# Patient Record
Sex: MALE | Race: OTHER | NOT HISPANIC OR LATINO
[De-identification: names, ages, dates, MRNs, and addresses within clinical notes are randomized per-mention and may not be internally consistent; named-entity substitution may affect disease eponyms.]

---

## 2019-03-11 PROBLEM — Z00.00 ENCOUNTER FOR PREVENTIVE HEALTH EXAMINATION: Status: ACTIVE | Noted: 2019-03-11

## 2019-03-12 ENCOUNTER — APPOINTMENT (OUTPATIENT)
Dept: NEPHROLOGY | Facility: CLINIC | Age: 69
End: 2019-03-12
Payer: MEDICARE

## 2019-03-12 VITALS — SYSTOLIC BLOOD PRESSURE: 150 MMHG | RESPIRATION RATE: 14 BRPM | DIASTOLIC BLOOD PRESSURE: 88 MMHG | HEART RATE: 90 BPM

## 2019-03-12 DIAGNOSIS — Z83.3 FAMILY HISTORY OF DIABETES MELLITUS: ICD-10-CM

## 2019-03-12 DIAGNOSIS — Z78.9 OTHER SPECIFIED HEALTH STATUS: ICD-10-CM

## 2019-03-12 PROCEDURE — 99204 OFFICE O/P NEW MOD 45 MIN: CPT

## 2019-03-12 RX ORDER — MOXIFLOXACIN OPHTHALMIC 5 MG/ML
0.5 SOLUTION/ DROPS OPHTHALMIC
Qty: 3 | Refills: 0 | Status: DISCONTINUED | COMMUNITY
Start: 2018-11-01

## 2019-03-12 RX ORDER — KETOROLAC TROMETHAMINE 5 MG/ML
0.5 SOLUTION OPHTHALMIC
Qty: 5 | Refills: 0 | Status: DISCONTINUED | COMMUNITY
Start: 2018-11-01

## 2019-03-12 RX ORDER — PREDNISOLONE ACETATE 10 MG/ML
1 SUSPENSION/ DROPS OPHTHALMIC
Qty: 15 | Refills: 0 | Status: DISCONTINUED | COMMUNITY
Start: 2018-11-01

## 2019-03-14 ENCOUNTER — FORM ENCOUNTER (OUTPATIENT)
Age: 69
End: 2019-03-14

## 2019-03-15 ENCOUNTER — APPOINTMENT (OUTPATIENT)
Dept: ULTRASOUND IMAGING | Facility: HOSPITAL | Age: 69
End: 2019-03-15
Payer: MEDICARE

## 2019-03-15 ENCOUNTER — OUTPATIENT (OUTPATIENT)
Dept: OUTPATIENT SERVICES | Facility: HOSPITAL | Age: 69
LOS: 1 days | End: 2019-03-15
Payer: MEDICARE

## 2019-03-15 PROCEDURE — 76770 US EXAM ABDO BACK WALL COMP: CPT

## 2019-03-15 PROCEDURE — 93976 VASCULAR STUDY: CPT | Mod: 26

## 2019-03-15 PROCEDURE — 93976 VASCULAR STUDY: CPT

## 2019-03-15 PROCEDURE — 76770 US EXAM ABDO BACK WALL COMP: CPT | Mod: 26,59

## 2019-03-18 ENCOUNTER — LABORATORY RESULT (OUTPATIENT)
Age: 69
End: 2019-03-18

## 2019-03-26 LAB
24R-OH-CALCIDIOL SERPL-MCNC: 26.1 PG/ML
25(OH)D3 SERPL-MCNC: 24.8 NG/ML
ALBUMIN MFR SERPL ELPH: 54.6 %
ALBUMIN SERPL ELPH-MCNC: 4.3 G/DL
ALBUMIN SERPL-MCNC: 4.1 G/DL
ALBUMIN/GLOB SERPL: 1.2 RATIO
ALPHA1 GLOB MFR SERPL ELPH: 3.9 %
ALPHA1 GLOB SERPL ELPH-MCNC: 0.3 G/DL
ALPHA2 GLOB MFR SERPL ELPH: 12.7 %
ALPHA2 GLOB SERPL ELPH-MCNC: 1 G/DL
ANA SER IF-ACNC: NEGATIVE
ANION GAP SERPL CALC-SCNC: 12 MMOL/L
APPEARANCE: CLEAR
B-GLOBULIN MFR SERPL ELPH: 13.3 %
B-GLOBULIN SERPL ELPH-MCNC: 1 G/DL
BACTERIA: NEGATIVE
BILIRUBIN URINE: NEGATIVE
BLOOD URINE: NORMAL
BUN SERPL-MCNC: 26 MG/DL
C3 SERPL-MCNC: 146 MG/DL
C4 SERPL-MCNC: 33 MG/DL
CALCIUM SERPL-MCNC: 9.7 MG/DL
CALCIUM SERPL-MCNC: 9.7 MG/DL
CHLORIDE SERPL-SCNC: 101 MMOL/L
CO2 SERPL-SCNC: 24 MMOL/L
COLOR: NORMAL
CREAT SERPL-MCNC: 2.42 MG/DL
CREAT SPEC-SCNC: 56 MG/DL
CREAT SPEC-SCNC: 56 MG/DL
CREAT/PROT UR: 2.4 RATIO
DEPRECATED KAPPA LC FREE/LAMBDA SER: 1.35 RATIO
DSDNA AB SER-ACNC: <12 IU/ML
GAMMA GLOB FLD ELPH-MCNC: 1.2 G/DL
GAMMA GLOB MFR SERPL ELPH: 15.5 %
GBM AB TITR SER IF: <1 AL
GLUCOSE QUALITATIVE U: ABNORMAL
GLUCOSE SERPL-MCNC: 311 MG/DL
HBV CORE IGG+IGM SER QL: NONREACTIVE
HBV CORE IGM SER QL: NONREACTIVE
HBV SURFACE AB SER QL: NONREACTIVE
HBV SURFACE AG SER QL: NONREACTIVE
HCV AB SER QL: NONREACTIVE
HCV S/CO RATIO: 0.1 S/CO
HIV1+2 AB SPEC QL IA.RAPID: NONREACTIVE
HYALINE CASTS: 0 /LPF
INTERPRETATION SERPL IEP-IMP: NORMAL
KAPPA LC CSF-MCNC: 4.77 MG/DL
KAPPA LC SERPL-MCNC: 6.42 MG/DL
KETONES URINE: NEGATIVE
LEUKOCYTE ESTERASE URINE: NEGATIVE
M PROTEIN SPEC IFE-MCNC: NORMAL
MICROALBUMIN 24H UR DL<=1MG/L-MCNC: 80.2 MG/DL
MICROALBUMIN/CREAT 24H UR-RTO: 1436 MG/G
MICROSCOPIC-UA: NORMAL
MPO AB + PR3 PNL SER: NORMAL
NITRITE URINE: NEGATIVE
PARATHYROID HORMONE INTACT: 100 PG/ML
PH URINE: 6
PHOSPHATE SERPL-MCNC: 4.3 MG/DL
PHOSPHOLIPASE A2 RECEPTOR ELISA: <2 RU/ML
PHOSPHOLIPASE A2 RECEPTOR IFA: NEGATIVE
POTASSIUM SERPL-SCNC: 5.1 MMOL/L
PROT SERPL-MCNC: 7.5 G/DL
PROT SERPL-MCNC: 7.5 G/DL
PROT UR-MCNC: 132 MG/DL
PROTEIN URINE: ABNORMAL
RED BLOOD CELLS URINE: 3 /HPF
SODIUM SERPL-SCNC: 137 MMOL/L
SPECIFIC GRAVITY URINE: 1.01
SQUAMOUS EPITHELIAL CELLS: 0 /HPF
T PALLIDUM AB SER QL IA: NEGATIVE
URATE SERPL-MCNC: 6.5 MG/DL
UROBILINOGEN URINE: NORMAL
WHITE BLOOD CELLS URINE: 1 /HPF

## 2019-03-26 NOTE — HISTORY OF PRESENT ILLNESS
[FreeTextEntry1] : 68yo [place]an M with HTN, long standing DMII + retinopathy referred by PCP Dr. Jaime To for CKD management after found to have rising Cr, microscopic hematuria and non-nephrotic proteinuria:\par \par Told about 1 yr ago that he needs to drink more water because of "kidneys'  but other wise denies knowledge of any prior kidney disease before February tania with PCP where Cr 2.4. He does say PCP told him the value checked before that in 2018 was "1.8" although didn't bring these records. Hard to get a straight answer from him regarding knowledge of renal disease or proteinuria in the past. \par \par DMII dx >20yrs ago, +retinopathy it seems (told dm affecting eye, also w cataracts), follows w optho. \par No neuropathy. \par \par Denies urinary sx, good stream usually w occasional lighter stream. no dysuria/hesitancy. no foamy urine, no hematuria. Does think he was told of "christina in the urine" when he was 7yo told it was 2/2 eating too many tomatoes. Never had sx of nephrolithiasis nor passed a stone subsequently. \par \par Follows low carb diet for DM, stable diet. Good appetite, no nausea. No skin itching or metallic taste.\par no LE swelling or SOB. \par No new joint pain/swelling/rash. \par \par OTC: B12, MVM, no NSAIDs, no herbals.\par \par

## 2019-03-26 NOTE — CONSULT LETTER
[Dear  ___] : Dear  [unfilled], [Consult Letter:] : I had the pleasure of evaluating your patient, [unfilled]. [Please see my note below.] : Please see my note below. [Consult Closing:] : Thank you very much for allowing me to participate in the care of this patient.  If you have any questions, please do not hesitate to contact me. [Sincerely,] : Sincerely, [FreeTextEntry3] : Tammy Vasquez MD\par  of Medicine\par Division of Kidney Diseases and Hypertension\par [facility] \par [employer] [facility]\par \par \par

## 2019-03-26 NOTE — PHYSICAL EXAM
[General Appearance - Alert] : alert [General Appearance - In No Acute Distress] : in no acute distress [General Appearance - Well Nourished] : well nourished [Sclera] : the sclera and conjunctiva were normal [PERRL With Normal Accommodation] : pupils were equal in size, round, and reactive to light [Extraocular Movements] : extraocular movements were intact [Outer Ear] : the ears and nose were normal in appearance [Oropharynx] : the oropharynx was normal [Neck Appearance] : the appearance of the neck was normal [Jugular Venous Distention Increased] : there was no jugular-venous distention [Auscultation Breath Sounds / Voice Sounds] : lungs were clear to auscultation bilaterally [Heart Rate And Rhythm] : heart rate was normal and rhythm regular [Heart Sounds] : normal S1 and S2 [Heart Sounds Gallop] : no gallops [Murmurs] : no murmurs [Heart Sounds Pericardial Friction Rub] : no pericardial rub [Full Pulse] : the pedal pulses are present [Edema] : there was no peripheral edema [Bowel Sounds] : normal bowel sounds [Abdomen Soft] : soft [Abdomen Tenderness] : non-tender [No CVA Tenderness] : no ~M costovertebral angle tenderness [No Spinal Tenderness] : no spinal tenderness [Abnormal Walk] : normal gait [Involuntary Movements] : no involuntary movements were seen [Musculoskeletal - Swelling] : no joint swelling seen [Skin Color & Pigmentation] : normal skin color and pigmentation [] : no rash [No Focal Deficits] : no focal deficits [Oriented To Time, Place, And Person] : oriented to person, place, and time [Impaired Insight] : insight and judgment were intact [Affect] : the affect was normal [FreeTextEntry1] : no asterixis

## 2019-04-03 ENCOUNTER — APPOINTMENT (OUTPATIENT)
Dept: NEPHROLOGY | Facility: CLINIC | Age: 69
End: 2019-04-03
Payer: MEDICARE

## 2019-04-03 VITALS — HEART RATE: 82 BPM | DIASTOLIC BLOOD PRESSURE: 80 MMHG | SYSTOLIC BLOOD PRESSURE: 145 MMHG | RESPIRATION RATE: 14 BRPM

## 2019-04-03 PROCEDURE — 99215 OFFICE O/P EST HI 40 MIN: CPT

## 2019-04-03 RX ORDER — METFORMIN HYDROCHLORIDE 1000 MG/1
1000 TABLET, COATED ORAL
Qty: 180 | Refills: 0 | Status: DISCONTINUED | COMMUNITY
Start: 2018-12-24 | End: 2019-04-03

## 2019-04-04 NOTE — ASSESSMENT
[FreeTextEntry1] : 70yo Iranian M with HTN, BPH with mild urinary retention, long standing DMII + retinopathy and CKD IV with non-nephrotic proteinuria:\par \par # CKD IV -  likely 2/2 diabetic nephropathy\par - Cr stable compared to Feb 2.4, 2.6g proteinuria, egfr 28.5 -  Ramipril 2.5mg  on hold 2/2 hyperkalemia - improved to 5.1, started chlorthalidone for HTN, edema and hyperkalemia, recheck labs in 1 month. Counselled extensively on low K diet, given literature. If k better next time will try to start  ACEi +/- Veltassa\par u/a 2+ protein, 10-20 RBCs Feb however repeat in march just proteinuria and glucosuria. 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 Needs ergo for secondary hyperparathyroidism. \par Reviewed renal sono, 10.5/10.7cm normal looking kidneys, mildly enlarged prostate with 170cc PVR - referred to urologist, may benefit from starting tamsulosin\par \par # DMII - + retinopathy\par HbA1C 7.9% Feb, says was difficult to control until he started lantus. Referred to endocrine, stopped Metformin.\par \par # HTN - fair control, started chlorthalidone, higher dose 50mg for reduced egfr\par

## 2019-04-04 NOTE — HISTORY OF PRESENT ILLNESS
[FreeTextEntry1] : 70yo [place]ian M with HTN, BPH with mild urinary retention, long standing DMII + retinopathy and CKD IV with non-nephrotic proteinuria:\par \par Feeling well since last viist. Picked  up norvasc to start in place of acei. No LE edema.\par No urinary symptoms.\par No headaches/dizziness. \par BGs have been very well controlled lately per pt, 's in the mornings. \par \par All other ROS negative

## 2019-04-26 ENCOUNTER — INPATIENT (INPATIENT)
Facility: HOSPITAL | Age: 69
LOS: 2 days | Discharge: HOME | DRG: 683 | End: 2019-04-29
Attending: INTERNAL MEDICINE | Admitting: INTERNAL MEDICINE
Payer: MEDICARE

## 2019-04-26 VITALS
SYSTOLIC BLOOD PRESSURE: 190 MMHG | TEMPERATURE: 98 F | HEART RATE: 95 BPM | OXYGEN SATURATION: 98 % | WEIGHT: 160.06 LBS | DIASTOLIC BLOOD PRESSURE: 77 MMHG | RESPIRATION RATE: 16 BRPM | HEIGHT: 67 IN

## 2019-04-26 DIAGNOSIS — Z29.9 ENCOUNTER FOR PROPHYLACTIC MEASURES, UNSPECIFIED: ICD-10-CM

## 2019-04-26 DIAGNOSIS — E78.5 HYPERLIPIDEMIA, UNSPECIFIED: ICD-10-CM

## 2019-04-26 DIAGNOSIS — E11.9 TYPE 2 DIABETES MELLITUS WITHOUT COMPLICATIONS: ICD-10-CM

## 2019-04-26 DIAGNOSIS — N18.4 CHRONIC KIDNEY DISEASE, STAGE 4 (SEVERE): ICD-10-CM

## 2019-04-26 DIAGNOSIS — R63.8 OTHER SYMPTOMS AND SIGNS CONCERNING FOOD AND FLUID INTAKE: ICD-10-CM

## 2019-04-26 DIAGNOSIS — N40.0 BENIGN PROSTATIC HYPERPLASIA WITHOUT LOWER URINARY TRACT SYMPTOMS: ICD-10-CM

## 2019-04-26 DIAGNOSIS — E87.1 HYPO-OSMOLALITY AND HYPONATREMIA: ICD-10-CM

## 2019-04-26 DIAGNOSIS — Z91.89 OTHER SPECIFIED PERSONAL RISK FACTORS, NOT ELSEWHERE CLASSIFIED: ICD-10-CM

## 2019-04-26 DIAGNOSIS — K59.00 CONSTIPATION, UNSPECIFIED: ICD-10-CM

## 2019-04-26 DIAGNOSIS — I10 ESSENTIAL (PRIMARY) HYPERTENSION: ICD-10-CM

## 2019-04-26 LAB
ANION GAP SERPL CALC-SCNC: 15 MMOL/L — SIGNIFICANT CHANGE UP (ref 5–17)
ANION GAP SERPL CALC-SCNC: 16 MMOL/L — SIGNIFICANT CHANGE UP (ref 5–17)
APPEARANCE UR: CLEAR — SIGNIFICANT CHANGE UP
BACTERIA # UR AUTO: PRESENT /HPF
BASOPHILS # BLD AUTO: 0.01 K/UL — SIGNIFICANT CHANGE UP (ref 0–0.2)
BASOPHILS NFR BLD AUTO: 0.1 % — SIGNIFICANT CHANGE UP (ref 0–2)
BILIRUB UR-MCNC: NEGATIVE — SIGNIFICANT CHANGE UP
BUN SERPL-MCNC: 29 MG/DL — HIGH (ref 7–23)
BUN SERPL-MCNC: 31 MG/DL — HIGH (ref 7–23)
CALCIUM SERPL-MCNC: 9 MG/DL — SIGNIFICANT CHANGE UP (ref 8.4–10.5)
CALCIUM SERPL-MCNC: 9.6 MG/DL — SIGNIFICANT CHANGE UP (ref 8.4–10.5)
CHLORIDE SERPL-SCNC: 71 MMOL/L — LOW (ref 96–108)
CHLORIDE SERPL-SCNC: 74 MMOL/L — LOW (ref 96–108)
CHLORIDE UR-SCNC: 28 MMOL/L — SIGNIFICANT CHANGE UP
CO2 SERPL-SCNC: 25 MMOL/L — SIGNIFICANT CHANGE UP (ref 22–31)
CO2 SERPL-SCNC: 27 MMOL/L — SIGNIFICANT CHANGE UP (ref 22–31)
COLOR SPEC: YELLOW — SIGNIFICANT CHANGE UP
COMMENT - URINE: SIGNIFICANT CHANGE UP
CREAT SERPL-MCNC: 2.61 MG/DL — HIGH (ref 0.5–1.3)
CREAT SERPL-MCNC: 2.73 MG/DL — HIGH (ref 0.5–1.3)
DIFF PNL FLD: ABNORMAL
EOSINOPHIL # BLD AUTO: 0.04 K/UL — SIGNIFICANT CHANGE UP (ref 0–0.5)
EOSINOPHIL NFR BLD AUTO: 0.4 % — SIGNIFICANT CHANGE UP (ref 0–6)
EPI CELLS # UR: SIGNIFICANT CHANGE UP /HPF (ref 0–5)
GLUCOSE BLDC GLUCOMTR-MCNC: 204 MG/DL — HIGH (ref 70–99)
GLUCOSE SERPL-MCNC: 182 MG/DL — HIGH (ref 70–99)
GLUCOSE SERPL-MCNC: 203 MG/DL — HIGH (ref 70–99)
GLUCOSE UR QL: 100
HCT VFR BLD CALC: 34.4 % — LOW (ref 39–50)
HGB BLD-MCNC: 12.4 G/DL — LOW (ref 13–17)
IMM GRANULOCYTES NFR BLD AUTO: 0.3 % — SIGNIFICANT CHANGE UP (ref 0–1.5)
KETONES UR-MCNC: NEGATIVE — SIGNIFICANT CHANGE UP
LEUKOCYTE ESTERASE UR-ACNC: NEGATIVE — SIGNIFICANT CHANGE UP
LYMPHOCYTES # BLD AUTO: 0.89 K/UL — LOW (ref 1–3.3)
LYMPHOCYTES # BLD AUTO: 9.9 % — LOW (ref 13–44)
MAGNESIUM SERPL-MCNC: 2.1 MG/DL — SIGNIFICANT CHANGE UP (ref 1.6–2.6)
MCHC RBC-ENTMCNC: 28.3 PG — SIGNIFICANT CHANGE UP (ref 27–34)
MCHC RBC-ENTMCNC: 36 GM/DL — SIGNIFICANT CHANGE UP (ref 32–36)
MCV RBC AUTO: 78.5 FL — LOW (ref 80–100)
MONOCYTES # BLD AUTO: 0.67 K/UL — SIGNIFICANT CHANGE UP (ref 0–0.9)
MONOCYTES NFR BLD AUTO: 7.5 % — SIGNIFICANT CHANGE UP (ref 2–14)
NEUTROPHILS # BLD AUTO: 7.31 K/UL — SIGNIFICANT CHANGE UP (ref 1.8–7.4)
NEUTROPHILS NFR BLD AUTO: 81.8 % — HIGH (ref 43–77)
NITRITE UR-MCNC: NEGATIVE — SIGNIFICANT CHANGE UP
NRBC # BLD: 0 /100 WBCS — SIGNIFICANT CHANGE UP (ref 0–0)
OSMOLALITY SERPL: 255 MOSM/KG — LOW (ref 280–301)
OSMOLALITY UR: 197 MOSMOL/KG — SIGNIFICANT CHANGE UP (ref 100–650)
OSMOLALITY UR: 232 MOSMOL/KG — SIGNIFICANT CHANGE UP (ref 100–650)
PH UR: 6.5 — SIGNIFICANT CHANGE UP (ref 5–8)
PLATELET # BLD AUTO: 320 K/UL — SIGNIFICANT CHANGE UP (ref 150–400)
POTASSIUM SERPL-MCNC: 2.9 MMOL/L — CRITICAL LOW (ref 3.5–5.3)
POTASSIUM SERPL-MCNC: 3.2 MMOL/L — LOW (ref 3.5–5.3)
POTASSIUM SERPL-SCNC: 2.9 MMOL/L — CRITICAL LOW (ref 3.5–5.3)
POTASSIUM SERPL-SCNC: 3.2 MMOL/L — LOW (ref 3.5–5.3)
PROT UR-MCNC: 100 MG/DL
RBC # BLD: 4.38 M/UL — SIGNIFICANT CHANGE UP (ref 4.2–5.8)
RBC # FLD: 11.1 % — SIGNIFICANT CHANGE UP (ref 10.3–14.5)
RBC CASTS # UR COMP ASSIST: < 5 /HPF — SIGNIFICANT CHANGE UP
SODIUM SERPL-SCNC: 113 MMOL/L — CRITICAL LOW (ref 135–145)
SODIUM SERPL-SCNC: 115 MMOL/L — CRITICAL LOW (ref 135–145)
SODIUM UR-SCNC: 32 MMOL/L — SIGNIFICANT CHANGE UP
SODIUM UR-SCNC: 32 MMOL/L — SIGNIFICANT CHANGE UP
SP GR SPEC: 1.01 — SIGNIFICANT CHANGE UP (ref 1–1.03)
UROBILINOGEN FLD QL: 0.2 E.U./DL — SIGNIFICANT CHANGE UP
WBC # BLD: 8.95 K/UL — SIGNIFICANT CHANGE UP (ref 3.8–10.5)
WBC # FLD AUTO: 8.95 K/UL — SIGNIFICANT CHANGE UP (ref 3.8–10.5)
WBC UR QL: < 5 /HPF — SIGNIFICANT CHANGE UP

## 2019-04-26 PROCEDURE — 93010 ELECTROCARDIOGRAM REPORT: CPT

## 2019-04-26 PROCEDURE — 99291 CRITICAL CARE FIRST HOUR: CPT

## 2019-04-26 PROCEDURE — 74019 RADEX ABDOMEN 2 VIEWS: CPT | Mod: 26

## 2019-04-26 RX ORDER — DEXTROSE 50 % IN WATER 50 %
25 SYRINGE (ML) INTRAVENOUS ONCE
Qty: 0 | Refills: 0 | Status: DISCONTINUED | OUTPATIENT
Start: 2019-04-26 | End: 2019-04-29

## 2019-04-26 RX ORDER — GLUCAGON INJECTION, SOLUTION 0.5 MG/.1ML
1 INJECTION, SOLUTION SUBCUTANEOUS ONCE
Qty: 0 | Refills: 0 | Status: DISCONTINUED | OUTPATIENT
Start: 2019-04-26 | End: 2019-04-29

## 2019-04-26 RX ORDER — INSULIN GLARGINE 100 [IU]/ML
12 INJECTION, SOLUTION SUBCUTANEOUS AT BEDTIME
Qty: 0 | Refills: 0 | Status: DISCONTINUED | OUTPATIENT
Start: 2019-04-26 | End: 2019-04-29

## 2019-04-26 RX ORDER — SENNA PLUS 8.6 MG/1
2 TABLET ORAL AT BEDTIME
Qty: 0 | Refills: 0 | Status: DISCONTINUED | OUTPATIENT
Start: 2019-04-26 | End: 2019-04-29

## 2019-04-26 RX ORDER — HEPARIN SODIUM 5000 [USP'U]/ML
5000 INJECTION INTRAVENOUS; SUBCUTANEOUS EVERY 8 HOURS
Qty: 0 | Refills: 0 | Status: DISCONTINUED | OUTPATIENT
Start: 2019-04-26 | End: 2019-04-29

## 2019-04-26 RX ORDER — POTASSIUM CHLORIDE 20 MEQ
40 PACKET (EA) ORAL ONCE
Qty: 0 | Refills: 0 | Status: COMPLETED | OUTPATIENT
Start: 2019-04-26 | End: 2019-04-26

## 2019-04-26 RX ORDER — DOCUSATE SODIUM 100 MG
100 CAPSULE ORAL THREE TIMES A DAY
Qty: 0 | Refills: 0 | Status: DISCONTINUED | OUTPATIENT
Start: 2019-04-26 | End: 2019-04-29

## 2019-04-26 RX ORDER — AMLODIPINE BESYLATE 2.5 MG/1
5 TABLET ORAL DAILY
Qty: 0 | Refills: 0 | Status: DISCONTINUED | OUTPATIENT
Start: 2019-04-27 | End: 2019-04-29

## 2019-04-26 RX ORDER — POLYETHYLENE GLYCOL 3350 17 G/17G
17 POWDER, FOR SOLUTION ORAL DAILY
Qty: 0 | Refills: 0 | Status: DISCONTINUED | OUTPATIENT
Start: 2019-04-26 | End: 2019-04-29

## 2019-04-26 RX ORDER — ATORVASTATIN CALCIUM 80 MG/1
20 TABLET, FILM COATED ORAL AT BEDTIME
Qty: 0 | Refills: 0 | Status: DISCONTINUED | OUTPATIENT
Start: 2019-04-26 | End: 2019-04-29

## 2019-04-26 RX ORDER — POTASSIUM CHLORIDE 20 MEQ
10 PACKET (EA) ORAL
Qty: 0 | Refills: 0 | Status: COMPLETED | OUTPATIENT
Start: 2019-04-26 | End: 2019-04-26

## 2019-04-26 RX ORDER — SODIUM CHLORIDE 9 MG/ML
1000 INJECTION INTRAMUSCULAR; INTRAVENOUS; SUBCUTANEOUS ONCE
Qty: 0 | Refills: 0 | Status: COMPLETED | OUTPATIENT
Start: 2019-04-26 | End: 2019-04-26

## 2019-04-26 RX ORDER — DEXTROSE 50 % IN WATER 50 %
15 SYRINGE (ML) INTRAVENOUS ONCE
Qty: 0 | Refills: 0 | Status: DISCONTINUED | OUTPATIENT
Start: 2019-04-26 | End: 2019-04-29

## 2019-04-26 RX ORDER — INSULIN LISPRO 100/ML
VIAL (ML) SUBCUTANEOUS
Qty: 0 | Refills: 0 | Status: DISCONTINUED | OUTPATIENT
Start: 2019-04-26 | End: 2019-04-29

## 2019-04-26 RX ORDER — SODIUM CHLORIDE 9 MG/ML
1000 INJECTION, SOLUTION INTRAVENOUS
Qty: 0 | Refills: 0 | Status: DISCONTINUED | OUTPATIENT
Start: 2019-04-26 | End: 2019-04-29

## 2019-04-26 RX ORDER — DEXTROSE 50 % IN WATER 50 %
12.5 SYRINGE (ML) INTRAVENOUS ONCE
Qty: 0 | Refills: 0 | Status: DISCONTINUED | OUTPATIENT
Start: 2019-04-26 | End: 2019-04-29

## 2019-04-26 RX ADMIN — Medication 40 MILLIEQUIVALENT(S): at 16:59

## 2019-04-26 RX ADMIN — HEPARIN SODIUM 5000 UNIT(S): 5000 INJECTION INTRAVENOUS; SUBCUTANEOUS at 23:38

## 2019-04-26 RX ADMIN — Medication 10 MILLIEQUIVALENT(S): at 19:06

## 2019-04-26 RX ADMIN — Medication 100 MILLIEQUIVALENT(S): at 20:30

## 2019-04-26 RX ADMIN — ATORVASTATIN CALCIUM 20 MILLIGRAM(S): 80 TABLET, FILM COATED ORAL at 23:38

## 2019-04-26 RX ADMIN — SENNA PLUS 2 TABLET(S): 8.6 TABLET ORAL at 23:38

## 2019-04-26 RX ADMIN — Medication 100 MILLIEQUIVALENT(S): at 17:59

## 2019-04-26 RX ADMIN — Medication 100 MILLIGRAM(S): at 23:38

## 2019-04-26 RX ADMIN — Medication 4: at 23:38

## 2019-04-26 RX ADMIN — POLYETHYLENE GLYCOL 3350 17 GRAM(S): 17 POWDER, FOR SOLUTION ORAL at 20:40

## 2019-04-26 RX ADMIN — SODIUM CHLORIDE 1000 MILLILITER(S): 9 INJECTION INTRAMUSCULAR; INTRAVENOUS; SUBCUTANEOUS at 13:30

## 2019-04-26 RX ADMIN — SODIUM CHLORIDE 1000 MILLILITER(S): 9 INJECTION INTRAMUSCULAR; INTRAVENOUS; SUBCUTANEOUS at 15:10

## 2019-04-26 RX ADMIN — INSULIN GLARGINE 12 UNIT(S): 100 INJECTION, SOLUTION SUBCUTANEOUS at 23:38

## 2019-04-26 RX ADMIN — SODIUM CHLORIDE 500 MILLILITER(S): 9 INJECTION INTRAMUSCULAR; INTRAVENOUS; SUBCUTANEOUS at 18:03

## 2019-04-26 RX ADMIN — Medication 1 ENEMA: at 13:14

## 2019-04-26 RX ADMIN — Medication 100 MILLIEQUIVALENT(S): at 19:39

## 2019-04-26 NOTE — H&P ADULT - PROBLEM SELECTOR PLAN 3
Creatinine elevated from baseline of 2.4. Component of PRICILA on CKD. S/p 2LNS in the ED  - f/u urine lytes  - BMP q4 Creatinine elevated at 2.73 from baseline of 2.4. Component of PRICILA on CKD. S/p 2LNS in the ED. Downtrending to 2.61.   - f/u urine lytes  - BMP q4 Creatinine elevated at 2.73 from baseline of 2.4. Component of PRICILA on CKD in the setting of vomiting and poor PO intake. S/p 2LNS in the ED. Downtrending to 2.61.   - f/u urine lytes  - BMP q4

## 2019-04-26 NOTE — ED ADULT NURSE REASSESSMENT NOTE - NS ED NURSE REASSESS COMMENT FT1
Patient states he passed some stool following enema. IV fluid infusing. Pending urine sample. Will repeat BMP after fluid. Safety precautions observed.

## 2019-04-26 NOTE — CONSULT NOTE ADULT - ASSESSMENT
Patient is a 69 year old male w pmhx of CKD IV (follows w Dr. Vasquez, baseline Creatinine 2.4), HTN, DM2, BPH with hx of urinary retention, chronic constipation who presents complaining of constipation x 4 days, found to have multiple lab derangements including Na to 113. Also with PRICILA, hypochloremia and hypokalemia.     Impression:  Hyponatremia likely secondary to new thiazide use and exacerbated by poor po intake over past several days. Dehydration from diuretic likely exacerbating chronic constipation.      Recommendations:  -stop chlorthalidone and consider calcium channel blocker for blood pressure control  -euvolemic on exam without signs of heart failure; continue with NS for repletion of Na   -monitor BMP q4 hours for sodium trends; goal increase by 6 over 24 hours.  -obtain urine Na and Urine osms with each BMP   -replete K and follow up BMP   -s/p 1 fleet enema; has not yet stooled. Start bowel regimen and continue to monitor K closely  -currently asymptomatic; mental status checks while Na <120  [facility]     Discussed with Dr. Torres Patient is a 69 year old male w pmhx of CKD IV (follows w Dr. Vasquez, baseline Creatinine 2.4), HTN, DM2, BPH with hx of urinary retention, chronic constipation who presents complaining of constipation x 4 days, found to have multiple lab derangements including Na to 113. Also with PRICILA, hypochloremia and hypokalemia.     Impression:  Hyponatremia likely secondary to new thiazide use and exacerbated by poor po intake over past several days. Dehydration from diuretic likely exacerbating chronic constipation.      Recommendations:  -stop chlorthalidone and consider nifedipine/calcium channel blocker for blood pressure control; avoid ACE given CKD4  -euvolemic on exam without signs of heart failure; continue with NS for repletion of Na   -monitor BMP q4 hours for sodium trends; goal increase by 6 over 24 hours.  -obtain urine Na and Urine osms with each BMP   -replete K and follow up BMP   -s/p 1 fleet enema; has not yet stooled. Start bowel regimen and continue to monitor K closely  -currently asymptomatic; mental status checks while Na <120  -contact Dr. Vasquez, follow up recommendations  -admission for care on [facility]     Discussed with Dr. Torres Patient is a 69 year old male w pmhx of CKD IV (follows w Dr. Vasquez, baseline Creatinine 2.4), HTN, DM2, BPH with hx of urinary retention, chronic constipation who presents complaining of constipation x 4 days, found to have multiple lab derangements including Na to 113. Also with PRICILA, hypochloremia and hypokalemia.     Impression:  Hyponatremia likely secondary to new thiazide use and exacerbated by poor po intake over past several days. Dehydration from diuretic likely exacerbating chronic constipation.      Recommendations:  -stop chlorthalidone and consider nifedipine/calcium channel blocker for blood pressure control; avoid ACE given CKD4  -euvolemic on exam without signs of heart failure; continue with NS for repletion of Na   -monitor BMP q4 hours for sodium trends; goal increase by 6 over 24 hours.  -obtain urine Na and Urine osms with each BMP   -replete K and follow up BMP   -s/p 1 fleet enema; has not yet stooled. Start bowel regimen and continue to monitor K closely  -currently asymptomatic; mental status checks while Na <120  -contact Dr. Vasquez, follow up recommendations  -abd/pelv US with prostatomegaly and high PVR; monitor UOP/PVRs to ensure not retaining  -admission for care on [facility]     Discussed with Dr. Torres Patient is a 69 year old male w pmhx of CKD IV (follows w Dr. Vasquez, baseline Creatinine 2.4), HTN, DM2, BPH with hx of urinary retention, chronic constipation who presents complaining of constipation x 4 days, found to have multiple lab derangements including Na to 113. Also with PRICILA, hypochloremia and hypokalemia.     Impression:  Hyponatremia likely secondary to new thiazide use and exacerbated by poor po intake over past several days. Dehydration from diuretic likely exacerbating chronic constipation.      Recommendations:  -stop chlorthalidone and consider nifedipine/calcium channel blocker for blood pressure control; avoid ACE given CKD4  -euvolemic on exam without signs of heart failure; continue with NS for repletion of Na   -monitor BMP q4 hours for sodium trends; goal increase by 6 over 24 hours.  -obtain urine Na and Urine osms with each BMP   -replete K and follow up BMP   -s/p 1 fleet enema; has not yet stooled. Start bowel regimen and continue to monitor K closely  -currently asymptomatic; mental status checks while Na <120  -contact Dr. Vasquez, follow up recommendations  -abd/pelv US with prostatomegaly and high PVR; monitor UOP; consider tamsulosin  -admission for care on [facility]     Discussed with Dr. Torres

## 2019-04-26 NOTE — H&P ADULT - ASSESSMENT
69 year old male w pmhx of CKD IV (follows w Dr. Vasquez, baseline Creatinine 2.4), HTN, DM2, BPH with hx of urinary retention, chronic constipation who presents complaining of constipation x 4 days found with multiple lab abnormalities including hyponatremia, hypokalemia and hypochloremia in the setting of thiazide use. 69M with PMH CKD stage IV (follows w Dr. Vasquez, baseline Creatinine 2.4), HTN, DM2, BPH with hx of urinary retention, chronic constipation who presents complaining of constipation x 4 days found with multiple lab abnormalities including hyponatremia, hypokalemia and hypochloremia in the setting of thiazide use.

## 2019-04-26 NOTE — H&P ADULT - PROBLEM SELECTOR PLAN 8
F: No IVF   E: Cautious repletion of lytes as patient with CKD  N: DASH/TLC, Consistent Carb, Renal diet

## 2019-04-26 NOTE — H&P ADULT - NSHPLABSRESULTS_GEN_ALL_CORE
LABS:                        12.4   8.95  )-----------( 320      ( 2019 12:38 )             34.4     04-    115<LL>  |  74<L>  |  29<H>  ----------------------------<  182<H>  2.9<LL>   |  25  |  2.61<H>    Ca    9.0      2019 15:22  Mg     2.1             Urinalysis Basic - ( 2019 14:48 )    Color: Yellow / Appearance: Clear / S.010 / pH: x  Gluc: x / Ketone: NEGATIVE  / Bili: Negative / Urobili: 0.2 E.U./dL   Blood: x / Protein: 100 mg/dL / Nitrite: NEGATIVE   Leuk Esterase: NEGATIVE / RBC: < 5 /HPF / WBC < 5 /HPF   Sq Epi: x / Non Sq Epi: 0-5 /HPF / Bacteria: Present /HPF

## 2019-04-26 NOTE — ED ADULT NURSE NOTE - OBJECTIVE STATEMENT
Pt presents to ED with c/o constipation and abdominal bloating x4-5 days. States he is still passing gas. Denies nausea, reports one episode of vomiting 2 days ago. Has been taking laxatives and suppositories with no relief. Abdomen is soft, nontender, appears bloated. Also reports decreased appetite. Denies hx abd surgeries or abd pain. States he began taking a new medication for DM 3 weeks ago (Tradjenta) and has "had problems ever since."

## 2019-04-26 NOTE — H&P ADULT - PROBLEM SELECTOR PLAN 1
Sodium 113 this admission without any evidence of altered mental status and currently asymptomatic. Believed to be chronic hyponatremia as he was started on thiazide about 3 weeks ago. He reports that he does not drink any excess fluids per day.  - c/w BMP q4  - f/u urine lytes Sodium 113 this admission without any evidence of altered mental status and currently asymptomatic. Believed to be chronic hyponatremia as he was started on thiazide about 3 weeks ago. He reports that he does not drink any excess fluids per day.  - c/w BMP q4  - f/u urine lytes  - holding all thiazides Sodium 113 this admission without any evidence of altered mental status and currently asymptomatic. Believed to be chronic hyponatremia as he was started on thiazide about 3 weeks ago. He reports that he does not drink any excess fluids per day.  - c/w BMP q4  - f/u urine lytes q4  - holding all thiazides

## 2019-04-26 NOTE — CONSULT NOTE ADULT - SUBJECTIVE AND OBJECTIVE BOX
Patient is a 69 year old male w pmhx of CKD IV (follows w Dr. Vasquez), HTN, chronic constipation who presents with complaining of constipation x 4 days, found to have multiple lab derangements including Na to 113. Patient saw Dr. Vasquez on 4/3, at which time his ACE inhibitor transitioned to chlorthalidone 50mg qday due to hyperkalemia. Patient was educated on low K diet and has been compliant with medication since.     Patient's last bowel movement was five days ago, with one episode of emesis two days ago and since that time, patient has had decreased po intake due to decreased appetite. In ED patient presented stable; hypertensive Patient is a 69 year old male w pmhx of CKD IV (follows w Dr. Vasquez, baseline Creatinine 2.4), HTN, DM2, BPH with hx of urinary retention, chronic constipation who presents complaining of constipation x 4 days, found to have multiple lab derangements including Na to 113. Patient saw Dr. Vasquez on 4/3, at which time his ACE inhibitor transitioned to thiazide, chlorthalidone 50mg qday, due to hyperkalemia on outpatient labwork. Patient was educated on low K diet and has been compliant with medication since. In ED, patient was fully alert, oriented, appropriate without signs of seizure, euvolemic on exam. Abdominal xray without signs of SBO, air present in rectum with patient stating he had recently passed flatus. Vitals with some hypertension that self resolved, HR to 90's, with patient comfortable on room air. Patient was given 50mEQ potassium for hypokalemia to 2.9, fleet enema for constipation and started on NS for correction of hypo-osmolar hyponatremia.  ICU consulted for higher level while monitoring correction of severe hyponatremia.     PMHx: as above  PSurgical: n/a  FHx: constipation   Meds: Lantus, amlodipine, lipitor, (stop chlorthalidone) - had been suggested for tamsulosin per outpatient notes  Allergies: NKA  Social: never smoker  ROS: no complaints    VITAL SIGNS:  Vital Signs Last 24 Hrs  T(C): 37.1 (2019 15:52), Max: 37.1 (2019 12:23)  T(F): 98.7 (2019 15:52), Max: 98.7 (2019 12:23)  HR: 92 (2019 15:52) (92 - 96)  BP: 170/88 (2019 15:52) (170/88 - 190/77)  RR: 18 (2019 15:52) (16 - 18)  SpO2: 99% (2019 15:52) (98% - 99%)    PHYSICAL EXAM:  General: WDWN in NAD  HEENT: NC/AT; PERRL, anicteric sclera  Neck: supple, no JVD  Cardiovascular: +S1/S2; RRR  Respiratory: CTA B/L; no W/R/R  Gastrointestinal: soft, NT to palpation, hypoactive bowel sounds  Extremities: WWP; no edema, clubbing or cyanosis  Vascular: 2+ radial, DP/PT pulses B/L  Neurological: AAOx3; no focal deficits    MEDICATIONS  (STANDING):  potassium chloride  10 mEq/100 mL IVPB 10 milliEquivalent(s) IV Intermittent every 1 hour    MEDICATIONS  (PRN):    ALLERGIES:  No Known Allergies or Intolerances    LABS:                        12.4   8.95  )-----------( 320      ( 2019 12:38 )             34.4     -    115<LL>  |  74<L>  |  29<H>  ----------------------------<  182<H>  2.9<LL>   |  25  |  2.61<H>    Ca    9.0      2019 15:22  Mg     2.1         Urinalysis Basic - ( 2019 14:48 )    Color: Yellow / Appearance: Clear / S.010 / pH: x  Gluc: x / Ketone: NEGATIVE  / Bili: Negative / Urobili: 0.2 E.U./dL   Blood: x / Protein: 100 mg/dL / Nitrite: NEGATIVE   Leuk Esterase: NEGATIVE / RBC: < 5 /HPF / WBC < 5 /HPF   Sq Epi: x / Non Sq Epi: 0-5 /HPF / Bacteria: Present /HPF    CAPILLARY BLOOD GLUCOSE    RADIOLOGY & ADDITIONAL TESTS: Reviewed.  ABD Xray: no signs of SBO, air in the rectum Patient is a 69 year old male w pmhx of CKD IV (follows w Dr. Vasquez, baseline Creatinine 2.4), HTN, DM2, BPH with hx of urinary retention, chronic constipation who presents complaining of constipation x 4 days, found to have multiple lab derangements including Na to 113. Patient saw Dr. Vasquez on 4/3, at which time his ACE inhibitor transitioned to thiazide, chlorthalidone 50mg qday, due to hyperkalemia on outpatient labwork. Patient was educated on low K diet and has been compliant with medication since. In ED, patient was fully alert, oriented, appropriate without signs of seizure, euvolemic on exam. Abdominal xray without signs of SBO, air present in rectum with patient stating he had recently passed flatus. Vitals with some hypertension that self resolved, HR to 90's, with patient comfortable on room air. Patient was given 50mEQ potassium for hypokalemia to 2.9, fleet enema for constipation and started on NS for correction of hypo-osmolar hyponatremia.  ICU consulted for higher level while monitoring correction of severe hyponatremia.     PMHx: as above  PSurgical: n/a  FHx: constipation   Meds: Lantus, amlodipine, lipitor, (stop chlorthalidone) - had been suggested for tamsulosin per outpatient notes  Allergies: NKA  Social: never smoker  ROS: no complaints    VITAL SIGNS:  Vital Signs Last 24 Hrs  T(C): 37.1 (2019 15:52), Max: 37.1 (2019 12:23)  T(F): 98.7 (2019 15:52), Max: 98.7 (2019 12:23)  HR: 92 (2019 15:52) (92 - 96)  BP: 170/88 (2019 15:52) (170/88 - 190/77)  RR: 18 (2019 15:52) (16 - 18)  SpO2: 99% (2019 15:52) (98% - 99%)    PHYSICAL EXAM:  General: WDWN in NAD  HEENT: NC/AT; PERRL, anicteric sclera  Neck: supple, no JVD  Cardiovascular: +S1/S2; RRR  Respiratory: CTA B/L; no W/R/R  Gastrointestinal: soft, NT to palpation, hypoactive bowel sounds  Extremities: WWP; no edema, clubbing or cyanosis  Vascular: 2+ radial, DP/PT pulses B/L  Neurological: AAOx3; no focal deficits    MEDICATIONS  (STANDING):  potassium chloride  10 mEq/100 mL IVPB 10 milliEquivalent(s) IV Intermittent every 1 hour    MEDICATIONS  (PRN):    ALLERGIES:  No Known Allergies or Intolerances    LABS:                        12.4   8.95  )-----------( 320      ( 2019 12:38 )             34.4     -    115<LL>  |  74<L>  |  29<H>  ----------------------------<  182<H>  2.9<LL>   |  25  |  2.61<H>    Ca    9.0      2019 15:22  Mg     2.1         Urinalysis Basic - ( 2019 14:48 )    Color: Yellow / Appearance: Clear / S.010 / pH: x  Gluc: x / Ketone: NEGATIVE  / Bili: Negative / Urobili: 0.2 E.U./dL   Blood: x / Protein: 100 mg/dL / Nitrite: NEGATIVE   Leuk Esterase: NEGATIVE / RBC: < 5 /HPF / WBC < 5 /HPF   Sq Epi: x / Non Sq Epi: 0-5 /HPF / Bacteria: Present /HPF    CAPILLARY BLOOD GLUCOSE    RADIOLOGY & ADDITIONAL TESTS: Reviewed.  ABD Xray: no signs of SBO, air in the rectum  EKG: NSR,  without U waves Patient is a 69 year old male w pmhx of CKD IV (follows w Dr. Vasquez, baseline Creatinine 2.4), HTN, DM2, BPH with hx of urinary retention, chronic constipation who presents complaining of constipation x 4 days, found to have multiple lab derangements including Na to 113. Patient saw Dr. Vasquez on 4/3, at which time his ACE inhibitor transitioned to thiazide, chlorthalidone 50mg qday, due to hyperkalemia on outpatient labwork. Patient was educated on low K diet and has been compliant with medication since. In ED, patient was fully alert, oriented, appropriate without signs of seizure, euvolemic on exam. Abdominal xray without signs of SBO, air present in rectum with patient stating he had recently passed flatus. Vitals with some hypertension that self resolved, HR to 90's, with patient comfortable on room air. Patient was given 50mEQ potassium for hypokalemia to 2.9, fleet enema for constipation and started on NS for correction of hypo-osmolar hyponatremia.  ICU consulted to assess for higher level of care while monitoring correction of severe hyponatremia.     PMHx: as above  PSurgical: n/a  FHx: constipation   Meds: Lantus, amlodipine, lipitor, (stop chlorthalidone) - had been suggested for tamsulosin per outpatient notes  Allergies: NKA  Social: never smoker  ROS: no complaints    VITAL SIGNS:  Vital Signs Last 24 Hrs  T(C): 37.1 (2019 15:52), Max: 37.1 (2019 12:23)  T(F): 98.7 (2019 15:52), Max: 98.7 (2019 12:23)  HR: 92 (2019 15:52) (92 - 96)  BP: 170/88 (2019 15:52) (170/88 - 190/77)  RR: 18 (2019 15:52) (16 - 18)  SpO2: 99% (2019 15:52) (98% - 99%)    PHYSICAL EXAM:  General: WDWN in NAD  HEENT: NC/AT; PERRL, anicteric sclera  Neck: supple, no JVD  Cardiovascular: +S1/S2; RRR  Respiratory: CTA B/L; no W/R/R  Gastrointestinal: soft, NT to palpation, hypoactive bowel sounds  Extremities: WWP; no edema, clubbing or cyanosis  Vascular: 2+ radial, DP/PT pulses B/L  Neurological: AAOx3; no focal deficits    MEDICATIONS  (STANDING):  potassium chloride  10 mEq/100 mL IVPB 10 milliEquivalent(s) IV Intermittent every 1 hour    MEDICATIONS  (PRN):    ALLERGIES:  No Known Allergies or Intolerances    LABS:                        12.4   8.95  )-----------( 320      ( 2019 12:38 )             34.4     04-    115<LL>  |  74<L>  |  29<H>  ----------------------------<  182<H>  2.9<LL>   |  25  |  2.61<H>    Ca    9.0      2019 15:22  Mg     2.1         Urinalysis Basic - ( 2019 14:48 )    Color: Yellow / Appearance: Clear / S.010 / pH: x  Gluc: x / Ketone: NEGATIVE  / Bili: Negative / Urobili: 0.2 E.U./dL   Blood: x / Protein: 100 mg/dL / Nitrite: NEGATIVE   Leuk Esterase: NEGATIVE / RBC: < 5 /HPF / WBC < 5 /HPF   Sq Epi: x / Non Sq Epi: 0-5 /HPF / Bacteria: Present /HPF    CAPILLARY BLOOD GLUCOSE    RADIOLOGY & ADDITIONAL TESTS: Reviewed.  ABD Xray: no signs of SBO, air in the rectum  EKG: NSR,  without U waves Patient is a 69 year old male w pmhx of CKD IV (follows w Dr. Vasquez, baseline Creatinine 2.4), HTN, DM2, BPH with hx of urinary retention, chronic constipation who presents complaining of constipation x 4 days, found to have multiple lab derangements including Na to 113. Patient saw Dr. Vasquez on 4/3, at which time his ACE inhibitor transitioned to thiazide, chlorthalidone 50mg qday, due to hyperkalemia on outpatient labwork. Patient was educated on low K diet and has been compliant with medication since. In ED, patient was fully alert, oriented, appropriate without signs of seizure, euvolemic on exam. Abdominal xray without signs of SBO, air present in rectum with patient stating he had recently passed flatus. Vitals with some hypertension that self resolved, HR to 90's, with patient comfortable on room air. Patient was given 70mEQ potassium for hypokalemia to 2.9, fleet enema for constipation and started on NS for correction of hypo-osmolar hyponatremia.  ICU consulted to assess for higher level of care while monitoring correction of severe hyponatremia.     PMHx: as above  PSurgical: n/a  FHx: constipation   Meds: Lantus, amlodipine, lipitor, (stop chlorthalidone) - had been suggested for tamsulosin per outpatient notes  Allergies: NKA  Social: never smoker  ROS: no complaints    VITAL SIGNS:  Vital Signs Last 24 Hrs  T(C): 37.1 (2019 15:52), Max: 37.1 (2019 12:23)  T(F): 98.7 (2019 15:52), Max: 98.7 (2019 12:23)  HR: 92 (2019 15:52) (92 - 96)  BP: 170/88 (2019 15:52) (170/88 - 190/77)  RR: 18 (2019 15:52) (16 - 18)  SpO2: 99% (2019 15:52) (98% - 99%)    PHYSICAL EXAM:  General: WDWN in NAD  HEENT: NC/AT; PERRL, anicteric sclera  Neck: supple, no JVD  Cardiovascular: +S1/S2; RRR  Respiratory: CTA B/L; no W/R/R  Gastrointestinal: soft, NT to palpation, hypoactive bowel sounds  Extremities: WWP; no edema, clubbing or cyanosis  Vascular: 2+ radial, DP/PT pulses B/L  Neurological: AAOx3; no focal deficits    MEDICATIONS  (STANDING):  potassium chloride  10 mEq/100 mL IVPB 10 milliEquivalent(s) IV Intermittent every 1 hour    MEDICATIONS  (PRN):    ALLERGIES:  No Known Allergies or Intolerances    LABS:                        12.4   8.95  )-----------( 320      ( 2019 12:38 )             34.4     04-    115<LL>  |  74<L>  |  29<H>  ----------------------------<  182<H>  2.9<LL>   |  25  |  2.61<H>    Ca    9.0      2019 15:22  Mg     2.1         Urinalysis Basic - ( 2019 14:48 )    Color: Yellow / Appearance: Clear / S.010 / pH: x  Gluc: x / Ketone: NEGATIVE  / Bili: Negative / Urobili: 0.2 E.U./dL   Blood: x / Protein: 100 mg/dL / Nitrite: NEGATIVE   Leuk Esterase: NEGATIVE / RBC: < 5 /HPF / WBC < 5 /HPF   Sq Epi: x / Non Sq Epi: 0-5 /HPF / Bacteria: Present /HPF    CAPILLARY BLOOD GLUCOSE    RADIOLOGY & ADDITIONAL TESTS: Reviewed.  ABD Xray: no signs of SBO, air in the rectum  EKG: NSR,  without U waves  Retroperitoneal US: prostatomegaly with high PVR    US Duplex Abdomen/Pelvis Limited (03.15.19 @ 18:53) >  IMPRESSION:   No doppler evidence of right renal artery stenosis.  Nonvisualization of the proximal and mid left renal artery due to   overlying bowel gas. Decreased velocity of the left distal main renal   artery which may be a technical artifact since it was not well   visualized, however stenosis cannot be excluded. Consider CTA or MRA.    Mild prostatomegaly. Large urinary bladder postvoid residual volume.

## 2019-04-26 NOTE — ED ADULT TRIAGE NOTE - OTHER COMPLAINTS
pt c.o no bm x 4 days. c.o decreased po intake with abd bloating. admits to taking laxatives with no relief.

## 2019-04-26 NOTE — ED PROVIDER NOTE - CRITICAL CARE INDICATION, MLM
patient was critically ill... Patient was critically ill with a high probability of imminent or life threatening deterioration.  significant hyponatremia / hypokalemia requiring correction with iv treatment/ close monitoring for complications

## 2019-04-26 NOTE — ED ADULT NURSE REASSESSMENT NOTE - NS ED NURSE REASSESS COMMENT FT1
Patient placed on continuous cardiac and pulse oximetry monitoring. VSS. Aox4. All safety maintained. Will continue to monitor.

## 2019-04-26 NOTE — ED PROVIDER NOTE - OBJECTIVE STATEMENT
here with constipation for past 4-5 days.  History of chronic constipation since childhood but says this is lasting longer/ stronger than normal.  Had one episode of nausea/vomiting yesterday.  Denies abdominal pain, fever/chills, distention.  Noted decreased appetite.  Tried stool softener and suppository without relief.  No prior abdominal surgery

## 2019-04-26 NOTE — H&P ADULT - NSHPPHYSICALEXAM_GEN_ALL_CORE
Vital Signs Last 12 Hrs  T(F): 98.8 (04-26-19 @ 19:10), Max: 98.8 (04-26-19 @ 19:10)  HR: 92 (04-26-19 @ 19:10) (92 - 96)  BP: 166/82 (04-26-19 @ 19:10) (166/82 - 190/77)  RR: 17 (04-26-19 @ 19:10) (16 - 18)  SpO2: 98% (04-26-19 @ 19:10) (98% - 99%)      PHYSICAL EXAM:  Constitutional: NAD, comfortable in bed.  HEENT: NC/AT, PERRLA, EOMI, no conjunctival pallor or scleral icterus, MMM  Neck: Supple, no JVD  Respiratory: Normal rate, rhythm, depth, effort. CTAB. No w/r/r.   Cardiovascular: RRR, normal S1 and S2, no m/r/g.   Gastrointestinal: +BS, soft NTND, no guarding or rebound tenderness, no palpable masses   Extremities: wwp; no cyanosis, clubbing or edema.   Vascular: Pulses equal and strong throughout.   Neurological: AAOx3, no CN deficits, strength and sensation intact throughout.   Skin: No gross skin abnormalities or rashes Vital Signs Last 12 Hrs  T(F): 98.8 (04-26-19 @ 19:10), Max: 98.8 (04-26-19 @ 19:10)  HR: 92 (04-26-19 @ 19:10) (92 - 96)  BP: 166/82 (04-26-19 @ 19:10) (166/82 - 190/77)  RR: 17 (04-26-19 @ 19:10) (16 - 18)  SpO2: 98% (04-26-19 @ 19:10) (98% - 99%)      PHYSICAL EXAM:  Constitutional: NAD, comfortable in bed.  HEENT: NC/AT, PERRLA, EOMI, no conjunctival pallor or scleral icterus, MMM  Neck: Supple, no JVD  Respiratory: Normal rate, rhythm, depth, effort. CTAB. No w/r/r.   Cardiovascular: RRR, normal S1 and S2, no m/r/g.   Gastrointestinal: +BS, soft and distended without guarding or rebound tenderness and no palpable masses  Extremities: wwp; no cyanosis, clubbing or edema.   Vascular: Pulses equal and strong throughout.   Neurological: AAOx3, no CN deficits, strength and sensation intact throughout.   Skin: No gross skin abnormalities or rashes

## 2019-04-26 NOTE — H&P ADULT - PROBLEM SELECTOR PLAN 10
Dr. Vasquez (nephrology)  Address: [address] [place] [address]  Phone: [phone]

## 2019-04-26 NOTE — PATIENT PROFILE ADULT - CAREGIVER PHONE NUMBER
GOAL: Pt will increase static/ dynamic standing balance to Fair+ with RW to improve safety with functional activities in 4 weeks. [phone]

## 2019-04-26 NOTE — H&P ADULT - PROBLEM SELECTOR PLAN 2
No bowel movement after given the fleet enema.   - c/w Miralax, colace and senna   - monitor bowel movements Only small bowel movement after given the fleet enema. 1 episode of "black" emesis a few days prior but Hgb on admission is stable at 12.4  - c/w Miralax, colace and senna   - monitor bowel movements  - monitor H&H

## 2019-04-26 NOTE — ED PROVIDER NOTE - CLINICAL SUMMARY MEDICAL DECISION MAKING FREE TEXT BOX
presents with constipation for past few days and decreased po intake.  abd soft, non tender.  xray done and non obstructive gas pattern.  patient noted fatigue/ decreased po intake so screening abdominal labs done. presents with constipation for past few days and decreased po intake.  abd soft, non tender.  xray done and non obstructive gas pattern.  patient noted fatigue/ decreased po intake so screening basic labwork done and notable for significant hyponatremia.  patient appears dry clinically and also on chlorthalidone which both may contribute.  baseline 137 a month ago in system.  urine lytes/ osm sent.  given 1 L ns with slight increase in sodium.  patient without neurologic symptoms.  icu consulted as may need close monitoring/ frequent blood draws.

## 2019-04-26 NOTE — H&P ADULT - HISTORY OF PRESENT ILLNESS
69M PMH CKD stage IV, HTN, DM2, BPH with hx of urinary retention, chronic constipation who presents complaining of constipation x 4 days. Patient is passing flatus without any n/v. Patient saw Dr. Vasquez on 4/3, at which time his ACE inhibitor transitioned to thiazide, chlorthalidone 50mg qday, due to hyperkalemia on outpatient lab work. He reports that he does not drink any water in excess. In ED, patient was fully alert, oriented, appropriate w/o signs of seizure, euvolemic on exam. MICU consulted to assess for higher level of care while monitoring correction of severe hyponatremia and decision made to admit to [facility] for q4 BMP.     Vitals in ED: Temp: 98.2, /77, HR: 95, RR: 16, SpO2: 98% RA  Labs s/f: Hyponatremia with sodium of 115, hypokalemia 3.2, hypochloremia 72 with creatinine 2.73 (baseline 2.4). Abdominal xray without signs of obstruction. Patient was given 40 PO potassium and 3 runs of IV K. He was also given 2L NS bolus and a fleet enema for the constipation. 69M PMH CKD stage IV, HTN, DM2, BPH with hx of urinary retention, chronic constipation who presents complaining of constipation x 4 days. He has also had poor PO intake for the past 4 days and associates the constipation with this. He had 1 episode of vomiting 2 days prior he reports as black in color. He has no excess thirst, no dizziness or lightheadedness, no cough, no shortness of breath, no chest pain or abdominal pain. No sick contacts. Patient saw Dr. Vasquez on 4/3 about 3 weeks ago at which time his ACE inhibitor was switched to chlorthalidone 50mg every day due to hyperkalemia on outpatient lab work. He reports that he does not drink any water in excess and has 4-5 8oz cups per day. He is passing gas and voiding regularly. In ED, patient was fully alert, oriented, appropriate w/o signs of seizure, euvolemic on exam. MICU consulted to assess for higher level of care while monitoring correction of severe hyponatremia and decision made to admit to [facility] for q4 BMP.     Vitals in ED: Temp: 98.2, /77, HR: 95, RR: 16, SpO2: 98% RA  Labs s/f: Hyponatremia with sodium of 113, hypokalemia 3.2, hypochloremia 72 with creatinine 2.73 (baseline 2.4). Abdominal xray without signs of obstruction. Patient was given 40 PO potassium and 3 runs of IV K. He was also given 2L NS bolus and a fleet enema for the constipation which only a small amount of stool came out.

## 2019-04-26 NOTE — H&P ADULT - NSHPREVIEWOFSYSTEMS_GEN_ALL_CORE
Otherwise denies fever, chills, lightheadedness, CP, palpitations, SOB, cough, N/V, abdominal pain, dysuria, hematuria, LE edema.

## 2019-04-27 LAB
ALBUMIN SERPL ELPH-MCNC: 3.5 G/DL — SIGNIFICANT CHANGE UP (ref 3.3–5)
ANION GAP SERPL CALC-SCNC: 11 MMOL/L — SIGNIFICANT CHANGE UP (ref 5–17)
ANION GAP SERPL CALC-SCNC: 12 MMOL/L — SIGNIFICANT CHANGE UP (ref 5–17)
ANION GAP SERPL CALC-SCNC: 14 MMOL/L — SIGNIFICANT CHANGE UP (ref 5–17)
BASOPHILS # BLD AUTO: 0.01 K/UL — SIGNIFICANT CHANGE UP (ref 0–0.2)
BASOPHILS NFR BLD AUTO: 0.1 % — SIGNIFICANT CHANGE UP (ref 0–2)
BUN SERPL-MCNC: 28 MG/DL — HIGH (ref 7–23)
BUN SERPL-MCNC: 29 MG/DL — HIGH (ref 7–23)
BUN SERPL-MCNC: 30 MG/DL — HIGH (ref 7–23)
CALCIUM SERPL-MCNC: 8.5 MG/DL — SIGNIFICANT CHANGE UP (ref 8.4–10.5)
CALCIUM SERPL-MCNC: 8.8 MG/DL — SIGNIFICANT CHANGE UP (ref 8.4–10.5)
CALCIUM SERPL-MCNC: 8.9 MG/DL — SIGNIFICANT CHANGE UP (ref 8.4–10.5)
CALCIUM SERPL-MCNC: 9 MG/DL — SIGNIFICANT CHANGE UP (ref 8.4–10.5)
CALCIUM SERPL-MCNC: 9.3 MG/DL — SIGNIFICANT CHANGE UP (ref 8.4–10.5)
CHLORIDE SERPL-SCNC: 82 MMOL/L — LOW (ref 96–108)
CHLORIDE SERPL-SCNC: 84 MMOL/L — LOW (ref 96–108)
CHLORIDE SERPL-SCNC: 86 MMOL/L — LOW (ref 96–108)
CHLORIDE SERPL-SCNC: 86 MMOL/L — LOW (ref 96–108)
CHLORIDE SERPL-SCNC: 87 MMOL/L — LOW (ref 96–108)
CO2 SERPL-SCNC: 24 MMOL/L — SIGNIFICANT CHANGE UP (ref 22–31)
CO2 SERPL-SCNC: 24 MMOL/L — SIGNIFICANT CHANGE UP (ref 22–31)
CO2 SERPL-SCNC: 25 MMOL/L — SIGNIFICANT CHANGE UP (ref 22–31)
CO2 SERPL-SCNC: 26 MMOL/L — SIGNIFICANT CHANGE UP (ref 22–31)
CO2 SERPL-SCNC: 26 MMOL/L — SIGNIFICANT CHANGE UP (ref 22–31)
CREAT SERPL-MCNC: 2.47 MG/DL — HIGH (ref 0.5–1.3)
CREAT SERPL-MCNC: 2.49 MG/DL — HIGH (ref 0.5–1.3)
CREAT SERPL-MCNC: 2.51 MG/DL — HIGH (ref 0.5–1.3)
CREAT SERPL-MCNC: 2.52 MG/DL — HIGH (ref 0.5–1.3)
CREAT SERPL-MCNC: 2.55 MG/DL — HIGH (ref 0.5–1.3)
EOSINOPHIL # BLD AUTO: 0.12 K/UL — SIGNIFICANT CHANGE UP (ref 0–0.5)
EOSINOPHIL NFR BLD AUTO: 1.8 % — SIGNIFICANT CHANGE UP (ref 0–6)
GLUCOSE BLDC GLUCOMTR-MCNC: 189 MG/DL — HIGH (ref 70–99)
GLUCOSE BLDC GLUCOMTR-MCNC: 197 MG/DL — HIGH (ref 70–99)
GLUCOSE BLDC GLUCOMTR-MCNC: 265 MG/DL — HIGH (ref 70–99)
GLUCOSE BLDC GLUCOMTR-MCNC: 73 MG/DL — SIGNIFICANT CHANGE UP (ref 70–99)
GLUCOSE SERPL-MCNC: 189 MG/DL — HIGH (ref 70–99)
GLUCOSE SERPL-MCNC: 192 MG/DL — HIGH (ref 70–99)
GLUCOSE SERPL-MCNC: 195 MG/DL — HIGH (ref 70–99)
GLUCOSE SERPL-MCNC: 273 MG/DL — HIGH (ref 70–99)
GLUCOSE SERPL-MCNC: 79 MG/DL — SIGNIFICANT CHANGE UP (ref 70–99)
HBA1C BLD-MCNC: 8.1 % — HIGH (ref 4–5.6)
HCT VFR BLD CALC: 32.4 % — LOW (ref 39–50)
HCV AB S/CO SERPL IA: 0.09 S/CO — SIGNIFICANT CHANGE UP
HCV AB S/CO SERPL IA: 0.1 S/CO — SIGNIFICANT CHANGE UP
HCV AB SERPL-IMP: SIGNIFICANT CHANGE UP
HCV AB SERPL-IMP: SIGNIFICANT CHANGE UP
HGB BLD-MCNC: 11.4 G/DL — LOW (ref 13–17)
IMM GRANULOCYTES NFR BLD AUTO: 0.4 % — SIGNIFICANT CHANGE UP (ref 0–1.5)
LYMPHOCYTES # BLD AUTO: 1.01 K/UL — SIGNIFICANT CHANGE UP (ref 1–3.3)
LYMPHOCYTES # BLD AUTO: 14.8 % — SIGNIFICANT CHANGE UP (ref 13–44)
MAGNESIUM SERPL-MCNC: 2.2 MG/DL — SIGNIFICANT CHANGE UP (ref 1.6–2.6)
MAGNESIUM SERPL-MCNC: 2.2 MG/DL — SIGNIFICANT CHANGE UP (ref 1.6–2.6)
MCHC RBC-ENTMCNC: 28.2 PG — SIGNIFICANT CHANGE UP (ref 27–34)
MCHC RBC-ENTMCNC: 35.2 GM/DL — SIGNIFICANT CHANGE UP (ref 32–36)
MCV RBC AUTO: 80.2 FL — SIGNIFICANT CHANGE UP (ref 80–100)
MONOCYTES # BLD AUTO: 0.7 K/UL — SIGNIFICANT CHANGE UP (ref 0–0.9)
MONOCYTES NFR BLD AUTO: 10.2 % — SIGNIFICANT CHANGE UP (ref 2–14)
NEUTROPHILS # BLD AUTO: 4.96 K/UL — SIGNIFICANT CHANGE UP (ref 1.8–7.4)
NEUTROPHILS NFR BLD AUTO: 72.7 % — SIGNIFICANT CHANGE UP (ref 43–77)
NRBC # BLD: 0 /100 WBCS — SIGNIFICANT CHANGE UP (ref 0–0)
PHOSPHATE SERPL-MCNC: 2.9 MG/DL — SIGNIFICANT CHANGE UP (ref 2.5–4.5)
PHOSPHATE SERPL-MCNC: 3.6 MG/DL — SIGNIFICANT CHANGE UP (ref 2.5–4.5)
PLATELET # BLD AUTO: 307 K/UL — SIGNIFICANT CHANGE UP (ref 150–400)
POTASSIUM SERPL-MCNC: 3.1 MMOL/L — LOW (ref 3.5–5.3)
POTASSIUM SERPL-MCNC: 3.2 MMOL/L — LOW (ref 3.5–5.3)
POTASSIUM SERPL-MCNC: 3.8 MMOL/L — SIGNIFICANT CHANGE UP (ref 3.5–5.3)
POTASSIUM SERPL-MCNC: 4 MMOL/L — SIGNIFICANT CHANGE UP (ref 3.5–5.3)
POTASSIUM SERPL-MCNC: 4.4 MMOL/L — SIGNIFICANT CHANGE UP (ref 3.5–5.3)
POTASSIUM SERPL-SCNC: 3.1 MMOL/L — LOW (ref 3.5–5.3)
POTASSIUM SERPL-SCNC: 3.2 MMOL/L — LOW (ref 3.5–5.3)
POTASSIUM SERPL-SCNC: 3.8 MMOL/L — SIGNIFICANT CHANGE UP (ref 3.5–5.3)
POTASSIUM SERPL-SCNC: 4 MMOL/L — SIGNIFICANT CHANGE UP (ref 3.5–5.3)
POTASSIUM SERPL-SCNC: 4.4 MMOL/L — SIGNIFICANT CHANGE UP (ref 3.5–5.3)
RBC # BLD: 4.04 M/UL — LOW (ref 4.2–5.8)
RBC # FLD: 11.4 % — SIGNIFICANT CHANGE UP (ref 10.3–14.5)
SODIUM SERPL-SCNC: 118 MMOL/L — CRITICAL LOW (ref 135–145)
SODIUM SERPL-SCNC: 121 MMOL/L — LOW (ref 135–145)
SODIUM SERPL-SCNC: 122 MMOL/L — LOW (ref 135–145)
SODIUM SERPL-SCNC: 123 MMOL/L — LOW (ref 135–145)
SODIUM SERPL-SCNC: 125 MMOL/L — LOW (ref 135–145)
WBC # BLD: 6.83 K/UL — SIGNIFICANT CHANGE UP (ref 3.8–10.5)
WBC # FLD AUTO: 6.83 K/UL — SIGNIFICANT CHANGE UP (ref 3.8–10.5)

## 2019-04-27 PROCEDURE — 99233 SBSQ HOSP IP/OBS HIGH 50: CPT | Mod: GC

## 2019-04-27 PROCEDURE — 99223 1ST HOSP IP/OBS HIGH 75: CPT

## 2019-04-27 RX ORDER — POTASSIUM CHLORIDE 20 MEQ
40 PACKET (EA) ORAL ONCE
Qty: 0 | Refills: 0 | Status: COMPLETED | OUTPATIENT
Start: 2019-04-27 | End: 2019-04-27

## 2019-04-27 RX ORDER — SODIUM CHLORIDE 9 MG/ML
1000 INJECTION, SOLUTION INTRAVENOUS
Qty: 0 | Refills: 0 | Status: DISCONTINUED | OUTPATIENT
Start: 2019-04-27 | End: 2019-04-28

## 2019-04-27 RX ORDER — POTASSIUM CHLORIDE 20 MEQ
40 PACKET (EA) ORAL EVERY 4 HOURS
Qty: 0 | Refills: 0 | Status: COMPLETED | OUTPATIENT
Start: 2019-04-27 | End: 2019-04-27

## 2019-04-27 RX ORDER — SODIUM CHLORIDE 9 MG/ML
1000 INJECTION, SOLUTION INTRAVENOUS
Qty: 0 | Refills: 0 | Status: DISCONTINUED | OUTPATIENT
Start: 2019-04-27 | End: 2019-04-27

## 2019-04-27 RX ADMIN — Medication 2: at 22:31

## 2019-04-27 RX ADMIN — HEPARIN SODIUM 5000 UNIT(S): 5000 INJECTION INTRAVENOUS; SUBCUTANEOUS at 05:58

## 2019-04-27 RX ADMIN — HEPARIN SODIUM 5000 UNIT(S): 5000 INJECTION INTRAVENOUS; SUBCUTANEOUS at 22:31

## 2019-04-27 RX ADMIN — SODIUM CHLORIDE 100 MILLILITER(S): 9 INJECTION, SOLUTION INTRAVENOUS at 17:28

## 2019-04-27 RX ADMIN — Medication 100 MILLIGRAM(S): at 14:13

## 2019-04-27 RX ADMIN — Medication 6: at 17:28

## 2019-04-27 RX ADMIN — ATORVASTATIN CALCIUM 20 MILLIGRAM(S): 80 TABLET, FILM COATED ORAL at 22:31

## 2019-04-27 RX ADMIN — Medication 100 MILLIGRAM(S): at 22:31

## 2019-04-27 RX ADMIN — Medication 40 MILLIEQUIVALENT(S): at 14:12

## 2019-04-27 RX ADMIN — SODIUM CHLORIDE 80 MILLILITER(S): 9 INJECTION, SOLUTION INTRAVENOUS at 09:22

## 2019-04-27 RX ADMIN — Medication 40 MILLIEQUIVALENT(S): at 09:59

## 2019-04-27 RX ADMIN — Medication 40 MILLIEQUIVALENT(S): at 00:55

## 2019-04-27 RX ADMIN — INSULIN GLARGINE 12 UNIT(S): 100 INJECTION, SOLUTION SUBCUTANEOUS at 22:31

## 2019-04-27 RX ADMIN — SENNA PLUS 2 TABLET(S): 8.6 TABLET ORAL at 22:31

## 2019-04-27 RX ADMIN — Medication 100 MILLIGRAM(S): at 05:58

## 2019-04-27 RX ADMIN — HEPARIN SODIUM 5000 UNIT(S): 5000 INJECTION INTRAVENOUS; SUBCUTANEOUS at 14:12

## 2019-04-27 RX ADMIN — AMLODIPINE BESYLATE 5 MILLIGRAM(S): 2.5 TABLET ORAL at 05:58

## 2019-04-27 RX ADMIN — POLYETHYLENE GLYCOL 3350 17 GRAM(S): 17 POWDER, FOR SOLUTION ORAL at 14:13

## 2019-04-27 NOTE — PROGRESS NOTE ADULT - PROBLEM SELECTOR PLAN 3
Creatinine elevated at 2.73 from baseline of 2.4. Component of PRICILA on CKD in the setting of vomiting and poor PO intake. S/p 2LNS in the ED. Downtrending to 2.61.   - f/u urine lytes  - BMP q4

## 2019-04-27 NOTE — PROGRESS NOTE ADULT - ATTENDING COMMENTS
Pt seen and examined at bedside. Agree with history, physical exam and plan as per Dr Grey.  Pt is slightly above correctional goal for 24 hour period, currently on D5 @ 80cc/hr, 4pm BMP to follow. Renal consulted for hyponatremia management, recs appreciated.  #Constipation: had BM today

## 2019-04-27 NOTE — CONSULT NOTE ADULT - ASSESSMENT
70yo M with longstanding DMII + retinopathy, HTN, CKD IV and mild urinary retention/BPH (170cc PVR on 2/19 sono) came to ED for constipation found to have acute-subacute asymp hyponatremia and hypokalemia likely 2/2 chlorthalidone initiation on 4/3/19:    Acute/Subacute euvolemic hypoosmolar hyponatremia likely 2/2 chlorthalidone  - s/p 2 L NS yesterday, no UO recorded, it is very important to record strict UO to follow renal response to hyponatremia so we can treat appropriately  - ur na 30's however on thiazide so likey from this, diluting capability largely intact w low 100's Ur osm- consistent w thiazide related hyponatremia, Na normal on labs from 1 month ago.   - up 12 in <24hrs however now down to 123 - 10 up from 24hrs ago, on D5 - would continue and check Na q4hrs, reminder to include hyperglycemia in Na determination   - tsh wnl  - hypokalemia improved w repletion    CKD at baseline likely 2/2 diabetic/htn nephrosclerosis. ANDRE silverio recently stopped 2/2 hyperkalemia. Non nephrotic proteinuria with negative serologic GN w/u. Renal sono 2/19 10.5cm kidneys w increased echogenicity and mild PVR    HTN fair control

## 2019-04-27 NOTE — PROGRESS NOTE ADULT - PROBLEM SELECTOR PLAN 8
F: No IVF   E: Cautious repletion of lytes as patient with CKD  N: DASH/TLC, Consistent Carb, Renal diet F: d5 @ 70 cc/hr  E: Cautious repletion of lytes as patient with CKD  N: DASH/TLC, Consistent Carb, Renal diet F: d5 @ 80 cc/hr  E: Cautious repletion of lytes as patient with CKD  N: DASH/TLC, Consistent Carb, Renal diet

## 2019-04-27 NOTE — PROGRESS NOTE ADULT - PROBLEM SELECTOR PLAN 1
Sodium 113 on admission likely 2/2 thiazide use. thiazide since discontinued. Na 123 this AM. Euvolemic on exam and otherwise asymptomatic.   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 could be 2/2 true hypovolemia vs. transient SIADH vs. adrenal insufficiency but most likely drug induced (thiazide)  - c/w BMP q4  - f/u urine lytes q4  - holding all thiazides Sodium 113 on admission likely 2/2 thiazide use. thiazide since discontinued. Na 123 this AM. Euvolemic on exam and otherwise asymptomatic.   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 could be 2/2 true hypovolemia vs. transient SIADH vs. adrenal insufficiency but most likely drug induced (thiazide)  - overcorrected to 125 today - 12 in 24 hours - so d5 was started at 70 cc/hr  - c/w BMP q4  - f/u urine lytes q4  - holding all thiazides Sodium 113 on admission likely 2/2 thiazide use. thiazide since discontinued. Na 123 this AM. Euvolemic on exam and otherwise asymptomatic.   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 could be 2/2 true hypovolemia vs. transient SIADH vs. adrenal insufficiency but most likely drug induced (thiazide)  - overcorrected to 125 today - 12 in 24 hours - so d5 was started at 70 cc/hr  - c/w BMP q4  - f/u urine lytes q4  - holding all thiazides  - will consider renal consult if sodium remains labile Sodium 113 on admission likely 2/2 thiazide use. thiazide since discontinued. Na 123 this AM. Euvolemic on exam and otherwise asymptomatic.   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 could be 2/2 true hypovolemia vs. transient SIADH vs. adrenal insufficiency but most likely drug induced (thiazide)  - overcorrected to 125 today - 12 in 24 hours - so d5 was started at 80 cc/hr  - c/w BMP q4  - f/u urine lytes q4  - holding all thiazides  - will consider renal consult if sodium remains labile Sodium 113 on admission likely 2/2 thiazide use. thiazide since discontinued. Na 123 this AM. Euvolemic on exam and otherwise asymptomatic.   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 could be 2/2 true hypovolemia vs. transient SIADH vs. adrenal insufficiency. less likely SIADH as pt's urine osms are 197. likely mixed picture w thiazide use.  - overcorrected to 125 today - 12 in 24 hours - so d5 was started at 80 cc/hr  - c/w BMP q4  - f/u urine lytes q4  - holding all thiazides  - Dr. Vasquez aware of admission

## 2019-04-27 NOTE — PROGRESS NOTE ADULT - SUBJECTIVE AND OBJECTIVE BOX
TRANSFER ACCEPTANCE NOTE: [facility] to [facility]  Hospital Course:      SUBJECTIVE / INTERVAL HPI: Patient seen and examined at bedside. pt without complaint this AM. reports 1 small BM but otherwise still feels constipation. eager to return home as he is otherwise asymptomatic. denies n/v/fevers/chills/SOB/chest pain/abdominal pain.     VITAL SIGNS:  Vital Signs Last 24 Hrs  T(C): 36.3 (2019 13:11), Max: 37.1 (2019 15:52)  T(F): 97.3 (2019 13:11), Max: 98.8 (2019 19:10)  HR: 80 (2019 12:55) (78 - 94)  BP: 145/75 (2019 12:55) (134/67 - 170/88)  BP(mean): 103 (2019 12:55) (94 - 111)  RR: 16 (2019 12:55) (16 - 18)  SpO2: 99% (2019 12:55) (98% - 100%)    PHYSICAL EXAM:    General: WDWN adult male in NAD; laying in bed w empty lunch tray beside him  HEENT: NC/AT; PERRL, anicteric sclera; MMM  Neck: supple  Cardiovascular: +S1/S2, RRR  Respiratory: CTA B/L; no W/R/R  Gastrointestinal: soft, slightly distended but nontender. BS+ x 4  Extremities: WWP; no edema, clubbing or cyanosis  Vascular: 2+ radial, DP/PT pulses B/L  Neurological: AAOx3; no focal deficits    MEDICATIONS:  MEDICATIONS  (STANDING):  amLODIPine   Tablet 5 milliGRAM(s) Oral daily  atorvastatin 20 milliGRAM(s) Oral at bedtime  dextrose 5%. 1000 milliLiter(s) (50 mL/Hr) IV Continuous <Continuous>  dextrose 5%. 1000 milliLiter(s) (80 mL/Hr) IV Continuous <Continuous>  dextrose 50% Injectable 12.5 Gram(s) IV Push once  dextrose 50% Injectable 25 Gram(s) IV Push once  dextrose 50% Injectable 25 Gram(s) IV Push once  docusate sodium 100 milliGRAM(s) Oral three times a day  heparin  Injectable 5000 Unit(s) SubCutaneous every 8 hours  insulin glargine Injectable (LANTUS) 12 Unit(s) SubCutaneous at bedtime  insulin lispro (HumaLOG) corrective regimen sliding scale   SubCutaneous Before meals and at bedtime  polyethylene glycol 3350 17 Gram(s) Oral daily  potassium chloride    Tablet ER 40 milliEquivalent(s) Oral every 4 hours  senna 2 Tablet(s) Oral at bedtime    MEDICATIONS  (PRN):  dextrose 40% Gel 15 Gram(s) Oral once PRN Blood Glucose LESS THAN 70 milliGRAM(s)/deciliter  glucagon  Injectable 1 milliGRAM(s) IntraMuscular once PRN Glucose LESS THAN 70 milligrams/deciliter      ALLERGIES:  Allergies    No Known Allergies    Intolerances        LABS:                        11.4   6.83  )-----------( 307      ( 2019 07:31 )             32.4         123<L>  |  86<L>  |  30<H>  ----------------------------<  189<H>  3.8   |  26  |  2.55<H>    Ca    9.0      2019 12:25  Phos  3.6       Mg     2.2         TPro  x   /  Alb  3.5  /  TBili  x   /  DBili  x   /  AST  x   /  ALT  x   /  AlkPhos  x         Urinalysis Basic - ( 2019 14:48 )    Color: Yellow / Appearance: Clear / S.010 / pH: x  Gluc: x / Ketone: NEGATIVE  / Bili: Negative / Urobili: 0.2 E.U./dL   Blood: x / Protein: 100 mg/dL / Nitrite: NEGATIVE   Leuk Esterase: NEGATIVE / RBC: < 5 /HPF / WBC < 5 /HPF   Sq Epi: x / Non Sq Epi: 0-5 /HPF / Bacteria: Present /HPF      CAPILLARY BLOOD GLUCOSE      POCT Blood Glucose.: 197 mg/dL (2019 11:26)      RADIOLOGY & ADDITIONAL TESTS: Reviewed. TRANSFER ACCEPTANCE NOTE: [facility] to [facility]  Hospital Course:  69M with PMH CKD stage IV (follows w Dr. Vasquez, baseline Creatinine 2.4), HTN, DM2, BPH with hx of urinary retention, chronic constipation who presents complaining of constipation x 4 days found with multiple lab abnormalities including hyponatremia, hypokalemia and hypochloremia in the setting of new thiazide use for 3 weeks. Patient was admitted in hemodynamically stable condition for frequent monitoring and remained asymptomatic. Sodium corrected with 2L NS and patient was started on D5W to avoid overcorrection. Patient was continued on Norvasc for blood pressure and thiazide was held, as was ACE in setting of PRICILA. Patient remained stable for step down to [facility] for continued monitoring.     SUBJECTIVE / INTERVAL HPI: Patient seen and examined at bedside. pt without complaint this AM. reports 1 small BM but otherwise still feels constipation. eager to return home as he is otherwise asymptomatic. denies n/v/fevers/chills/SOB/chest pain/abdominal pain.     VITAL SIGNS:  Vital Signs Last 24 Hrs  T(C): 36.3 (2019 13:11), Max: 37.1 (2019 15:52)  T(F): 97.3 (2019 13:11), Max: 98.8 (2019 19:10)  HR: 80 (2019 12:55) (78 - 94)  BP: 145/75 (2019 12:55) (134/67 - 170/88)  BP(mean): 103 (2019 12:55) (94 - 111)  RR: 16 (2019 12:55) (16 - 18)  SpO2: 99% (2019 12:55) (98% - 100%)    PHYSICAL EXAM:    General: WDWN adult male in NAD; laying in bed w empty lunch tray beside him  HEENT: NC/AT; PERRL, anicteric sclera; MMM  Neck: supple  Cardiovascular: +S1/S2, RRR  Respiratory: CTA B/L; no W/R/R  Gastrointestinal: soft, slightly distended but nontender. BS+ x 4  Extremities: WWP; no edema, clubbing or cyanosis  Vascular: 2+ radial, DP/PT pulses B/L  Neurological: AAOx3; no focal deficits    MEDICATIONS:  MEDICATIONS  (STANDING):  amLODIPine   Tablet 5 milliGRAM(s) Oral daily  atorvastatin 20 milliGRAM(s) Oral at bedtime  dextrose 5%. 1000 milliLiter(s) (50 mL/Hr) IV Continuous <Continuous>  dextrose 5%. 1000 milliLiter(s) (80 mL/Hr) IV Continuous <Continuous>  dextrose 50% Injectable 12.5 Gram(s) IV Push once  dextrose 50% Injectable 25 Gram(s) IV Push once  dextrose 50% Injectable 25 Gram(s) IV Push once  docusate sodium 100 milliGRAM(s) Oral three times a day  heparin  Injectable 5000 Unit(s) SubCutaneous every 8 hours  insulin glargine Injectable (LANTUS) 12 Unit(s) SubCutaneous at bedtime  insulin lispro (HumaLOG) corrective regimen sliding scale   SubCutaneous Before meals and at bedtime  polyethylene glycol 3350 17 Gram(s) Oral daily  potassium chloride    Tablet ER 40 milliEquivalent(s) Oral every 4 hours  senna 2 Tablet(s) Oral at bedtime    MEDICATIONS  (PRN):  dextrose 40% Gel 15 Gram(s) Oral once PRN Blood Glucose LESS THAN 70 milliGRAM(s)/deciliter  glucagon  Injectable 1 milliGRAM(s) IntraMuscular once PRN Glucose LESS THAN 70 milligrams/deciliter      ALLERGIES:  Allergies    No Known Allergies    Intolerances        LABS:                        11.4   6.83  )-----------( 307      ( 2019 07:31 )             32.4     -    123<L>  |  86<L>  |  30<H>  ----------------------------<  189<H>  3.8   |  26  |  2.55<H>    Ca    9.0      2019 12:25  Phos  3.6       Mg     2.2         TPro  x   /  Alb  3.5  /  TBili  x   /  DBili  x   /  AST  x   /  ALT  x   /  AlkPhos  x         Urinalysis Basic - ( 2019 14:48 )    Color: Yellow / Appearance: Clear / S.010 / pH: x  Gluc: x / Ketone: NEGATIVE  / Bili: Negative / Urobili: 0.2 E.U./dL   Blood: x / Protein: 100 mg/dL / Nitrite: NEGATIVE   Leuk Esterase: NEGATIVE / RBC: < 5 /HPF / WBC < 5 /HPF   Sq Epi: x / Non Sq Epi: 0-5 /HPF / Bacteria: Present /HPF      CAPILLARY BLOOD GLUCOSE      POCT Blood Glucose.: 197 mg/dL (2019 11:26)      RADIOLOGY & ADDITIONAL TESTS: Reviewed.

## 2019-04-27 NOTE — CONSULT NOTE ADULT - SUBJECTIVE AND OBJECTIVE BOX
[facility] DIVISION OF KIDNEY DISEASES AND HYPERTENSION -- INITIAL CONSULT NOTE  --------------------------------------------------------------------------------  HPI:  70yo M with longstanding DMII + retinopathy, HTN, CKD IV and mild urinary retention/BPH (170cc PVR on 2/19 sono) came to ED for constipation found to have acute-subacute asymp hyponatremia and hypokalemia likely 2/2 chlorthalidone initiation on 4/3/19:      PAST HISTORY  --------------------------------------------------------------------------------  PAST MEDICAL & SURGICAL HISTORY:  Hyperlipidemia  Hypertension  Diabetes  No significant past surgical history    FAMILY HISTORY:    PAST SOCIAL HISTORY:    ALLERGIES & MEDICATIONS  --------------------------------------------------------------------------------  Allergies    No Known Allergies    Intolerances      Standing Inpatient Medications  amLODIPine   Tablet 5 milliGRAM(s) Oral daily  atorvastatin 20 milliGRAM(s) Oral at bedtime  dextrose 5%. 1000 milliLiter(s) IV Continuous <Continuous>  dextrose 5%. 1000 milliLiter(s) IV Continuous <Continuous>  dextrose 50% Injectable 12.5 Gram(s) IV Push once  dextrose 50% Injectable 25 Gram(s) IV Push once  dextrose 50% Injectable 25 Gram(s) IV Push once  docusate sodium 100 milliGRAM(s) Oral three times a day  heparin  Injectable 5000 Unit(s) SubCutaneous every 8 hours  insulin glargine Injectable (LANTUS) 12 Unit(s) SubCutaneous at bedtime  insulin lispro (HumaLOG) corrective regimen sliding scale   SubCutaneous Before meals and at bedtime  polyethylene glycol 3350 17 Gram(s) Oral daily  senna 2 Tablet(s) Oral at bedtime    PRN Inpatient Medications  dextrose 40% Gel 15 Gram(s) Oral once PRN  glucagon  Injectable 1 milliGRAM(s) IntraMuscular once PRN      REVIEW OF SYSTEMS  --------------------------------------------------------------------------------  Gen: No weight changes, fatigue, fevers/chills, weakness  Skin: No rashes  Head/Eyes/Ears/Mouth: No headache; Normal hearing; Normal vision w/o blurriness; No sinus pain/discomfort, sore throat  Respiratory: No dyspnea, cough, wheezing, hemoptysis  CV: No chest pain, PND, orthopnea  GI: No abdominal pain, diarrhea, constipation, nausea, vomiting, melena, hematochezia  : No increased frequency, dysuria, hematuria, nocturia  MSK: No joint pain/swelling; no back pain; no edema  Neuro: No dizziness/lightheadedness, weakness, seizures, numbness, tingling  Heme: No easy bruising or bleeding  Endo: No heat/cold intolerance  Psych: No significant nervousness, anxiety, stress, depression    All other systems were reviewed and are negative, except as noted.    VITALS/PHYSICAL EXAM  --------------------------------------------------------------------------------  T(C): 36.3 (04-27-19 @ 13:11), Max: 37.1 (04-26-19 @ 15:52)  HR: 80 (04-27-19 @ 12:55) (78 - 94)  BP: 145/75 (04-27-19 @ 12:55) (134/67 - 170/88)  RR: 16 (04-27-19 @ 12:55) (16 - 18)  SpO2: 99% (04-27-19 @ 12:55) (98% - 100%)  Wt(kg): --  Height (cm): 170.18 (04-26-19 @ 22:01)  Weight (kg): 72.1 (04-26-19 @ 22:01)  BMI (kg/m2): 24.9 (04-26-19 @ 22:01)  BSA (m2): 1.83 (04-26-19 @ 22:01)      Physical Exam:  	Gen: NAD, well-appearing  	HEENT: PERRL, supple neck, clear oropharynx  	Pulm: CTA B/L  	CV: RRR, S1S2; no rub  	Back: No spinal or CVA tenderness; no sacral edema  	Abd: +BS, soft, nontender/nondistended  	: No suprapubic tenderness  	UE: Warm, FROM, no clubbing, intact strength; no edema; no asterixis  	LE: Warm, FROM, no clubbing, intact strength; no edema  	Neuro: No focal deficits, intact gait  	Psych: Normal affect and mood  	Skin: Warm, without rashes  	Vascular access:    LABS/STUDIES  --------------------------------------------------------------------------------              11.4   6.83  >-----------<  307      [04-27-19 @ 07:31]              32.4     123  |  86  |  30  ----------------------------<  189      [04-27-19 @ 12:25]  3.8   |  26  |  2.55        Ca     9.0     [04-27-19 @ 12:25]      Mg     2.2     [04-27-19 @ 07:31]      Phos  3.6     [04-27-19 @ 07:31]    TPro  x   /  Alb  3.5  /  TBili  x   /  DBili  x   /  AST  x   /  ALT  x   /  AlkPhos  x   [04-26-19 @ 23:11]        Serum Osmolality 255      [04-26-19 @ 23:11]    Creatinine Trend:  SCr 2.55 [04-27 @ 12:25]  SCr 2.47 [04-27 @ 07:31]  SCr 2.51 [04-26 @ 23:11]  SCr 2.61 [04-26 @ 15:22]  SCr 2.73 [04-26 @ 12:38]    Urinalysis - [04-26-19 @ 14:48]      Color Yellow / Appearance Clear / SG 1.010 / pH 6.5      Gluc 100 / Ketone NEGATIVE  / Bili Negative / Urobili 0.2       Blood Small / Protein 100 / Leuk Est NEGATIVE / Nitrite NEGATIVE      RBC < 5 / WBC < 5 / Hyaline  / Gran  / Sq Epi  / Non Sq Epi 0-5 / Bacteria Present    Urine Sodium 32      [04-26-19 @ 19:34]  Urine Chloride 28      [04-26-19 @ 14:48]  Urine Osmolality 197      [04-26-19 @ 19:34]    HbA1c 8.1      [04-27-19 @ 07:31]  TSH 2.158      [04-26-19 @ 15:22]    HCV 0.09, Nonreact      [04-27-19 @ 07:31] [facility] DIVISION OF KIDNEY DISEASES AND HYPERTENSION -- INITIAL CONSULT NOTE  --------------------------------------------------------------------------------  HPI:  70yo M with longstanding DMII + retinopathy, HTN, CKD IV and mild urinary retention/BPH (170cc PVR on 2/19 sono) came to ED for constipation found to have acute-subacute asymp hyponatremia and hypokalemia likely 2/2 chlorthalidone initiation on 4/3/19:  feeling well, no weakness, confusion, gait unsteadiness. Biggest complaint is constipation, worsening over the past 6 days despite his usual bowel regimen. No n/v or ab. pain.   No LE edema    PAST HISTORY  --------------------------------------------------------------------------------  PAST MEDICAL & SURGICAL HISTORY:  Hyperlipidemia  Hypertension  Diabetes  No significant past surgical history    FAMILY HISTORY: nc    PAST SOCIAL HISTORY: no etoh/drugs    ALLERGIES & MEDICATIONS  --------------------------------------------------------------------------------  Allergies    No Known Allergies    Intolerances      Standing Inpatient Medications  amLODIPine   Tablet 5 milliGRAM(s) Oral daily  atorvastatin 20 milliGRAM(s) Oral at bedtime  dextrose 5%. 1000 milliLiter(s) IV Continuous <Continuous>  dextrose 5%. 1000 milliLiter(s) IV Continuous <Continuous>  dextrose 50% Injectable 12.5 Gram(s) IV Push once  dextrose 50% Injectable 25 Gram(s) IV Push once  dextrose 50% Injectable 25 Gram(s) IV Push once  docusate sodium 100 milliGRAM(s) Oral three times a day  heparin  Injectable 5000 Unit(s) SubCutaneous every 8 hours  insulin glargine Injectable (LANTUS) 12 Unit(s) SubCutaneous at bedtime  insulin lispro (HumaLOG) corrective regimen sliding scale   SubCutaneous Before meals and at bedtime  polyethylene glycol 3350 17 Gram(s) Oral daily  senna 2 Tablet(s) Oral at bedtime    PRN Inpatient Medications  dextrose 40% Gel 15 Gram(s) Oral once PRN  glucagon  Injectable 1 milliGRAM(s) IntraMuscular once PRN      REVIEW OF SYSTEMS  --------------------------------------------------------------------------------  Gen: No weight changes, fatigue, fevers/chills, weakness  Skin: No rashes  Head/Eyes/Ears/Mouth: No headache; Normal hearing; Normal vision w/o blurriness; No sinus pain/discomfort, sore throat  Respiratory: No dyspnea, cough, wheezing, hemoptysis  CV: No chest pain, PND, orthopnea  GI: No abdominal pain, +constipation, no nausea, vomiting, +passing gas  : No increased frequency, dysuria, hematuria, nocturia  MSK: No joint pain/swelling; no back pain; no edema  Neuro: No dizziness/lightheadedness, weakness, seizures, numbness, tingling  Heme: No easy bruising or bleeding  Endo: No heat/cold intolerance  Psych: No significant nervousness, anxiety, stress, depression    All other systems were reviewed and are negative, except as noted.    VITALS/PHYSICAL EXAM  --------------------------------------------------------------------------------  T(C): 36.3 (04-27-19 @ 13:11), Max: 37.1 (04-26-19 @ 15:52)  HR: 80 (04-27-19 @ 12:55) (78 - 94)  BP: 145/75 (04-27-19 @ 12:55) (134/67 - 170/88)  RR: 16 (04-27-19 @ 12:55) (16 - 18)  SpO2: 99% (04-27-19 @ 12:55) (98% - 100%)  Wt(kg): --  Height (cm): 170.18 (04-26-19 @ 22:01)  Weight (kg): 72.1 (04-26-19 @ 22:01)  BMI (kg/m2): 24.9 (04-26-19 @ 22:01)  BSA (m2): 1.83 (04-26-19 @ 22:01)      Physical Exam:  	Gen: NAD, well-appearing  	HEENT: PERRL, supple neck, clear oropharynx  	Pulm: CTA B/L  	CV: RRR, S1S2; no rub  	Back: No spinal or CVA tenderness; no sacral edema  	Abd: +BS, soft, nontender/nondistended  	: No suprapubic tenderness  	UE: Warm, FROM, no clubbing, intact strength; no edema; no asterixis  	LE: Warm, FROM, no clubbing, intact strength; no edema  	Neuro: No focal deficits, no tremor, AAox3  	Psych: Normal affect and mood  	Skin: Warm, without rashes      LABS/STUDIES  --------------------------------------------------------------------------------              11.4   6.83  >-----------<  307      [04-27-19 @ 07:31]              32.4     123  |  86  |  30  ----------------------------<  189      [04-27-19 @ 12:25]  3.8   |  26  |  2.55        Ca     9.0     [04-27-19 @ 12:25]      Mg     2.2     [04-27-19 @ 07:31]      Phos  3.6     [04-27-19 @ 07:31]    TPro  x   /  Alb  3.5  /  TBili  x   /  DBili  x   /  AST  x   /  ALT  x   /  AlkPhos  x   [04-26-19 @ 23:11]        Serum Osmolality 255      [04-26-19 @ 23:11]    Creatinine Trend:  SCr 2.55 [04-27 @ 12:25]  SCr 2.47 [04-27 @ 07:31]  SCr 2.51 [04-26 @ 23:11]  SCr 2.61 [04-26 @ 15:22]  SCr 2.73 [04-26 @ 12:38]    Urinalysis - [04-26-19 @ 14:48]      Color Yellow / Appearance Clear / SG 1.010 / pH 6.5      Gluc 100 / Ketone NEGATIVE  / Bili Negative / Urobili 0.2       Blood Small / Protein 100 / Leuk Est NEGATIVE / Nitrite NEGATIVE      RBC < 5 / WBC < 5 / Hyaline  / Gran  / Sq Epi  / Non Sq Epi 0-5 / Bacteria Present    Urine Sodium 32      [04-26-19 @ 19:34]  Urine Chloride 28      [04-26-19 @ 14:48]  Urine Osmolality 197      [04-26-19 @ 19:34]    HbA1c 8.1      [04-27-19 @ 07:31]  TSH 2.158      [04-26-19 @ 15:22]    HCV 0.09, Nonreact      [04-27-19 @ 07:31]

## 2019-04-27 NOTE — PROGRESS NOTE ADULT - SUBJECTIVE AND OBJECTIVE BOX
OVERNIGHT EVENTS: Admitted o/n    SUBJECTIVE / INTERVAL HPI: Patient seen and examined at bedside. Patient reports feeling generally well, no significant symptoms, no cough, no SOB, no HA, no lightheadedness or dizziness. Tolerating PO.    VITAL SIGNS:  Vital Signs Last 24 Hrs  T(C): 36.8 (2019 06:00), Max: 37.1 (2019 12:23)  T(F): 98.3 (2019 06:00), Max: 98.8 (2019 19:10)  HR: 78 (2019 04:45) (78 - 96)  BP: 142/75 (2019 04:45) (134/67 - 190/77)  BP(mean): 100 (2019 04:45) (94 - 111)  RR: 16 (2019 04:45) (16 - 18)  SpO2: 100% (2019 04:45) (98% - 100%)    PHYSICAL EXAM:    General: Well developed, well nourished, no acute distress  HEENT: PERRL, anicteric sclera; MMM  Neck: supple, no JVD  Cardiovascular: +S1/S2, RRR, no murmurs, rubs, gallops  Respiratory: CTA B/L; no W/R/R  Gastrointestinal: soft, NT/ND; +BSx4  Extremities: WWP; no edema, clubbing or cyanosis  Vascular: 2+ radial and pedal pulses  Neurological: AAOx3; no focal deficits    MEDICATIONS:  MEDICATIONS  (STANDING):  amLODIPine   Tablet 5 milliGRAM(s) Oral daily  atorvastatin 20 milliGRAM(s) Oral at bedtime  dextrose 5%. 1000 milliLiter(s) (50 mL/Hr) IV Continuous <Continuous>  dextrose 50% Injectable 12.5 Gram(s) IV Push once  dextrose 50% Injectable 25 Gram(s) IV Push once  dextrose 50% Injectable 25 Gram(s) IV Push once  docusate sodium 100 milliGRAM(s) Oral three times a day  heparin  Injectable 5000 Unit(s) SubCutaneous every 8 hours  insulin glargine Injectable (LANTUS) 12 Unit(s) SubCutaneous at bedtime  insulin lispro (HumaLOG) corrective regimen sliding scale   SubCutaneous Before meals and at bedtime  polyethylene glycol 3350 17 Gram(s) Oral daily  senna 2 Tablet(s) Oral at bedtime    MEDICATIONS  (PRN):  dextrose 40% Gel 15 Gram(s) Oral once PRN Blood Glucose LESS THAN 70 milliGRAM(s)/deciliter  glucagon  Injectable 1 milliGRAM(s) IntraMuscular once PRN Glucose LESS THAN 70 milligrams/deciliter      ALLERGIES:  Allergies    No Known Allergies    Intolerances        LABS:                        12.4   8.95  )-----------( 320      ( 2019 12:38 )             34.4         118<LL>  |  82<L>  |  29<H>  ----------------------------<  192<H>  3.2<L>   |  24  |  2.51<H>    Ca    8.5      2019 23:11  Mg     2.1         TPro  x   /  Alb  3.5  /  TBili  x   /  DBili  x   /  AST  x   /  ALT  x   /  AlkPhos  x         Urinalysis Basic - ( 2019 14:48 )    Color: Yellow / Appearance: Clear / S.010 / pH: x  Gluc: x / Ketone: NEGATIVE  / Bili: Negative / Urobili: 0.2 E.U./dL   Blood: x / Protein: 100 mg/dL / Nitrite: NEGATIVE   Leuk Esterase: NEGATIVE / RBC: < 5 /HPF / WBC < 5 /HPF   Sq Epi: x / Non Sq Epi: 0-5 /HPF / Bacteria: Present /HPF      CAPILLARY BLOOD GLUCOSE      POCT Blood Glucose.: 204 mg/dL (2019 22:48)      RADIOLOGY & ADDITIONAL TESTS: Reviewed. [facility]    HOSPITAL COURSE    69M with PMH CKD stage IV (follows w Dr. Vasquez, baseline Creatinine 2.4), HTN, DM2, BPH with hx of urinary retention, chronic constipation who presents complaining of constipation x 4 days found with multiple lab abnormalities including hyponatremia, hypokalemia and hypochloremia in the setting of thiazide use. Patient was admitted in hemodynamically stable condition for frequent monitoring and remained asymptomatic. Sodium corrected and patient was started on D5W.     OVERNIGHT EVENTS: Admitted o/n    SUBJECTIVE / INTERVAL HPI: Patient seen and examined at bedside. Patient reports feeling generally well, no significant symptoms, no cough, no SOB, no HA, no lightheadedness or dizziness. Tolerating PO.    VITAL SIGNS:  Vital Signs Last 24 Hrs  T(C): 36.8 (2019 06:00), Max: 37.1 (2019 12:23)  T(F): 98.3 (2019 06:00), Max: 98.8 (2019 19:10)  HR: 78 (2019 04:45) (78 - 96)  BP: 142/75 (2019 04:45) (134/67 - 190/77)  BP(mean): 100 (2019 04:45) (94 - 111)  RR: 16 (2019 04:45) (16 - 18)  SpO2: 100% (2019 04:45) (98% - 100%)    PHYSICAL EXAM:    General: Well developed, well nourished, no acute distress  HEENT: PERRL, anicteric sclera; MMM  Neck: supple, no JVD  Cardiovascular: +S1/S2, RRR, no murmurs, rubs, gallops  Respiratory: CTA B/L; no W/R/R  Gastrointestinal: soft, NT/ND; +BSx4  Extremities: WWP; no edema, clubbing or cyanosis  Vascular: 2+ radial and pedal pulses  Neurological: AAOx3; no focal deficits    MEDICATIONS:  MEDICATIONS  (STANDING):  amLODIPine   Tablet 5 milliGRAM(s) Oral daily  atorvastatin 20 milliGRAM(s) Oral at bedtime  dextrose 5%. 1000 milliLiter(s) (50 mL/Hr) IV Continuous <Continuous>  dextrose 50% Injectable 12.5 Gram(s) IV Push once  dextrose 50% Injectable 25 Gram(s) IV Push once  dextrose 50% Injectable 25 Gram(s) IV Push once  docusate sodium 100 milliGRAM(s) Oral three times a day  heparin  Injectable 5000 Unit(s) SubCutaneous every 8 hours  insulin glargine Injectable (LANTUS) 12 Unit(s) SubCutaneous at bedtime  insulin lispro (HumaLOG) corrective regimen sliding scale   SubCutaneous Before meals and at bedtime  polyethylene glycol 3350 17 Gram(s) Oral daily  senna 2 Tablet(s) Oral at bedtime    MEDICATIONS  (PRN):  dextrose 40% Gel 15 Gram(s) Oral once PRN Blood Glucose LESS THAN 70 milliGRAM(s)/deciliter  glucagon  Injectable 1 milliGRAM(s) IntraMuscular once PRN Glucose LESS THAN 70 milligrams/deciliter      ALLERGIES:  Allergies    No Known Allergies    Intolerances        LABS:                        12.4   8.95  )-----------( 320      ( 2019 12:38 )             34.4         118<LL>  |  82<L>  |  29<H>  ----------------------------<  192<H>  3.2<L>   |  24  |  2.51<H>    Ca    8.5      2019 23:11  Mg     2.1         TPro  x   /  Alb  3.5  /  TBili  x   /  DBili  x   /  AST  x   /  ALT  x   /  AlkPhos  x         Urinalysis Basic - ( 2019 14:48 )    Color: Yellow / Appearance: Clear / S.010 / pH: x  Gluc: x / Ketone: NEGATIVE  / Bili: Negative / Urobili: 0.2 E.U./dL   Blood: x / Protein: 100 mg/dL / Nitrite: NEGATIVE   Leuk Esterase: NEGATIVE / RBC: < 5 /HPF / WBC < 5 /HPF   Sq Epi: x / Non Sq Epi: 0-5 /HPF / Bacteria: Present /HPF      CAPILLARY BLOOD GLUCOSE      POCT Blood Glucose.: 204 mg/dL (2019 22:48)      RADIOLOGY & ADDITIONAL TESTS: [facility]ed. [facility]    HOSPITAL COURSE    69M with PMH CKD stage IV (follows w Dr. Vasquez, baseline Creatinine 2.4), HTN, DM2, BPH with hx of urinary retention, chronic constipation who presents complaining of constipation x 4 days found with multiple lab abnormalities including hyponatremia, hypokalemia and hypochloremia in the setting of new thiazide use for 3 weeks. Patient was admitted in hemodynamically stable condition for frequent monitoring and remained asymptomatic. Sodium corrected with 2L NS and patient was started on D5W to avoid overcorrection. Patient was continued on Norvasc for blood pressure and thiazide was held, as was ACE in setting of PRICILA. Patient remained stable for step down to [facility] for continued monitoring.     OVERNIGHT EVENTS: Admitted o/n    SUBJECTIVE / INTERVAL HPI: Patient seen and examined at bedside. Patient reports feeling generally well, no significant symptoms, no cough, no SOB, no HA, no lightheadedness or dizziness. Tolerating PO.    VITAL SIGNS:  Vital Signs Last 24 Hrs  T(C): 36.8 (2019 06:00), Max: 37.1 (2019 12:23)  T(F): 98.3 (2019 06:00), Max: 98.8 (2019 19:10)  HR: 78 (2019 04:45) (78 - 96)  BP: 142/75 (2019 04:45) (134/67 - 190/77)  BP(mean): 100 (2019 04:45) (94 - 111)  RR: 16 (2019 04:45) (16 - 18)  SpO2: 100% (2019 04:45) (98% - 100%)    PHYSICAL EXAM:    General: Well developed, well nourished, no acute distress  HEENT: PERRL, anicteric sclera; MMM  Neck: supple, no JVD  Cardiovascular: +S1/S2, RRR, no murmurs, rubs, gallops  Respiratory: CTA B/L; no W/R/R  Gastrointestinal: soft, NT/ND; +BSx4  Extremities: WWP; no edema, clubbing or cyanosis  Vascular: 2+ radial and pedal pulses  Neurological: AAOx3; no focal deficits    MEDICATIONS:  MEDICATIONS  (STANDING):  amLODIPine   Tablet 5 milliGRAM(s) Oral daily  atorvastatin 20 milliGRAM(s) Oral at bedtime  dextrose 5%. 1000 milliLiter(s) (50 mL/Hr) IV Continuous <Continuous>  dextrose 50% Injectable 12.5 Gram(s) IV Push once  dextrose 50% Injectable 25 Gram(s) IV Push once  dextrose 50% Injectable 25 Gram(s) IV Push once  docusate sodium 100 milliGRAM(s) Oral three times a day  heparin  Injectable 5000 Unit(s) SubCutaneous every 8 hours  insulin glargine Injectable (LANTUS) 12 Unit(s) SubCutaneous at bedtime  insulin lispro (HumaLOG) corrective regimen sliding scale   SubCutaneous Before meals and at bedtime  polyethylene glycol 3350 17 Gram(s) Oral daily  senna 2 Tablet(s) Oral at bedtime    MEDICATIONS  (PRN):  dextrose 40% Gel 15 Gram(s) Oral once PRN Blood Glucose LESS THAN 70 milliGRAM(s)/deciliter  glucagon  Injectable 1 milliGRAM(s) IntraMuscular once PRN Glucose LESS THAN 70 milligrams/deciliter      ALLERGIES:  Allergies    No Known Allergies    Intolerances        LABS:                        12.4   8.95  )-----------( 320      ( 2019 12:38 )             34.4         118<LL>  |  82<L>  |  29<H>  ----------------------------<  192<H>  3.2<L>   |  24  |  2.51<H>    Ca    8.5      2019 23:11  Mg     2.1         TPro  x   /  Alb  3.5  /  TBili  x   /  DBili  x   /  AST  x   /  ALT  x   /  AlkPhos  x         Urinalysis Basic - ( 2019 14:48 )    Color: Yellow / Appearance: Clear / S.010 / pH: x  Gluc: x / Ketone: NEGATIVE  / Bili: Negative / Urobili: 0.2 E.U./dL   Blood: x / Protein: 100 mg/dL / Nitrite: NEGATIVE   Leuk Esterase: NEGATIVE / RBC: < 5 /HPF / WBC < 5 /HPF   Sq Epi: x / Non Sq Epi: 0-5 /HPF / Bacteria: Present /HPF      CAPILLARY BLOOD GLUCOSE      POCT Blood Glucose.: 204 mg/dL (2019 22:48)      RADIOLOGY & ADDITIONAL TESTS: Reviewed.

## 2019-04-27 NOTE — PROGRESS NOTE ADULT - PROBLEM SELECTOR PLAN 1
Sodium 113 this admission without any evidence of altered mental status and currently asymptomatic. Believed to be chronic hyponatremia as he was started on thiazide about 3 weeks ago. He reports that he does not drink any excess fluids per day.  - c/w BMP q4  - f/u urine lytes q4  - holding all thiazides

## 2019-04-27 NOTE — PROGRESS NOTE ADULT - PROBLEM SELECTOR PLAN 2
Only small bowel movement after given the fleet enema. 1 episode of "black" emesis a few days prior but Hgb on admission is stable at 12.4  - c/w Miralax, colace and senna  - monitor H&H Only small bowel movement after given the fleet enema. 1 episode of "black" emesis a few days prior but Hgb on admission is stable at 12.4  - c/w Miralax, colace and senna  - monitor H&H  - will give additional enema if continued constipation

## 2019-04-27 NOTE — PROGRESS NOTE ADULT - PROBLEM SELECTOR PLAN 2
Only small bowel movement after given the fleet enema. 1 episode of "black" emesis a few days prior but Hgb on admission is stable at 12.4  - c/w Miralax, colace and senna  - monitor H&H

## 2019-04-27 NOTE — PROGRESS NOTE ADULT - ASSESSMENT
69M with PMH CKD stage IV (follows w Dr. Vasquez, baseline Creatinine 2.4), HTN, DM2, BPH with hx of urinary retention, chronic constipation who presents complaining of constipation x 4 days found with multiple lab abnormalities including hyponatremia, hypokalemia and hypochloremia in the setting of thiazide use.

## 2019-04-27 NOTE — PROGRESS NOTE ADULT - PROBLEM SELECTOR PLAN 6
- On home Lantus 30U nightly  - c/w insulin sliding scale, accuchecks - On home Lantus 30U nightly  - c/w insulin sliding scale  - FSG

## 2019-04-27 NOTE — PROGRESS NOTE ADULT - PROBLEM SELECTOR PLAN 3
Creatinine elevated at 2.73 from baseline of 2.4. Component of PRICILA on CKD in the setting of vomiting and poor PO intake. S/p 2LNS in the ED. Downtrending to 2.61.   - f/u urine lytes - urine creatinine not previously ordered.  - BMP q4 Creatinine elevated at 2.73 from baseline of 2.4. Component of PRICILA on CKD in the setting of vomiting and poor PO intake. S/p 2LNS in the ED. Downtrending.  - f/u urine lytes - urine creatinine not previously ordered.  - BMP q4

## 2019-04-28 LAB
ANION GAP SERPL CALC-SCNC: 10 MMOL/L — SIGNIFICANT CHANGE UP (ref 5–17)
ANION GAP SERPL CALC-SCNC: 11 MMOL/L — SIGNIFICANT CHANGE UP (ref 5–17)
ANION GAP SERPL CALC-SCNC: 12 MMOL/L — SIGNIFICANT CHANGE UP (ref 5–17)
ANION GAP SERPL CALC-SCNC: 12 MMOL/L — SIGNIFICANT CHANGE UP (ref 5–17)
ANION GAP SERPL CALC-SCNC: 13 MMOL/L — SIGNIFICANT CHANGE UP (ref 5–17)
BUN SERPL-MCNC: 25 MG/DL — HIGH (ref 7–23)
BUN SERPL-MCNC: 26 MG/DL — HIGH (ref 7–23)
BUN SERPL-MCNC: 27 MG/DL — HIGH (ref 7–23)
BUN SERPL-MCNC: 28 MG/DL — HIGH (ref 7–23)
BUN SERPL-MCNC: 30 MG/DL — HIGH (ref 7–23)
CALCIUM SERPL-MCNC: 9.1 MG/DL — SIGNIFICANT CHANGE UP (ref 8.4–10.5)
CALCIUM SERPL-MCNC: 9.2 MG/DL — SIGNIFICANT CHANGE UP (ref 8.4–10.5)
CALCIUM SERPL-MCNC: 9.2 MG/DL — SIGNIFICANT CHANGE UP (ref 8.4–10.5)
CHLORIDE SERPL-SCNC: 86 MMOL/L — LOW (ref 96–108)
CHLORIDE SERPL-SCNC: 88 MMOL/L — LOW (ref 96–108)
CHLORIDE SERPL-SCNC: 88 MMOL/L — LOW (ref 96–108)
CHLORIDE SERPL-SCNC: 89 MMOL/L — LOW (ref 96–108)
CHLORIDE SERPL-SCNC: 89 MMOL/L — LOW (ref 96–108)
CO2 SERPL-SCNC: 26 MMOL/L — SIGNIFICANT CHANGE UP (ref 22–31)
CO2 SERPL-SCNC: 27 MMOL/L — SIGNIFICANT CHANGE UP (ref 22–31)
CO2 SERPL-SCNC: 27 MMOL/L — SIGNIFICANT CHANGE UP (ref 22–31)
CREAT ?TM UR-MCNC: 27 MG/DL — SIGNIFICANT CHANGE UP
CREAT ?TM UR-MCNC: 29 MG/DL — SIGNIFICANT CHANGE UP
CREAT SERPL-MCNC: 2.46 MG/DL — HIGH (ref 0.5–1.3)
CREAT SERPL-MCNC: 2.56 MG/DL — HIGH (ref 0.5–1.3)
CREAT SERPL-MCNC: 2.58 MG/DL — HIGH (ref 0.5–1.3)
CREAT SERPL-MCNC: 2.61 MG/DL — HIGH (ref 0.5–1.3)
CREAT SERPL-MCNC: 2.62 MG/DL — HIGH (ref 0.5–1.3)
GLUCOSE BLDC GLUCOMTR-MCNC: 133 MG/DL — HIGH (ref 70–99)
GLUCOSE BLDC GLUCOMTR-MCNC: 169 MG/DL — HIGH (ref 70–99)
GLUCOSE BLDC GLUCOMTR-MCNC: 215 MG/DL — HIGH (ref 70–99)
GLUCOSE BLDC GLUCOMTR-MCNC: 240 MG/DL — HIGH (ref 70–99)
GLUCOSE SERPL-MCNC: 123 MG/DL — HIGH (ref 70–99)
GLUCOSE SERPL-MCNC: 145 MG/DL — HIGH (ref 70–99)
GLUCOSE SERPL-MCNC: 214 MG/DL — HIGH (ref 70–99)
GLUCOSE SERPL-MCNC: 253 MG/DL — HIGH (ref 70–99)
GLUCOSE SERPL-MCNC: 97 MG/DL — SIGNIFICANT CHANGE UP (ref 70–99)
HCT VFR BLD CALC: 34.8 % — LOW (ref 39–50)
HGB BLD-MCNC: 12.2 G/DL — LOW (ref 13–17)
MAGNESIUM SERPL-MCNC: 2.2 MG/DL — SIGNIFICANT CHANGE UP (ref 1.6–2.6)
MCHC RBC-ENTMCNC: 29 PG — SIGNIFICANT CHANGE UP (ref 27–34)
MCHC RBC-ENTMCNC: 35.1 GM/DL — SIGNIFICANT CHANGE UP (ref 32–36)
MCV RBC AUTO: 82.9 FL — SIGNIFICANT CHANGE UP (ref 80–100)
NRBC # BLD: 0 /100 WBCS — SIGNIFICANT CHANGE UP (ref 0–0)
OSMOLALITY UR: 162 MOSMOL/KG — SIGNIFICANT CHANGE UP (ref 100–650)
OSMOLALITY UR: 163 MOSMOL/KG — SIGNIFICANT CHANGE UP (ref 100–650)
PLATELET # BLD AUTO: 331 K/UL — SIGNIFICANT CHANGE UP (ref 150–400)
POTASSIUM SERPL-MCNC: 3.9 MMOL/L — SIGNIFICANT CHANGE UP (ref 3.5–5.3)
POTASSIUM SERPL-MCNC: 4 MMOL/L — SIGNIFICANT CHANGE UP (ref 3.5–5.3)
POTASSIUM SERPL-MCNC: 4.2 MMOL/L — SIGNIFICANT CHANGE UP (ref 3.5–5.3)
POTASSIUM SERPL-MCNC: 4.4 MMOL/L — SIGNIFICANT CHANGE UP (ref 3.5–5.3)
POTASSIUM SERPL-MCNC: 4.4 MMOL/L — SIGNIFICANT CHANGE UP (ref 3.5–5.3)
POTASSIUM SERPL-SCNC: 3.9 MMOL/L — SIGNIFICANT CHANGE UP (ref 3.5–5.3)
POTASSIUM SERPL-SCNC: 4 MMOL/L — SIGNIFICANT CHANGE UP (ref 3.5–5.3)
POTASSIUM SERPL-SCNC: 4.2 MMOL/L — SIGNIFICANT CHANGE UP (ref 3.5–5.3)
POTASSIUM SERPL-SCNC: 4.4 MMOL/L — SIGNIFICANT CHANGE UP (ref 3.5–5.3)
POTASSIUM SERPL-SCNC: 4.4 MMOL/L — SIGNIFICANT CHANGE UP (ref 3.5–5.3)
RBC # BLD: 4.2 M/UL — SIGNIFICANT CHANGE UP (ref 4.2–5.8)
RBC # FLD: 11.9 % — SIGNIFICANT CHANGE UP (ref 10.3–14.5)
SODIUM SERPL-SCNC: 123 MMOL/L — LOW (ref 135–145)
SODIUM SERPL-SCNC: 125 MMOL/L — LOW (ref 135–145)
SODIUM SERPL-SCNC: 127 MMOL/L — LOW (ref 135–145)
SODIUM SERPL-SCNC: 127 MMOL/L — LOW (ref 135–145)
SODIUM SERPL-SCNC: 128 MMOL/L — LOW (ref 135–145)
SODIUM UR-SCNC: 25 MMOL/L — SIGNIFICANT CHANGE UP
SODIUM UR-SCNC: 29 MMOL/L — SIGNIFICANT CHANGE UP
WBC # BLD: 8.44 K/UL — SIGNIFICANT CHANGE UP (ref 3.8–10.5)
WBC # FLD AUTO: 8.44 K/UL — SIGNIFICANT CHANGE UP (ref 3.8–10.5)

## 2019-04-28 PROCEDURE — 99232 SBSQ HOSP IP/OBS MODERATE 35: CPT

## 2019-04-28 PROCEDURE — 99233 SBSQ HOSP IP/OBS HIGH 50: CPT | Mod: GC

## 2019-04-28 RX ORDER — LISINOPRIL 2.5 MG/1
10 TABLET ORAL DAILY
Qty: 0 | Refills: 0 | Status: DISCONTINUED | OUTPATIENT
Start: 2019-04-28 | End: 2019-04-29

## 2019-04-28 RX ORDER — LISINOPRIL 2.5 MG/1
10 TABLET ORAL DAILY
Qty: 0 | Refills: 0 | Status: DISCONTINUED | OUTPATIENT
Start: 2019-04-28 | End: 2019-04-28

## 2019-04-28 RX ADMIN — INSULIN GLARGINE 12 UNIT(S): 100 INJECTION, SOLUTION SUBCUTANEOUS at 22:03

## 2019-04-28 RX ADMIN — SODIUM CHLORIDE 100 MILLILITER(S): 9 INJECTION, SOLUTION INTRAVENOUS at 06:00

## 2019-04-28 RX ADMIN — Medication 100 MILLIGRAM(S): at 21:37

## 2019-04-28 RX ADMIN — Medication 100 MILLIGRAM(S): at 11:13

## 2019-04-28 RX ADMIN — Medication 100 MILLIGRAM(S): at 05:59

## 2019-04-28 RX ADMIN — LISINOPRIL 10 MILLIGRAM(S): 2.5 TABLET ORAL at 14:15

## 2019-04-28 RX ADMIN — HEPARIN SODIUM 5000 UNIT(S): 5000 INJECTION INTRAVENOUS; SUBCUTANEOUS at 06:00

## 2019-04-28 RX ADMIN — HEPARIN SODIUM 5000 UNIT(S): 5000 INJECTION INTRAVENOUS; SUBCUTANEOUS at 13:36

## 2019-04-28 RX ADMIN — Medication 4: at 22:03

## 2019-04-28 RX ADMIN — Medication 10 MILLIGRAM(S): at 14:15

## 2019-04-28 RX ADMIN — AMLODIPINE BESYLATE 5 MILLIGRAM(S): 2.5 TABLET ORAL at 05:59

## 2019-04-28 RX ADMIN — SENNA PLUS 2 TABLET(S): 8.6 TABLET ORAL at 21:37

## 2019-04-28 RX ADMIN — HEPARIN SODIUM 5000 UNIT(S): 5000 INJECTION INTRAVENOUS; SUBCUTANEOUS at 21:38

## 2019-04-28 RX ADMIN — Medication 2: at 17:58

## 2019-04-28 RX ADMIN — POLYETHYLENE GLYCOL 3350 17 GRAM(S): 17 POWDER, FOR SOLUTION ORAL at 11:13

## 2019-04-28 RX ADMIN — ATORVASTATIN CALCIUM 20 MILLIGRAM(S): 80 TABLET, FILM COATED ORAL at 21:37

## 2019-04-28 RX ADMIN — Medication 4: at 12:20

## 2019-04-28 NOTE — PROGRESS NOTE ADULT - ASSESSMENT
70yo M with longstanding DMII + retinopathy, HTN, CKD IV and mild urinary retention/BPH (170cc PVR on 2/19 sono) came to ED for constipation found to have acute-subacute asymp hyponatremia and hypokalemia likely 2/2 chlorthalidone initiation on 4/3/19:    Acute/Subacute euvolemic hypoosmolar hyponatremia likely 2/2 chlorthalidone  - Na 128 then 127 (corrected for hyperglycemia) this am. d/c D5W and change frequency of Na checks to BID. Goal 135 tmrw. Consider endocrine consult to optimize regimen, lantus at home as metformin d/c'd because of reduced egfr <30  - hypokalemia improved w repletion    CKD at baseline likely 2/2 diabetic/htn nephrosclerosis. ANDRE blockade recently stopped 2/2 hyperkalemia. Non nephrotic proteinuria with negative serologic GN w/u. Renal sono 2/19 10.5cm kidneys w increased echogenicity and mild PVR  BP not at goal <135/85. Recommend restarting ramipril 2.5mg daily along w torsemide for renal benefits as well as lower BP, hopefully adding torsemide instead of chlorthalidone will help with his hyperkalemia he had in the recent past while on Ramipril. Low K diet

## 2019-04-28 NOTE — PROGRESS NOTE ADULT - PROBLEM SELECTOR PLAN 3
At baseline, in setting of Diabetes. To begin Ramipril 2.5mg today for renal protection, as well as Torsemide.

## 2019-04-28 NOTE — PROGRESS NOTE ADULT - PROBLEM SELECTOR PLAN 1
Na correcting at appropriate levels as of this morning (Na 128), D5 now DC'ed as Na dropped to 123. Continue to check BMP q12 hrs  -Renal following, recs appreciated

## 2019-04-28 NOTE — PROGRESS NOTE ADULT - ASSESSMENT
69M with PMH CKD stage IV (follows w Dr. Vasquez, baseline Creatinine 2.4), HTN, DM2, BPH with hx of urinary retention, chronic constipation who presents complaining of constipation x 4 days found with multiple lab abnormalities including hyponatremia, hypokalemia and hypochloremia.

## 2019-04-28 NOTE — PROGRESS NOTE ADULT - SUBJECTIVE AND OBJECTIVE BOX
[facility] DIVISION OF KIDNEY DISEASES AND HYPERTENSION   --------------------------------------------------------------------------------  HPI:  70yo M with longstanding DMII + retinopathy, HTN, CKD IV and mild urinary retention/BPH (170cc PVR on 2/19 sono) came to ED for constipation found to have acute-subacute asymp hyponatremia and hypokalemia likely 2/2 chlorthalidone initiation on 4/3/19:      PAST HISTORY  --------------------------------------------------------------------------------  PAST MEDICAL & SURGICAL HISTORY:  Hyperlipidemia  Hypertension  Diabetes  No significant past surgical history    FAMILY HISTORY: nc    PAST SOCIAL HISTORY: no etoh/drugs    ALLERGIES & MEDICATIONS  --------------------------------------------------------------------------------  Allergies    No Known Allergies    Intolerances      Standing Inpatient Medications  amLODIPine   Tablet 5 milliGRAM(s) Oral daily  atorvastatin 20 milliGRAM(s) Oral at bedtime  dextrose 5%. 1000 milliLiter(s) IV Continuous <Continuous>  dextrose 5%. 1000 milliLiter(s) IV Continuous <Continuous>  dextrose 50% Injectable 12.5 Gram(s) IV Push once  dextrose 50% Injectable 25 Gram(s) IV Push once  dextrose 50% Injectable 25 Gram(s) IV Push once  docusate sodium 100 milliGRAM(s) Oral three times a day  heparin  Injectable 5000 Unit(s) SubCutaneous every 8 hours  insulin glargine Injectable (LANTUS) 12 Unit(s) SubCutaneous at bedtime  insulin lispro (HumaLOG) corrective regimen sliding scale   SubCutaneous Before meals and at bedtime  polyethylene glycol 3350 17 Gram(s) Oral daily  senna 2 Tablet(s) Oral at bedtime    PRN Inpatient Medications  dextrose 40% Gel 15 Gram(s) Oral once PRN  glucagon  Injectable 1 milliGRAM(s) IntraMuscular once PRN      REVIEW OF SYSTEMS  --------------------------------------------------------------------------------  Gen: No weight changes, fatigue, fevers/chills, weakness  Skin: No rashes  Head/Eyes/Ears/Mouth: No headache; Normal hearing; Normal vision w/o blurriness; No sinus pain/discomfort, sore throat  Respiratory: No dyspnea, cough, wheezing, hemoptysis  CV: No chest pain, PND, orthopnea  GI: No abdominal pain, +constipation, no nausea, vomiting, +passing gas  : No increased frequency, dysuria, hematuria, nocturia  MSK: No joint pain/swelling; no back pain; no edema  Neuro: No dizziness/lightheadedness, weakness, seizures, numbness, tingling  Heme: No easy bruising or bleeding  Endo: No heat/cold intolerance  Psych: No significant nervousness, anxiety, stress, depression    All other systems were reviewed and are negative, except as noted.    VITALS/PHYSICAL EXAM  --------------------------------------------------------------------------------  T(C): 36.3 (04-27-19 @ 13:11), Max: 37.1 (04-26-19 @ 15:52)  HR: 80 (04-27-19 @ 12:55) (78 - 94)  BP: 145/75 (04-27-19 @ 12:55) (134/67 - 170/88)  RR: 16 (04-27-19 @ 12:55) (16 - 18)  SpO2: 99% (04-27-19 @ 12:55) (98% - 100%)  Wt(kg): --  Height (cm): 170.18 (04-26-19 @ 22:01)  Weight (kg): 72.1 (04-26-19 @ 22:01)  BMI (kg/m2): 24.9 (04-26-19 @ 22:01)  BSA (m2): 1.83 (04-26-19 @ 22:01)      Physical Exam:  	Gen: NAD, well-appearing  	HEENT: PERRL, supple neck, clear oropharynx  	Pulm: CTA B/L  	CV: RRR, S1S2; no rub  	Back: No spinal or CVA tenderness; no sacral edema  	Abd: +BS, soft, nontender/nondistended  	: No suprapubic tenderness  	UE: Warm, FROM, no clubbing, intact strength; no edema; no asterixis  	LE: Warm, FROM, no clubbing, intact strength; no edema  	Neuro: No focal deficits, no tremor, AAox3  	Psych: Normal affect and mood  	Skin: Warm, without rashes      LABS/STUDIES  --------------------------------------------------------------------------------              11.4   6.83  >-----------<  307      [04-27-19 @ 07:31]              32.4     123  |  86  |  30  ----------------------------<  189      [04-27-19 @ 12:25]  3.8   |  26  |  2.55        Ca     9.0     [04-27-19 @ 12:25]      Mg     2.2     [04-27-19 @ 07:31]      Phos  3.6     [04-27-19 @ 07:31]    TPro  x   /  Alb  3.5  /  TBili  x   /  DBili  x   /  AST  x   /  ALT  x   /  AlkPhos  x   [04-26-19 @ 23:11]        Serum Osmolality 255      [04-26-19 @ 23:11]    Creatinine Trend:  SCr 2.55 [04-27 @ 12:25]  SCr 2.47 [04-27 @ 07:31]  SCr 2.51 [04-26 @ 23:11]  SCr 2.61 [04-26 @ 15:22]  SCr 2.73 [04-26 @ 12:38]    Urinalysis - [04-26-19 @ 14:48]      Color Yellow / Appearance Clear / SG 1.010 / pH 6.5      Gluc 100 / Ketone NEGATIVE  / Bili Negative / Urobili 0.2       Blood Small / Protein 100 / Leuk Est NEGATIVE / Nitrite NEGATIVE      RBC < 5 / WBC < 5 / Hyaline  / Gran  / Sq Epi  / Non Sq Epi 0-5 / Bacteria Present    Urine Sodium 32      [04-26-19 @ 19:34]  Urine Chloride 28      [04-26-19 @ 14:48]  Urine Osmolality 197      [04-26-19 @ 19:34]    HbA1c 8.1      [04-27-19 @ 07:31]  TSH 2.158      [04-26-19 @ 15:22]    HCV 0.09, Nonreact      [04-27-19 @ 07:31]

## 2019-04-28 NOTE — PROGRESS NOTE ADULT - PROBLEM SELECTOR PLAN 2
Miralax, Senna, Colace  -Discussed dietary changes (to help with constipation) at length with patient, including Metamucil

## 2019-04-28 NOTE — PROGRESS NOTE ADULT - SUBJECTIVE AND OBJECTIVE BOX
Patient is a 69y old  Male who presents with a chief complaint of Constipation (2019 13:17)      INTERVAL HPI/OVERNIGHT EVENTS: No acute events overnight. Still on D5 in setting of overcorrection of Na. Pt seen and examined at bedside. No acute complaints, asking when he can be discharged home. No lightheadedness, dizziness, lethargy. Had BM yesterday, no urge for BM today.      ROS  (- ) headache  ( -  )fevers/chills  ( - ) dyspnea  (  - ) cough  (  - ) chest pain  (  - ) palpatations  ( - ) dizziness/lightheadedness  (  - ) nausea/vomiting  (  - ) abd pain  (  - ) diarrhea  (  - ) melena  (  - ) hematochezia  (  - ) dysuria   ( - ) hematuria  (  - ) leg swelling    ( - ) calf tenderness  (  - ) motor weakness  ( - ) extremity numbness  ( - ) back pain  ( + ) tolerating POs  ( + ) BM  ROS: 12 point review of systems otherwise negative              MEDICATIONS  (STANDING):  amLODIPine   Tablet 5 milliGRAM(s) Oral daily  atorvastatin 20 milliGRAM(s) Oral at bedtime  dextrose 5%. 1000 milliLiter(s) (50 mL/Hr) IV Continuous <Continuous>  dextrose 50% Injectable 12.5 Gram(s) IV Push once  dextrose 50% Injectable 25 Gram(s) IV Push once  dextrose 50% Injectable 25 Gram(s) IV Push once  docusate sodium 100 milliGRAM(s) Oral three times a day  heparin  Injectable 5000 Unit(s) SubCutaneous every 8 hours  insulin glargine Injectable (LANTUS) 12 Unit(s) SubCutaneous at bedtime  insulin lispro (HumaLOG) corrective regimen sliding scale   SubCutaneous Before meals and at bedtime  lisinopril 10 milliGRAM(s) Oral daily  polyethylene glycol 3350 17 Gram(s) Oral daily  senna 2 Tablet(s) Oral at bedtime  torsemide 10 milliGRAM(s) Oral every 24 hours    MEDICATIONS  (PRN):  dextrose 40% Gel 15 Gram(s) Oral once PRN Blood Glucose LESS THAN 70 milliGRAM(s)/deciliter  glucagon  Injectable 1 milliGRAM(s) IntraMuscular once PRN Glucose LESS THAN 70 milligrams/deciliter      Allergies    No Known Allergies    Intolerances          Vital Signs Last 24 Hrs  T(C): 37 (2019 13:26), Max: 37 (2019 13:26)  T(F): 98.6 (2019 13:26), Max: 98.6 (2019 13:26)  HR: 94 (2019 13:26) (81 - 94)  BP: 155/81 (2019 13:26) (133/78 - 155/81)  BP(mean): --  RR: 18 (2019 13:26) (16 - 18)  SpO2: 99% (2019 13:) (98% - 99%)  CAPILLARY BLOOD GLUCOSE      POCT Blood Glucose.: 240 mg/dL (2019 12:09)  POCT Blood Glucose.: 133 mg/dL (2019 08:05)  POCT Blood Glucose.: 189 mg/dL (2019 21:52)  POCT Blood Glucose.: 265 mg/dL (2019 17:08)       @ 07: @ 07:00  --------------------------------------------------------  IN: 1100 mL / OUT: 1575 mL / NET: -475 mL     @ : @ 13:44  --------------------------------------------------------  IN: 0 mL / OUT: 700 mL / NET: -700 mL        Physical Exam:    General:  Well appearing male in no acute distress, resting in bed comfortably  HEENT:  Nonicteric, PERRLA, oral pharynx w/o erythema/exudate,  neck supple  CV:  V7C1arp , RRR,  no JVD  Lungs:  CTA B/L, no wheezes, rales, rhonchi  Abdomen:  positive BS, Soft, non-tender, non distended, no hepatosplenomegaly  Extremities:  2+ DP/radial pulses b/l, no cyanosis, no edema  Back: no cva or midline tenderness  Skin:  Warm and dry   :  No rothman  Neuro:  AAOx3,  CN II-XII grossly intact, 5/5 str all 4 ext, sensation intact, (-) dysmetria b/l (-) dysdiadochokinesia bl  No Restraints    LABS:                        12.2   8.44  )-----------( 331      ( 2019 07:45 )             34.8         123<L>  |  86<L>  |  25<H>  ----------------------------<  253<H>  4.2   |  27  |  2.56<H>    Ca    9.2      2019 11:07  Phos  2.9       Mg     2.2         TPro  x   /  Alb  3.5  /  TBili  x   /  DBili  x   /  AST  x   /  ALT  x   /  AlkPhos  x         Urinalysis Basic - ( 2019 14:48 )    Color: Yellow / Appearance: Clear / S.010 / pH: x  Gluc: x / Ketone: NEGATIVE  / Bili: Negative / Urobili: 0.2 E.U./dL   Blood: x / Protein: 100 mg/dL / Nitrite: NEGATIVE   Leuk Esterase: NEGATIVE / RBC: < 5 /HPF / WBC < 5 /HPF   Sq Epi: x / Non Sq Epi: 0-5 /HPF / Bacteria: Present /HPF      RADIOLOGY & ADDITIONAL TESTS: reviewed

## 2019-04-29 ENCOUNTER — TRANSCRIPTION ENCOUNTER (OUTPATIENT)
Age: 69
End: 2019-04-29

## 2019-04-29 VITALS
HEART RATE: 94 BPM | RESPIRATION RATE: 17 BRPM | SYSTOLIC BLOOD PRESSURE: 114 MMHG | TEMPERATURE: 99 F | DIASTOLIC BLOOD PRESSURE: 67 MMHG | OXYGEN SATURATION: 100 %

## 2019-04-29 LAB
ALBUMIN SERPL ELPH-MCNC: 4 G/DL — SIGNIFICANT CHANGE UP (ref 3.3–5)
ALP SERPL-CCNC: 63 U/L — SIGNIFICANT CHANGE UP (ref 40–120)
ALT FLD-CCNC: 17 U/L — SIGNIFICANT CHANGE UP (ref 10–45)
ANION GAP SERPL CALC-SCNC: 13 MMOL/L — SIGNIFICANT CHANGE UP (ref 5–17)
AST SERPL-CCNC: 21 U/L — SIGNIFICANT CHANGE UP (ref 10–40)
BASOPHILS # BLD AUTO: 0.05 K/UL — SIGNIFICANT CHANGE UP (ref 0–0.2)
BASOPHILS NFR BLD AUTO: 0.5 % — SIGNIFICANT CHANGE UP (ref 0–2)
BILIRUB SERPL-MCNC: 0.5 MG/DL — SIGNIFICANT CHANGE UP (ref 0.2–1.2)
BUN SERPL-MCNC: 30 MG/DL — HIGH (ref 7–23)
CALCIUM SERPL-MCNC: 9.5 MG/DL — SIGNIFICANT CHANGE UP (ref 8.4–10.5)
CHLORIDE SERPL-SCNC: 92 MMOL/L — LOW (ref 96–108)
CO2 SERPL-SCNC: 27 MMOL/L — SIGNIFICANT CHANGE UP (ref 22–31)
CREAT SERPL-MCNC: 2.84 MG/DL — HIGH (ref 0.5–1.3)
EOSINOPHIL # BLD AUTO: 0.66 K/UL — HIGH (ref 0–0.5)
EOSINOPHIL NFR BLD AUTO: 6.4 % — HIGH (ref 0–6)
GLUCOSE BLDC GLUCOMTR-MCNC: 104 MG/DL — HIGH (ref 70–99)
GLUCOSE SERPL-MCNC: 93 MG/DL — SIGNIFICANT CHANGE UP (ref 70–99)
HCT VFR BLD CALC: 37.9 % — LOW (ref 39–50)
HGB BLD-MCNC: 12.6 G/DL — LOW (ref 13–17)
IMM GRANULOCYTES NFR BLD AUTO: 0.4 % — SIGNIFICANT CHANGE UP (ref 0–1.5)
LYMPHOCYTES # BLD AUTO: 4.28 K/UL — HIGH (ref 1–3.3)
LYMPHOCYTES # BLD AUTO: 41.6 % — SIGNIFICANT CHANGE UP (ref 13–44)
MAGNESIUM SERPL-MCNC: 2.2 MG/DL — SIGNIFICANT CHANGE UP (ref 1.6–2.6)
MCHC RBC-ENTMCNC: 28.3 PG — SIGNIFICANT CHANGE UP (ref 27–34)
MCHC RBC-ENTMCNC: 33.2 GM/DL — SIGNIFICANT CHANGE UP (ref 32–36)
MCV RBC AUTO: 85 FL — SIGNIFICANT CHANGE UP (ref 80–100)
MONOCYTES # BLD AUTO: 1.05 K/UL — HIGH (ref 0–0.9)
MONOCYTES NFR BLD AUTO: 10.2 % — SIGNIFICANT CHANGE UP (ref 2–14)
NEUTROPHILS # BLD AUTO: 4.2 K/UL — SIGNIFICANT CHANGE UP (ref 1.8–7.4)
NEUTROPHILS NFR BLD AUTO: 40.9 % — LOW (ref 43–77)
NRBC # BLD: 0 /100 WBCS — SIGNIFICANT CHANGE UP (ref 0–0)
PHOSPHATE SERPL-MCNC: 3.8 MG/DL — SIGNIFICANT CHANGE UP (ref 2.5–4.5)
PLATELET # BLD AUTO: 352 K/UL — SIGNIFICANT CHANGE UP (ref 150–400)
POTASSIUM SERPL-MCNC: 4 MMOL/L — SIGNIFICANT CHANGE UP (ref 3.5–5.3)
POTASSIUM SERPL-SCNC: 4 MMOL/L — SIGNIFICANT CHANGE UP (ref 3.5–5.3)
PROT SERPL-MCNC: 7.5 G/DL — SIGNIFICANT CHANGE UP (ref 6–8.3)
RBC # BLD: 4.46 M/UL — SIGNIFICANT CHANGE UP (ref 4.2–5.8)
RBC # FLD: 11.9 % — SIGNIFICANT CHANGE UP (ref 10.3–14.5)
SODIUM SERPL-SCNC: 132 MMOL/L — LOW (ref 135–145)
WBC # BLD: 10.28 K/UL — SIGNIFICANT CHANGE UP (ref 3.8–10.5)
WBC # FLD AUTO: 10.28 K/UL — SIGNIFICANT CHANGE UP (ref 3.8–10.5)

## 2019-04-29 PROCEDURE — 85027 COMPLETE CBC AUTOMATED: CPT

## 2019-04-29 PROCEDURE — 93005 ELECTROCARDIOGRAM TRACING: CPT

## 2019-04-29 PROCEDURE — 82040 ASSAY OF SERUM ALBUMIN: CPT

## 2019-04-29 PROCEDURE — 82436 ASSAY OF URINE CHLORIDE: CPT

## 2019-04-29 PROCEDURE — 96361 HYDRATE IV INFUSION ADD-ON: CPT

## 2019-04-29 PROCEDURE — 83935 ASSAY OF URINE OSMOLALITY: CPT

## 2019-04-29 PROCEDURE — 86803 HEPATITIS C AB TEST: CPT

## 2019-04-29 PROCEDURE — 83880 ASSAY OF NATRIURETIC PEPTIDE: CPT

## 2019-04-29 PROCEDURE — 80048 BASIC METABOLIC PNL TOTAL CA: CPT

## 2019-04-29 PROCEDURE — 99239 HOSP IP/OBS DSCHRG MGMT >30: CPT

## 2019-04-29 PROCEDURE — 96365 THER/PROPH/DIAG IV INF INIT: CPT

## 2019-04-29 PROCEDURE — 81001 URINALYSIS AUTO W/SCOPE: CPT

## 2019-04-29 PROCEDURE — 36415 COLL VENOUS BLD VENIPUNCTURE: CPT

## 2019-04-29 PROCEDURE — 84300 ASSAY OF URINE SODIUM: CPT

## 2019-04-29 PROCEDURE — 85025 COMPLETE CBC W/AUTO DIFF WBC: CPT

## 2019-04-29 PROCEDURE — 83036 HEMOGLOBIN GLYCOSYLATED A1C: CPT

## 2019-04-29 PROCEDURE — 82570 ASSAY OF URINE CREATININE: CPT

## 2019-04-29 PROCEDURE — 83735 ASSAY OF MAGNESIUM: CPT

## 2019-04-29 PROCEDURE — 80053 COMPREHEN METABOLIC PANEL: CPT

## 2019-04-29 PROCEDURE — 99232 SBSQ HOSP IP/OBS MODERATE 35: CPT

## 2019-04-29 PROCEDURE — 83930 ASSAY OF BLOOD OSMOLALITY: CPT

## 2019-04-29 PROCEDURE — 82962 GLUCOSE BLOOD TEST: CPT

## 2019-04-29 PROCEDURE — 99285 EMERGENCY DEPT VISIT HI MDM: CPT | Mod: 25

## 2019-04-29 PROCEDURE — 84100 ASSAY OF PHOSPHORUS: CPT

## 2019-04-29 PROCEDURE — 84443 ASSAY THYROID STIM HORMONE: CPT

## 2019-04-29 PROCEDURE — 74019 RADEX ABDOMEN 2 VIEWS: CPT

## 2019-04-29 RX ORDER — CHLORTHALIDONE 50 MG
1 TABLET ORAL
Qty: 0 | Refills: 0 | COMMUNITY

## 2019-04-29 RX ORDER — POLYETHYLENE GLYCOL 3350 17 G/17G
17 POWDER, FOR SOLUTION ORAL
Qty: 0 | Refills: 0 | DISCHARGE
Start: 2019-04-29

## 2019-04-29 RX ORDER — INSULIN GLARGINE 100 [IU]/ML
30 INJECTION, SOLUTION SUBCUTANEOUS
Qty: 0 | Refills: 0 | COMMUNITY

## 2019-04-29 RX ORDER — INSULIN GLARGINE 100 [IU]/ML
15 INJECTION, SOLUTION SUBCUTANEOUS
Qty: 0 | Refills: 0 | DISCHARGE
Start: 2019-04-29

## 2019-04-29 RX ORDER — INSULIN GLARGINE 100 [IU]/ML
20 INJECTION, SOLUTION SUBCUTANEOUS
Qty: 0 | Refills: 0 | DISCHARGE
Start: 2019-04-29

## 2019-04-29 RX ORDER — RAMIPRIL 5 MG
1 CAPSULE ORAL
Qty: 30 | Refills: 0 | OUTPATIENT
Start: 2019-04-29 | End: 2019-05-28

## 2019-04-29 RX ORDER — RAMIPRIL 5 MG
1 CAPSULE ORAL
Qty: 30 | Refills: 0
Start: 2019-04-29 | End: 2019-05-28

## 2019-04-29 RX ADMIN — POLYETHYLENE GLYCOL 3350 17 GRAM(S): 17 POWDER, FOR SOLUTION ORAL at 12:26

## 2019-04-29 RX ADMIN — LISINOPRIL 10 MILLIGRAM(S): 2.5 TABLET ORAL at 06:02

## 2019-04-29 RX ADMIN — Medication 10 MILLIGRAM(S): at 12:26

## 2019-04-29 RX ADMIN — AMLODIPINE BESYLATE 5 MILLIGRAM(S): 2.5 TABLET ORAL at 06:02

## 2019-04-29 RX ADMIN — HEPARIN SODIUM 5000 UNIT(S): 5000 INJECTION INTRAVENOUS; SUBCUTANEOUS at 06:02

## 2019-04-29 RX ADMIN — Medication 100 MILLIGRAM(S): at 06:02

## 2019-04-29 RX ADMIN — HEPARIN SODIUM 5000 UNIT(S): 5000 INJECTION INTRAVENOUS; SUBCUTANEOUS at 13:12

## 2019-04-29 RX ADMIN — Medication 100 MILLIGRAM(S): at 13:12

## 2019-04-29 NOTE — DISCHARGE NOTE PROVIDER - HOSPITAL COURSE
69M with PMH CKD stage IV (follows w Dr. Vasquez, baseline Creatinine 2.4), HTN, DM2, BPH with hx of urinary retention, chronic constipation who presents complaining of constipation x 4 days found with multiple lab abnormalities including hyponatremia, hypokalemia and hypochloremia in the setting of new thiazide use for 3 weeks. Patient was admitted in hemodynamically stable condition for frequent monitoring and remained asymptomatic. Sodium corrected with 2L NS and patient was started on D5W to avoid overcorrection. Patient was continued on Norvasc for blood pressure and thiazide was held, as was ACE in setting of PRICILA. Upon step down to [facility] for continued monitoring, patient was restarted on lisinopril, and torsemide was added. Na corrected to 132 on day of discharge. Creatinine slightly elevated above baseline, likely in setting of starting torsemide and ACEi. Patient is stable to be discharged with close outpatient follow up with Dr. Vasquez.

## 2019-04-29 NOTE — PROGRESS NOTE ADULT - ASSESSMENT
70yo M with longstanding DMII + retinopathy, HTN, CKD IV and mild urinary retention/BPH (170cc PVR on 2/19 sono) came to ED for constipation found to have acute-subacute asymp hyponatremia and hypokalemia likely 2/2 chlorthalidone initiation on 4/3/19:    Acute/Subacute euvolemic hypoosmolar hyponatremia likely 2/2 chlorthalidone - resolved    CKD IV w hypokalemia improved w repletion, h/o hyperkalemia prior to admission- doing well on torsemide/acei addition, explains mild bump in Cr. To restart his ramipril 2.5 mg daily at home as well as torsemide 10mg daily to help with K and volume. Repeat blood work in 1 month will place orders.  Non nephrotic proteinuria with negative serologic GN w/u. Renal sono 2/19 10.5cm kidneys w increased echogenicity and mild PVR  BP goal <135/85.

## 2019-04-29 NOTE — DISCHARGE NOTE PROVIDER - CARE PROVIDERS DIRECT ADDRESSES
[email or web address]

## 2019-04-29 NOTE — DISCHARGE NOTE NURSING/CASE MANAGEMENT/SOCIAL WORK - NSDCDPATPORTLINK_GEN_ALL_CORE
You can access the [facility] Portal, offered by [facility], by registering with the following website: [email or web address]

## 2019-04-29 NOTE — DISCHARGE NOTE PROVIDER - NSDCCPCAREPLAN_GEN_ALL_CORE_FT
PRINCIPAL DISCHARGE DIAGNOSIS  Diagnosis: Hyponatremia  Assessment and Plan of Treatment: You came in to the emergency room for constipation. You were found to have low sodium, which was due to your recent new medication that was a thiazide diuretic. This medication was stopped and your constipation and sodium improved. You should follow up with Dr. Vasquez later this week in order to have repeat blood studies.      SECONDARY DISCHARGE DIAGNOSES  Diagnosis: Diabetes  Assessment and Plan of Treatment: Please continue your insulin.    Diagnosis: Hypertension  Assessment and Plan of Treatment: Please continue your amlodipine. you will be started on a new medication called ramipril and torsemide.    Diagnosis: CKD (chronic kidney disease), stage IV  Assessment and Plan of Treatment: Please continue to follow up with Dr. Vasquez.    Diagnosis: BPH (benign prostatic hyperplasia)  Assessment and Plan of Treatment: Please continue your home medications. PRINCIPAL DISCHARGE DIAGNOSIS  Diagnosis: Hyponatremia  Assessment and Plan of Treatment: You came in to the emergency room for constipation. You were found to have low sodium, which was due to your recent new medication that was a thiazide diuretic. This medication was stopped and your constipation and sodium improved. You should follow up with Dr. To later this week in order to obtain repeat blood studies. Also follow up with Dr. Vasquez in about a month.      SECONDARY DISCHARGE DIAGNOSES  Diagnosis: Diabetes  Assessment and Plan of Treatment: Please continue your insulin.    Diagnosis: Hypertension  Assessment and Plan of Treatment: Please continue your amlodipine. you will be started on a new medication called ramipril and torsemide.    Diagnosis: CKD (chronic kidney disease), stage IV  Assessment and Plan of Treatment: Please continue to follow up with Dr. Vasquez.    Diagnosis: BPH (benign prostatic hyperplasia)  Assessment and Plan of Treatment: Please continue your home medications. PRINCIPAL DISCHARGE DIAGNOSIS  Diagnosis: Hyponatremia  Assessment and Plan of Treatment: You came in to the emergency room for constipation. You were found to have low sodium, which was due to your recent new medication that was a thiazide diuretic. This medication was stopped and your constipation and sodium improved. You should follow up with Dr. To later this week in order to obtain repeat blood studies. Also follow up with Dr. Vasquez in about a month.      SECONDARY DISCHARGE DIAGNOSES  Diagnosis: Diabetes  Assessment and Plan of Treatment: Please take lantus 15 units at night. Be sure to measure your blood glucose each morning before eating and keep a record of these readings to bring to your PCP appointment.    Diagnosis: Hypertension  Assessment and Plan of Treatment: Please continue your amlodipine. you will be started on a new medication called ramipril and torsemide.    Diagnosis: CKD (chronic kidney disease), stage IV  Assessment and Plan of Treatment: Please continue to follow up with Dr. Vasquez.    Diagnosis: BPH (benign prostatic hyperplasia)  Assessment and Plan of Treatment: Please continue your home medications.

## 2019-04-29 NOTE — DISCHARGE NOTE PROVIDER - NSDCFUADDAPPT_GEN_ALL_CORE_FT
Please follow up with Dr. Vasquez at 2:30pm on May 24th, 2019.    Please follow up with Dr. To on Saturday, May 4th, 2019.

## 2019-04-29 NOTE — PROGRESS NOTE ADULT - SUBJECTIVE AND OBJECTIVE BOX
[facility] DIVISION OF KIDNEY DISEASES AND HYPERTENSION   --------------------------------------------------------------------------------  HPI:  68yo M with longstanding DMII + retinopathy, HTN, CKD IV and mild urinary retention/BPH (170cc PVR on 2/19 sono) came to ED for constipation found to have acute-subacute asymp hyponatremia and hypokalemia likely 2/2 chlorthalidone initiation on 4/3/19:      PAST HISTORY  --------------------------------------------------------------------------------  PAST MEDICAL & SURGICAL HISTORY:  Hyperlipidemia  Hypertension  Diabetes  No significant past surgical history    FAMILY HISTORY: nc    PAST SOCIAL HISTORY: no etoh/drugs    ALLERGIES & MEDICATIONS  --------------------------------------------------------------------------------  Allergies    No Known Allergies    Intolerances      Standing Inpatient Medications  amLODIPine   Tablet 5 milliGRAM(s) Oral daily  atorvastatin 20 milliGRAM(s) Oral at bedtime  dextrose 5%. 1000 milliLiter(s) IV Continuous <Continuous>  dextrose 5%. 1000 milliLiter(s) IV Continuous <Continuous>  dextrose 50% Injectable 12.5 Gram(s) IV Push once  dextrose 50% Injectable 25 Gram(s) IV Push once  dextrose 50% Injectable 25 Gram(s) IV Push once  docusate sodium 100 milliGRAM(s) Oral three times a day  heparin  Injectable 5000 Unit(s) SubCutaneous every 8 hours  insulin glargine Injectable (LANTUS) 12 Unit(s) SubCutaneous at bedtime  insulin lispro (HumaLOG) corrective regimen sliding scale   SubCutaneous Before meals and at bedtime  polyethylene glycol 3350 17 Gram(s) Oral daily  senna 2 Tablet(s) Oral at bedtime    PRN Inpatient Medications  dextrose 40% Gel 15 Gram(s) Oral once PRN  glucagon  Injectable 1 milliGRAM(s) IntraMuscular once PRN      REVIEW OF SYSTEMS  --------------------------------------------------------------------------------  Gen: No weight changes, fatigue, fevers/chills, weakness  Skin: No rashes  Head/Eyes/Ears/Mouth: No headache; Normal hearing; Normal vision w/o blurriness; No sinus pain/discomfort, sore throat  Respiratory: No dyspnea, cough, wheezing, hemoptysis  CV: No chest pain, PND, orthopnea  GI: No abdominal pain, +constipation, no nausea, vomiting, +passing gas  : No increased frequency, dysuria, hematuria, nocturia  MSK: No joint pain/swelling; no back pain; no edema  Neuro: No dizziness/lightheadedness, weakness, seizures, numbness, tingling  Heme: No easy bruising or bleeding  Endo: No heat/cold intolerance  Psych: No significant nervousness, anxiety, stress, depression    All other systems were reviewed and are negative, except as noted.    VITALS/PHYSICAL EXAM  --------------------------------------------------------------------------------  T(C): 36.3 (04-27-19 @ 13:11), Max: 37.1 (04-26-19 @ 15:52)  HR: 80 (04-27-19 @ 12:55) (78 - 94)  BP: 145/75 (04-27-19 @ 12:55) (134/67 - 170/88)  RR: 16 (04-27-19 @ 12:55) (16 - 18)  SpO2: 99% (04-27-19 @ 12:55) (98% - 100%)  Wt(kg): --  Height (cm): 170.18 (04-26-19 @ 22:01)  Weight (kg): 72.1 (04-26-19 @ 22:01)  BMI (kg/m2): 24.9 (04-26-19 @ 22:01)  BSA (m2): 1.83 (04-26-19 @ 22:01)      Physical Exam:  	Gen: NAD, well-appearing  	HEENT: PERRL, supple neck, clear oropharynx  	Pulm: CTA B/L  	CV: RRR, S1S2; no rub  	Back: No spinal or CVA tenderness; no sacral edema  	Abd: +BS, soft, nontender/nondistended  	: No suprapubic tenderness  	UE: Warm, FROM, no clubbing, intact strength; no edema; no asterixis  	LE: Warm, FROM, no clubbing, intact strength; no edema  	Neuro: No focal deficits, no tremor, AAox3  	Psych: Normal affect and mood  	Skin: Warm, without rashes      LABS/STUDIES  --------------------------------------------------------------------------------              11.4   6.83  >-----------<  307      [04-27-19 @ 07:31]              32.4     123  |  86  |  30  ----------------------------<  189      [04-27-19 @ 12:25]  3.8   |  26  |  2.55        Ca     9.0     [04-27-19 @ 12:25]      Mg     2.2     [04-27-19 @ 07:31]      Phos  3.6     [04-27-19 @ 07:31]    TPro  x   /  Alb  3.5  /  TBili  x   /  DBili  x   /  AST  x   /  ALT  x   /  AlkPhos  x   [04-26-19 @ 23:11]        Serum Osmolality 255      [04-26-19 @ 23:11]    Creatinine Trend:  SCr 2.55 [04-27 @ 12:25]  SCr 2.47 [04-27 @ 07:31]  SCr 2.51 [04-26 @ 23:11]  SCr 2.61 [04-26 @ 15:22]  SCr 2.73 [04-26 @ 12:38]    Urinalysis - [04-26-19 @ 14:48]      Color Yellow / Appearance Clear / SG 1.010 / pH 6.5      Gluc 100 / Ketone NEGATIVE  / Bili Negative / Urobili 0.2       Blood Small / Protein 100 / Leuk Est NEGATIVE / Nitrite NEGATIVE      RBC < 5 / WBC < 5 / Hyaline  / Gran  / Sq Epi  / Non Sq Epi 0-5 / Bacteria Present    Urine Sodium 32      [04-26-19 @ 19:34]  Urine Chloride 28      [04-26-19 @ 14:48]  Urine Osmolality 197      [04-26-19 @ 19:34]    HbA1c 8.1      [04-27-19 @ 07:31]  TSH 2.158      [04-26-19 @ 15:22]    HCV 0.09, Nonreact      [04-27-19 @ 07:31]

## 2019-04-29 NOTE — DISCHARGE NOTE PROVIDER - PROVIDER TOKENS
PROVIDER:[TOKEN:[[id number]],FOLLOWUP:[1-3 days]] PROVIDER:[TOKEN:[[id number]]],FREE:[LAST:[Yousuf],FIRST:[Jaime],PHONE:[phone]],FAX:[(   )    -],ADDRESS:[[address] [place] [address]]]

## 2019-04-29 NOTE — DISCHARGE NOTE PROVIDER - CARE PROVIDER_API CALL
Tammy Vasquez)  Nephrology  [address]  [place], [place] [address]  Phone: [phone]  Fax: [phone]  Follow Up Time: 1-3 days Tammy Vasquez)  Nephrology  [address] [place] [address]  Phone: [phone]  Fax: [phone]  Follow Up Time:     Jaime To  [address] [place] [address]  Phone: [phone]  Fax: (   )    -  Follow Up Time:

## 2019-04-30 ENCOUNTER — INBOUND DOCUMENT (OUTPATIENT)
Age: 69
End: 2019-04-30

## 2019-05-03 DIAGNOSIS — N17.9 ACUTE KIDNEY FAILURE, UNSPECIFIED: ICD-10-CM

## 2019-05-03 DIAGNOSIS — E11.22 TYPE 2 DIABETES MELLITUS WITH DIABETIC CHRONIC KIDNEY DISEASE: ICD-10-CM

## 2019-05-03 DIAGNOSIS — E86.0 DEHYDRATION: ICD-10-CM

## 2019-05-03 DIAGNOSIS — E78.5 HYPERLIPIDEMIA, UNSPECIFIED: ICD-10-CM

## 2019-05-03 DIAGNOSIS — E11.319 TYPE 2 DIABETES MELLITUS WITH UNSPECIFIED DIABETIC RETINOPATHY WITHOUT MACULAR EDEMA: ICD-10-CM

## 2019-05-03 DIAGNOSIS — K59.00 CONSTIPATION, UNSPECIFIED: ICD-10-CM

## 2019-05-03 DIAGNOSIS — E87.6 HYPOKALEMIA: ICD-10-CM

## 2019-05-03 DIAGNOSIS — T50.2X5A ADVERSE EFFECT OF CARBONIC-ANHYDRASE INHIBITORS, BENZOTHIADIAZIDES AND OTHER DIURETICS, INITIAL ENCOUNTER: ICD-10-CM

## 2019-05-03 DIAGNOSIS — E11.65 TYPE 2 DIABETES MELLITUS WITH HYPERGLYCEMIA: ICD-10-CM

## 2019-05-03 DIAGNOSIS — Z79.4 LONG TERM (CURRENT) USE OF INSULIN: ICD-10-CM
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
## 2019-05-03 DIAGNOSIS — N40.1 BENIGN PROSTATIC HYPERPLASIA WITH LOWER URINARY TRACT SYMPTOMS: ICD-10-CM

## 2019-05-03 DIAGNOSIS — E87.8 OTHER DISORDERS OF ELECTROLYTE AND FLUID BALANCE, NOT ELSEWHERE CLASSIFIED: ICD-10-CM

## 2019-05-03 DIAGNOSIS — R33.8 OTHER RETENTION OF URINE: ICD-10-CM

## 2019-05-03 DIAGNOSIS — N18.4 CHRONIC KIDNEY DISEASE, STAGE 4 (SEVERE): ICD-10-CM

## 2019-05-03 DIAGNOSIS — I12.9 HYPERTENSIVE CHRONIC KIDNEY DISEASE WITH STAGE 1 THROUGH STAGE 4 CHRONIC KIDNEY DISEASE, OR UNSPECIFIED CHRONIC KIDNEY DISEASE: ICD-10-CM

## 2019-05-03 DIAGNOSIS — E87.1 HYPO-OSMOLALITY AND HYPONATREMIA: ICD-10-CM

## 2019-05-06 ENCOUNTER — LABORATORY RESULT (OUTPATIENT)
Age: 69
End: 2019-05-06

## 2019-05-06 ENCOUNTER — APPOINTMENT (OUTPATIENT)
Dept: INTERNAL MEDICINE | Facility: CLINIC | Age: 69
End: 2019-05-06
Payer: MEDICARE

## 2019-05-06 VITALS
WEIGHT: 150 LBS | BODY MASS INDEX: 23.54 KG/M2 | OXYGEN SATURATION: 98 % | HEART RATE: 100 BPM | SYSTOLIC BLOOD PRESSURE: 125 MMHG | DIASTOLIC BLOOD PRESSURE: 78 MMHG | HEIGHT: 67 IN | TEMPERATURE: 98.1 F

## 2019-05-06 DIAGNOSIS — Z78.9 OTHER SPECIFIED HEALTH STATUS: ICD-10-CM

## 2019-05-06 DIAGNOSIS — Z12.11 ENCOUNTER FOR SCREENING FOR MALIGNANT NEOPLASM OF COLON: ICD-10-CM

## 2019-05-06 DIAGNOSIS — Z87.448 PERSONAL HISTORY OF OTHER DISEASES OF URINARY SYSTEM: ICD-10-CM

## 2019-05-06 PROCEDURE — 36415 COLL VENOUS BLD VENIPUNCTURE: CPT

## 2019-05-06 PROCEDURE — 99204 OFFICE O/P NEW MOD 45 MIN: CPT | Mod: 25

## 2019-05-06 RX ORDER — CHLORTHALIDONE 50 MG/1
50 TABLET ORAL DAILY
Qty: 30 | Refills: 6 | Status: DISCONTINUED | COMMUNITY
Start: 2019-04-03 | End: 2019-05-06

## 2019-05-07 ENCOUNTER — RESULT REVIEW (OUTPATIENT)
Age: 69
End: 2019-05-07

## 2019-05-07 ENCOUNTER — LABORATORY RESULT (OUTPATIENT)
Age: 69
End: 2019-05-07

## 2019-05-07 LAB
ALBUMIN SERPL ELPH-MCNC: 4.3 G/DL
ANION GAP SERPL CALC-SCNC: 14 MMOL/L
BASOPHILS # BLD AUTO: 0.03 K/UL
BASOPHILS NFR BLD AUTO: 0.4 %
BUN SERPL-MCNC: 36 MG/DL
CALCIUM SERPL-MCNC: 9.5 MG/DL
CHLORIDE SERPL-SCNC: 94 MMOL/L
CO2 SERPL-SCNC: 23 MMOL/L
CREAT SERPL-MCNC: 2.79 MG/DL
EOSINOPHIL # BLD AUTO: 0.32 K/UL
EOSINOPHIL NFR BLD AUTO: 4.5 %
GLUCOSE SERPL-MCNC: 256 MG/DL
HCT VFR BLD CALC: 38.7 %
HGB BLD-MCNC: 12.1 G/DL
IMM GRANULOCYTES NFR BLD AUTO: 0.3 %
LYMPHOCYTES # BLD AUTO: 1.99 K/UL
LYMPHOCYTES NFR BLD AUTO: 27.8 %
MAN DIFF?: NORMAL
MCHC RBC-ENTMCNC: 28.1 PG
MCHC RBC-ENTMCNC: 31.3 GM/DL
MCV RBC AUTO: 90 FL
MONOCYTES # BLD AUTO: 0.62 K/UL
MONOCYTES NFR BLD AUTO: 8.6 %
NEUTROPHILS # BLD AUTO: 4.19 K/UL
NEUTROPHILS NFR BLD AUTO: 58.4 %
PHOSPHATE SERPL-MCNC: 4.4 MG/DL
PLATELET # BLD AUTO: 399 K/UL
POTASSIUM SERPL-SCNC: 5.3 MMOL/L
RBC # BLD: 4.3 M/UL
RBC # FLD: 12.3 %
SODIUM SERPL-SCNC: 131 MMOL/L
WBC # FLD AUTO: 7.17 K/UL

## 2019-05-14 LAB
ALBUMIN MFR SERPL ELPH: 54.3 %
ALBUMIN SERPL-MCNC: 4 G/DL
ALBUMIN/GLOB SERPL: 1.2 RATIO
ALPHA1 GLOB MFR SERPL ELPH: 4.5 %
ALPHA1 GLOB SERPL ELPH-MCNC: 0.3 G/DL
ALPHA2 GLOB MFR SERPL ELPH: 13.5 %
ALPHA2 GLOB SERPL ELPH-MCNC: 1 G/DL
B-GLOBULIN MFR SERPL ELPH: 13.2 %
B-GLOBULIN SERPL ELPH-MCNC: 1 G/DL
DEPRECATED KAPPA LC FREE/LAMBDA SER: 1.35 RATIO
FERRITIN SERPL-MCNC: 44 NG/ML
GAMMA GLOB FLD ELPH-MCNC: 1.1 G/DL
GAMMA GLOB MFR SERPL ELPH: 14.5 %
INTERPRETATION SERPL IEP-IMP: NORMAL
IRON SATN MFR SERPL: 11 %
IRON SERPL-MCNC: 41 UG/DL
KAPPA LC CSF-MCNC: 5.13 MG/DL
KAPPA LC SERPL-MCNC: 6.94 MG/DL
PROT SERPL-MCNC: 7.3 G/DL
PROT SERPL-MCNC: 7.3 G/DL
TIBC SERPL-MCNC: 365 UG/DL
TSH SERPL-ACNC: 3.32 UIU/ML
UIBC SERPL-MCNC: 324 UG/DL
VIT B12 SERPL-MCNC: 321 PG/ML

## 2019-05-15 LAB
HOMOCYSTEINE LEVEL: 43.5 UMOL/L
METHYLMALONIC ACID LEVEL: 588 NMOL/L

## 2019-05-24 ENCOUNTER — APPOINTMENT (OUTPATIENT)
Dept: NEPHROLOGY | Facility: CLINIC | Age: 69
End: 2019-05-24
Payer: MEDICARE

## 2019-05-24 VITALS — DIASTOLIC BLOOD PRESSURE: 74 MMHG | RESPIRATION RATE: 14 BRPM | SYSTOLIC BLOOD PRESSURE: 148 MMHG | HEART RATE: 95 BPM

## 2019-05-24 PROBLEM — E11.9 TYPE 2 DIABETES MELLITUS WITHOUT COMPLICATIONS: Chronic | Status: ACTIVE | Noted: 2019-04-26

## 2019-05-24 PROBLEM — I10 ESSENTIAL (PRIMARY) HYPERTENSION: Chronic | Status: ACTIVE | Noted: 2019-04-26

## 2019-05-24 PROBLEM — E78.5 HYPERLIPIDEMIA, UNSPECIFIED: Chronic | Status: ACTIVE | Noted: 2019-04-26

## 2019-05-24 PROCEDURE — 99215 OFFICE O/P EST HI 40 MIN: CPT

## 2019-05-24 RX ORDER — PATIROMER 8.4 G/1
8.4 POWDER, FOR SUSPENSION ORAL DAILY
Qty: 1 | Refills: 4 | Status: DISCONTINUED | COMMUNITY
Start: 2019-05-07 | End: 2019-05-24

## 2019-05-24 NOTE — PHYSICAL EXAM
[General Appearance - Well Nourished] : well nourished [General Appearance - Alert] : alert [General Appearance - In No Acute Distress] : in no acute distress [Sclera] : the sclera and conjunctiva were normal [Extraocular Movements] : extraocular movements were intact [PERRL With Normal Accommodation] : pupils were equal in size, round, and reactive to light [Oropharynx] : the oropharynx was normal [Outer Ear] : the ears and nose were normal in appearance [Jugular Venous Distention Increased] : there was no jugular-venous distention [Neck Appearance] : the appearance of the neck was normal [Heart Rate And Rhythm] : heart rate was normal and rhythm regular [Auscultation Breath Sounds / Voice Sounds] : lungs were clear to auscultation bilaterally [Murmurs] : no murmurs [Heart Sounds] : normal S1 and S2 [Heart Sounds Gallop] : no gallops [Full Pulse] : the pedal pulses are present [Edema] : there was no peripheral edema [Heart Sounds Pericardial Friction Rub] : no pericardial rub [Abdomen Tenderness] : non-tender [Bowel Sounds] : normal bowel sounds [Abdomen Soft] : soft [Abnormal Walk] : normal gait [No Spinal Tenderness] : no spinal tenderness [No CVA Tenderness] : no ~M costovertebral angle tenderness [Involuntary Movements] : no involuntary movements were seen [Skin Color & Pigmentation] : normal skin color and pigmentation [Musculoskeletal - Swelling] : no joint swelling seen [No Focal Deficits] : no focal deficits [] : no rash [FreeTextEntry1] : no asterixis [Oriented To Time, Place, And Person] : oriented to person, place, and time [Impaired Insight] : insight and judgment were intact [Affect] : the affect was normal

## 2019-05-24 NOTE — HISTORY OF PRESENT ILLNESS
[FreeTextEntry1] : 68yo Fijian M with HTN, BPH with mild urinary retention, long standing DMII + retinopathy and CKD IV with non-nephrotic proteinuria and hyperkalemia here for f/u:\par \par Hospitalized for severe constipation and hyponatremia thought to be 2/2 chlorthalidone. Na normalized off chlorthalidone. Feeling very well, good energy and appetite. Weight stable.\par Tried the Veltassa last prescribed but couldn't tolerate 2/2 severe constipation again that resolved with stopping the drug\par Says his BG have been much improved, fasting am values \par  No SOB/orthopnea or LE edema\par Urinating w/o hesitancy\par No weakness/dizziness and no uremic sx.\par \par All other ROS negative

## 2019-05-24 NOTE — ASSESSMENT
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 Alb wnl. K elevated 5.3 however was hyperglycemic which contributed to high K and low measured Na, corrected Na normal 135. \par Start Kayexelate 3x weekly instead of Veltassa 2/2 intolerance, will also help w constipation. \par tolerated restarting Ramipril 2.5mg daily well. BP fair control, 2 weeks ago at PCP office was perfect, today he said he took his meds late just before walking into the office so likely this is the reason was slightly elevated today. 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 \par Reviewed renal sono, 10.5/10.7cm normal looking kidneys, mildly enlarged prostate with 170cc PVR - referred to urologist, may benefit from starting tamsulosin, no active symptoms right now. Sono couldn't rule out L ANDRE however given his well controlled BP and stable creatinine as well as lack of atrophy seen in L kidney, risks of further imaging with MRIgad or CTcontrast outweigh benefits as we would opt for medical management with ANDRE blockade which he's already one. \par On ergo weekly for secondary hyperparathyroidism \par \par # DMII - + retinopathy\par Better control on lantus and tradjenta \par \par # Anemia - Fe deficiency but can't tolerate Fe tablets. Mild, will cont to monitor. \par \par RTC in 3 months for f/u\par

## 2019-07-31 NOTE — ASSESSMENT
[FreeTextEntry1] : 70yo Uz[place] M with HTN, long standing DMII + retinopathy referred by PCP Dr. Jaime To for CKD management after found to have rising Cr, microscopic hematuria and non-nephrotic proteinuria:\par \par # CKD IV - per hx likely 2/2 diabetic nephropathy\par Reviewed Feb 2019 labs cbc wnl\par Cr 2.4 bun 26 K/bicarb/alb wnl egfr 28.5 - taken off Ramipril 2.5mg by PCP\par u/a 2+ protein, 10-20 RBCs - pt says never told of hematuria/proteinuria \par Per pt dr told him that >1yr ago was 1.6 then 1.8 Cr. \par - recheck Cr along w itph, Vit D 1,25 (25 mildly low 29), phos, uric acid\par - check serologic GN w/u given active urine sediment. Recheck u/a, if hematuria persists will need urine cytology as well as  referral for cystoscopy to r/o malignancy. \par - check renal sono w renal artery dopplers and bladder sono w PVR\par - discussed at length the nature of his kidney disease and stage, goal is to delay progression of renal disease however if 2/2 diabetes it does tend to progress. \par - if Cr can tolerate would like to restart ramipril for renal protection in near future.\par \par # DMII - + retinopathy\par HbA1C 7.9% Feb, says was difficult to control until he started lantus. \par - If gfr <30 on repeat bmp today, advise stopping metformin\par \par # HTN - ramipril changed for norvasc 5mg by Pcp but pt hasn't picked it up yet from pharmacy, denies any prior knowledge of HTN dx. Elevated today in office, he'll go  norvasc today. \par \par Addendum 3/26: reviewed lab results, Cr stable, K 5.1 - left voicemail w patient to discuss need for improved glycemic control and possibly Veltassa if insurance covers, in order to lower K, thus facilitating restarting ramipril 2.5mg as he has 2.4g proteinuria. Nephrotic GN w/u negative. Needs ergo for secondary hyperparathyroidism. \par Reviewed renal sono, 10.5/10.7cm normal looking kidneys, mildly enlarged prostate with 170cc PVR - needs to see urologist, may benefit from starting tamsulosin Orthopedic

## 2019-08-12 LAB
24R-OH-CALCIDIOL SERPL-MCNC: 65.9 PG/ML
25(OH)D3 SERPL-MCNC: 72.2 NG/ML
ALBUMIN SERPL ELPH-MCNC: 4.4 G/DL
ANION GAP SERPL CALC-SCNC: 14 MMOL/L
APPEARANCE: CLEAR
BACTERIA: NEGATIVE
BASOPHILS # BLD AUTO: 0.03 K/UL
BASOPHILS NFR BLD AUTO: 0.4 %
BILIRUBIN URINE: NEGATIVE
BLOOD URINE: NEGATIVE
BUN SERPL-MCNC: 23 MG/DL
CALCIUM SERPL-MCNC: 9.6 MG/DL
CALCIUM SERPL-MCNC: 9.6 MG/DL
CHLORIDE SERPL-SCNC: 95 MMOL/L
CO2 SERPL-SCNC: 26 MMOL/L
COLOR: COLORLESS
CREAT SERPL-MCNC: 2.67 MG/DL
CREAT SPEC-SCNC: 22 MG/DL
CREAT/PROT UR: 1.3 RATIO
EOSINOPHIL # BLD AUTO: 0.55 K/UL
EOSINOPHIL NFR BLD AUTO: 7.1 %
FERRITIN SERPL-MCNC: 40 NG/ML
GLUCOSE QUALITATIVE U: NEGATIVE
GLUCOSE SERPL-MCNC: 147 MG/DL
HCT VFR BLD CALC: 38 %
HGB BLD-MCNC: 12.4 G/DL
HYALINE CASTS: 0 /LPF
IMM GRANULOCYTES NFR BLD AUTO: 0.3 %
IRON SATN MFR SERPL: 19 %
IRON SERPL-MCNC: 65 UG/DL
KETONES URINE: NEGATIVE
LEUKOCYTE ESTERASE URINE: NEGATIVE
LYMPHOCYTES # BLD AUTO: 3.87 K/UL
LYMPHOCYTES NFR BLD AUTO: 50 %
MAN DIFF?: NORMAL
MCHC RBC-ENTMCNC: 28.4 PG
MCHC RBC-ENTMCNC: 32.6 GM/DL
MCV RBC AUTO: 87 FL
MICROSCOPIC-UA: NORMAL
MONOCYTES # BLD AUTO: 0.74 K/UL
MONOCYTES NFR BLD AUTO: 9.6 %
NEUTROPHILS # BLD AUTO: 2.53 K/UL
NEUTROPHILS NFR BLD AUTO: 32.6 %
NITRITE URINE: NEGATIVE
PARATHYROID HORMONE INTACT: 90 PG/ML
PH URINE: 6.5
PHOSPHATE SERPL-MCNC: 4.3 MG/DL
PLATELET # BLD AUTO: 269 K/UL
POTASSIUM SERPL-SCNC: 4.9 MMOL/L
PROT UR-MCNC: 28 MG/DL
PROTEIN URINE: ABNORMAL
RBC # BLD: 4.37 M/UL
RBC # FLD: 13 %
RED BLOOD CELLS URINE: 0 /HPF
SODIUM SERPL-SCNC: 135 MMOL/L
SPECIFIC GRAVITY URINE: 1.01
SQUAMOUS EPITHELIAL CELLS: 0 /HPF
TIBC SERPL-MCNC: 339 UG/DL
UIBC SERPL-MCNC: 274 UG/DL
URATE SERPL-MCNC: 7.1 MG/DL
UROBILINOGEN URINE: NORMAL
WBC # FLD AUTO: 7.74 K/UL
WHITE BLOOD CELLS URINE: 0 /HPF

## 2019-08-19 ENCOUNTER — APPOINTMENT (OUTPATIENT)
Dept: INTERNAL MEDICINE | Facility: CLINIC | Age: 69
End: 2019-08-19
Payer: MEDICARE

## 2019-08-19 VITALS
HEART RATE: 85 BPM | BODY MASS INDEX: 24.01 KG/M2 | HEIGHT: 67 IN | TEMPERATURE: 98.3 F | DIASTOLIC BLOOD PRESSURE: 77 MMHG | OXYGEN SATURATION: 98 % | WEIGHT: 153 LBS | SYSTOLIC BLOOD PRESSURE: 137 MMHG

## 2019-08-19 PROCEDURE — 36415 COLL VENOUS BLD VENIPUNCTURE: CPT

## 2019-08-19 PROCEDURE — 99214 OFFICE O/P EST MOD 30 MIN: CPT | Mod: 25

## 2019-08-20 LAB
CHOLEST SERPL-MCNC: 121 MG/DL
CHOLEST/HDLC SERPL: 4 RATIO
ESTIMATED AVERAGE GLUCOSE: 174 MG/DL
HBA1C MFR BLD HPLC: 7.7 %
HDLC SERPL-MCNC: 30 MG/DL
LDLC SERPL CALC-MCNC: 43 MG/DL
TRIGL SERPL-MCNC: 239 MG/DL
VIT B12 SERPL-MCNC: 215 PG/ML

## 2019-08-23 ENCOUNTER — APPOINTMENT (OUTPATIENT)
Dept: NEPHROLOGY | Facility: CLINIC | Age: 69
End: 2019-08-23
Payer: MEDICARE

## 2019-08-23 VITALS — SYSTOLIC BLOOD PRESSURE: 138 MMHG | DIASTOLIC BLOOD PRESSURE: 65 MMHG | HEART RATE: 88 BPM | RESPIRATION RATE: 14 BRPM

## 2019-08-23 PROCEDURE — 99215 OFFICE O/P EST HI 40 MIN: CPT

## 2019-08-23 RX ORDER — ERGOCALCIFEROL 1.25 MG/1
1.25 MG CAPSULE ORAL
Qty: 12 | Refills: 0 | Status: DISCONTINUED | COMMUNITY
Start: 2019-04-03 | End: 2019-08-23

## 2019-08-23 NOTE — PHYSICAL EXAM
[General Appearance - Alert] : alert [General Appearance - In No Acute Distress] : in no acute distress [General Appearance - Well Nourished] : well nourished [PERRL With Normal Accommodation] : pupils were equal in size, round, and reactive to light [Sclera] : the sclera and conjunctiva were normal [Extraocular Movements] : extraocular movements were intact [Outer Ear] : the ears and nose were normal in appearance [Oropharynx] : the oropharynx was normal [Neck Appearance] : the appearance of the neck was normal [Jugular Venous Distention Increased] : there was no jugular-venous distention [Auscultation Breath Sounds / Voice Sounds] : lungs were clear to auscultation bilaterally [Heart Rate And Rhythm] : heart rate was normal and rhythm regular [Heart Sounds] : normal S1 and S2 [Murmurs] : no murmurs [Heart Sounds Gallop] : no gallops [Heart Sounds Pericardial Friction Rub] : no pericardial rub [Full Pulse] : the pedal pulses are present [Edema] : there was no peripheral edema [Bowel Sounds] : normal bowel sounds [Abdomen Soft] : soft [Abdomen Tenderness] : non-tender [No CVA Tenderness] : no ~M costovertebral angle tenderness [No Spinal Tenderness] : no spinal tenderness [Abnormal Walk] : normal gait [Involuntary Movements] : no involuntary movements were seen [Musculoskeletal - Swelling] : no joint swelling seen [Skin Color & Pigmentation] : normal skin color and pigmentation [] : no rash [No Focal Deficits] : no focal deficits [Oriented To Time, Place, And Person] : oriented to person, place, and time [Affect] : the affect was normal [Impaired Insight] : insight and judgment were intact [FreeTextEntry1] : no asterixis

## 2019-08-23 NOTE — HISTORY OF PRESENT ILLNESS
[FreeTextEntry1] : 70yo [place]ore[place] M with HTN, BPH with mild urinary retention, long standing DMII + retinopathy and CKD IV with non-nephrotic proteinuria and hyperkalemia here for f/u:\par \par Feeling "great", went to [employer] for cricket championships and ate off his diet for a while. \par No LE edema or SOB. Stable appetite and weight. Urinating w/o difficulty. Tolerating torsemide\par \par OTC: vitamin B12 started by PCP \par \par All other ROS negative

## 2019-09-17 ENCOUNTER — APPOINTMENT (OUTPATIENT)
Dept: GASTROENTEROLOGY | Facility: CLINIC | Age: 69
End: 2019-09-17
Payer: MEDICARE

## 2019-09-17 VITALS
OXYGEN SATURATION: 98 % | RESPIRATION RATE: 16 BRPM | SYSTOLIC BLOOD PRESSURE: 108 MMHG | BODY MASS INDEX: 24.01 KG/M2 | HEART RATE: 101 BPM | HEIGHT: 67 IN | WEIGHT: 153 LBS | DIASTOLIC BLOOD PRESSURE: 60 MMHG

## 2019-09-17 PROCEDURE — 99203 OFFICE O/P NEW LOW 30 MIN: CPT | Mod: 25

## 2019-09-17 PROCEDURE — 36415 COLL VENOUS BLD VENIPUNCTURE: CPT

## 2019-09-18 NOTE — PHYSICAL EXAM
[General Appearance - Alert] : alert [Sclera] : the sclera and conjunctiva were normal [General Appearance - In No Acute Distress] : in no acute distress [Outer Ear] : the ears and nose were normal in appearance [Neck Appearance] : the appearance of the neck was normal [Heart Rate And Rhythm] : heart rate was normal and rhythm regular [Edema] : there was no peripheral edema [Bowel Sounds] : normal bowel sounds [Abdomen Soft] : soft [Abdomen Tenderness] : non-tender [Abdomen Mass (___ Cm)] : no abdominal mass palpated [No CVA Tenderness] : no ~M costovertebral angle tenderness [Abnormal Walk] : normal gait [] : no rash [No Focal Deficits] : no focal deficits [Oriented To Time, Place, And Person] : oriented to person, place, and time

## 2019-09-18 NOTE — HISTORY OF PRESENT ILLNESS
[de-identified] : 69M with hx controlled HTN, stable BPH, stable Diabetes, stable stage 4 CKD presents for evaluation of IRON and B12 def ref by DR US\par B12 was 321 5/2019 now 215 8/2019\par hgb 12.4 MCV 87 high EOS 7.1%\par ferritin 40, 44\par iron 11%, 41\par no symptoms except fatigue when walking a lot but no pain.\par no surgeries\par no FHX autoimmune dz or other GI diseases\par no smoking drinking\par never had cscope or egd\par no blood in stool\par normal stool\par

## 2019-09-18 NOTE — ASSESSMENT
[FreeTextEntry1] : 69M with controlled HTN, stable BPH, stable Diabetes, stable stage 4 CKD presents with b12 and iron deficiencies\par - r/o malabsorption with celiac serology, EGD with bx\par - eval pernicious anemia with Ab and EGD\par - cscope for iron def\par - if above negative consider VCE\par - supplement IM B12\par - HP breath test\par - consider SIBO if all negative\par - f/u after above result

## 2019-09-26 ENCOUNTER — MESSAGE (OUTPATIENT)
Age: 69
End: 2019-09-26

## 2019-09-26 LAB
GLIADIN IGA SER QL: 5.7 UNITS
GLIADIN IGG SER QL: <5 UNITS
GLIADIN PEPTIDE IGA SER-ACNC: NEGATIVE
GLIADIN PEPTIDE IGG SER-ACNC: NEGATIVE
IF BLOCK AB SER QL: NORMAL
IGA SER QL IEP: 386 MG/DL
PCA AB SER QL IF: NORMAL
TTG IGA SER IA-ACNC: <1.2 U/ML
TTG IGA SER-ACNC: NEGATIVE
TTG IGG SER IA-ACNC: 1.7 U/ML
TTG IGG SER IA-ACNC: NEGATIVE
UREA BREATH TEST QL: NEGATIVE

## 2019-10-21 ENCOUNTER — RX RENEWAL (OUTPATIENT)
Age: 69
End: 2019-10-21

## 2019-10-21 ENCOUNTER — APPOINTMENT (OUTPATIENT)
Dept: GASTROENTEROLOGY | Facility: HOSPITAL | Age: 69
End: 2019-10-21

## 2019-10-21 ENCOUNTER — RESULT REVIEW (OUTPATIENT)
Age: 69
End: 2019-10-21

## 2019-10-21 ENCOUNTER — OUTPATIENT (OUTPATIENT)
Dept: OUTPATIENT SERVICES | Facility: HOSPITAL | Age: 69
LOS: 1 days | Discharge: HOME | End: 2019-10-21
Payer: MEDICARE

## 2019-10-21 LAB — GLUCOSE BLDC GLUCOMTR-MCNC: 95 MG/DL — SIGNIFICANT CHANGE UP (ref 70–99)

## 2019-10-21 PROCEDURE — 45330 DIAGNOSTIC SIGMOIDOSCOPY: CPT

## 2019-10-21 PROCEDURE — 43239 EGD BIOPSY SINGLE/MULTIPLE: CPT

## 2019-10-21 PROCEDURE — 88305 TISSUE EXAM BY PATHOLOGIST: CPT

## 2019-10-21 PROCEDURE — 82962 GLUCOSE BLOOD TEST: CPT

## 2019-10-23 LAB — SURGICAL PATHOLOGY STUDY: SIGNIFICANT CHANGE UP

## 2019-10-24 ENCOUNTER — RX RENEWAL (OUTPATIENT)
Age: 69
End: 2019-10-24

## 2019-10-28 ENCOUNTER — OTHER (OUTPATIENT)
Age: 69
End: 2019-10-28

## 2019-11-07 ENCOUNTER — LABORATORY RESULT (OUTPATIENT)
Age: 69
End: 2019-11-07

## 2019-11-07 ENCOUNTER — APPOINTMENT (OUTPATIENT)
Dept: GASTROENTEROLOGY | Facility: CLINIC | Age: 69
End: 2019-11-07

## 2019-11-07 ENCOUNTER — RESULT REVIEW (OUTPATIENT)
Age: 69
End: 2019-11-07

## 2019-11-08 ENCOUNTER — INPATIENT (INPATIENT)
Facility: HOSPITAL | Age: 69
LOS: 1 days | Discharge: HOME | DRG: 918 | End: 2019-11-10
Attending: HOSPITALIST | Admitting: HOSPITALIST
Payer: MEDICARE

## 2019-11-08 VITALS
DIASTOLIC BLOOD PRESSURE: 74 MMHG | SYSTOLIC BLOOD PRESSURE: 151 MMHG | HEART RATE: 95 BPM | WEIGHT: 154.98 LBS | HEIGHT: 67 IN | OXYGEN SATURATION: 99 % | TEMPERATURE: 97 F | RESPIRATION RATE: 18 BRPM

## 2019-11-08 DIAGNOSIS — I10 ESSENTIAL (PRIMARY) HYPERTENSION: ICD-10-CM

## 2019-11-08 DIAGNOSIS — D64.9 ANEMIA, UNSPECIFIED: ICD-10-CM

## 2019-11-08 DIAGNOSIS — N17.9 ACUTE KIDNEY FAILURE, UNSPECIFIED: ICD-10-CM

## 2019-11-08 DIAGNOSIS — N40.0 BENIGN PROSTATIC HYPERPLASIA WITHOUT LOWER URINARY TRACT SYMPTOMS: ICD-10-CM

## 2019-11-08 DIAGNOSIS — N18.4 CHRONIC KIDNEY DISEASE, STAGE 4 (SEVERE): ICD-10-CM

## 2019-11-08 DIAGNOSIS — R63.8 OTHER SYMPTOMS AND SIGNS CONCERNING FOOD AND FLUID INTAKE: ICD-10-CM

## 2019-11-08 DIAGNOSIS — E87.1 HYPO-OSMOLALITY AND HYPONATREMIA: ICD-10-CM

## 2019-11-08 DIAGNOSIS — E11.9 TYPE 2 DIABETES MELLITUS WITHOUT COMPLICATIONS: ICD-10-CM

## 2019-11-08 DIAGNOSIS — Z91.89 OTHER SPECIFIED PERSONAL RISK FACTORS, NOT ELSEWHERE CLASSIFIED: ICD-10-CM

## 2019-11-08 DIAGNOSIS — E78.5 HYPERLIPIDEMIA, UNSPECIFIED: ICD-10-CM

## 2019-11-08 LAB
ALBUMIN SERPL ELPH-MCNC: 4 G/DL — SIGNIFICANT CHANGE UP (ref 3.3–5)
ALP SERPL-CCNC: 83 U/L — SIGNIFICANT CHANGE UP (ref 40–120)
ALT FLD-CCNC: 14 U/L — SIGNIFICANT CHANGE UP (ref 10–45)
ANION GAP SERPL CALC-SCNC: 13 MMOL/L — SIGNIFICANT CHANGE UP (ref 5–17)
APPEARANCE UR: CLEAR — SIGNIFICANT CHANGE UP
AST SERPL-CCNC: 23 U/L — SIGNIFICANT CHANGE UP (ref 10–40)
BASOPHILS # BLD AUTO: 0.03 K/UL — SIGNIFICANT CHANGE UP (ref 0–0.2)
BASOPHILS NFR BLD AUTO: 0.3 % — SIGNIFICANT CHANGE UP (ref 0–2)
BILIRUB SERPL-MCNC: 0.6 MG/DL — SIGNIFICANT CHANGE UP (ref 0.2–1.2)
BILIRUB UR-MCNC: NEGATIVE — SIGNIFICANT CHANGE UP
BUN SERPL-MCNC: 31 MG/DL — HIGH (ref 7–23)
BUN SERPL-MCNC: 31 MG/DL — HIGH (ref 7–23)
CALCIUM SERPL-MCNC: 8.8 MG/DL — SIGNIFICANT CHANGE UP (ref 8.4–10.5)
CALCIUM SERPL-MCNC: 9.2 MG/DL — SIGNIFICANT CHANGE UP (ref 8.4–10.5)
CHLORIDE SERPL-SCNC: 83 MMOL/L — LOW (ref 96–108)
CHLORIDE SERPL-SCNC: 89 MMOL/L — LOW (ref 96–108)
CO2 SERPL-SCNC: 24 MMOL/L — SIGNIFICANT CHANGE UP (ref 22–31)
CO2 SERPL-SCNC: 28 MMOL/L — SIGNIFICANT CHANGE UP (ref 22–31)
COLOR SPEC: YELLOW — SIGNIFICANT CHANGE UP
CREAT ?TM UR-MCNC: 43 MG/DL — SIGNIFICANT CHANGE UP
CREAT SERPL-MCNC: 3.25 MG/DL — HIGH (ref 0.5–1.3)
CREAT SERPL-MCNC: 3.36 MG/DL — HIGH (ref 0.5–1.3)
DIFF PNL FLD: ABNORMAL
EOSINOPHIL # BLD AUTO: 0.28 K/UL — SIGNIFICANT CHANGE UP (ref 0–0.5)
EOSINOPHIL NFR BLD AUTO: 2.8 % — SIGNIFICANT CHANGE UP (ref 0–6)
GLUCOSE SERPL-MCNC: 166 MG/DL — HIGH (ref 70–99)
GLUCOSE SERPL-MCNC: 230 MG/DL — HIGH (ref 70–99)
GLUCOSE UR QL: 100
HCT VFR BLD CALC: 33.4 % — LOW (ref 39–50)
HGB BLD-MCNC: 11.6 G/DL — LOW (ref 13–17)
IMM GRANULOCYTES NFR BLD AUTO: 0.3 % — SIGNIFICANT CHANGE UP (ref 0–1.5)
KETONES UR-MCNC: NEGATIVE — SIGNIFICANT CHANGE UP
LEUKOCYTE ESTERASE UR-ACNC: NEGATIVE — SIGNIFICANT CHANGE UP
LYMPHOCYTES # BLD AUTO: 3.52 K/UL — HIGH (ref 1–3.3)
LYMPHOCYTES # BLD AUTO: 35.5 % — SIGNIFICANT CHANGE UP (ref 13–44)
MAGNESIUM SERPL-MCNC: 2 MG/DL — SIGNIFICANT CHANGE UP (ref 1.6–2.6)
MCHC RBC-ENTMCNC: 27.7 PG — SIGNIFICANT CHANGE UP (ref 27–34)
MCHC RBC-ENTMCNC: 34.7 GM/DL — SIGNIFICANT CHANGE UP (ref 32–36)
MCV RBC AUTO: 79.7 FL — LOW (ref 80–100)
MONOCYTES # BLD AUTO: 0.92 K/UL — HIGH (ref 0–0.9)
MONOCYTES NFR BLD AUTO: 9.3 % — SIGNIFICANT CHANGE UP (ref 2–14)
NEUTROPHILS # BLD AUTO: 5.13 K/UL — SIGNIFICANT CHANGE UP (ref 1.8–7.4)
NEUTROPHILS NFR BLD AUTO: 51.8 % — SIGNIFICANT CHANGE UP (ref 43–77)
NITRITE UR-MCNC: NEGATIVE — SIGNIFICANT CHANGE UP
NRBC # BLD: 0 /100 WBCS — SIGNIFICANT CHANGE UP (ref 0–0)
OSMOLALITY SERPL: 267 MOSMOL/KG — LOW (ref 280–301)
OSMOLALITY UR: 187 MOSMOL/KG — SIGNIFICANT CHANGE UP (ref 100–650)
PH UR: 7 — SIGNIFICANT CHANGE UP (ref 5–8)
PHOSPHATE SERPL-MCNC: 4.1 MG/DL — SIGNIFICANT CHANGE UP (ref 2.5–4.5)
PLATELET # BLD AUTO: 284 K/UL — SIGNIFICANT CHANGE UP (ref 150–400)
POTASSIUM SERPL-MCNC: 4.2 MMOL/L — SIGNIFICANT CHANGE UP (ref 3.5–5.3)
POTASSIUM SERPL-MCNC: 4.6 MMOL/L — SIGNIFICANT CHANGE UP (ref 3.5–5.3)
POTASSIUM SERPL-SCNC: 4.2 MMOL/L — SIGNIFICANT CHANGE UP (ref 3.5–5.3)
POTASSIUM SERPL-SCNC: 4.6 MMOL/L — SIGNIFICANT CHANGE UP (ref 3.5–5.3)
PROT SERPL-MCNC: 7.4 G/DL — SIGNIFICANT CHANGE UP (ref 6–8.3)
PROT UR-MCNC: ABNORMAL MG/DL
RBC # BLD: 4.19 M/UL — LOW (ref 4.2–5.8)
RBC # FLD: 11.6 % — SIGNIFICANT CHANGE UP (ref 10.3–14.5)
SODIUM SERPL-SCNC: 120 MMOL/L — CRITICAL LOW (ref 135–145)
SODIUM UR-SCNC: 38 MMOL/L — SIGNIFICANT CHANGE UP
SP GR SPEC: 1.01 — SIGNIFICANT CHANGE UP (ref 1–1.03)
UROBILINOGEN FLD QL: 0.2 E.U./DL — SIGNIFICANT CHANGE UP
WBC # BLD: 9.91 K/UL — SIGNIFICANT CHANGE UP (ref 3.8–10.5)
WBC # FLD AUTO: 9.91 K/UL — SIGNIFICANT CHANGE UP (ref 3.8–10.5)

## 2019-11-08 PROCEDURE — 99223 1ST HOSP IP/OBS HIGH 75: CPT | Mod: GC

## 2019-11-08 PROCEDURE — 99222 1ST HOSP IP/OBS MODERATE 55: CPT | Mod: GC

## 2019-11-08 PROCEDURE — 99285 EMERGENCY DEPT VISIT HI MDM: CPT

## 2019-11-08 RX ORDER — GLUCAGON INJECTION, SOLUTION 0.5 MG/.1ML
1 INJECTION, SOLUTION SUBCUTANEOUS ONCE
Refills: 0 | Status: DISCONTINUED | OUTPATIENT
Start: 2019-11-08 | End: 2019-11-10

## 2019-11-08 RX ORDER — SODIUM CHLORIDE 9 MG/ML
1000 INJECTION, SOLUTION INTRAVENOUS
Refills: 0 | Status: DISCONTINUED | OUTPATIENT
Start: 2019-11-08 | End: 2019-11-10

## 2019-11-08 RX ORDER — ATORVASTATIN CALCIUM 80 MG/1
20 TABLET, FILM COATED ORAL AT BEDTIME
Refills: 0 | Status: DISCONTINUED | OUTPATIENT
Start: 2019-11-08 | End: 2019-11-10

## 2019-11-08 RX ORDER — PANTOPRAZOLE SODIUM 20 MG/1
40 TABLET, DELAYED RELEASE ORAL
Refills: 0 | Status: DISCONTINUED | OUTPATIENT
Start: 2019-11-08 | End: 2019-11-10

## 2019-11-08 RX ORDER — SODIUM CHLORIDE 9 MG/ML
1000 INJECTION INTRAMUSCULAR; INTRAVENOUS; SUBCUTANEOUS
Refills: 0 | Status: DISCONTINUED | OUTPATIENT
Start: 2019-11-08 | End: 2019-11-09

## 2019-11-08 RX ORDER — DEXTROSE 50 % IN WATER 50 %
25 SYRINGE (ML) INTRAVENOUS ONCE
Refills: 0 | Status: DISCONTINUED | OUTPATIENT
Start: 2019-11-08 | End: 2019-11-10

## 2019-11-08 RX ORDER — DEXTROSE 50 % IN WATER 50 %
12.5 SYRINGE (ML) INTRAVENOUS ONCE
Refills: 0 | Status: DISCONTINUED | OUTPATIENT
Start: 2019-11-08 | End: 2019-11-10

## 2019-11-08 RX ORDER — INSULIN LISPRO 100/ML
VIAL (ML) SUBCUTANEOUS
Refills: 0 | Status: DISCONTINUED | OUTPATIENT
Start: 2019-11-08 | End: 2019-11-10

## 2019-11-08 RX ORDER — DEXTROSE 50 % IN WATER 50 %
15 SYRINGE (ML) INTRAVENOUS ONCE
Refills: 0 | Status: DISCONTINUED | OUTPATIENT
Start: 2019-11-08 | End: 2019-11-10

## 2019-11-08 RX ORDER — INFLUENZA VIRUS VACCINE 15; 15; 15; 15 UG/.5ML; UG/.5ML; UG/.5ML; UG/.5ML
0.5 SUSPENSION INTRAMUSCULAR ONCE
Refills: 0 | Status: COMPLETED | OUTPATIENT
Start: 2019-11-08 | End: 2019-11-09

## 2019-11-08 RX ORDER — HEPARIN SODIUM 5000 [USP'U]/ML
5000 INJECTION INTRAVENOUS; SUBCUTANEOUS EVERY 12 HOURS
Refills: 0 | Status: DISCONTINUED | OUTPATIENT
Start: 2019-11-08 | End: 2019-11-10

## 2019-11-08 RX ADMIN — Medication 2: at 22:48

## 2019-11-08 RX ADMIN — HEPARIN SODIUM 5000 UNIT(S): 5000 INJECTION INTRAVENOUS; SUBCUTANEOUS at 18:13

## 2019-11-08 RX ADMIN — ATORVASTATIN CALCIUM 20 MILLIGRAM(S): 80 TABLET, FILM COATED ORAL at 22:47

## 2019-11-08 RX ADMIN — SODIUM CHLORIDE 70 MILLILITER(S): 9 INJECTION INTRAMUSCULAR; INTRAVENOUS; SUBCUTANEOUS at 22:47

## 2019-11-08 RX ADMIN — SODIUM CHLORIDE 70 MILLILITER(S): 9 INJECTION INTRAMUSCULAR; INTRAVENOUS; SUBCUTANEOUS at 16:50

## 2019-11-08 NOTE — CONSULT NOTE ADULT - ASSESSMENT
69M with PMH CKD stage IV (follows w Dr. Vasquez, baseline Creatinine in the 2s), HTN, DM2, BPH with hx of urinary retention, admission in April 2019 for hyponatremia and electrolyte disturbances thought to be 2/2 thiazide use, sent in today by Dr. Vasquez for Na 119 in office.

## 2019-11-08 NOTE — CONSULT NOTE ADULT - ATTENDING COMMENTS
hyponatremia -- basically asymptomatic . may be diuretic related  urine osm rather low- though on diuretic currently so this may be reason. consider tea and toast sx as well or sponatenous recovery (though not likely with current na of 120 from 119 yesterday)   gentle NS for now -- hold diuretics  no free water restriction for now   follow Na q4 --   keep Na< 125 today and goal 126 in am

## 2019-11-08 NOTE — ED ADULT NURSE NOTE - OTHER COMPLAINTS
reports he went for blood work yesterday and was directed to ER by Dr Vasquez due to low Na+ level (119). Reports feeling "more tired." Denies chest pain/sob/dizziness or other symptoms. ekg in progress.

## 2019-11-08 NOTE — ED ADULT NURSE NOTE - PMH
BPH (benign prostatic hyperplasia)    CKD (chronic kidney disease), stage IV    Diabetes    Hyperlipidemia    Hypertension

## 2019-11-08 NOTE — H&P ADULT - PROBLEM SELECTOR PLAN 9
1) PCP Contacted on Admission: (Y/N) --> Name & Phone #:  2) Date of Contact with PCP: TBD  3) PCP Contacted at Discharge: TBD  4) Summary of Handoff Given to PCP: TBD   5) Post-Discharge Appointment Date: TBD 1) PCP Contacted on Admission: (Y/N) --> Name & Phone #: PCP Dr. Mook Buchanan   3) PCP Contacted at Discharge: TBD  4) Summary of Handoff Given to PCP: TBD   5) Post-Discharge Appointment Date: TBD

## 2019-11-08 NOTE — CONSULT NOTE ADULT - PROBLEM SELECTOR RECOMMENDATION 9
unclear etiology, Hx, PE and urine lytes do not feet specific pattern, but givven dilute Urine and Gris on the low side, also pt reporting a tea and toast type of diet  - would consider a trial of gentle maintenance IVF hydration   - obtain TSH  - repeat BMP in 4 hours/ goal SNa ~ 126 in am

## 2019-11-08 NOTE — H&P ADULT - HISTORY OF PRESENT ILLNESS
69M PMH CKD stage IV (baseline Cr 2.4), HTN, DM2, BPH with hx of urinary retention, chronic constipation, recent admission in April 2019 for hyponatremia and electrolyte disturbances thought to be 2/2 thiazide use, sent in today by Dr. Vasquez for Na 119 in office. Pt states his pharmacy refilled his thiazide mistakenly, and he did not realize, took it for approximately 3-4 days, then realized he had the wrong medication.  Last dose roughly Sunday. Had routine labs done yesterday and called today by Dr. Vasquez's office that his Na was 119 and to come to the ED for evaluation. He reports that he does not drink any water in excess and has 4-5 8oz cups per day. He is passing gas and voiding regularly. In ED, patient was fully alert, oriented, appropriate w/o signs of seizure, euvolemic on exam. Nephrology was consulted for hyponatremia and decision made to admit to [facility] with q4BMP given pt asymptomatic.  Pt has no excess thirst, no dizziness or lightheadedness, no headache, no cough, no shortness of breath, no chest pain or abdominal pain.     Vitals in ED: Temp: 98.1, /74, HR: 95, RR: 18, SpO2: 99% RA  Labs s/f: Hgb 11.6, Na 120, BUN/Cr 31/3.25 (baseline 2.4). UA trace blood and protein, Uosm 187, Urine Na 38, Urine Cr 43, Serum osm 267. EKG NSR  In the ED was started on 70cc/hour NS per renal recs 69M PMH CKD stage IV (baseline Cr 2.4), HTN, DM2, BPH with hx of urinary retention, chronic constipation, recent admission in April 2019 for hyponatremia and electrolyte disturbances thought to be 2/2 thiazide use, sent in today by Dr. Vasquez for Na 119 in office. Pt states his pharmacy refilled his thiazide mistakenly, and he did not realize, took it for approximately 3-4 days, then realized he had the wrong medication.  Last dose roughly Sunday. Had routine labs done yesterday and called today by Dr. Vasquez's office that his Na was 119 and to come to the ED for evaluation. He reports that he does not drink any water in excess and has 4-5 8oz cups per day. Reports decrease voiding for the past few days however denies urinary retention or other urinary symptoms. In ED, patient was fully alert, oriented, appropriate w/o signs of seizure, euvolemic on exam. Nephrology was consulted for hyponatremia and decision made to admit to [facility] with q4BMP given pt asymptomatic.  Pt has no excess thirst, no dizziness or lightheadedness, no headache, no cough, no shortness of breath, no chest pain or abdominal pain.     Vitals in ED: Temp: 98.1, /74, HR: 95, RR: 18, SpO2: 99% RA  Labs s/f: Hgb 11.6, Na 120, BUN/Cr 31/3.25 (baseline 2.4). UA trace blood and protein, Uosm 187, Urine Na 38, Urine Cr 43, Serum osm 267. EKG NSR  In the ED was started on 70cc/hour NS per renal recs 69M PMH CKD stage IV (baseline Cr 2.4), HTN, DM2, BPH with hx of urinary retention, and recent admission in April 2019 for hyponatremia and electrolyte disturbances thought to be due to thiazide use, sent in today by Dr. Vasquez for Na 119 per outpatient labs yesterday. Pt reports pharmacist refilled his thiazide mistakenly, and he did not realize, thus he took it from 10/27 to 11/3. Afterwards patient realized he was not supposed to take the medication. He reports that he drinks ~ 2L of water per day. Reports decrease voiding for the past few days however denies urinary retention or other urinary symptoms. On presentation to the ED, patient was fully alert, oriented, appropriate w/o signs of seizure, euvolemic on exam. Nephrology was consulted for hyponatremia and decision made to admit to [facility] with q4BMP given pt asymptomatic.  Pt has no excess thirst, no dizziness or lightheadedness, no headache, no cough, no shortness of breath, no chest pain or abdominal pain.     Vitals in ED: Temp: 98.1, /74, HR: 95, RR: 18, SpO2: 99% RA  Labs s/f: Hgb 11.6, Na 120, BUN/Cr 31/3.25 (baseline 2.4). UA trace blood and protein, Uosm 187, Urine Na 38, Urine Cr 43, Serum osm 267. EKG NSR  In the ED was started on 70cc/hour NS per renal recs 69M PMH CKD stage IV (baseline Cr 2.4), HTN, DM2, BPH with hx of urinary retention, and recent admission in April 2019 for hyponatremia and electrolyte disturbances thought to be due to thiazide use, sent in today by Dr. Vasquez for Na 119 per outpatient labs yesterday. Pt reports pharmacist refilled his thiazide mistakenly, and he did not realize, thus he took it from 10/27 to 11/3. Afterwards patient realized he was not supposed to take the medication. He reports that he drinks ~ 2L of water per day. Reports decrease voiding for the past few days however denies urinary retention or other urinary symptoms. On presentation to the ED, patient was fully alert, oriented, appropriate w/o signs of seizure, euvolemic on exam. Nephrology was consulted for hyponatremia and decision made to admit to [facility] with q4BMP given pt asymptomatic.  Pt denies excess thirst, no dizziness or lightheadedness, no headache, no cough, no shortness of breath, no chest pain or abdominal pain, no F/C.     Vitals in ED: Temp: 98.1, /74, HR: 95, RR: 18, SpO2: 99% RA  Labs s/f: Hgb 11.6, Na 120, BUN/Cr 31/3.25 (baseline 2.4). UA trace blood and protein, Uosm 187, Urine Na 38, Urine Cr 43, Serum osm 267. EKG NSR  In the ED was started on 70cc/hour NS per renal recs

## 2019-11-08 NOTE — ED PROVIDER NOTE - PHYSICAL EXAMINATION
CONSTITUTIONAL: Well-appearing; well-nourished; in no apparent distress.   HEAD: Normocephalic; atraumatic.   EYES:  conjunctiva and sclera clear  ENT: normal nose; no rhinorrhea; normal pharynx with no erythema or lesions.   NECK: Supple; non-tender;   CARDIOVASCULAR: Normal S1, S2; no murmurs, rubs, or gallops. Regular rate and rhythm.   RESPIRATORY: Breathing easily; breath sounds clear and equal bilaterally; no wheezes, rhonchi, or rales.  GI: Soft; non-distended; non-tender; no palpable organomegaly.   EXT: No cyanosis or edema; N/V intact  SKIN: Normal for age and race; warm; dry; good turgor; no apparent lesions or rash.   NEURO: A & O x 3; face symmetric; grossly unremarkable.   PSYCHOLOGICAL: The patient’s mood and manner are appropriate. well-groomed/no distress/well-nourished/well-developed

## 2019-11-08 NOTE — H&P ADULT - PROBLEM SELECTOR PLAN 3
Pt with hx of HTN on home Ramipril 2.5mg qd and Torsemide 10mg qd   -holding ACEi/ARB and loop diuretics in setting of PRICILA on CKD. Pt with hx of HTN on home Ramipril 2.5mg qd and Torsemide 10mg qd  -holding ACEi/ARB and loop diuretics in setting of PRICILA on CKD. Pt with hx of HTN on home Ramipril 2.5mg qd  -holding ACEi/ARB in setting of PRICILA on CKD.

## 2019-11-08 NOTE — H&P ADULT - NSHPPHYSICALEXAM_GEN_ALL_CORE
PHYSICAL EXAM:  GENERAL: NAD, well-groomed, well-developed  HEAD:  Atraumatic, Normocephalic  EYES: EOMI, PERRLA, conjunctiva and sclera clear  ENMT: No tonsillar erythema, exudates, or enlargement; Moist mucous membranes  NECK: Supple, No JVD, Normal thyroid  HEART: Regular rate and rhythm; No murmurs, rubs, or gallops  RESPIRATORY: CTA B/L, No W/R/R  ABDOMEN: Soft, Nontender, Nondistended; Bowel sounds present  EXTREMITIES:  2+ Peripheral Pulses, No clubbing, cyanosis, or edema  SKIN: warm, dry, normal color, no rash or abnormal lesions  Neurological:   	- Mental Status: AAOx3; speech is fluent with intact naming, repetition, and comprehension  	- Cranial Nerves II -XII:  II: PERRLA; visual fields are full to confrontation  III, IV, VI: EOMI, no nystagmus appreciated  V: facial sensation intact to light touch V1-V3 b/l  VII: no ptosis, no facial droop, symmetric eyebrow raise and closure  VIII: hearing intact to finger rub b/l  IX, X: uvula is midline, soft palate rises symmetrically  XI: head turning and shoulder shrug intact b/l  XII: tongue protrusion midline  - Motor: strength is 5/5 b/l LLE & RLE, 5/5 b/l LUE & RUE. normal muscle bulk and tone throughout, no pronator drift  -Sensation: Intact to light touch, proprioception, and pinprick.  No neglect.   -Coordination: No dysmetria on finger-to-nose and heel-to-shin.  No clumsiness.  -Reflexes: 2+ in upper and lower extremities, downgoing toes bilaterally  -Gait: not assessed PHYSICAL EXAM:  GENERAL: NAD, well-groomed, well-developed. In no acute distress  HEAD:  Atraumatic, Normocephalic  EYES: EOMI, PERRLA, conjunctiva and sclera clear  ENMT: No tonsillar erythema, exudates, or enlargement; Moist mucous membranes  NECK: Supple, No JVD, Normal thyroid  HEART: Regular rate and rhythm; No murmurs, rubs, or gallops  RESPIRATORY: CTA B/L, No W/R/R  ABDOMEN: Soft, Nontender, Nondistended; Bowel sounds present  EXTREMITIES:  2+ Peripheral Pulses, No clubbing, cyanosis, or edema  SKIN: warm, dry, normal color, no rash or abnormal lesions  Neurological:   	- Mental Status: AAOx3; speech is fluent with intact naming, repetition, and comprehension  	- Cranial Nerves II -XII:  II: PERRLA; visual fields are full to confrontation  III, IV, VI: EOMI, no nystagmus appreciated  V: facial sensation intact to light touch V1-V3 b/l  VII: no ptosis, no facial droop, symmetric eyebrow raise and closure  VIII: hearing intact to finger rub b/l  IX, X: uvula is midline, soft palate rises symmetrically  XI: head turning and shoulder shrug intact b/l  XII: tongue protrusion midline  - Motor: strength is 5/5 b/l LLE & RLE, 5/5 b/l LUE & RUE. normal muscle bulk and tone throughout, no pronator drift  -Sensation: Intact to light touch, proprioception, and pinprick.  No neglect.   -Coordination: No dysmetria on finger-to-nose and heel-to-shin.  No clumsiness.  -Reflexes: 2+ in upper and lower extremities, downgoing toes bilaterally  -Gait: not assessed

## 2019-11-08 NOTE — H&P ADULT - NSHPLABSRESULTS_GEN_ALL_CORE
LABS:                        11.6   9.91  )-----------( 284      ( 2019 13:59 )             33.4         120<LL>  |  83<L>  |  31<H>  ----------------------------<  230<H>  4.2   |  24  |  3.25<H>    Ca    8.8      2019 13:59  Phos  4.1       Mg     2.0         TPro  7.4  /  Alb  4.0  /  TBili  0.6  /  DBili  x   /  AST  23  /  ALT  14  /  AlkPhos  83        Urinalysis Basic - ( 2019 15:21 )    Color: Yellow / Appearance: Clear / S.010 / pH: x  Gluc: x / Ketone: NEGATIVE  / Bili: Negative / Urobili: 0.2 E.U./dL   Blood: x / Protein: Trace mg/dL / Nitrite: NEGATIVE   Leuk Esterase: NEGATIVE / RBC: < 5 /HPF / WBC < 5 /HPF   Sq Epi: x / Non Sq Epi: x / Bacteria: x      CAPILLARY BLOOD GLUCOSE    Urinalysis Basic - ( 2019 15:21 )    Color: Yellow / Appearance: Clear / S.010 / pH: x  Gluc: x / Ketone: NEGATIVE  / Bili: Negative / Urobili: 0.2 E.U./dL   Blood: x / Protein: Trace mg/dL / Nitrite: NEGATIVE   Leuk Esterase: NEGATIVE / RBC: < 5 /HPF / WBC < 5 /HPF   Sq Epi: x / Non Sq Epi: x / Bacteria: x    Echo:    RADIOLOGY & ADDITIONAL TESTS:

## 2019-11-08 NOTE — H&P ADULT - PROBLEM SELECTOR PLAN 1
Euvolemic hypoosmolar hyponatremia   Sodium 119 today per outpt labs. This admission without any evidence of altered mental status and currently asymptomatic. Recent use of thiazide, from 10/27 to 11/3. He reports that he does not drink any excess fluids per day.  -Uosm 187 with Urine Na 38, Serum osm 267  -c/w BMP q4  -f/u urine lytes q4  -d/c thiazide  -start 70cc/hour NS   -goal Na 126 by tomorrow morning, keep <125 tonight  -nephrology following, appreciate recs Euvolemic hypo-osmolar hyponatremia   Sodium 119 yesterday per outpt labs. This admission without any evidence of altered mental status and currently asymptomatic. Recent use of thiazide, from 10/27 to 11/3. He reports that he does not drink any excess fluids per day. Most likely diuretic related with low urine osm, less likely tea and toast syndrome given pt reports eating a healthy diet including vegetables and does not appear malnourished.   gentle NS for now -- hold diuretics  no free water restriction for now   follow Na q4 --   keep Na< 125 today and goal 126 in am .   -Na 120 on presentation  -Uosm 187 with Urine Na 38, Serum osm 267  -c/w BMP q4  -f/u urine lytes q4  -d/c thiazide  -start 70cc/hour NS   -goal Na 126 by tomorrow morning, keep <125 tonight  -nephrology following, appreciate recs Euvolemic hypo-osmolar hyponatremia   Sodium 119 yesterday per outpt labs. This admission without any evidence of altered mental status and currently asymptomatic. Recent use of thiazide, from 10/27 to 11/3. He reports that he does not drink any excess fluids per day. Unclear if patient takes Torsemide daily, per pharmacy recs pt refilled Torsemide 10mg on 10/30/19. Most likely diuretic related given low urine osm, less likely tea and toast syndrome given pt reports eating a healthy diet including vegetables and does not appear malnourished. -   -Na 120 on presentation  -Uosm 187 with Urine Na 38, Serum osm 267  -c/w BMP q4  -f/u urine lytes q4  -hold diuretics  -start 70cc/hour NS   -goal Na 126 by tomorrow morning, keep <125 tonight  -nephrology following, appreciate recs Euvolemic hypo-osmolar hyponatremia   Sodium 119 yesterday per outpt labs. This admission without any evidence of altered mental status and currently asymptomatic. Recent use of thiazide, from 10/27 to 11/3. He reports that he does not drink any excess fluids. Pt on Torsemide 10mg qd. Most likely diuretic related given low urine osm, less likely tea and toast syndrome given pt reports eating a healthy diet including vegetables and does not appear malnourished. -   -Na 120 on presentation  -Uosm 187 with Urine Na 38, Serum osm 267  -c/w BMP q4  -f/u urine lytes q4  -hold diuretics  -start 70cc/hour NS   -goal Na 126 by tomorrow morning, keep <125 tonight  -nephrology following, appreciate recs  -f/u TSH Euvolemic hypo-osmolar hyponatremia   Sodium 119 yesterday per outpt labs. This admission without any evidence of altered mental status and currently asymptomatic. Recent use of thiazide, from 10/27 to 11/3. Similar presentation in past due to HCTZ use, recovered after discontinuation. He reports that he does not drink any excess fluids. Pt on Torsemide 10mg qd. Most likely diuretic related given low urine osm, less likely tea and toast syndrome given pt reports eating a healthy diet including vegetables and does not appear malnourished. -   -Na 120 on presentation  -Uosm 187 with Urine Na 38, Serum osm 267  -c/w BMP q4  -f/u urine lytes q4  -hold diuretics  -start 70cc/hour NS   -goal Na 126 by tomorrow morning, keep <125 tonight  -nephrology following, appreciate recs  -f/u TSH

## 2019-11-08 NOTE — H&P ADULT - PROBLEM SELECTOR PLAN 8
1) PCP Contacted on Admission: (Y/N) --> Name & Phone #:  2) Date of Contact with PCP: TBD  3) PCP Contacted at Discharge: TBD  4) Summary of Handoff Given to PCP: TBD   5) Post-Discharge Appointment Date: TBD F: none  E: replete electrolytes K< 4, Mg <2  N: DASH/TLC diabetic diet  DVT PPx: Hep SubQ  Code status: Full Code  Dispo: [facility]

## 2019-11-08 NOTE — H&P ADULT - NSHPSOCIALHISTORY_GEN_ALL_CORE
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

## 2019-11-08 NOTE — CONSULT NOTE ADULT - SUBJECTIVE AND OBJECTIVE BOX
HPI:  69M with PMH CKD stage IV (follows w Dr. Vasquez, baseline Creatinine in the 2s), HTN, DM2, BPH with hx of urinary retention, admission in April 2019 for hyponatremia and electrolyte disturbances thought to be 2/2 thiazide use, sent in today by Dr. Vasquez for Na 119 in office. Pt states his pharmacy refilled his thiazide mistakenly, and he did not realize, took it for approximately 3-4 days, then realized he had the wrong medication.  Last dose roughly Sunday. Had routine labs done yesterday and called today by Dr. Vasquez's office that his Na was 119 and to come to the ED for evaluation. Pt is asymptomatic at this time.  No headache, confusion, N/V, ataxia.      PAST HISTORY  --------------------------------------------------------------------------------  PAST MEDICAL & SURGICAL HISTORY:  BPH (benign prostatic hyperplasia)  CKD (chronic kidney disease), stage IV  Hyperlipidemia  Hypertension  Diabetes  No significant past surgical history    FAMILY HISTORY:    PAST SOCIAL HISTORY:    ALLERGIES & MEDICATIONS  --------------------------------------------------------------------------------  Allergies    No Known Allergies    Intolerances      Standing Inpatient Medications    PRN Inpatient Medications      REVIEW OF SYSTEMS  --------------------------------------------------------------------------------  Gen: No weight changes, fatigue, fevers/chills, weakness  Skin: No rashes  Head/Eyes/Ears/Mouth: No headache; Normal hearing; Normal vision w/o blurriness; No sinus pain/discomfort, sore throat  Respiratory: No dyspnea, cough, wheezing, hemoptysis  CV: No chest pain, PND, orthopnea  GI: No abdominal pain, diarrhea, constipation, nausea, vomiting, melena, hematochezia  : No increased frequency, dysuria, hematuria, nocturia  MSK: No joint pain/swelling; no back pain; no edema  Neuro: No dizziness/lightheadedness, weakness, seizures, numbness, tingling  Heme: No easy bruising or bleeding  Endo: No heat/cold intolerance  Psych: No significant nervousness, anxiety, stress, depression    All other systems were reviewed and are negative, except as noted.    VITALS/PHYSICAL EXAM  --------------------------------------------------------------------------------  T(C): 36.7 (11-08-19 @ 15:12), Max: 36.7 (11-08-19 @ 15:12)  HR: 91 (11-08-19 @ 15:12) (91 - 99)  BP: 153/70 (11-08-19 @ 15:12) (151/74 - 155/73)  RR: 16 (11-08-19 @ 15:12) (16 - 18)  SpO2: 100% (11-08-19 @ 15:12) (99% - 100%)  Wt(kg): --  Height (cm): 170.18 (11-08-19 @ 13:33)  Weight (kg): 70.3 (11-08-19 @ 13:33)  BMI (kg/m2): 24.3 (11-08-19 @ 13:33)  BSA (m2): 1.81 (11-08-19 @ 13:33)      Physical Exam:  	Gen: NAD, well-appearing  	HEENT: PERRL, supple neck, clear oropharynx  	Pulm: CTA B/L  	CV: RRR, S1S2; no rub  	Back: No spinal or CVA tenderness; no sacral edema  	Abd: +BS, soft, nontender/nondistended  	: No suprapubic tenderness  	UE: Warm, FROM, no clubbing, intact strength; no edema; no asterixis  	LE: Warm, FROM, no clubbing, intact strength; no edema  	Neuro: No focal deficits, intact gait  	Psych: Normal affect and mood  	Skin: Warm, without rashes  	Vascular access:    LABS/STUDIES  --------------------------------------------------------------------------------              11.6   9.91  >-----------<  284      [11-08-19 @ 13:59]              33.4     120  |  83  |  31  ----------------------------<  230      [11-08-19 @ 13:59]  4.2   |  24  |  3.25        Ca     8.8     [11-08-19 @ 13:59]      Mg     2.0     [11-08-19 @ 13:59]      Phos  4.1     [11-08-19 @ 13:59]    TPro  7.4  /  Alb  4.0  /  TBili  0.6  /  DBili  x   /  AST  23  /  ALT  14  /  AlkPhos  83  [11-08-19 @ 13:59]        Serum Osmolality 267      [11-08-19 @ 13:59]    Creatinine Trend:  SCr 3.25 [11-08 @ 13:59]    Urinalysis - [11-08-19 @ 15:21]      Color Yellow / Appearance Clear / SG 1.010 / pH 7.0      Gluc 100 / Ketone NEGATIVE  / Bili Negative / Urobili 0.2       Blood Trace / Protein Trace / Leuk Est NEGATIVE / Nitrite NEGATIVE      RBC < 5 / WBC < 5 / Hyaline  / Gran  / Sq Epi  / Non Sq Epi  / Bacteria     Urine Creatinine 43      [11-08-19 @ 15:21]  Urine Sodium 38      [11-08-19 @ 15:21]  Urine Osmolality 187      [11-08-19 @ 15:21]    HbA1c 8.1      [04-27-19 @ 07:31]  TSH 2.158      [04-26-19 @ 15:22]    HCV 0.09, Nonreact      [04-27-19 @ 07:31] HPI:  69M with PMH CKD stage IV (follows w Dr. Vasquez, baseline Creatinine in the 2s), HTN, DM2, BPH with hx of urinary retention, admission in April 2019 for hyponatremia and electrolyte disturbances thought to be 2/2 thiazide use, sent in today by Dr. Vasquez for Na 119 in office. Pt states his pharmacy refilled his thiazide mistakenly, and he did not realize, took it for approximately 3-4 days, then realized he had the wrong medication.  Last dose roughly Sunday. Had routine labs done yesterday and called today by Dr. Vasquez's office that his Na was 119 and to come to the ED for evaluation. Pt is asymptomatic at this time.  No headache, confusion, N/V, ataxia.      PAST HISTORY  --------------------------------------------------------------------------------  PAST MEDICAL & SURGICAL HISTORY:  BPH (benign prostatic hyperplasia)  CKD (chronic kidney disease), stage IV  Hyperlipidemia  Hypertension  Diabetes  No significant past surgical history    FAMILY HISTORY: NC    PAST SOCIAL HISTORY: no tobacco, etoh, drugs    ALLERGIES & MEDICATIONS  --------------------------------------------------------------------------------  Allergies    No Known Allergies    Intolerances      Standing Inpatient Medications    PRN Inpatient Medications      REVIEW OF SYSTEMS  --------------------------------------------------------------------------------  Gen: No weight changes, fatigue, fevers/chills, weakness, slt sleepiness recently   Skin: No rashes  Head/Eyes/Ears/Mouth: No headache; Normal hearing; Normal vision w/o blurriness; No sinus pain/discomfort, sore throat  Respiratory: No dyspnea, cough, wheezing, hemoptysis  CV: No chest pain, PND, orthopnea  GI: No abdominal pain, diarrhea, constipation, nausea, vomiting, melena, hematochezia  : No increased frequency, dysuria, hematuria, nocturia  MSK: No joint pain/swelling; no back pain; no edema  Neuro: No dizziness/lightheadedness, weakness, seizures, numbness, tingling  Heme: No easy bruising or bleeding  Endo: No heat/cold intolerance  Psych: No significant nervousness, anxiety, stress, depression    All other systems were reviewed and are negative, except as noted.    VITALS/PHYSICAL EXAM  --------------------------------------------------------------------------------  T(C): 36.7 (11-08-19 @ 15:12), Max: 36.7 (11-08-19 @ 15:12)  HR: 91 (11-08-19 @ 15:12) (91 - 99)  BP: 153/70 (11-08-19 @ 15:12) (151/74 - 155/73)  RR: 16 (11-08-19 @ 15:12) (16 - 18)  SpO2: 100% (11-08-19 @ 15:12) (99% - 100%)  Wt(kg): --  Height (cm): 170.18 (11-08-19 @ 13:33)  Weight (kg): 70.3 (11-08-19 @ 13:33)  BMI (kg/m2): 24.3 (11-08-19 @ 13:33)  BSA (m2): 1.81 (11-08-19 @ 13:33)      Physical Exam:  	Gen: NAD, well-appearing  	HEENT: PERRL, supple neck, clear oropharynx  	Pulm: CTA B/L  	CV: RRR, S1S2; no rub  	Back: No spinal or CVA tenderness; no sacral edema  	Abd: +BS, soft, nontender/nondistended  	: No suprapubic tenderness  	UE: Warm, FROM, no clubbing, intact strength; no edema; no asterixis  	LE: Warm, FROM, no clubbing, intact strength; no edema  	Neuro: No focal deficits, intact gait  	Psych: Normal affect and mood  	Skin: Warm, without rashes  	Vascular access:    LABS/STUDIES  --------------------------------------------------------------------------------              11.6   9.91  >-----------<  284      [11-08-19 @ 13:59]              33.4     120  |  83  |  31  ----------------------------<  230      [11-08-19 @ 13:59]  4.2   |  24  |  3.25        Ca     8.8     [11-08-19 @ 13:59]      Mg     2.0     [11-08-19 @ 13:59]      Phos  4.1     [11-08-19 @ 13:59]    TPro  7.4  /  Alb  4.0  /  TBili  0.6  /  DBili  x   /  AST  23  /  ALT  14  /  AlkPhos  83  [11-08-19 @ 13:59]        Serum Osmolality 267      [11-08-19 @ 13:59]    Creatinine Trend:  SCr 3.25 [11-08 @ 13:59]    Urinalysis - [11-08-19 @ 15:21]      Color Yellow / Appearance Clear / SG 1.010 / pH 7.0      Gluc 100 / Ketone NEGATIVE  / Bili Negative / Urobili 0.2       Blood Trace / Protein Trace / Leuk Est NEGATIVE / Nitrite NEGATIVE      RBC < 5 / WBC < 5 / Hyaline  / Gran  / Sq Epi  / Non Sq Epi  / Bacteria     Urine Creatinine 43      [11-08-19 @ 15:21]  Urine Sodium 38      [11-08-19 @ 15:21]  Urine Osmolality 187      [11-08-19 @ 15:21]    HbA1c 8.1      [04-27-19 @ 07:31]  TSH 2.158      [04-26-19 @ 15:22]    HCV 0.09, Nonreact      [04-27-19 @ 07:31] HPI:  69M with PMH CKD stage IV (follows w Dr. Vasquez, baseline Creatinine in the 2s), HTN, DM2, BPH with hx of urinary retention, admission in April 2019 for hyponatremia and electrolyte disturbances thought to be 2/2 thiazide use, sent in today by Dr. Vasquez for Na 119 in office. Pt states his pharmacy refilled his thiazide mistakenly, and he did not realize, took it for approximately 3-4 days, then realized he had the wrong medication.  Last dose roughly Sunday. Had routine labs done yesterday and called today by Dr. Vasquez's office that his Na was 119 and to come to the ED for evaluation. Pt is asymptomatic at this time.  No headache, confusion, N/V, ataxia, reports decreased UOP but no LUST, no fveres/ chills, denies any diuretic use except those few Chlorthalidone almost one week ago, denies any thyroid issues or any meds for mood disorders.    PAST HISTORY  --------------------------------------------------------------------------------  PAST MEDICAL & SURGICAL HISTORY:  BPH (benign prostatic hyperplasia)  CKD (chronic kidney disease), stage IV  Hyperlipidemia  Hypertension  Diabetes  No significant past surgical history    FAMILY HISTORY: NC    PAST SOCIAL HISTORY: no tobacco, etoh, drugs    ALLERGIES & MEDICATIONS  --------------------------------------------------------------------------------  Allergies    No Known Allergie    REVIEW OF SYSTEMS  --------------------------------------------------------------------------------  Gen: No weight changes, fatigue, fevers/chills, weakness, slt sleepiness recently   Skin: No rashes  Head/Eyes/Ears/Mouth: No headache; Normal hearing; Normal vision w/o blurriness; No sinus pain/discomfort, sore throat  Respiratory: No dyspnea, cough, wheezing, hemoptysis  CV: No chest pain, PND, orthopnea  GI: No abdominal pain, diarrhea, constipation, nausea, vomiting, melena, hematochezia  : No increased frequency, dysuria, hematuria, nocturia  MSK: No joint pain/swelling; no back pain; no edema  Neuro: No dizziness/lightheadedness, weakness, seizures, numbness, tingling  Heme: No easy bruising or bleeding  Endo: No heat/cold intolerance  Psych: No significant nervousness, anxiety, stress, depression    All other systems were reviewed and are negative, except as noted.    VITALS/PHYSICAL EXAM  --------------------------------------------------------------------------------  T(C): 36.7 (11-08-19 @ 15:12), Max: 36.7 (11-08-19 @ 15:12)  HR: 91 (11-08-19 @ 15:12) (91 - 99)  BP: 153/70 (11-08-19 @ 15:12) (151/74 - 155/73)  RR: 16 (11-08-19 @ 15:12) (16 - 18)  SpO2: 100% (11-08-19 @ 15:12) (99% - 100%)  Height (cm): 170.18 (11-08-19 @ 13:33)  Weight (kg): 70.3 (11-08-19 @ 13:33)  BMI (kg/m2): 24.3 (11-08-19 @ 13:33)  BSA (m2): 1.81 (11-08-19 @ 13:33)      Physical Exam:  	Gen: NAD, well-appearing  	Pulm: CTA B/L  	CV: RRR, S1S2; no rub  	Back: No spinal or CVA tenderness  	Abd: +BS, soft, nontender/nondistended  	: No suprapubic tenderness  	UE: Warm, FROM, no clubbing, intact strength; no edema; no asterixis  	LE: Warm, FROM, no clubbing, intact strength; no edema  	Neuro: No focal deficite  	Psych: Normal affect and mood      LABS/STUDIES  --------------------------------------------------------------------------------              11.6   9.91  >-----------<  284      [11-08-19 @ 13:59]              33.4     120  |  83  |  31  ----------------------------<  230      [11-08-19 @ 13:59]  4.2   |  24  |  3.25        Ca     8.8     [11-08-19 @ 13:59]      Mg     2.0     [11-08-19 @ 13:59]      Phos  4.1     [11-08-19 @ 13:59]    TPro  7.4  /  Alb  4.0  /  TBili  0.6  /  DBili  x   /  AST  23  /  ALT  14  /  AlkPhos  83  [11-08-19 @ 13:59]        Serum Osmolality 267      [11-08-19 @ 13:59]    Creatinine Trend:  SCr 3.25 [11-08 @ 13:59]    Urinalysis - [11-08-19 @ 15:21]      Color Yellow / Appearance Clear / SG 1.010 / pH 7.0      Gluc 100 / Ketone NEGATIVE  / Bili Negative / Urobili 0.2       Blood Trace / Protein Trace / Leuk Est NEGATIVE / Nitrite NEGATIVE      RBC < 5 / WBC < 5 / Hyaline  / Gran  / Sq Epi  / Non Sq Epi  / Bacteria     Urine Creatinine 43      [11-08-19 @ 15:21]  Urine Sodium 38      [11-08-19 @ 15:21]  Urine Osmolality 187      [11-08-19 @ 15:21]    HbA1c 8.1      [04-27-19 @ 07:31]  TSH 2.158      [04-26-19 @ 15:22]    HCV 0.09, Nonreact      [04-27-19 @ 07:31]

## 2019-11-08 NOTE — ED PROVIDER NOTE - CLINICAL SUMMARY MEDICAL DECISION MAKING FREE TEXT BOX
here w/ concern for recurrent hyponatremia, in the setting of accidental thiazide use. will repeat labs here and discuss results with nixon Loveo pending level

## 2019-11-08 NOTE — ED PROVIDER NOTE - OBJECTIVE STATEMENT
69M with PMH CKD stage IV (follows w Dr. Vasquez, baseline Creatinine in the 2s), HTN, DM2, BPH with hx of urinary retention, chronic constipation, admission in April 2019 for hyponatremia hypokalemia and hypochloremia thought to be 2/2 thiazide use, sent in today by Dr. Vasquez for concern for recurrent hyponatremia. 69M with PMH CKD stage IV (follows w Dr. Vasquez, baseline Creatinine in the 2s), HTN, DM2, BPH with hx of urinary retention, chronic constipation, admission in April 2019 for hyponatremia hypokalemia and hypochloremia thought to be 2/2 thiazide use, sent in today by Dr. Vasquez for concern for recurrent hyponatremia. Pt states his pharmacy refilled his thiazide mistakenly, and he did not realize, took it for approximately 3-4 days, then got constipated, and looked at the bottles, realized he had the wrong medication. last dose he took was possibly last Sunday. since stopping it his constipation resolved. Had routine labs done yesterday and called today by Dr. Vasquez's office that his Na was 119 and to come to the ED for evaluation. Pt is asymptomatic at this time.

## 2019-11-08 NOTE — H&P ADULT - PROBLEM SELECTOR PLAN 5
-c/w home Atorvastatin 20mg Pt with Hgb at baseline between 11-12. Most likely anemia of chronic disease in the setting of DM Type 2 and HTN.   -Hgb 11.6 with MCV 79.7  -f/u iron panel in the AM   -transfuse for Hgb <7  -continue to monitor CBC Pt with Hgb at baseline between 11-12. Most likely anemia of chronic disease in the setting of DM Type 2 and HTN.   -Hgb 11.6 with MCV 79.7  -f/u iron panel in the AM   -reportedly pt had a colonoscopy in October 2019 and it was normal. Obtain collateral information from his PCP Dr. Mook Mccann  -transfuse for Hgb <7  -continue to monitor CBC

## 2019-11-08 NOTE — CONSULT NOTE ADULT - PROBLEM SELECTOR RECOMMENDATION 2
likely prerenal, baseline in md 2s  - trial of IV hydration with Ns starting 70 cc/hr     will follow  Discussed with Dr. Dunne

## 2019-11-08 NOTE — H&P ADULT - PROBLEM SELECTOR PLAN 7
F: none  E: replete electrolytes K< 4, Mg <2  N: DASH/TLC diabetic diet  DVT PPx: Hep SubQ  Code status: Full Code  Dispo: [facility] Patient with hx of BPH. Currently voiding well.   - monitor I&O.

## 2019-11-08 NOTE — H&P ADULT - NSICDXPASTMEDICALHX_GEN_ALL_CORE_FT
PAST MEDICAL HISTORY:  BPH (benign prostatic hyperplasia)     CKD (chronic kidney disease), stage IV     Diabetes     Hyperlipidemia     Hypertension

## 2019-11-08 NOTE — H&P ADULT - PROBLEM SELECTOR PLAN 4
Diabetes TII  On home Lantus 24units per pharmacy recs  -c/w insulin sliding scale  -accu checks Diabetes type II  On home Lantus 24units and Tradjenta 5mg qd per pharmacy recs  -c/w moderate insulin sliding scale Diabetes type II  On home Lantus 24units and Tradjenta 5mg qd per pharmacy recs  -c/w moderate insulin sliding scale  -f/u HbA1C

## 2019-11-08 NOTE — ED ADULT NURSE NOTE - NSIMPLEMENTINTERV_GEN_ALL_ED
Implemented All Universal Safety Interventions:  [place] to call system. Call bell, personal items and telephone within reach. Instruct patient to call for assistance. Room bathroom lighting operational. Non-slip footwear when patient is off stretcher. Physically safe environment: no spills, clutter or unnecessary equipment. Stretcher in lowest position, wheels locked, appropriate side rails in place.

## 2019-11-08 NOTE — H&P ADULT - ASSESSMENT
69M with PMH CKD stage IV (baseline Cr 2.4), HTN, DM2, BPH with hx of urinary retention, chronic constipation, recent admission in April 2019 for hyponatremia and electrolyte disturbances thought to be 2/2 thiazide use, sent in today by Dr. Vasquez for Na 119 in office. Admitted to [facility] for further management. 69M with PMH CKD stage IV (baseline Cr 2.4), HTN, DM2, BPH with hx of urinary retention, chronic constipation, recent admission in April 2019 for hyponatremia and electrolyte disturbances thought to be 2/2 thiazide use, sent in today by Dr. Vasquez for Na 119 on 11/7 per outpatient labs . Admitted to [facility] for further management.

## 2019-11-08 NOTE — H&P ADULT - PROBLEM SELECTOR PLAN 2
Creatinine elevated at 3.25 from baseline of 2.4. Component of PRICILA on CKD   - f/u urine lytes including Urine urea, pt on Torsemide 10mg qd  - BMP q4  - start 70cc/hours NS  - hold ACE/ARBs  - avoid nephrotoxic agents  - renally dose medications Creatinine elevated at 3.25 from baseline of 2.4. Component of PRICILA on CKD   - f/u urine lytes including Urine urea, pt on Torsemide 10mg qd  - BMP q4  - start 70cc/hours NS  - hold ACE/ARBs and diuretics  - avoid nephrotoxic agents  - renally dose medications Creatinine elevated at 3.25 from baseline of 2.4. Component of PRICILA on CKD. Pt with hx of hyperkalemia per prior admission recs, was started on Torsemide since April 2019.  - f/u urine lytes including Urine urea, pt on Torsemide 10mg qd  - BMP q4  - start 70cc/hours NS  - hold ACE/ARBs and diuretics  - avoid nephrotoxic agents  - renally dose medications Hx of CKD IV. Creatinine elevated at 3.25 from baseline of 2.4. Component of PRICILA on CKD. Pt with hx of hyperkalemia per prior admission recs, was started on Torsemide since April 2019.  - f/u urine lytes including Urine urea, pt on Torsemide 10mg qd  - BMP q4  - start 70cc/hours NS  - hold ACE/ARBs and diuretics  - avoid nephrotoxic agents  - renally dose medications Hx of CKD IV. Creatinine elevated at 3.25 from baseline of 2.4. Component of PRICILA on CKD. Pt with hx of hyperkalemia per prior admission recs, was started on Torsemide since April 2019.  - f/u urine lytes including Urine urea, pt on Torsemide 10mg qd  - BMP q4  - start 70cc/hours NS, hold for Na>126 before morning   - hold ACE/ARBs and diuretics  - avoid nephrotoxic agents  - renally dose medications  - check PVR given history of BPH

## 2019-11-09 LAB
ALBUMIN SERPL ELPH-MCNC: 4 G/DL — SIGNIFICANT CHANGE UP (ref 3.3–5)
ALP SERPL-CCNC: 75 U/L — SIGNIFICANT CHANGE UP (ref 40–120)
ALT FLD-CCNC: 12 U/L — SIGNIFICANT CHANGE UP (ref 10–45)
ANION GAP SERPL CALC-SCNC: 11 MMOL/L — SIGNIFICANT CHANGE UP (ref 5–17)
ANION GAP SERPL CALC-SCNC: 12 MMOL/L — SIGNIFICANT CHANGE UP (ref 5–17)
ANION GAP SERPL CALC-SCNC: 13 MMOL/L — SIGNIFICANT CHANGE UP (ref 5–17)
ANION GAP SERPL CALC-SCNC: 7 MMOL/L — SIGNIFICANT CHANGE UP (ref 5–17)
AST SERPL-CCNC: 17 U/L — SIGNIFICANT CHANGE UP (ref 10–40)
BASOPHILS # BLD AUTO: 0.02 K/UL — SIGNIFICANT CHANGE UP (ref 0–0.2)
BASOPHILS NFR BLD AUTO: 0.3 % — SIGNIFICANT CHANGE UP (ref 0–2)
BILIRUB SERPL-MCNC: 0.7 MG/DL — SIGNIFICANT CHANGE UP (ref 0.2–1.2)
BUN SERPL-MCNC: 28 MG/DL — HIGH (ref 7–23)
BUN SERPL-MCNC: 29 MG/DL — HIGH (ref 7–23)
BUN SERPL-MCNC: 30 MG/DL — HIGH (ref 7–23)
CALCIUM SERPL-MCNC: 8.7 MG/DL — SIGNIFICANT CHANGE UP (ref 8.4–10.5)
CALCIUM SERPL-MCNC: 9.2 MG/DL — SIGNIFICANT CHANGE UP (ref 8.4–10.5)
CALCIUM SERPL-MCNC: 9.2 MG/DL — SIGNIFICANT CHANGE UP (ref 8.4–10.5)
CALCIUM SERPL-MCNC: 9.3 MG/DL — SIGNIFICANT CHANGE UP (ref 8.4–10.5)
CALCIUM SERPL-MCNC: 9.3 MG/DL — SIGNIFICANT CHANGE UP (ref 8.4–10.5)
CHLORIDE SERPL-SCNC: 89 MMOL/L — LOW (ref 96–108)
CHLORIDE SERPL-SCNC: 90 MMOL/L — LOW (ref 96–108)
CHLORIDE SERPL-SCNC: 90 MMOL/L — LOW (ref 96–108)
CHLORIDE SERPL-SCNC: 91 MMOL/L — LOW (ref 96–108)
CHLORIDE SERPL-SCNC: 92 MMOL/L — LOW (ref 96–108)
CO2 SERPL-SCNC: 24 MMOL/L — SIGNIFICANT CHANGE UP (ref 22–31)
CO2 SERPL-SCNC: 25 MMOL/L — SIGNIFICANT CHANGE UP (ref 22–31)
CO2 SERPL-SCNC: 26 MMOL/L — SIGNIFICANT CHANGE UP (ref 22–31)
CREAT SERPL-MCNC: 3.22 MG/DL — HIGH (ref 0.5–1.3)
CREAT SERPL-MCNC: 3.24 MG/DL — HIGH (ref 0.5–1.3)
CREAT SERPL-MCNC: 3.25 MG/DL — HIGH (ref 0.5–1.3)
CREAT SERPL-MCNC: 3.33 MG/DL — HIGH (ref 0.5–1.3)
CREAT SERPL-MCNC: 3.38 MG/DL — HIGH (ref 0.5–1.3)
EOSINOPHIL # BLD AUTO: 0.23 K/UL — SIGNIFICANT CHANGE UP (ref 0–0.5)
EOSINOPHIL NFR BLD AUTO: 3 % — SIGNIFICANT CHANGE UP (ref 0–6)
FERRITIN SERPL-MCNC: 50 NG/ML — SIGNIFICANT CHANGE UP (ref 30–400)
GLUCOSE SERPL-MCNC: 169 MG/DL — HIGH (ref 70–99)
GLUCOSE SERPL-MCNC: 195 MG/DL — HIGH (ref 70–99)
GLUCOSE SERPL-MCNC: 286 MG/DL — HIGH (ref 70–99)
GLUCOSE SERPL-MCNC: 331 MG/DL — HIGH (ref 70–99)
GLUCOSE SERPL-MCNC: 84 MG/DL — SIGNIFICANT CHANGE UP (ref 70–99)
HBA1C BLD-MCNC: 7.8 % — HIGH (ref 4–5.6)
HCT VFR BLD CALC: 34.2 % — LOW (ref 39–50)
HGB BLD-MCNC: 11.7 G/DL — LOW (ref 13–17)
IMM GRANULOCYTES NFR BLD AUTO: 0.3 % — SIGNIFICANT CHANGE UP (ref 0–1.5)
IRON SATN MFR SERPL: 17 % — SIGNIFICANT CHANGE UP (ref 16–55)
IRON SATN MFR SERPL: 51 UG/DL — SIGNIFICANT CHANGE UP (ref 45–165)
LYMPHOCYTES # BLD AUTO: 2.33 K/UL — SIGNIFICANT CHANGE UP (ref 1–3.3)
LYMPHOCYTES # BLD AUTO: 30.1 % — SIGNIFICANT CHANGE UP (ref 13–44)
MAGNESIUM SERPL-MCNC: 2.2 MG/DL — SIGNIFICANT CHANGE UP (ref 1.6–2.6)
MCHC RBC-ENTMCNC: 27.9 PG — SIGNIFICANT CHANGE UP (ref 27–34)
MCHC RBC-ENTMCNC: 34.2 GM/DL — SIGNIFICANT CHANGE UP (ref 32–36)
MCV RBC AUTO: 81.4 FL — SIGNIFICANT CHANGE UP (ref 80–100)
MONOCYTES # BLD AUTO: 0.83 K/UL — SIGNIFICANT CHANGE UP (ref 0–0.9)
MONOCYTES NFR BLD AUTO: 10.7 % — SIGNIFICANT CHANGE UP (ref 2–14)
NEUTROPHILS # BLD AUTO: 4.32 K/UL — SIGNIFICANT CHANGE UP (ref 1.8–7.4)
NEUTROPHILS NFR BLD AUTO: 55.6 % — SIGNIFICANT CHANGE UP (ref 43–77)
NRBC # BLD: 0 /100 WBCS — SIGNIFICANT CHANGE UP (ref 0–0)
PHOSPHATE SERPL-MCNC: 4.2 MG/DL — SIGNIFICANT CHANGE UP (ref 2.5–4.5)
PLATELET # BLD AUTO: 288 K/UL — SIGNIFICANT CHANGE UP (ref 150–400)
POTASSIUM SERPL-MCNC: 4 MMOL/L — SIGNIFICANT CHANGE UP (ref 3.5–5.3)
POTASSIUM SERPL-MCNC: 4.4 MMOL/L — SIGNIFICANT CHANGE UP (ref 3.5–5.3)
POTASSIUM SERPL-MCNC: 4.5 MMOL/L — SIGNIFICANT CHANGE UP (ref 3.5–5.3)
POTASSIUM SERPL-MCNC: 4.5 MMOL/L — SIGNIFICANT CHANGE UP (ref 3.5–5.3)
POTASSIUM SERPL-MCNC: 4.8 MMOL/L — SIGNIFICANT CHANGE UP (ref 3.5–5.3)
POTASSIUM SERPL-SCNC: 4 MMOL/L — SIGNIFICANT CHANGE UP (ref 3.5–5.3)
POTASSIUM SERPL-SCNC: 4.4 MMOL/L — SIGNIFICANT CHANGE UP (ref 3.5–5.3)
POTASSIUM SERPL-SCNC: 4.5 MMOL/L — SIGNIFICANT CHANGE UP (ref 3.5–5.3)
POTASSIUM SERPL-SCNC: 4.5 MMOL/L — SIGNIFICANT CHANGE UP (ref 3.5–5.3)
POTASSIUM SERPL-SCNC: 4.8 MMOL/L — SIGNIFICANT CHANGE UP (ref 3.5–5.3)
PROT SERPL-MCNC: 7.3 G/DL — SIGNIFICANT CHANGE UP (ref 6–8.3)
RBC # BLD: 4.2 M/UL — SIGNIFICANT CHANGE UP (ref 4.2–5.8)
RBC # FLD: 11.8 % — SIGNIFICANT CHANGE UP (ref 10.3–14.5)
SODIUM SERPL-SCNC: 124 MMOL/L — LOW (ref 135–145)
SODIUM SERPL-SCNC: 126 MMOL/L — LOW (ref 135–145)
SODIUM SERPL-SCNC: 126 MMOL/L — LOW (ref 135–145)
SODIUM SERPL-SCNC: 127 MMOL/L — LOW (ref 135–145)
SODIUM SERPL-SCNC: 129 MMOL/L — LOW (ref 135–145)
SODIUM SERPL-SCNC: 129 MMOL/L — LOW (ref 135–145)
TIBC SERPL-MCNC: 293 UG/DL — SIGNIFICANT CHANGE UP (ref 220–430)
TRANSFERRIN SERPL-MCNC: 252 MG/DL — SIGNIFICANT CHANGE UP (ref 200–360)
TSH SERPL-MCNC: 3.51 UIU/ML — SIGNIFICANT CHANGE UP (ref 0.35–4.94)
UIBC SERPL-MCNC: 242 UG/DL — SIGNIFICANT CHANGE UP (ref 110–370)
WBC # BLD: 7.75 K/UL — SIGNIFICANT CHANGE UP (ref 3.8–10.5)
WBC # FLD AUTO: 7.75 K/UL — SIGNIFICANT CHANGE UP (ref 3.8–10.5)

## 2019-11-09 PROCEDURE — 99233 SBSQ HOSP IP/OBS HIGH 50: CPT | Mod: GC

## 2019-11-09 RX ORDER — SODIUM CHLORIDE 9 MG/ML
250 INJECTION, SOLUTION INTRAVENOUS
Refills: 0 | Status: DISCONTINUED | OUTPATIENT
Start: 2019-11-09 | End: 2019-11-09

## 2019-11-09 RX ADMIN — INFLUENZA VIRUS VACCINE 0.5 MILLILITER(S): 15; 15; 15; 15 SUSPENSION INTRAMUSCULAR at 15:32

## 2019-11-09 RX ADMIN — SODIUM CHLORIDE 500 MILLILITER(S): 9 INJECTION, SOLUTION INTRAVENOUS at 09:48

## 2019-11-09 RX ADMIN — PANTOPRAZOLE SODIUM 40 MILLIGRAM(S): 20 TABLET, DELAYED RELEASE ORAL at 06:29

## 2019-11-09 RX ADMIN — SODIUM CHLORIDE 500 MILLILITER(S): 9 INJECTION, SOLUTION INTRAVENOUS at 06:34

## 2019-11-09 RX ADMIN — ATORVASTATIN CALCIUM 20 MILLIGRAM(S): 80 TABLET, FILM COATED ORAL at 23:16

## 2019-11-09 RX ADMIN — HEPARIN SODIUM 5000 UNIT(S): 5000 INJECTION INTRAVENOUS; SUBCUTANEOUS at 06:29

## 2019-11-09 RX ADMIN — Medication 4: at 12:54

## 2019-11-09 RX ADMIN — HEPARIN SODIUM 5000 UNIT(S): 5000 INJECTION INTRAVENOUS; SUBCUTANEOUS at 17:43

## 2019-11-09 NOTE — PROGRESS NOTE ADULT - PROBLEM SELECTOR PLAN 9
1) PCP Contacted on Admission: (Y/N) --> Name & Phone #: PCP Dr. Mook Buchanan   3) PCP Contacted at Discharge: TBD  4) Summary of Handoff Given to PCP: TBD   5) Post-Discharge Appointment Date: TBD

## 2019-11-09 NOTE — PROGRESS NOTE ADULT - PROBLEM SELECTOR PLAN 2
PRICILA and CKD IV POA, BUN/Cr this am 29/3.38, possible worsened overnight 2/2 to holding fluids  - need FeUrea 2/2 to pt on Torsemide 10mg qd  - BMP q4  - held fluids overnight due to increased sodium   - hold ACE/ARBs and diuretics  - avoid nephrotoxic agents  - renally dose medications  - check PVR given history of BPH

## 2019-11-09 NOTE — PROGRESS NOTE ADULT - ASSESSMENT
69M with PMH CKD stage IV (baseline Cr 2.4), HTN, DM2, BPH with hx of urinary retention, chronic constipation, recent admission in April 2019 for hyponatremia and electrolyte disturbances thought to be 2/2 thiazide use, sent in today by Dr. Vasquez for Na 119 on 11/7 per outpatient labs . Admitted to [facility] for further management.

## 2019-11-09 NOTE — PROGRESS NOTE ADULT - PROBLEM SELECTOR PLAN 4
Diabetes type II  On home Lantus 24units and Tradjenta 5mg qd per pharmacy recs  -Hgb A1c-  -c/w moderate insulin sliding scale

## 2019-11-09 NOTE — PROGRESS NOTE ADULT - PROBLEM SELECTOR PLAN 5
Pt with Hgb at baseline between 11-12. Most likely anemia of chronic disease in the setting of DM Type 2 and HTN.   -Hgb 11.6 with MCV 79.7  -f/u iron panel in the AM   -reportedly pt had a colonoscopy in October 2019 and it was normal. Obtain collateral information from his PCP Dr. Mook Mccann  -transfuse for Hgb <7  -continue to monitor CBC

## 2019-11-09 NOTE — PROGRESS NOTE ADULT - PROBLEM SELECTOR PLAN 1
Euvolemic hypo-osmolar hyponatremia, currently with no neuro deficits   - overnight Na at 127, exceeding correction of 6-8 Meq in 24 hours, fluids held, am sodium at 129 given 250cc d5w- repeat BMP at noon  -c/w BMP q4  -f/u urine lytes q4  -hold diuretics  -nephrology following, appreciate recs  -f/u TSH

## 2019-11-09 NOTE — PROGRESS NOTE ADULT - SUBJECTIVE AND OBJECTIVE BOX
Nephrology following for asymptomatic hyponatremia  Na 120 on admission, up to 129 at 7 am   NS at 70 cc/hr was stopped at 3 am  Cr stable at 3.3        Meds:  atorvastatin 20 at bedtime  dextrose 40% Gel 15 once PRN  dextrose 5%. 1000 <Continuous>  dextrose 5%. 250 <Continuous>  dextrose 50% Injectable 12.5 once  dextrose 50% Injectable 25 once  dextrose 50% Injectable 25 once  glucagon  Injectable 1 once PRN  heparin  Injectable 5000 every 12 hours  influenza   Vaccine 0.5 once  insulin lispro (HumaLOG) corrective regimen sliding scale  Before meals and at bedtime  pantoprazole    Tablet 40 before breakfast      T(C): , Max: 36.9 (19 @ 19:20)  T(F): , Max: 98.4 (19 @ 19:20)  HR: 87 (19 @ 05:48)  BP: 127/77 (19 @ 05:48)  BP(mean): --  RR: 18 (19 @ 05:48)  SpO2: 100% (19 @ 05:48)  Wt(kg): --    Height (cm): 170.18 ( 13:33)  Weight (kg): 70.3 (:33)  BMI (kg/m2): 24.3 (:33)  BSA (m2): 1.81 (:33)    Review of Systems:  CONSTITUTIONAL: No fever or chills  RESPIRATORY: No shortness of breath, cough  CARDIOVASCULAR: No chest pain or shortness of breath  GASTROINTESTINAL: No abdominal or flank pain, No nausea or vomiting, No diarrhea  GENITOURINARY: No dysuria or urinary burning, No hematuria  NEUROLOGICAL: No headaches or blurred vision  SKIN: No skin rashes   MUSCULOSKELETAL: No leg edema, No muscle pain      PHYSICAL EXAM:  GENERAL: alert, no acute distress at present  NECK: supple, No JVD  CHEST/LUNG: Clear to auscultation bilaterally  HEART: normal S1S2, RRR  ABDOMEN: Soft, Nontender, +BS, No flank tenderness bilateral  EXTREMITIES: No clubbing, cyanosis, or edema   NEUROLOGY: AAO x3, no focal neurological deficit        LABS:                        11.7   7.75  )-----------( 288      ( 2019 07:09 )             34.2     11-09    129<L>  |  91<L>  |  29<H>  ----------------------------<  169<H>  4.8   |  26  |  3.38<H>    Ca    9.3      2019 07:09  Phos  4.2       Mg     2.2         TPro  7.3  /  Alb  4.0  /  TBili  0.7  /  DBili  x   /  AST  17  /  ALT  12  /  AlkPhos  75      Hemoglobin A1C, Whole Blood: 7.8 % <H> [4.0 - 5.6] ( @ 07:09)  Osmolality, Serum: 267 mosmol/kg <L> [280 - 301] ( @ 13:59)      Urinalysis Basic - ( 2019 15:21 )    Color: Yellow / Appearance: Clear / S.010 / pH: x  Gluc: x / Ketone: NEGATIVE  / Bili: Negative / Urobili: 0.2 E.U./dL   Blood: x / Protein: Trace mg/dL / Nitrite: NEGATIVE   Leuk Esterase: NEGATIVE / RBC: < 5 /HPF / WBC < 5 /HPF   Sq Epi: x / Non Sq Epi: x / Bacteria: x      Creatinine, Random Urine: 22 mg/dL ( @ 02:56)  Sodium, Random Urine: 49 mmol/L ( @ 02:56)  Osmolality, Random Urine: 163 mosmol/kg ( @ 02:56)  Creatinine, Random Urine: 43 mg/dL ( @ 15:21)  Sodium, Random Urine: 38 mmol/L ( @ 15:21)  Osmolality, Random Urine: 187 mosmol/kg ( @ 15:21)

## 2019-11-09 NOTE — PROGRESS NOTE ADULT - SUBJECTIVE AND OBJECTIVE BOX
OVERNIGHT EVENTS: No acute events overnight-Patient with overnight sodium at 127, fluids discontinued given that sodium correcting too quickly, given 250cc bolus of d5w this am.     SUBJECTIVE / INTERVAL HPI: Patient seen and examined at bedside. No new acute complaints denies f/c/n/v, CP, sob, weakness. Tolerating PO ok, with last bowel movement yesterday.      Review of systems negative except as noted above.     VITAL SIGNS:  Vital Signs Last 24 Hrs  T(C): 36.8 (2019 05:48), Max: 36.9 (2019 19:20)  T(F): 98.3 (2019 05:48), Max: 98.4 (2019 19:20)  HR: 87 (2019 05:48) (87 - 99)  BP: 127/77 (2019 05:48) (127/77 - 157/66)  BP(mean): --  RR: 18 (2019 05:48) (16 - 18)  SpO2: 100% (2019 05:48) (98% - 100%)      PHYSICAL EXAM:    General: WDWN, resting comfortably on bed at time of exam in NAD  Neck: -JVD  Cardiovascular: +S1/S2; RRR  Respiratory: CTA B/L; no W/R/R  Gastrointestinal: NTND BS+4  Extremities: WWP; No edema  Vascular: 2+ radial, DP pulses B/L  Neurological: AAOx3; no focal deficits strength 5/5 bilaterally and symmetrically in both upper and lower extremities.     MEDICATIONS:  MEDICATIONS  (STANDING):  atorvastatin 20 milliGRAM(s) Oral at bedtime  dextrose 5%. 1000 milliLiter(s) (50 mL/Hr) IV Continuous <Continuous>  dextrose 5%. 250 milliLiter(s) (500 mL/Hr) IV Continuous <Continuous>  dextrose 50% Injectable 12.5 Gram(s) IV Push once  dextrose 50% Injectable 25 Gram(s) IV Push once  dextrose 50% Injectable 25 Gram(s) IV Push once  heparin  Injectable 5000 Unit(s) SubCutaneous every 12 hours  influenza   Vaccine 0.5 milliLiter(s) IntraMuscular once  insulin lispro (HumaLOG) corrective regimen sliding scale   SubCutaneous Before meals and at bedtime  pantoprazole    Tablet 40 milliGRAM(s) Oral before breakfast    MEDICATIONS  (PRN):  dextrose 40% Gel 15 Gram(s) Oral once PRN Blood Glucose LESS THAN 70 milliGRAM(s)/deciliter  glucagon  Injectable 1 milliGRAM(s) IntraMuscular once PRN Glucose LESS THAN 70 milligrams/deciliter      ALLERGIES:  Allergies    No Known Allergies    Intolerances        LABS:                        11.7   7.75  )-----------( 288      ( 2019 07:09 )             34.2     11    127<L>  |  89<L>  |  30<H>  ----------------------------<  84  4.0   |  25  |  3.33<H>    Ca    9.2      2019 02:50  Phos  4.1       Mg     2.0         TPro  7.4  /  Alb  4.0  /  TBili  0.6  /  DBili  x   /  AST  23  /  ALT  14  /  AlkPhos  83        Urinalysis Basic - ( 2019 15:21 )    Color: Yellow / Appearance: Clear / S.010 / pH: x  Gluc: x / Ketone: NEGATIVE  / Bili: Negative / Urobili: 0.2 E.U./dL   Blood: x / Protein: Trace mg/dL / Nitrite: NEGATIVE   Leuk Esterase: NEGATIVE / RBC: < 5 /HPF / WBC < 5 /HPF   Sq Epi: x / Non Sq Epi: x / Bacteria: x      CAPILLARY BLOOD GLUCOSE      POCT Blood Glucose.: 171 mg/dL (2019 22:21)          RADIOLOGY & ADDITIONAL TESTS: Reviewed.

## 2019-11-09 NOTE — PROGRESS NOTE ADULT - PROBLEM SELECTOR PLAN 8
F: none  E: replete electrolytes K< 4, Mg <2  N: DASH/TLC diabetic diet  DVT PPx: Hep SubQ  Code status: Full Code  Dispo: [facility]

## 2019-11-10 ENCOUNTER — TRANSCRIPTION ENCOUNTER (OUTPATIENT)
Age: 69
End: 2019-11-10

## 2019-11-10 VITALS
RESPIRATION RATE: 18 BRPM | OXYGEN SATURATION: 100 % | HEART RATE: 75 BPM | TEMPERATURE: 99 F | SYSTOLIC BLOOD PRESSURE: 127 MMHG | DIASTOLIC BLOOD PRESSURE: 69 MMHG

## 2019-11-10 LAB
ANION GAP SERPL CALC-SCNC: 11 MMOL/L — SIGNIFICANT CHANGE UP (ref 5–17)
BUN SERPL-MCNC: 27 MG/DL — HIGH (ref 7–23)
CALCIUM SERPL-MCNC: 9.6 MG/DL — SIGNIFICANT CHANGE UP (ref 8.4–10.5)
CHLORIDE SERPL-SCNC: 93 MMOL/L — LOW (ref 96–108)
CO2 SERPL-SCNC: 25 MMOL/L — SIGNIFICANT CHANGE UP (ref 22–31)
CREAT SERPL-MCNC: 3.3 MG/DL — HIGH (ref 0.5–1.3)
GLUCOSE SERPL-MCNC: 133 MG/DL — HIGH (ref 70–99)
HCT VFR BLD CALC: 33.3 % — LOW (ref 39–50)
HGB BLD-MCNC: 11.1 G/DL — LOW (ref 13–17)
MCHC RBC-ENTMCNC: 27.7 PG — SIGNIFICANT CHANGE UP (ref 27–34)
MCHC RBC-ENTMCNC: 33.3 GM/DL — SIGNIFICANT CHANGE UP (ref 32–36)
MCV RBC AUTO: 83 FL — SIGNIFICANT CHANGE UP (ref 80–100)
NRBC # BLD: 0 /100 WBCS — SIGNIFICANT CHANGE UP (ref 0–0)
PLATELET # BLD AUTO: 280 K/UL — SIGNIFICANT CHANGE UP (ref 150–400)
POTASSIUM SERPL-MCNC: 4.3 MMOL/L — SIGNIFICANT CHANGE UP (ref 3.5–5.3)
POTASSIUM SERPL-SCNC: 4.3 MMOL/L — SIGNIFICANT CHANGE UP (ref 3.5–5.3)
RBC # BLD: 4.01 M/UL — LOW (ref 4.2–5.8)
RBC # FLD: 11.9 % — SIGNIFICANT CHANGE UP (ref 10.3–14.5)
SODIUM SERPL-SCNC: 129 MMOL/L — LOW (ref 135–145)
WBC # BLD: 6.91 K/UL — SIGNIFICANT CHANGE UP (ref 3.8–10.5)
WBC # FLD AUTO: 6.91 K/UL — SIGNIFICANT CHANGE UP (ref 3.8–10.5)

## 2019-11-10 PROCEDURE — 90686 IIV4 VACC NO PRSV 0.5 ML IM: CPT

## 2019-11-10 PROCEDURE — 84100 ASSAY OF PHOSPHORUS: CPT

## 2019-11-10 PROCEDURE — 84443 ASSAY THYROID STIM HORMONE: CPT

## 2019-11-10 PROCEDURE — 82962 GLUCOSE BLOOD TEST: CPT

## 2019-11-10 PROCEDURE — 83550 IRON BINDING TEST: CPT

## 2019-11-10 PROCEDURE — 83935 ASSAY OF URINE OSMOLALITY: CPT

## 2019-11-10 PROCEDURE — 84466 ASSAY OF TRANSFERRIN: CPT

## 2019-11-10 PROCEDURE — 83930 ASSAY OF BLOOD OSMOLALITY: CPT

## 2019-11-10 PROCEDURE — 36415 COLL VENOUS BLD VENIPUNCTURE: CPT

## 2019-11-10 PROCEDURE — 85027 COMPLETE CBC AUTOMATED: CPT

## 2019-11-10 PROCEDURE — 82728 ASSAY OF FERRITIN: CPT

## 2019-11-10 PROCEDURE — 99239 HOSP IP/OBS DSCHRG MGMT >30: CPT

## 2019-11-10 PROCEDURE — 83036 HEMOGLOBIN GLYCOSYLATED A1C: CPT

## 2019-11-10 PROCEDURE — 85025 COMPLETE CBC W/AUTO DIFF WBC: CPT

## 2019-11-10 PROCEDURE — 84300 ASSAY OF URINE SODIUM: CPT

## 2019-11-10 PROCEDURE — 84540 ASSAY OF URINE/UREA-N: CPT

## 2019-11-10 PROCEDURE — 80053 COMPREHEN METABOLIC PANEL: CPT

## 2019-11-10 PROCEDURE — 83735 ASSAY OF MAGNESIUM: CPT

## 2019-11-10 PROCEDURE — 83540 ASSAY OF IRON: CPT

## 2019-11-10 PROCEDURE — 82570 ASSAY OF URINE CREATININE: CPT

## 2019-11-10 PROCEDURE — 99285 EMERGENCY DEPT VISIT HI MDM: CPT

## 2019-11-10 PROCEDURE — 80048 BASIC METABOLIC PNL TOTAL CA: CPT

## 2019-11-10 PROCEDURE — 81001 URINALYSIS AUTO W/SCOPE: CPT

## 2019-11-10 RX ORDER — SODIUM CHLORIDE 9 MG/ML
1000 INJECTION INTRAMUSCULAR; INTRAVENOUS; SUBCUTANEOUS
Refills: 0 | Status: DISCONTINUED | OUTPATIENT
Start: 2019-11-10 | End: 2019-11-10

## 2019-11-10 RX ADMIN — PANTOPRAZOLE SODIUM 40 MILLIGRAM(S): 20 TABLET, DELAYED RELEASE ORAL at 06:18

## 2019-11-10 RX ADMIN — Medication 2: at 12:49

## 2019-11-10 RX ADMIN — HEPARIN SODIUM 5000 UNIT(S): 5000 INJECTION INTRAVENOUS; SUBCUTANEOUS at 06:18

## 2019-11-10 NOTE — DISCHARGE NOTE PROVIDER - NSDCCPCAREPLAN_GEN_ALL_CORE_FT
PRINCIPAL DISCHARGE DIAGNOSIS  Diagnosis: Hyponatremia  Assessment and Plan of Treatment: You were found to have abnormally low sodium levels. This was likely caused by the medication, Hydrochlorothiazide. Please refrain from taking this medication. You were seen and evaluated by the nephrology service. Please follow-up with your nephrologist (Dr. Ferreira) tomorrow 11/11/19 for follow-up of your sodium levels. Please continue with fluid restriction as discussed.      SECONDARY DISCHARGE DIAGNOSES  Diagnosis: PRICILA (acute kidney injury)  Assessment and Plan of Treatment: Your kidney function was noted to be acutely worsening. This was noted based on your bloodwork. This was likely caused by fluid depletion. Please continue to maintain adequate hydration status by drinking an adequate amount of water. Please also avoid your home medications torsemide and lisinopril as discussed until further evaluation with your nephrologist. PRINCIPAL DISCHARGE DIAGNOSIS  Diagnosis: Hyponatremia  Assessment and Plan of Treatment: You were found to have abnormally low sodium levels. This was likely caused by the medication, Hydrochlorothiazide. Please refrain from taking this medication. You were seen and evaluated by the nephrology service. Please follow-up with your nephrologist (Dr. Ferreira) tomorrow 11/11/19 for follow-up of your sodium levels.      SECONDARY DISCHARGE DIAGNOSES  Diagnosis: PRICILA (acute kidney injury)  Assessment and Plan of Treatment: Your kidney function was noted to be acutely worsening. This was noted based on your bloodwork. This was likely caused by fluid depletion. Please continue to maintain adequate hydration status by drinking an adequate amount of water. Please also avoid your home medications torsemide and lisinopril as discussed until further evaluation with your nephrologist.

## 2019-11-10 NOTE — DISCHARGE NOTE PROVIDER - NSDCFUSCHEDAPPT_GEN_ALL_CORE_FT
JAYCOB RUANO ; 11/11/2019 ; [facility] [address] [place] [address] JAYCOB RUANO ; 11/11/2019 ; [facility] [address] [place] [address] JAYCOB RUANO ; 11/11/2019 ; [facility] [address] [place] [address]

## 2019-11-10 NOTE — DISCHARGE NOTE PROVIDER - NSDCMRMEDTOKEN_GEN_ALL_CORE_FT
amLODIPine 5 mg oral tablet: 1 tab(s) orally once a day  atorvastatin 20 mg oral tablet: 1 tab(s) orally once a day  ergocalciferol:   insulin glargine: 15 unit(s) subcutaneous once a day (at bedtime)  polyethylene glycol 3350 oral powder for reconstitution: 17 gram(s) orally once a day  ramipril 2.5 mg oral capsule: 1 cap(s) orally once a day   torsemide 10 mg oral tablet: 1 tab(s) orally every 24 hours amLODIPine 5 mg oral tablet: 1 tab(s) orally once a day  atorvastatin 20 mg oral tablet: 1 tab(s) orally once a day  ergocalciferol:   insulin glargine: 15 unit(s) subcutaneous once a day (at bedtime)

## 2019-11-10 NOTE — DISCHARGE NOTE PROVIDER - CARE PROVIDER_API CALL
Tammy Vasquez)  Nephrology  [address]  [place], [place] [address]  Phone: [phone]  Fax: [phone]  Follow Up Time:

## 2019-11-10 NOTE — PROGRESS NOTE ADULT - ASSESSMENT
68 yo M with PMH CKD stage IV (follows w Dr. Vasquez, baseline Creatinine in the 2s), HTN, DM2, BPH with hx of urinary retention with euvolemic hyponatremia

## 2019-11-10 NOTE — DISCHARGE NOTE NURSING/CASE MANAGEMENT/SOCIAL WORK - PATIENT PORTAL LINK FT
You can access the FollowMyHealth Patient Portal offered by [facility] by registering at the following website: [email or web address]. By joining [facility]’s FollowMyHealth portal, you will also be able to view your health information using other applications (apps) compatible with our system.

## 2019-11-10 NOTE — DISCHARGE NOTE PROVIDER - HOSPITAL COURSE
69M PMH CKD stage IV (baseline Cr 2.4), HTN, DM2, BPH with hx of urinary retention, and recent admission in April 2019 for hyponatremia and electrolyte disturbances thought to be due to thiazide use, sent in today by Dr. Vasquez for Na 119 per outpatient labs in setting of patient accidentally taking thiazides again.        #Hyponatremia, asymptomatic: 69M PMH CKD stage IV (baseline Cr 2.4), HTN, DM2, BPH with hx of urinary retention, and recent admission in April 2019 for hyponatremia and electrolyte disturbances thought to be due to thiazide use, sent in today by Dr. Vasquez for Na 119 per outpatient labs in setting of patient accidentally taking thiazides again.        #Hyponatremia, asymptomatic: Na 120 on admission; in setting of accidental thiazide use. Patient remained without symptoms throughout hospital course, and demonstrated interval improvement of Na levels with initial fluids followed by fluid restriction. Patient was seen and evaluated by nephrology service, with plans for immediate follow-up on 11/11/19 in outpatient setting with Dr. Vasquez.         #PRICILA, likely prerenal: labs demonstrating interval improvement with renal diet, gentle hydration, avoidance of nephrotoxic agents/meds.         Home meds torsemide and lisinopril to be resumed pending further outpatient evaluation. 69M PMH CKD stage IV (baseline Cr 2.4), HTN, DM2, BPH with hx of urinary retention, and recent admission in April 2019 for hyponatremia and electrolyte disturbances thought to be due to thiazide use, sent in today by Dr. Vasquez for Na 119 per outpatient labs in setting of patient accidentally taking thiazides again.        #Hyponatremia, asymptomatic: Na 120 on admission; in setting of accidental thiazide use. Patient remained without symptoms throughout hospital course, and demonstrated interval improvement of Na levels with initial gentle hydration. Patient was seen and evaluated by nephrology service, with plans for immediate follow-up on 11/11/19 in outpatient setting with Dr. Vasquez.         #PRICILA, likely prerenal: labs demonstrating interval improvement with renal diet, gentle hydration, avoidance of nephrotoxic agents/meds.         Home meds torsemide and lisinopril to be resumed pending further outpatient evaluation.

## 2019-11-10 NOTE — PROGRESS NOTE ADULT - PROBLEM SELECTOR PLAN 1
Hyponatremia improved to 129 following NS and 250cc D5W yesterday morning  has not received any other treatment since yesterday morning and appears very comfortable and asymptomatic  okay for discharge with follow-up tomorrow with Dr. Vasquez (already scheduled)

## 2019-11-10 NOTE — PROGRESS NOTE ADULT - SUBJECTIVE AND OBJECTIVE BOX
CC: HYPONATREMIA; PRICILA (ACUTEKIDNEY INJURY)      INTERVAL HISTORY:  feels well  wants to go home  denies any symptoms of nausea/vomiting/headache      ROS: No chest pain, no sob, no abd pain. No n/v/d    PAST MEDICAL & SURGICAL HISTORY:  BPH (benign prostatic hyperplasia)  CKD (chronic kidney disease), stage IV  Hyperlipidemia  Hypertension  Diabetes  No significant past surgical history      PHYSICAL EXAM:  T(C): 37 (11-10-19 @ 09:12), Max: 37 (11-10-19 @ 09:12)  HR: 75 (11-10-19 @ 09:12)  BP: 127/69 (11-10-19 @ 09:12) (113/68 - 146/76)  RR: 18 (11-10-19 @ 09:12)  SpO2: 100% (11-10-19 @ 09:12)  Wt(kg): --  I&O's Summary    2019 07:01  -  10 Nov 2019 07:00  --------------------------------------------------------  IN: 250 mL / OUT: 0 mL / NET: 250 mL      Weight 70.3 ( @ 13:33)  General: AAO x 3,  NAD.  HEENT: moist mucous membranes, no pallor/cyanosis.  Neck: no JVD visible.  Cardiac: S1, S2. RRR. No murmurs   Respratory: CTA b/l, no access muscle use.   Abdomen: soft. nontender. nondistended  Skin: no rashes.  Extremities: no LE edema b/l  Access:       DATA:                        11.1<L>  6.91  )-----------( 280      ( 10 Nov 2019 07:18 )             33.3<L>    Ferritin, Serum: 50 ng/mL ( 07:09)   Iron Total, Serum: 51 ug/dL ( 07:09)     129<L>  |  93<L>  |  27<H>  ----------------------------<  133<H>  Ca:9.6   (10 Nov 2019 07:19)  4.3     |  25     |  3.30<H>      eGFR if Non : 18 <L>  eGFR if : 21 <L>    TPro  7.3    /  Alb  4.0    /  TBili  0.7    /  DBili  x      /  AST  17     /  ALT  12     /  AlkPhos  75     2019 07:09    Osmolality, Serum: 267 mosmol/kg <L> ( @ 13:59)      Urinalysis Basic - ( 2019 15:21 )  Color: Yellow / Appearance: Clear / S.010 / pH: x  Gluc: x / Ketone: NEGATIVE  / Bili: Negative / Urobili: 0.2 E.U./dL   Blood: x / Protein: Trace mg/dL<!> / Nitrite: NEGATIVE   Leuk Esterase: NEGATIVE / RBC: < 5 /HPF / WBC < 5 /HPF   Sq Epi: x / Non Sq Epi: x / Bacteria: x      Creatinine, Random Urine: 22 mg/dL ( @ 02:56)  Sodium, Random Urine: 49 mmol/L ( @ 02:56)  Osmolality, Random Urine: 163 mosmol/kg ( @ 02:56)  Creatinine, Random Urine: 43 mg/dL ( @ 15:21)  Sodium, Random Urine: 38 mmol/L ( @ 15:21)  Osmolality, Random Urine: 187 mosmol/kg ( @ 15:21)            MEDICATIONS  (STANDING):  atorvastatin 20 milliGRAM(s) Oral at bedtime  dextrose 5%. 1000 milliLiter(s) (50 mL/Hr) IV Continuous <Continuous>  dextrose 50% Injectable 12.5 Gram(s) IV Push once  dextrose 50% Injectable 25 Gram(s) IV Push once  dextrose 50% Injectable 25 Gram(s) IV Push once  heparin  Injectable 5000 Unit(s) SubCutaneous every 12 hours  insulin lispro (HumaLOG) corrective regimen sliding scale   SubCutaneous Before meals and at bedtime  pantoprazole    Tablet 40 milliGRAM(s) Oral before breakfast    MEDICATIONS  (PRN):  dextrose 40% Gel 15 Gram(s) Oral once PRN Blood Glucose LESS THAN 70 milliGRAM(s)/deciliter  glucagon  Injectable 1 milliGRAM(s) IntraMuscular once PRN Glucose LESS THAN 70 milligrams/deciliter

## 2019-11-11 ENCOUNTER — INBOUND DOCUMENT (OUTPATIENT)
Age: 69
End: 2019-11-11

## 2019-11-11 ENCOUNTER — APPOINTMENT (OUTPATIENT)
Dept: NEPHROLOGY | Facility: CLINIC | Age: 69
End: 2019-11-11
Payer: MEDICARE

## 2019-11-11 VITALS — HEART RATE: 92 BPM | RESPIRATION RATE: 16 BRPM | SYSTOLIC BLOOD PRESSURE: 128 MMHG | DIASTOLIC BLOOD PRESSURE: 65 MMHG

## 2019-11-11 PROBLEM — N18.4 CHRONIC KIDNEY DISEASE, STAGE 4 (SEVERE): Chronic | Status: ACTIVE | Noted: 2019-11-08

## 2019-11-11 PROBLEM — N40.0 BENIGN PROSTATIC HYPERPLASIA WITHOUT LOWER URINARY TRACT SYMPTOMS: Chronic | Status: ACTIVE | Noted: 2019-11-08

## 2019-11-11 PROCEDURE — 99215 OFFICE O/P EST HI 40 MIN: CPT

## 2019-11-13 DIAGNOSIS — N18.4 CHRONIC KIDNEY DISEASE, STAGE 4 (SEVERE): ICD-10-CM

## 2019-11-13 DIAGNOSIS — N40.0 BENIGN PROSTATIC HYPERPLASIA WITHOUT LOWER URINARY TRACT SYMPTOMS: ICD-10-CM

## 2019-11-13 DIAGNOSIS — N17.9 ACUTE KIDNEY FAILURE, UNSPECIFIED: ICD-10-CM

## 2019-11-13 DIAGNOSIS — E87.1 HYPO-OSMOLALITY AND HYPONATREMIA: ICD-10-CM

## 2019-11-13 DIAGNOSIS — E78.5 HYPERLIPIDEMIA, UNSPECIFIED: ICD-10-CM

## 2019-11-13 DIAGNOSIS — Z79.899 OTHER LONG TERM (CURRENT) DRUG THERAPY: ICD-10-CM

## 2019-11-13 DIAGNOSIS — D64.9 ANEMIA, UNSPECIFIED: ICD-10-CM

## 2019-11-13 DIAGNOSIS — Z79.4 LONG TERM (CURRENT) USE OF INSULIN: ICD-10-CM

## 2019-11-13 DIAGNOSIS — E11.22 TYPE 2 DIABETES MELLITUS WITH DIABETIC CHRONIC KIDNEY DISEASE: ICD-10-CM

## 2019-11-13 DIAGNOSIS — T50.2X1A POISONING BY CARBONIC-ANHYDRASE INHIBITORS, BENZOTHIADIAZIDES AND OTHER DIURETICS, ACCIDENTAL (UNINTENTIONAL), INITIAL ENCOUNTER: ICD-10-CM

## 2019-11-13 DIAGNOSIS — I12.9 HYPERTENSIVE CHRONIC KIDNEY DISEASE WITH STAGE 1 THROUGH STAGE 4 CHRONIC KIDNEY DISEASE, OR UNSPECIFIED CHRONIC KIDNEY DISEASE: ICD-10-CM

## 2019-11-17 ENCOUNTER — FORM ENCOUNTER (OUTPATIENT)
Age: 69
End: 2019-11-17

## 2019-11-18 ENCOUNTER — OUTPATIENT (OUTPATIENT)
Dept: OUTPATIENT SERVICES | Facility: HOSPITAL | Age: 69
LOS: 1 days | End: 2019-11-18
Payer: MEDICARE

## 2019-11-18 ENCOUNTER — APPOINTMENT (OUTPATIENT)
Dept: ULTRASOUND IMAGING | Facility: HOSPITAL | Age: 69
End: 2019-11-18

## 2019-11-18 PROCEDURE — 76770 US EXAM ABDO BACK WALL COMP: CPT

## 2019-11-18 PROCEDURE — 76770 US EXAM ABDO BACK WALL COMP: CPT | Mod: 26

## 2019-12-02 ENCOUNTER — APPOINTMENT (OUTPATIENT)
Dept: INTERNAL MEDICINE | Facility: CLINIC | Age: 69
End: 2019-12-02
Payer: MEDICARE

## 2019-12-02 VITALS
SYSTOLIC BLOOD PRESSURE: 125 MMHG | BODY MASS INDEX: 24.28 KG/M2 | WEIGHT: 155 LBS | OXYGEN SATURATION: 97 % | DIASTOLIC BLOOD PRESSURE: 71 MMHG | HEART RATE: 93 BPM | TEMPERATURE: 99.4 F

## 2019-12-02 LAB
24R-OH-CALCIDIOL SERPL-MCNC: 37.3 PG/ML
25(OH)D3 SERPL-MCNC: 45 NG/ML
ALBUMIN SERPL ELPH-MCNC: 4.2 G/DL
ANION GAP SERPL CALC-SCNC: 15 MMOL/L
APPEARANCE: CLEAR
BACTERIA: NEGATIVE
BASOPHILS # BLD AUTO: 0.02 K/UL
BASOPHILS NFR BLD AUTO: 0.3 %
BILIRUBIN URINE: NEGATIVE
BLOOD URINE: NORMAL
BUN SERPL-MCNC: 27 MG/DL
CALCIUM SERPL-MCNC: 9.3 MG/DL
CALCIUM SERPL-MCNC: 9.3 MG/DL
CHLORIDE SERPL-SCNC: 94 MMOL/L
CO2 SERPL-SCNC: 23 MMOL/L
COLOR: COLORLESS
CREAT SERPL-MCNC: 2.85 MG/DL
CREAT SPEC-SCNC: 30 MG/DL
CREAT SPEC-SCNC: 30 MG/DL
CREAT/PROT UR: 1.1 RATIO
EOSINOPHIL # BLD AUTO: 0.64 K/UL
EOSINOPHIL NFR BLD AUTO: 8.1 %
ESTIMATED AVERAGE GLUCOSE: 183 MG/DL
FERRITIN SERPL-MCNC: 36 NG/ML
GLUCOSE QUALITATIVE U: NORMAL
GLUCOSE SERPL-MCNC: 142 MG/DL
HBA1C MFR BLD HPLC: 8 %
HCT VFR BLD CALC: 34.7 %
HGB BLD-MCNC: 11.2 G/DL
HYALINE CASTS: 0 /LPF
IMM GRANULOCYTES NFR BLD AUTO: 0.3 %
IRON SATN MFR SERPL: 21 %
IRON SERPL-MCNC: 71 UG/DL
KETONES URINE: NEGATIVE
LEUKOCYTE ESTERASE URINE: ABNORMAL
LYMPHOCYTES # BLD AUTO: 3.42 K/UL
LYMPHOCYTES NFR BLD AUTO: 43.1 %
MAGNESIUM SERPL-MCNC: 2.1 MG/DL
MAN DIFF?: NORMAL
MCHC RBC-ENTMCNC: 28.2 PG
MCHC RBC-ENTMCNC: 32.3 GM/DL
MCV RBC AUTO: 87.4 FL
MICROALBUMIN 24H UR DL<=1MG/L-MCNC: 14.8 MG/DL
MICROALBUMIN/CREAT 24H UR-RTO: 501 MG/G
MICROSCOPIC-UA: NORMAL
MONOCYTES # BLD AUTO: 0.59 K/UL
MONOCYTES NFR BLD AUTO: 7.4 %
NEUTROPHILS # BLD AUTO: 3.24 K/UL
NEUTROPHILS NFR BLD AUTO: 40.8 %
NITRITE URINE: NEGATIVE
OSMOLALITY UR: 174 MOSM/KG
PARATHYROID HORMONE INTACT: 123 PG/ML
PH URINE: 6.5
PHOSPHATE SERPL-MCNC: 3.9 MG/DL
PLATELET # BLD AUTO: 292 K/UL
POTASSIUM SERPL-SCNC: 4.5 MMOL/L
PROT UR-MCNC: 31 MG/DL
PROTEIN URINE: ABNORMAL
RBC # BLD: 3.97 M/UL
RBC # FLD: 12.5 %
RED BLOOD CELLS URINE: 2 /HPF
SODIUM ?TM SUB UR QN: 42 MMOL/L
SODIUM SERPL-SCNC: 132 MMOL/L
SPECIFIC GRAVITY URINE: 1.01
SQUAMOUS EPITHELIAL CELLS: 1 /HPF
TIBC SERPL-MCNC: 340 UG/DL
UIBC SERPL-MCNC: 269 UG/DL
URATE SERPL-MCNC: 6.7 MG/DL
URATE UR-MCNC: 9.2 MG/DL
UROBILINOGEN URINE: NORMAL
WBC # FLD AUTO: 7.93 K/UL
WHITE BLOOD CELLS URINE: 2 /HPF

## 2019-12-02 PROCEDURE — 99214 OFFICE O/P EST MOD 30 MIN: CPT

## 2019-12-02 NOTE — HISTORY OF PRESENT ILLNESS
[FreeTextEntry1] : 68yo Citizen of the [place] M with HTN, BPH with urinary retention, long standing DMII + retinopathy here for f/u of CKD IV with non-nephrotic proteinuria as well as acute hyponatremia related to chlorthalidone:\par \par Feeling "great", went to [place] for cricket championships and ate off his diet for a while. \par No LE edema or SOB. Stable appetite and weight. Urinating w/o difficulty. Tolerating torsemide\par \par OTC: vitamin B12 started by PCP \par \par All other ROS negative

## 2019-12-02 NOTE — PHYSICAL EXAM
[General Appearance - Alert] : alert [General Appearance - Well Nourished] : well nourished [General Appearance - In No Acute Distress] : in no acute distress [Sclera] : the sclera and conjunctiva were normal [PERRL With Normal Accommodation] : pupils were equal in size, round, and reactive to light [Extraocular Movements] : extraocular movements were intact [Outer Ear] : the ears and nose were normal in appearance [Oropharynx] : the oropharynx was normal [Neck Appearance] : the appearance of the neck was normal [Jugular Venous Distention Increased] : there was no jugular-venous distention [Auscultation Breath Sounds / Voice Sounds] : lungs were clear to auscultation bilaterally [Heart Rate And Rhythm] : heart rate was normal and rhythm regular [Heart Sounds] : normal S1 and S2 [Heart Sounds Gallop] : no gallops [Murmurs] : no murmurs [Heart Sounds Pericardial Friction Rub] : no pericardial rub [Full Pulse] : the pedal pulses are present [Edema] : there was no peripheral edema [Abdomen Soft] : soft [Bowel Sounds] : normal bowel sounds [Abdomen Tenderness] : non-tender [No CVA Tenderness] : no ~M costovertebral angle tenderness [No Spinal Tenderness] : no spinal tenderness [Abnormal Walk] : normal gait [Involuntary Movements] : no involuntary movements were seen [Musculoskeletal - Swelling] : no joint swelling seen [Skin Color & Pigmentation] : normal skin color and pigmentation [] : no rash [No Focal Deficits] : no focal deficits [Impaired Insight] : insight and judgment were intact [Oriented To Time, Place, And Person] : oriented to person, place, and time [Affect] : the affect was normal [FreeTextEntry1] : no asterixis

## 2019-12-02 NOTE — ASSESSMENT
[FreeTextEntry1] : 70yo [place] M with HTN, BPH with urinary retention, long standing DMII + retinopathy here for f/u of CKD IV with non-nephrotic proteinuria as well as acute hyponatremia related to chlorthalidone:\par \par # PRICILA on CKD IV w hyperkalemia and non nephrotic proteinuria -  likely 2/2 diabetic nephropathy\par -  baseline 2.4-2.8 egfr low 20's however 4x checked in [facility] and outpt over last week have been 3.2-3.4 - decrease torsemide to q2 days in case he's being volume depleted, no edema and good BPs, recheck renal sono to r/o retention as his March sono did show mild 60cc urinary retention and he complains of LUTS. Of note, also started on omeprazole for GERD 4-6 weeks ago, no rash or peripheral eosinophilia, low suspicion for AIN however if Cr doesn't improve w above changes may consider holding it to see if renal fx improves to baseline. \par - hyponatremia improved 119-> 129 with holding chlorthalidone in hospital, he will not retake. Recheck Na/Cr in 2 weeks \par - hyperkalemia normalized on Kayexelate TIW, able to cont low dose ANDRE blockade\par - fair diabetic control  7.7%, + retinopathy, on lantus and tradjenta \par - serologic proteinuric GN w/u negative. \par - Renal sono, 10.5/10.7cm normal looking kidneys, mildly enlarged prostate with 170cc PVR\par On ergo weekly for secondary hyperparathyroidism \par \par # Anemia - Fe deficiency but can't tolerate Fe tablets. Mild and stable will cont to monitor. \par \par RTC in 3 months for f/u\par \par Addendum: Cr and Na back to baseline, 1g proteinuria, renal/bladder sono w no significant urinary retention

## 2019-12-12 ENCOUNTER — MEDICATION RENEWAL (OUTPATIENT)
Age: 69
End: 2019-12-12

## 2019-12-12 ENCOUNTER — RX RENEWAL (OUTPATIENT)
Age: 69
End: 2019-12-12

## 2019-12-13 ENCOUNTER — RX RENEWAL (OUTPATIENT)
Age: 69
End: 2019-12-13

## 2019-12-17 ENCOUNTER — MEDICATION RENEWAL (OUTPATIENT)
Age: 69
End: 2019-12-17

## 2020-01-31 NOTE — ED PROVIDER NOTE - HEME LYMPH, MLM
Disp Refills Start End MATTIE   levothyroxine (SYNTHROID/LEVOTHROID) 137 MCG tablet 90 tablet 3 4/10/2019  No   Sig: TAKE 1 TABLET EVERY DAY   Sent to pharmacy as: levothyroxine (SYNTHROID/LEVOTHROID) 137 MCG tablet   Class: E-Prescribe   Notes to Pharmacy: (pt will request refill when due)   Order: [id number]   E-Prescribing Status: Receipt confirmed by pharmacy (4/10/2019  8:13 AM CDT)      Disp Refills Start End MATTIE   atorvastatin (LIPITOR) 40 MG tablet 90 tablet 3 4/10/2019  No   Sig: TAKE 1 TABLET EVERY DAY   Sent to pharmacy as: atorvastatin (LIPITOR) 40 MG tablet   Class: E-Prescribe   Notes to Pharmacy: (pt will request refill when due)   Order: [id number]   E-Prescribing Status: Receipt confirmed by pharmacy (4/10/2019  8:13 AM CDT)      Disp Refills Start End MATTIE   lisinopril (PRINIVIL/ZESTRIL) 20 MG tablet 90 tablet 3 4/10/2019  No   Sig: TAKE 1 TABLET EVERY DAY   Sent to pharmacy as: lisinopril (PRINIVIL/ZESTRIL) 20 MG tablet   Class: E-Prescribe   Notes to Pharmacy: (pt will request refill when due)   Order: [id number]   E-Prescribing Status: Receipt confirmed by pharmacy (4/10/2019  8:13 AM CDT)     Rimma Ibrahim MA on 1/31/2020 at 2:37 PM    No adenopathy

## 2020-02-05 ENCOUNTER — APPOINTMENT (OUTPATIENT)
Dept: INTERNAL MEDICINE | Facility: CLINIC | Age: 70
End: 2020-02-05
Payer: MEDICARE

## 2020-02-05 VITALS
SYSTOLIC BLOOD PRESSURE: 135 MMHG | WEIGHT: 153 LBS | HEART RATE: 96 BPM | TEMPERATURE: 98.1 F | HEIGHT: 67 IN | BODY MASS INDEX: 24.01 KG/M2 | OXYGEN SATURATION: 100 % | DIASTOLIC BLOOD PRESSURE: 63 MMHG

## 2020-02-05 DIAGNOSIS — L98.9 DISORDER OF THE SKIN AND SUBCUTANEOUS TISSUE, UNSPECIFIED: ICD-10-CM

## 2020-02-05 DIAGNOSIS — E53.8 DEFICIENCY OF OTHER SPECIFIED B GROUP VITAMINS: ICD-10-CM

## 2020-02-05 PROCEDURE — 36415 COLL VENOUS BLD VENIPUNCTURE: CPT

## 2020-02-05 PROCEDURE — 99214 OFFICE O/P EST MOD 30 MIN: CPT | Mod: 25

## 2020-02-06 LAB
24R-OH-CALCIDIOL SERPL-MCNC: 37.9 PG/ML
25(OH)D3 SERPL-MCNC: 42.1 NG/ML
ALBUMIN SERPL ELPH-MCNC: 4.4 G/DL
ALP BLD-CCNC: 100 U/L
ALT SERPL-CCNC: 11 U/L
ANION GAP SERPL CALC-SCNC: 17 MMOL/L
APPEARANCE: CLEAR
AST SERPL-CCNC: 14 U/L
BACTERIA: NEGATIVE
BASOPHILS # BLD AUTO: 0.05 K/UL
BASOPHILS NFR BLD AUTO: 0.6 %
BILIRUB SERPL-MCNC: 0.4 MG/DL
BILIRUBIN URINE: NEGATIVE
BLOOD URINE: NEGATIVE
BUN SERPL-MCNC: 29 MG/DL
CALCIUM SERPL-MCNC: 9.3 MG/DL
CALCIUM SERPL-MCNC: 9.3 MG/DL
CHLORIDE SERPL-SCNC: 97 MMOL/L
CO2 SERPL-SCNC: 21 MMOL/L
COLOR: NORMAL
CREAT SERPL-MCNC: 3.19 MG/DL
CREAT SPEC-SCNC: 40 MG/DL
CREAT SPEC-SCNC: 40 MG/DL
CREAT/PROT UR: 1.1 RATIO
EOSINOPHIL # BLD AUTO: 0.4 K/UL
EOSINOPHIL NFR BLD AUTO: 4.8 %
ESTIMATED AVERAGE GLUCOSE: 197 MG/DL
FERRITIN SERPL-MCNC: 34 NG/ML
FRUCTOSAMINE SERPL-MCNC: 387 UMOL/L
GLUCOSE QUALITATIVE U: ABNORMAL
GLUCOSE SERPL-MCNC: 251 MG/DL
HBA1C MFR BLD HPLC: 8.5 %
HCT VFR BLD CALC: 33.9 %
HGB BLD-MCNC: 11.4 G/DL
HYALINE CASTS: 1 /LPF
IMM GRANULOCYTES NFR BLD AUTO: 0.2 %
IRON SATN MFR SERPL: 11 %
IRON SERPL-MCNC: 36 UG/DL
KETONES URINE: NEGATIVE
LEUKOCYTE ESTERASE URINE: NEGATIVE
LYMPHOCYTES # BLD AUTO: 2.41 K/UL
LYMPHOCYTES NFR BLD AUTO: 28.9 %
MAGNESIUM SERPL-MCNC: 2.3 MG/DL
MAN DIFF?: NORMAL
MCHC RBC-ENTMCNC: 28.9 PG
MCHC RBC-ENTMCNC: 33.6 GM/DL
MCV RBC AUTO: 86 FL
MICROALBUMIN 24H UR DL<=1MG/L-MCNC: 19.3 MG/DL
MICROALBUMIN/CREAT 24H UR-RTO: 487 MG/G
MICROSCOPIC-UA: NORMAL
MONOCYTES # BLD AUTO: 0.77 K/UL
MONOCYTES NFR BLD AUTO: 9.2 %
NEUTROPHILS # BLD AUTO: 4.69 K/UL
NEUTROPHILS NFR BLD AUTO: 56.3 %
NITRITE URINE: NEGATIVE
OSMOLALITY UR: 259 MOSM/KG
PARATHYROID HORMONE INTACT: 105 PG/ML
PH URINE: 6.5
PHOSPHATE SERPL-MCNC: 4 MG/DL
PLATELET # BLD AUTO: 286 K/UL
POTASSIUM SERPL-SCNC: 5.5 MMOL/L
PROT SERPL-MCNC: 7.5 G/DL
PROT UR-MCNC: 44 MG/DL
PROTEIN URINE: ABNORMAL
RBC # BLD: 3.94 M/UL
RBC # FLD: 13.1 %
RED BLOOD CELLS URINE: 0 /HPF
SODIUM SERPL-SCNC: 134 MMOL/L
SPECIFIC GRAVITY URINE: 1.01
SQUAMOUS EPITHELIAL CELLS: 0 /HPF
TIBC SERPL-MCNC: 336 UG/DL
UIBC SERPL-MCNC: 299 UG/DL
UROBILINOGEN URINE: NORMAL
VIT B12 SERPL-MCNC: 538 PG/ML
WBC # FLD AUTO: 8.34 K/UL
WHITE BLOOD CELLS URINE: 0 /HPF

## 2020-02-07 ENCOUNTER — RECORD ABSTRACTING (OUTPATIENT)
Age: 70
End: 2020-02-07

## 2020-02-11 ENCOUNTER — APPOINTMENT (OUTPATIENT)
Dept: NEPHROLOGY | Facility: CLINIC | Age: 70
End: 2020-02-11
Payer: MEDICARE

## 2020-02-11 VITALS — RESPIRATION RATE: 14 BRPM | DIASTOLIC BLOOD PRESSURE: 62 MMHG | SYSTOLIC BLOOD PRESSURE: 128 MMHG | HEART RATE: 85 BPM

## 2020-02-11 PROCEDURE — 99215 OFFICE O/P EST HI 40 MIN: CPT

## 2020-02-11 NOTE — HISTORY OF PRESENT ILLNESS
[FreeTextEntry1] : 71yo [place] M with h/o acute hyponatremia related to chlorthalidone, HTN, BPH with urinary retention, long standing uncontrolled  DMII + retinopathy here for f/u of CKD IV \par \par Feeling well. Good energy and appetite. \par Has 4 grandchildren, took two on a 10d trip to [facility] during [place] break. \par \par Feeling "great", went to [place] for cricket championships and ate off his diet for a while. \par No LE edema or SOB. Stable appetite and weight. Urinating w/o difficulty. Tolerating torsemide\par \par OTC: vitamin B12 started by PCP \par \par All other ROS negative

## 2020-02-11 NOTE — ASSESSMENT
[FreeTextEntry1] : 69yo Citizen of [place] M with h/o acute on chronic hyponatremia related to chlorthalidone, HTN, BPH with urinary retention, long standing uncontrolled  DMII + retinopathy here for f/u of CKD IV\par \par # CKD IV w hyperkalemia, mild chronic hyponatremia and non nephrotic proteinuria -  likely 2/2 diabetic nephropathy\par -  baseline 2.4-2.8 egfr low 20's however 4x checked in [facility] and outpt over last week have been 3.2-3.4 - decrease torsemide to q2 days in case he's being volume depleted, no edema and good BPs, recheck renal sono to r/o retention as his March sono did show mild 60cc urinary retention and he complains of LUTS. Of note, also started on omeprazole for GERD 4-6 weeks ago, no rash or peripheral eosinophilia, low suspicion for AIN however if Cr doesn't improve w above changes may consider holding it to see if renal fx improves to baseline. \par - hyponatremia 119 on chlorthalidone req hospitalization and stopping thiazide. 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 If not, to take daily kayexelate instead TIW so we can increase ramipril to 5mg daily to get PCR <1g \par - HTN well controlled. 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 \par - Renal sono, 10.5/10.7cm normal looking kidneys, mildly enlarged prostate with 170cc PVR\par - Secondary hyperparathyroidism - on vitamin D daily well controlled\par Discussed with patient the importance of both diabetic and hypertension control on delaying the progression of renal disease. Goal HbA1C <7%, exercise advised, low Na diet rich in fruits and vegetables, to avoid pre-packaged foods, eating out, juices/sodas/sweets. Advised to check BP regularly at home, record values and bring to future appointments. To call if BP consistently above goal 130/80 so we can adjust regimen in between appointments as needed.\par \par # Anemia - Fe deficiency, started ferrous gluconate, per pt he doesn't remember fe intolerance.\par \par RTC in 3 months for f/u\par

## 2020-02-22 ENCOUNTER — EMERGENCY (EMERGENCY)
Facility: HOSPITAL | Age: 70
LOS: 1 days | Discharge: HOME | End: 2020-02-22
Attending: EMERGENCY MEDICINE | Admitting: EMERGENCY MEDICINE
Payer: MEDICARE

## 2020-02-22 VITALS
HEIGHT: 67 IN | RESPIRATION RATE: 17 BRPM | WEIGHT: 154.98 LBS | HEART RATE: 107 BPM | DIASTOLIC BLOOD PRESSURE: 74 MMHG | TEMPERATURE: 98 F | OXYGEN SATURATION: 97 % | SYSTOLIC BLOOD PRESSURE: 169 MMHG

## 2020-02-22 VITALS
SYSTOLIC BLOOD PRESSURE: 148 MMHG | RESPIRATION RATE: 18 BRPM | OXYGEN SATURATION: 98 % | HEART RATE: 100 BPM | DIASTOLIC BLOOD PRESSURE: 74 MMHG | TEMPERATURE: 98 F

## 2020-02-22 LAB
ALBUMIN SERPL ELPH-MCNC: 3.9 G/DL — SIGNIFICANT CHANGE UP (ref 3.3–5)
ALP SERPL-CCNC: 86 U/L — SIGNIFICANT CHANGE UP (ref 40–120)
ALT FLD-CCNC: 11 U/L — SIGNIFICANT CHANGE UP (ref 10–45)
ANION GAP SERPL CALC-SCNC: 13 MMOL/L — SIGNIFICANT CHANGE UP (ref 5–17)
AST SERPL-CCNC: 14 U/L — SIGNIFICANT CHANGE UP (ref 10–40)
BASOPHILS # BLD AUTO: 0.03 K/UL — SIGNIFICANT CHANGE UP (ref 0–0.2)
BASOPHILS NFR BLD AUTO: 0.3 % — SIGNIFICANT CHANGE UP (ref 0–2)
BILIRUB SERPL-MCNC: 0.9 MG/DL — SIGNIFICANT CHANGE UP (ref 0.2–1.2)
BUN SERPL-MCNC: 24 MG/DL — HIGH (ref 7–23)
CALCIUM SERPL-MCNC: 9.1 MG/DL — SIGNIFICANT CHANGE UP (ref 8.4–10.5)
CHLORIDE SERPL-SCNC: 97 MMOL/L — SIGNIFICANT CHANGE UP (ref 96–108)
CO2 SERPL-SCNC: 25 MMOL/L — SIGNIFICANT CHANGE UP (ref 22–31)
CREAT SERPL-MCNC: 2.98 MG/DL — HIGH (ref 0.5–1.3)
EOSINOPHIL # BLD AUTO: 0.1 K/UL — SIGNIFICANT CHANGE UP (ref 0–0.5)
EOSINOPHIL NFR BLD AUTO: 1 % — SIGNIFICANT CHANGE UP (ref 0–6)
GLUCOSE SERPL-MCNC: 298 MG/DL — HIGH (ref 70–99)
HCT VFR BLD CALC: 31.5 % — LOW (ref 39–50)
HGB BLD-MCNC: 10.5 G/DL — LOW (ref 13–17)
IMM GRANULOCYTES NFR BLD AUTO: 0.4 % — SIGNIFICANT CHANGE UP (ref 0–1.5)
LYMPHOCYTES # BLD AUTO: 1.95 K/UL — SIGNIFICANT CHANGE UP (ref 1–3.3)
LYMPHOCYTES # BLD AUTO: 19.3 % — SIGNIFICANT CHANGE UP (ref 13–44)
MCHC RBC-ENTMCNC: 28 PG — SIGNIFICANT CHANGE UP (ref 27–34)
MCHC RBC-ENTMCNC: 33.3 GM/DL — SIGNIFICANT CHANGE UP (ref 32–36)
MCV RBC AUTO: 84 FL — SIGNIFICANT CHANGE UP (ref 80–100)
MONOCYTES # BLD AUTO: 1.01 K/UL — HIGH (ref 0–0.9)
MONOCYTES NFR BLD AUTO: 10 % — SIGNIFICANT CHANGE UP (ref 2–14)
NEUTROPHILS # BLD AUTO: 6.99 K/UL — SIGNIFICANT CHANGE UP (ref 1.8–7.4)
NEUTROPHILS NFR BLD AUTO: 69 % — SIGNIFICANT CHANGE UP (ref 43–77)
NRBC # BLD: 0 /100 WBCS — SIGNIFICANT CHANGE UP (ref 0–0)
PLATELET # BLD AUTO: 263 K/UL — SIGNIFICANT CHANGE UP (ref 150–400)
POTASSIUM SERPL-MCNC: 4.5 MMOL/L — SIGNIFICANT CHANGE UP (ref 3.5–5.3)
POTASSIUM SERPL-SCNC: 4.5 MMOL/L — SIGNIFICANT CHANGE UP (ref 3.5–5.3)
PROT SERPL-MCNC: 7.4 G/DL — SIGNIFICANT CHANGE UP (ref 6–8.3)
RBC # BLD: 3.75 M/UL — LOW (ref 4.2–5.8)
RBC # FLD: 13.1 % — SIGNIFICANT CHANGE UP (ref 10.3–14.5)
SODIUM SERPL-SCNC: 135 MMOL/L — SIGNIFICANT CHANGE UP (ref 135–145)
WBC # BLD: 10.12 K/UL — SIGNIFICANT CHANGE UP (ref 3.8–10.5)
WBC # FLD AUTO: 10.12 K/UL — SIGNIFICANT CHANGE UP (ref 3.8–10.5)

## 2020-02-22 PROCEDURE — 10060 I&D ABSCESS SIMPLE/SINGLE: CPT

## 2020-02-22 PROCEDURE — 80053 COMPREHEN METABOLIC PANEL: CPT

## 2020-02-22 PROCEDURE — 70486 CT MAXILLOFACIAL W/O DYE: CPT | Mod: 26

## 2020-02-22 PROCEDURE — 70486 CT MAXILLOFACIAL W/O DYE: CPT

## 2020-02-22 PROCEDURE — 99284 EMERGENCY DEPT VISIT MOD MDM: CPT | Mod: 25

## 2020-02-22 PROCEDURE — 36415 COLL VENOUS BLD VENIPUNCTURE: CPT

## 2020-02-22 PROCEDURE — 85025 COMPLETE CBC W/AUTO DIFF WBC: CPT

## 2020-02-22 NOTE — ED PROVIDER NOTE - PROVIDER TOKENS
PROVIDER:[TOKEN:[[id number]],FOLLOWUP:[4-6 Days]],PROVIDER:[TOKEN:[[id number]],FOLLOWUP:[1-3 Days]]

## 2020-02-22 NOTE — ED PROVIDER NOTE - CARE PROVIDER_API CALL
Lauren Rosen)  Otolaryngology  [address] [place] [address]  Phone: [phone]  Fax: [phone]  Follow Up Time: 4-6 Days    Adonay Hernandez (TOM)  OralMaxillofacial Surgery  [address] [place] [id number]  Phone: [phone]  Fax: [phone]  Follow Up Time: 1-3 Days

## 2020-02-22 NOTE — ED PROVIDER NOTE - PATIENT PORTAL LINK FT
You can access the FollowMyHealth Patient Portal offered by [facility] by registering at the following website: [email or web address]. By joining [facility]’s FollowMyHealth portal, you will also be able to view your health information using other applications (apps) compatible with our system.

## 2020-02-22 NOTE — ED PROVIDER NOTE - ATTENDING CONTRIBUTION TO CARE
Agree w/ PA note. VSS, +2cm abscess to posterior scalp, mouth with no tongue deviation or facial droop noted. + swelling to right maxillary region extending to right upper lip. + swelling to right upper gumline. Plan for CT to assess for deeper infection, if neg, plan for po abx and f/u both dentist and ENT. DC home in [facility] with strict return precautions given.

## 2020-02-22 NOTE — ED PROVIDER NOTE - OBJECTIVE STATEMENT
71 y/o female with hx of DM, HLD, HTN, CKD c/o abscess to posterior scalp x 1 month. Pt states abscess drained but then became larger. Pt notes swelling to right face as well past few days. Pt went to urgent care center today and told tongue deviated to right and sent to ED. pt notes right upper gumline swollen as well and slight pain. Pt notes similar right facial swelling in past and resolved on its own. no toothache or recent dental work. no ha or dizziness. no fever or chills. no numbness, tingling or weakness. no cp, sob, abd pain, n/v. no further complaints.

## 2020-02-22 NOTE — ED ADULT NURSE NOTE - OBJECTIVE STATEMENT
Patient AOX4 c/o abscess to posterior head and pain/swelling to R side of face. Reports abscess x 1.5 month. Denies fevers/chills. No tongue deviation noted. Speaking in full, complete sentences.

## 2020-02-22 NOTE — ED PROVIDER NOTE - CARE PROVIDERS DIRECT ADDRESSES
[email or web address] of [email or web address]

## 2020-02-22 NOTE — ED PROVIDER NOTE - NSFOLLOWUPINSTRUCTIONS_ED_ALL_ED_FT
Follow up in the Emergency Department in 2 days for a wound check. Follow up with then ENT physician and dentist in 3-4 days. Return immediately for worsening pain, swelling, fever or any other concerning symptoms.        Dental Abscess    WHAT YOU NEED TO KNOW:    A dental abscess is a collection of pus in or around a tooth. A dental abscess is caused by bacteria. The bacteria usually enter the tooth when the enamel (outer part of the tooth) is damaged by tooth decay. Bacteria may also enter the tooth through a break or chip in the tooth, or a cut in the gum. Food particles that are stuck between the teeth for a long time may also lead to an abscess.     DISCHARGE INSTRUCTIONS:    Return to the emergency department if:     You have severe pain.      You have trouble breathing because of pain or swelling.    Contact your healthcare provider if:     Your symptoms get worse, even after treatment.      Your mouth is bleeding.      You cannot eat or drink because of pain or swelling.      Your abscess returns.      You have an injury that causes a crack in your tooth.      You have questions or concerns about your condition or care.    Medicines: You may need any of the following:     Antibiotics help treat a bacterial infection.       NSAIDs, such as ibuprofen, help decrease swelling, pain, and fever. This medicine is available with or without a doctor's order. NSAIDs can cause stomach bleeding or kidney problems in certain people. If you take blood thinner medicine, always ask your healthcare provider if NSAIDs are safe for you. Always read the medicine label and follow directions.      Acetaminophen decreases pain and fever. It is available without a doctor's order. Ask how much to take and how often to take it. Follow directions. Read the labels of all other medicines you are using to see if they also contain acetaminophen, or ask your doctor or pharmacist. Acetaminophen can cause liver damage if not taken correctly. Do not use more than 4 grams (4,000 milligrams) total of acetaminophen in one day.       Prescription pain medicine may be given. Ask your healthcare provider how to take this medicine safely. Some prescription pain medicines contain acetaminophen. Do not take other medicines that contain acetaminophen without talking to your healthcare provider. Too much acetaminophen may cause liver damage. Prescription pain medicine may cause constipation. Ask your healthcare provider how to prevent or treat constipation.       Take your medicine as directed. Contact your healthcare provider if you think your medicine is not helping or if you have side effects. Tell him of her if you are allergic to any medicine. Keep a list of the medicines, vitamins, and herbs you take. Include the amounts, and when and why you take them. Bring the list or the pill bottles to follow-up visits. Carry your medicine list with you in case of an emergency.    Self-care:     Rinse your mouth every 2 hours with salt water. This will help keep the area clean.       Gently brush your teeth twice a day with a soft tooth brush. This will help keep the area clean.       Eat soft foods as directed. Soft foods may cause less pain. Examples include applesauce, yogurt, and cooked pasta. Ask your healthcare provider how long to follow this instruction.       Apply a warm compress to your tooth or gum. Use a cotton ball or gauze soaked in warm water. Remove the compress in 10 minutes or when it becomes cool. Repeat 3 times a day.     Prevent another abscess:     Brush your teeth at least 2 times a day with fluoride toothpaste.      Use dental floss to clean between your teeth at least once a day.      Rinse your mouth with water or mouthwash after meals and snacks.       Chew sugarless gum after meals and snacks.      Limit foods that are sticky and high in sugar such as raisons. Also limit drinks high in sugar, such as soda.       See your dentist every 6 months for dental cleanings and oral exams.    Follow up with your healthcare provider in 24 hours: Your healthcare provider will need to check your teeth and gums. Write down your questions so you remember to ask them during your visits.        © Copyright [employer] 2020       back to top                      © Copyright [employer] 2020

## 2020-02-22 NOTE — ED PROVIDER NOTE - CLINICAL SUMMARY MEDICAL DECISION MAKING FREE TEXT BOX
abscess to posterior scalp and right facial swelling. pt well appearing. suspect dental infection for facial swelling. labs noted. glucose 298. d/w pt and he will take meds and monitor at home. ct scan done and + lucency to pre molars and opacification of right maxillary sinus. I&d attempted to scalp abscess with blood only. no pus. will tx with augmentin. wound check in 2 days and f/u with ENT and dental this week. return precautions d/w pt.

## 2020-02-22 NOTE — ED ADULT NURSE NOTE - CHPI ED NUR SYMPTOMS NEG
no chills/no blood in mucus/no drainage/no purulent drainage/no redness/no vomiting/no fever/no bleeding at site/no rectal pain

## 2020-02-22 NOTE — ED PROVIDER NOTE - NEUROLOGICAL, MLM
Alert and oriented, no focal deficits, no motor or sensory deficits. CN II-XI intact b/l. no tongue deviation or facial droop noted. + swelling to right maxillary region extending to right upper lip. + swelling to right upper gumline. no abscess. non tender. no erythema.

## 2020-02-22 NOTE — ED ADULT TRIAGE NOTE - CHIEF COMPLAINT QUOTE
Patient complaining of abscess to posterior neck.  Per EMS, urgent referred him to ED for "tongue deviation to the right."  Patient noted to have right sided facial swelling and droop, states "it has been like that for four days."  Patient denies any N/V, fevers, CP, SOB or any other complaints at this time.

## 2020-02-24 ENCOUNTER — EMERGENCY (EMERGENCY)
Facility: HOSPITAL | Age: 70
LOS: 1 days | Discharge: HOME | End: 2020-02-24
Admitting: EMERGENCY MEDICINE
Payer: MEDICARE

## 2020-02-24 VITALS
SYSTOLIC BLOOD PRESSURE: 132 MMHG | TEMPERATURE: 98 F | RESPIRATION RATE: 17 BRPM | WEIGHT: 154.98 LBS | HEART RATE: 81 BPM | DIASTOLIC BLOOD PRESSURE: 64 MMHG | HEIGHT: 67 IN | OXYGEN SATURATION: 98 %

## 2020-02-24 DIAGNOSIS — L02.11 CUTANEOUS ABSCESS OF NECK: ICD-10-CM

## 2020-02-24 DIAGNOSIS — Z79.899 OTHER LONG TERM (CURRENT) DRUG THERAPY: ICD-10-CM

## 2020-02-24 DIAGNOSIS — N18.4 CHRONIC KIDNEY DISEASE, STAGE 4 (SEVERE): ICD-10-CM

## 2020-02-24 DIAGNOSIS — L72.3 SEBACEOUS CYST: ICD-10-CM

## 2020-02-24 DIAGNOSIS — M54.2 CERVICALGIA: ICD-10-CM

## 2020-02-24 DIAGNOSIS — I12.9 HYPERTENSIVE CHRONIC KIDNEY DISEASE WITH STAGE 1 THROUGH STAGE 4 CHRONIC KIDNEY DISEASE, OR UNSPECIFIED CHRONIC KIDNEY DISEASE: ICD-10-CM

## 2020-02-24 PROCEDURE — 10061 I&D ABSCESS COMP/MULTIPLE: CPT | Mod: 58

## 2020-02-24 PROCEDURE — 99282 EMERGENCY DEPT VISIT SF MDM: CPT | Mod: 25

## 2020-02-24 PROCEDURE — 99283 EMERGENCY DEPT VISIT LOW MDM: CPT | Mod: 25

## 2020-02-24 PROCEDURE — 10061 I&D ABSCESS COMP/MULTIPLE: CPT

## 2020-02-24 NOTE — ED PROVIDER NOTE - NSDCPRINTRESULTS_ED_ALL_ED
Patient requests all Lab and Radiology Results on their Discharge Instructions Alert and oriented to person, place, time/situation. normal mood and affect. no apparent risk to self or others.

## 2020-02-24 NOTE — ED ADULT TRIAGE NOTE - CHIEF COMPLAINT QUOTE
told to come back to remove the wound packing placed 2/20/20 to the abscess located at the back of his neck

## 2020-02-24 NOTE — ED PROVIDER NOTE - CLINICAL SUMMARY MEDICAL DECISION MAKING FREE TEXT BOX
Patient with neck abscess was I&D no complication. Tolerated procedure well. Recommend to continue abx and follow up in two days.

## 2020-02-24 NOTE — ED PROVIDER NOTE - PATIENT PORTAL LINK FT
You can access the FollowMyHealth Patient Portal offered by [facility] by registering at the following website: [email or web address]. By joining [employer]’s FollowMyHealth portal, you will also be able to view your health information using other applications (apps) compatible with our system.

## 2020-02-24 NOTE — ED PROVIDER NOTE - OBJECTIVE STATEMENT
71 y/o M pt with PMHx of DM, HLD, and HTN, and no significant PSHx presents to ED c/o wound check s/p aspiration to abscess in back of neck today. Pt was placed on abx and reports of persistent pain and swelling. Pt denies fever, nausea, active drainage, restriction with movement of head, and any other acute complaints. 71 y/o M pt with PMHx of DM, HLD, and HTN, and no significant PSHx presents to ED c/o wound check s/p aspiration to a abscess on the back of his neck yesterday.  Pt was placed on abx. He reports of persistent pain and swelling. Pt denies fever, nausea, active drainage, restriction with movement of head, and any other acute complaints.

## 2020-02-24 NOTE — ED PROVIDER NOTE - SKIN, MLM
3cm abscess to back of neck at base of skull, fluctuant, multiple little openings with purulent secretions expelled with palpation, no surrounding cellulitis, positive induration, positive erythema, no swollen lymphadenopathy. 3cm abscess to back of neck at base of the skull, fluctuant, with multiple little openings with purulent secretions expelled with palpation, no surrounding cellulitis, positive induration, positive erythema, no swollen lymphadenopathy.

## 2020-02-24 NOTE — ED ADULT NURSE NOTE - NSIMPLEMENTINTERV_GEN_ALL_ED
Implemented All Universal Safety Interventions:  [place] to call system. Call bell, personal items and telephone within reach. Instruct patient to call for assistance. Room bathroom lighting operational. Non-slip footwear when patient is off stretcher. Physically safe environment: no spills, clutter or unnecessary equipment. Stretcher in lowest position, wheels locked, appropriate side rails in place.

## 2020-02-27 ENCOUNTER — EMERGENCY (EMERGENCY)
Facility: HOSPITAL | Age: 70
LOS: 1 days | Discharge: HOME | End: 2020-02-27
Admitting: EMERGENCY MEDICINE
Payer: MEDICARE

## 2020-02-27 VITALS
OXYGEN SATURATION: 96 % | HEIGHT: 67 IN | RESPIRATION RATE: 18 BRPM | DIASTOLIC BLOOD PRESSURE: 77 MMHG | SYSTOLIC BLOOD PRESSURE: 144 MMHG | HEART RATE: 98 BPM | WEIGHT: 154.98 LBS | TEMPERATURE: 97 F

## 2020-02-27 DIAGNOSIS — I12.9 HYPERTENSIVE CHRONIC KIDNEY DISEASE WITH STAGE 1 THROUGH STAGE 4 CHRONIC KIDNEY DISEASE, OR UNSPECIFIED CHRONIC KIDNEY DISEASE: ICD-10-CM

## 2020-02-27 DIAGNOSIS — M54.2 CERVICALGIA: ICD-10-CM

## 2020-02-27 DIAGNOSIS — Z79.899 OTHER LONG TERM (CURRENT) DRUG THERAPY: ICD-10-CM

## 2020-02-27 DIAGNOSIS — N18.4 CHRONIC KIDNEY DISEASE, STAGE 4 (SEVERE): ICD-10-CM

## 2020-02-27 DIAGNOSIS — E11.9 TYPE 2 DIABETES MELLITUS WITHOUT COMPLICATIONS: ICD-10-CM

## 2020-02-27 PROCEDURE — 99282 EMERGENCY DEPT VISIT SF MDM: CPT

## 2020-02-27 NOTE — ED PROVIDER NOTE - OBJECTIVE STATEMENT
69 y/o M with PMHx CKD, DM, HLD and HTN presents to the ED for wound check s/p multiple I&Ds to an abscess to his posterior neck. Pt had an I&D done 3 days ago and was given Augmentin; pt returned to the ED the following day for another I&D and was told to continue his abx and f/u for a wound check today. Pt currently endorses pain and swelling. Denies fever, chills, nausea, vomiting or active bleeding.

## 2020-02-27 NOTE — ED PROVIDER NOTE - CLINICAL SUMMARY MEDICAL DECISION MAKING FREE TEXT BOX
71 y/o M with PMHx CKD, DM, HLD, and HTN presenting with persistent pain and swelling to an abscess to his posterior neck s/p 2 I&Ds. Due to persistent symptoms, will advise f/u with dermatology or surgery for surgical removal of cyst.

## 2020-02-27 NOTE — ED PROVIDER NOTE - SKIN, MLM
3cm abscess to posterior neck at base of skull, fluctuant, hard, indurated, + ttp. Purulent secretions excreted with palpation, no surrounding cellulitis.

## 2020-02-27 NOTE — ED ADULT TRIAGE NOTE - CHIEF COMPLAINT QUOTE
Patient states, "I am here to have the cyst on my neck checked."  Patient denies any fevers, N/V, discharge or any other complaints at this time.

## 2020-02-27 NOTE — ED PROVIDER NOTE - NSFOLLOWUPINSTRUCTIONS_ED_ALL_ED_FT
I have discussed the discharge plan with the patient. The patient agrees with the plan, as discussed.  The patient understands Emergency Department diagnosis is a preliminary diagnosis often based on limited information and that the patient must adhere to the follow-up plan as discussed.  The patient understands that if the symptoms worsen or if prescribed medications do not have the desired/planned effect that the patient may return to the Emergency Department at any time for further evaluation and treatment.        WARM COMPRESS OR SOAK - AfterCare(R) Instructions(ER/ED)     Warm Compress or Soak    WHAT YOU NEED TO KNOW:    A warm compress or soak helps improve blood flow to tissues and relieve pain and swelling. This will help you heal from an injury or illness. You may need a warm compress or soak to help manage any of the following:   A sinus infection or upper respiratory infection      A blocked tear duct, eye infection, or a stye      A skin abscess or infection      An ingrown toenail      An ear infection      A soft or deep tissue injury      A muscle or joint injury, such as a sprain  DISCHARGE INSTRUCTIONS:    Contact your healthcare provider if:     Your symptoms do not improve or you have new symptoms.       You see blisters on the area where you applied the compress or soak.       You have questions or concerns about your condition or care.     How to prepare and use a moist warm compress: Your healthcare provider will tell you how often to apply a warm compress:     Wash your hands.       Use a washcloth, small towel, or gauze as your compress.      You can place the compress under running water or place it in a bowl with warm water. Check the temperature of the water with a thermometer. The water should not be warmer than 100°F for babies, 105°F for children, and 120°F for adults. Adults should use water that is 105°F if they will apply the compress to an eye.       If directed, add 1 tablespoon of salt to the water. Squeeze extra water out of the compress.       Place the compress directly on the area. If directed, gently massage the area with the compress. Check your skin in 2 minutes for blisters or bright red skin. Your skin should look pink to light red.       You may need to rewarm the compress every 5 minutes.       Remove the compress in 15 to 30 minutes, or when the compress starts to feel cold. Gently pat your skin dry with a clean towel.       Wash your hands.       Reapply the compress as many times as directed each day. Use a clean compress every time.     How to use a dry warm compress: A dry compress may be a hot water bottle or a heating pad. You can also buy a prepared hot pack. Follow the package directions for how to use these devices. Cover a bottle or hot pack with a towel before you apply it to your skin. Do not leave a dry compress on your skin for more than 20 minutes or as directed. Do not fall asleep with a dry compress on your skin. A dry compress may burn your skin if it is left on for too long.     How to prepare and use a warm soak:     Fill a clean container or tub with warm water and soap. The container should be deep enough to cover the area completely.       Check the temperature of the water with a thermometer. The water should not be warmer than 100°F for children and babies, and 110°F for adults.       If directed, add 1 tablespoon of salt to the water.       Remove any bandages.       Soak the area for 30 minutes or as long as directed. Gently pat your skin dry when you are done soaking.       Replace bandages as directed.       Clean the container or tub when finished.       Wash your hands.     Follow up with your healthcare provider as directed: Write down your questions so you remember to ask them during your visits.      continue all medications as prescribed and f/u with a surgeon for complete incision

## 2020-02-27 NOTE — ED ADULT NURSE NOTE - CHPI ED NUR SYMPTOMS NEG
no drainage/no fever/no bleeding/no purulent drainage/no redness/no pain/no inflammation/no rectal pain/no bleeding at site/no chills

## 2020-02-27 NOTE — ED PROVIDER NOTE - PATIENT PORTAL LINK FT
You can access the FollowMyHealth Patient Portal offered by [facility] by registering at the following website: [email or web address]. By joining [employer]’s FollowMyHealth portal, you will also be able to view your health information using other applications (apps) compatible with our system.

## 2020-02-28 DIAGNOSIS — Z79.4 LONG TERM (CURRENT) USE OF INSULIN: ICD-10-CM

## 2020-02-28 DIAGNOSIS — K04.7 PERIAPICAL ABSCESS WITHOUT SINUS: ICD-10-CM

## 2020-02-28 DIAGNOSIS — E11.9 TYPE 2 DIABETES MELLITUS WITHOUT COMPLICATIONS: ICD-10-CM

## 2020-02-28 DIAGNOSIS — L02.811 CUTANEOUS ABSCESS OF HEAD [ANY PART, EXCEPT FACE]: ICD-10-CM

## 2020-02-28 DIAGNOSIS — I10 ESSENTIAL (PRIMARY) HYPERTENSION: ICD-10-CM

## 2020-02-28 DIAGNOSIS — Z79.899 OTHER LONG TERM (CURRENT) DRUG THERAPY: ICD-10-CM

## 2020-02-28 DIAGNOSIS — E78.5 HYPERLIPIDEMIA, UNSPECIFIED: ICD-10-CM

## 2020-03-18 ENCOUNTER — RX RENEWAL (OUTPATIENT)
Age: 70
End: 2020-03-18

## 2020-03-18 RX ORDER — BLOOD SUGAR DIAGNOSTIC
STRIP MISCELLANEOUS
Qty: 100 | Refills: 0 | Status: ACTIVE | COMMUNITY
Start: 2020-03-18 | End: 1900-01-01

## 2020-04-12 ENCOUNTER — RX RENEWAL (OUTPATIENT)
Age: 70
End: 2020-04-12

## 2020-04-13 ENCOUNTER — RX RENEWAL (OUTPATIENT)
Age: 70
End: 2020-04-13

## 2020-04-20 ENCOUNTER — RX RENEWAL (OUTPATIENT)
Age: 70
End: 2020-04-20

## 2020-06-12 ENCOUNTER — RX RENEWAL (OUTPATIENT)
Age: 70
End: 2020-06-12

## 2020-06-15 LAB
24R-OH-CALCIDIOL SERPL-MCNC: 44.1 PG/ML
25(OH)D3 SERPL-MCNC: 59.1 NG/ML
ALBUMIN SERPL ELPH-MCNC: 4.1 G/DL
ANION GAP SERPL CALC-SCNC: 14 MMOL/L
APPEARANCE: CLEAR
BACTERIA: NEGATIVE
BASOPHILS # BLD AUTO: 0.03 K/UL
BASOPHILS NFR BLD AUTO: 0.4 %
BILIRUBIN URINE: NEGATIVE
BLOOD URINE: NEGATIVE
BUN SERPL-MCNC: 19 MG/DL
CALCIUM SERPL-MCNC: 9.2 MG/DL
CALCIUM SERPL-MCNC: 9.2 MG/DL
CHLORIDE SERPL-SCNC: 97 MMOL/L
CO2 SERPL-SCNC: 24 MMOL/L
COLOR: NORMAL
CREAT SERPL-MCNC: 2.95 MG/DL
CREAT SPEC-SCNC: 61 MG/DL
CREAT SPEC-SCNC: 61 MG/DL
CREAT/PROT UR: 1.2 RATIO
EOSINOPHIL # BLD AUTO: 0.36 K/UL
EOSINOPHIL NFR BLD AUTO: 4.8 %
FERRITIN SERPL-MCNC: 27 NG/ML
GLUCOSE QUALITATIVE U: ABNORMAL
GLUCOSE SERPL-MCNC: 175 MG/DL
HCT VFR BLD CALC: 35.3 %
HGB BLD-MCNC: 11.2 G/DL
HYALINE CASTS: 1 /LPF
IMM GRANULOCYTES NFR BLD AUTO: 0.4 %
IRON SATN MFR SERPL: 24 %
IRON SERPL-MCNC: 74 UG/DL
KETONES URINE: NEGATIVE
LEUKOCYTE ESTERASE URINE: NEGATIVE
LYMPHOCYTES # BLD AUTO: 3.69 K/UL
LYMPHOCYTES NFR BLD AUTO: 49.2 %
MAN DIFF?: NORMAL
MCHC RBC-ENTMCNC: 27.6 PG
MCHC RBC-ENTMCNC: 31.7 GM/DL
MCV RBC AUTO: 86.9 FL
MICROALBUMIN 24H UR DL<=1MG/L-MCNC: 33 MG/DL
MICROALBUMIN/CREAT 24H UR-RTO: 545 MG/G
MICROSCOPIC-UA: NORMAL
MONOCYTES # BLD AUTO: 0.65 K/UL
MONOCYTES NFR BLD AUTO: 8.7 %
NEUTROPHILS # BLD AUTO: 2.74 K/UL
NEUTROPHILS NFR BLD AUTO: 36.5 %
NITRITE URINE: NEGATIVE
PARATHYROID HORMONE INTACT: 172 PG/ML
PH URINE: 6.5
PHOSPHATE SERPL-MCNC: 3.4 MG/DL
PLATELET # BLD AUTO: 248 K/UL
POTASSIUM SERPL-SCNC: 4.4 MMOL/L
PROT UR-MCNC: 70 MG/DL
PROTEIN URINE: ABNORMAL
RBC # BLD: 4.06 M/UL
RBC # FLD: 13.9 %
RED BLOOD CELLS URINE: 0 /HPF
SODIUM SERPL-SCNC: 135 MMOL/L
SPECIFIC GRAVITY URINE: 1.01
SQUAMOUS EPITHELIAL CELLS: 1 /HPF
TIBC SERPL-MCNC: 307 UG/DL
UIBC SERPL-MCNC: 232 UG/DL
URATE SERPL-MCNC: 6.1 MG/DL
UROBILINOGEN URINE: NORMAL
WBC # FLD AUTO: 7.5 K/UL
WHITE BLOOD CELLS URINE: 1 /HPF

## 2020-06-17 ENCOUNTER — APPOINTMENT (OUTPATIENT)
Dept: NEPHROLOGY | Facility: CLINIC | Age: 70
End: 2020-06-17
Payer: MEDICARE

## 2020-06-17 VITALS — SYSTOLIC BLOOD PRESSURE: 132 MMHG | DIASTOLIC BLOOD PRESSURE: 68 MMHG | HEART RATE: 93 BPM | RESPIRATION RATE: 14 BRPM

## 2020-06-17 DIAGNOSIS — R31.29 OTHER MICROSCOPIC HEMATURIA: ICD-10-CM

## 2020-06-17 DIAGNOSIS — K22.10 ULCER OF ESOPHAGUS W/OUT BLEEDING: ICD-10-CM

## 2020-06-17 PROCEDURE — 99214 OFFICE O/P EST MOD 30 MIN: CPT

## 2020-06-17 RX ORDER — PATIROMER 8.4 G/1
8.4 POWDER, FOR SUSPENSION ORAL DAILY
Qty: 1 | Refills: 4 | Status: DISCONTINUED | COMMUNITY
Start: 2020-02-11 | End: 2020-06-17

## 2020-06-22 PROBLEM — R31.29 HEMATURIA, MICROSCOPIC: Status: ACTIVE | Noted: 2019-03-12

## 2020-06-22 PROBLEM — K22.10 EROSIVE ESOPHAGITIS: Status: ACTIVE | Noted: 2019-10-21

## 2020-06-22 NOTE — ASSESSMENT
[FreeTextEntry1] : 69yo [place] M with h/o acute on chronic hyponatremia related to chlorthalidone, HTN, BPH with urinary retention, long standing uncontrolled  DMII + retinopathy here for f/u of CKD IV\par lc\par # CKD IV w hyperkalemia, mild chronic hyponatremia and non nephrotic proteinuria -  likely 2/2 diabetic nephropathy\par -  baseline 2.4-2.8 egfr low 20's however 4x checked in [facility] and outpt over last week have been 3.2-3.4 - decrease torsemide to q2 days in case he's being volume depleted, no edema and good BPs, recheck renal sono to r/o retention as his March sono did show mild 60cc urinary retention and he complains of LUTS. Of note, also started on omeprazole for GERD 4-6 weeks ago, no rash or peripheral eosinophilia, low suspicion for AIN however if Cr doesn't improve w above changes may consider holding it to see if renal fx improves to baseline. \par - hyponatremia 119 on chlorthalidone req hospitalization and stopping thiazide. 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 If not, to take daily kayexelate instead TIW so we can increase ramipril to 5mg daily to get PCR <1g \par - HTN well controlled. 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 \par - Renal sono, 10.5/10.7cm normal looking kidneys, mildly enlarged prostate with 170cc PVR\par - Secondary hyperparathyroidism - on vitamin D daily well controlled\par Discussed with patient the importance of both diabetic and hypertension control on delaying the progression of renal disease. Goal HbA1C <7%, exercise advised, low Na diet rich in fruits and vegetables, to avoid pre-packaged foods, eating out, juices/sodas/sweets. Advised to check BP regularly at home, record values and bring to future appointments. To call if BP consistently above goal 130/80 so we can adjust regimen in between appointments as needed.\par \par # Anemia - Fe deficiency, started ferrous gluconate, per pt he doesn't remember fe intolerance.\par \par RTC in 3 months for f/u\par

## 2020-06-22 NOTE — HISTORY OF PRESENT ILLNESS
[FreeTextEntry1] : 69yo [place] M with h/o acute hyponatremia related to chlorthalidone, HTN, BPH with urinary retention, long standing uncontrolled  DMII + retinopathy here for f/u of CKD IV \par \par Feeling well. Good energy and appetite. \par Has 4 grandchildren, took two on a 10d trip to [facility] during [place] break. \par \par Feeling "great", went to [place] for cricket championships and ate off his diet for a while. \par No LE edema or SOB. Stable appetite and weight. Urinating w/o difficulty. Tolerating torsemide\par \par OTC: vitamin B12 started by PCP \par \par All other ROS negative

## 2020-06-22 NOTE — PHYSICAL EXAM
[General Appearance - In No Acute Distress] : in no acute distress [General Appearance - Alert] : alert [General Appearance - Well Nourished] : well nourished [Sclera] : the sclera and conjunctiva were normal [Extraocular Movements] : extraocular movements were intact [PERRL With Normal Accommodation] : pupils were equal in size, round, and reactive to light [Outer Ear] : the ears and nose were normal in appearance [Oropharynx] : the oropharynx was normal [Neck Appearance] : the appearance of the neck was normal [Jugular Venous Distention Increased] : there was no jugular-venous distention [Heart Rate And Rhythm] : heart rate was normal and rhythm regular [Auscultation Breath Sounds / Voice Sounds] : lungs were clear to auscultation bilaterally [Heart Sounds Gallop] : no gallops [Heart Sounds] : normal S1 and S2 [Murmurs] : no murmurs [Heart Sounds Pericardial Friction Rub] : no pericardial rub [Full Pulse] : the pedal pulses are present [Edema] : there was no peripheral edema [Bowel Sounds] : normal bowel sounds [Abdomen Soft] : soft [Abdomen Tenderness] : non-tender [No CVA Tenderness] : no ~M costovertebral angle tenderness [No Spinal Tenderness] : no spinal tenderness [Abnormal Walk] : normal gait [Involuntary Movements] : no involuntary movements were seen [Musculoskeletal - Swelling] : no joint swelling seen [Skin Color & Pigmentation] : normal skin color and pigmentation [] : no rash [No Focal Deficits] : no focal deficits [FreeTextEntry1] : no asterixis [Oriented To Time, Place, And Person] : oriented to person, place, and time [Impaired Insight] : insight and judgment were intact [Affect] : the affect was normal

## 2020-07-06 NOTE — ED ADULT NURSE NOTE - NS ED NURSE LEVEL OF CONSCIOUSNESS SPEECH
Speaking Coherently Minocycline Counseling: Patient advised regarding possible photosensitivity and discoloration of the teeth, skin, lips, tongue and gums.  Patient instructed to avoid sunlight, if possible.  When exposed to sunlight, patients should wear protective clothing, sunglasses, and sunscreen.  The patient was instructed to call the office immediately if the following severe adverse effects occur:  hearing changes, easy bruising/bleeding, severe headache, or vision changes.  The patient verbalized understanding of the proper use and possible adverse effects of minocycline.  All of the patient's questions and concerns were addressed.

## 2020-07-09 ENCOUNTER — RX RENEWAL (OUTPATIENT)
Age: 70
End: 2020-07-09

## 2020-07-17 RX ORDER — OMEPRAZOLE 40 MG/1
40 CAPSULE, DELAYED RELEASE ORAL
Qty: 90 | Refills: 0 | Status: ACTIVE | COMMUNITY
Start: 2019-10-21 | End: 1900-01-01

## 2020-07-20 LAB
ALBUMIN SERPL ELPH-MCNC: 4 G/DL
ANION GAP SERPL CALC-SCNC: 13 MMOL/L
BUN SERPL-MCNC: 18 MG/DL
CALCIUM SERPL-MCNC: 9.2 MG/DL
CHLORIDE SERPL-SCNC: 99 MMOL/L
CO2 SERPL-SCNC: 26 MMOL/L
CREAT SERPL-MCNC: 3.09 MG/DL
ESTIMATED AVERAGE GLUCOSE: 166 MG/DL
GLUCOSE SERPL-MCNC: 208 MG/DL
HBA1C MFR BLD HPLC: 7.4 %
PHOSPHATE SERPL-MCNC: 3.6 MG/DL
POTASSIUM SERPL-SCNC: 4.3 MMOL/L
SODIUM SERPL-SCNC: 138 MMOL/L

## 2020-09-12 ENCOUNTER — RX RENEWAL (OUTPATIENT)
Age: 70
End: 2020-09-12

## 2020-10-07 ENCOUNTER — RX RENEWAL (OUTPATIENT)
Age: 70
End: 2020-10-07

## 2020-10-14 ENCOUNTER — LABORATORY RESULT (OUTPATIENT)
Age: 70
End: 2020-10-14

## 2020-10-15 ENCOUNTER — LABORATORY RESULT (OUTPATIENT)
Age: 70
End: 2020-10-15

## 2020-10-20 ENCOUNTER — APPOINTMENT (OUTPATIENT)
Dept: NEPHROLOGY | Facility: CLINIC | Age: 70
End: 2020-10-20
Payer: MEDICARE

## 2020-10-20 VITALS
DIASTOLIC BLOOD PRESSURE: 80 MMHG | OXYGEN SATURATION: 99 % | RESPIRATION RATE: 14 BRPM | SYSTOLIC BLOOD PRESSURE: 156 MMHG | HEART RATE: 84 BPM

## 2020-10-20 PROCEDURE — 99214 OFFICE O/P EST MOD 30 MIN: CPT

## 2020-10-20 NOTE — HISTORY OF PRESENT ILLNESS
[FreeTextEntry1] : 71yo [place]uv[place] M with h/o acute hyponatremia related to chlorthalidone, HTN, BPH with urinary retention, long standing uncontrolled  DMII + retinopathy here for f/u of CKD IV \par \par Feeling well. No changes to weight. No edema or SOB. No uremic sx.\par \par OTC: vitamin B12 started by PCP \par \par All other ROS negative

## 2020-10-20 NOTE — ASSESSMENT
[FreeTextEntry1] : 69yo [place] M with h/o acute on chronic hyponatremia related to chlorthalidone, HTN, BPH with urinary retention, long standing uncontrolled  DMII + retinopathy here for f/u of CKD IV\par \par # CKD IV w hyperkalemia, mild chronic hyponatremia and non nephrotic proteinuria -  likely 2/2 diabetic nephropathy\par -  baseline Cr 2.8-3 egfr ~20, re-referred to [place] [facility] as he was unable to get in touch with them during the pandemic. No uremix sx and renal fx stable. \par - BP uncontroled, discussed diet at length, was having things like [employer] and soup, he will decrease his Na content in his diet. \par - urine PCR 1.8g, prior had been around 1g, will recheck next visit. Cont Ramipril 7.5mg daily. 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 \par - Renal sono, 10.5/10.7cm normal looking kidneys, mildly enlarged prostate with 170cc PVR\par - Secondary hyperparathyroidism - on vitamin D daily well controlled\par Discussed with patient the importance of both diabetic and hypertension control on delaying the progression of renal disease. Goal HbA1C <7%, exercise advised, low Na diet rich in fruits and vegetables, to avoid pre-packaged foods, eating out, juices/sodas/sweets. Advised to check BP regularly at home, record values and bring to future appointments. To call if BP consistently above goal 130/80 so we can adjust regimen in between appointments as needed.\par \par # Anemia - Fe deficiency, started ferrous gluconate, improved.\par \par RTC in 4 months for f/u\par

## 2020-10-20 NOTE — PHYSICAL EXAM
[General Appearance - Alert] : alert [General Appearance - In No Acute Distress] : in no acute distress [General Appearance - Well Nourished] : well nourished [Sclera] : the sclera and conjunctiva were normal [PERRL With Normal Accommodation] : pupils were equal in size, round, and reactive to light [Extraocular Movements] : extraocular movements were intact [Outer Ear] : the ears and nose were normal in appearance [Oropharynx] : the oropharynx was normal [Neck Appearance] : the appearance of the neck was normal [Jugular Venous Distention Increased] : there was no jugular-venous distention [Auscultation Breath Sounds / Voice Sounds] : lungs were clear to auscultation bilaterally [Heart Sounds] : normal S1 and S2 [Heart Rate And Rhythm] : heart rate was normal and rhythm regular [Murmurs] : no murmurs [Heart Sounds Gallop] : no gallops [Heart Sounds Pericardial Friction Rub] : no pericardial rub [Full Pulse] : the pedal pulses are present [Bowel Sounds] : normal bowel sounds [Abdomen Soft] : soft [Abdomen Tenderness] : non-tender [No Spinal Tenderness] : no spinal tenderness [No CVA Tenderness] : no ~M costovertebral angle tenderness [Abnormal Walk] : normal gait [Involuntary Movements] : no involuntary movements were seen [Musculoskeletal - Swelling] : no joint swelling seen [Skin Color & Pigmentation] : normal skin color and pigmentation [] : no rash [No Focal Deficits] : no focal deficits [Oriented To Time, Place, And Person] : oriented to person, place, and time [Impaired Insight] : insight and judgment were intact [Affect] : the affect was normal [FreeTextEntry1] : no asterixis

## 2020-11-12 ENCOUNTER — RX RENEWAL (OUTPATIENT)
Age: 70
End: 2020-11-12

## 2020-12-01 ENCOUNTER — LABORATORY RESULT (OUTPATIENT)
Age: 70
End: 2020-12-01

## 2020-12-01 ENCOUNTER — APPOINTMENT (OUTPATIENT)
Dept: INTERNAL MEDICINE | Facility: CLINIC | Age: 70
End: 2020-12-01
Payer: MEDICARE

## 2020-12-01 VITALS
OXYGEN SATURATION: 97 % | WEIGHT: 160 LBS | HEIGHT: 67 IN | DIASTOLIC BLOOD PRESSURE: 86 MMHG | BODY MASS INDEX: 25.11 KG/M2 | SYSTOLIC BLOOD PRESSURE: 151 MMHG | HEART RATE: 91 BPM | TEMPERATURE: 97.2 F

## 2020-12-01 PROCEDURE — 36415 COLL VENOUS BLD VENIPUNCTURE: CPT

## 2020-12-01 PROCEDURE — 99214 OFFICE O/P EST MOD 30 MIN: CPT | Mod: 25

## 2020-12-01 RX ORDER — PEN NEEDLE, DIABETIC 32 GX 1/4"
32G X 6 MM NEEDLE, DISPOSABLE MISCELLANEOUS
Qty: 2 | Refills: 4 | Status: ACTIVE | COMMUNITY
Start: 2020-12-01 | End: 1900-01-01

## 2020-12-02 LAB
ALBUMIN SERPL ELPH-MCNC: 4.3 G/DL
ALP BLD-CCNC: 95 U/L
ALT SERPL-CCNC: 13 U/L
ANION GAP SERPL CALC-SCNC: 12 MMOL/L
AST SERPL-CCNC: 15 U/L
BASOPHILS # BLD AUTO: 0.04 K/UL
BASOPHILS NFR BLD AUTO: 0.5 %
BILIRUB SERPL-MCNC: 0.6 MG/DL
BUN SERPL-MCNC: 25 MG/DL
CALCIUM SERPL-MCNC: 9.2 MG/DL
CHLORIDE SERPL-SCNC: 99 MMOL/L
CO2 SERPL-SCNC: 24 MMOL/L
CREAT SERPL-MCNC: 3.17 MG/DL
EOSINOPHIL # BLD AUTO: 0.35 K/UL
EOSINOPHIL NFR BLD AUTO: 4.7 %
GLUCOSE SERPL-MCNC: 291 MG/DL
HCT VFR BLD CALC: 40 %
HGB BLD-MCNC: 12.9 G/DL
IMM GRANULOCYTES NFR BLD AUTO: 0.3 %
LYMPHOCYTES # BLD AUTO: 2.72 K/UL
LYMPHOCYTES NFR BLD AUTO: 36.9 %
MAN DIFF?: NORMAL
MCHC RBC-ENTMCNC: 29.7 PG
MCHC RBC-ENTMCNC: 32.3 GM/DL
MCV RBC AUTO: 92.2 FL
MONOCYTES # BLD AUTO: 0.52 K/UL
MONOCYTES NFR BLD AUTO: 7 %
NEUTROPHILS # BLD AUTO: 3.73 K/UL
NEUTROPHILS NFR BLD AUTO: 50.6 %
PLATELET # BLD AUTO: 283 K/UL
POTASSIUM SERPL-SCNC: 5.1 MMOL/L
PROT SERPL-MCNC: 7.6 G/DL
RBC # BLD: 4.34 M/UL
RBC # FLD: 12.9 %
SODIUM SERPL-SCNC: 135 MMOL/L
TSH SERPL-ACNC: 5.56 UIU/ML
WBC # FLD AUTO: 7.38 K/UL

## 2020-12-10 ENCOUNTER — RX RENEWAL (OUTPATIENT)
Age: 70
End: 2020-12-10

## 2021-01-02 ENCOUNTER — RX RENEWAL (OUTPATIENT)
Age: 71
End: 2021-01-02

## 2021-01-16 ENCOUNTER — RX RENEWAL (OUTPATIENT)
Age: 71
End: 2021-01-16

## 2021-02-01 ENCOUNTER — RX RENEWAL (OUTPATIENT)
Age: 71
End: 2021-02-01

## 2021-02-13 ENCOUNTER — NON-APPOINTMENT (OUTPATIENT)
Age: 71
End: 2021-02-13

## 2021-02-23 ENCOUNTER — APPOINTMENT (OUTPATIENT)
Dept: NEPHROLOGY | Facility: CLINIC | Age: 71
End: 2021-02-23
Payer: MEDICARE

## 2021-02-23 VITALS
DIASTOLIC BLOOD PRESSURE: 80 MMHG | RESPIRATION RATE: 14 BRPM | HEART RATE: 90 BPM | OXYGEN SATURATION: 99 % | SYSTOLIC BLOOD PRESSURE: 150 MMHG

## 2021-02-23 LAB
24R-OH-CALCIDIOL SERPL-MCNC: 44.1 PG/ML
25(OH)D3 SERPL-MCNC: 44.2 NG/ML
ALBUMIN SERPL ELPH-MCNC: 4.3 G/DL
ANION GAP SERPL CALC-SCNC: 13 MMOL/L
APPEARANCE: CLEAR
BACTERIA: NEGATIVE
BASOPHILS # BLD AUTO: 0.04 K/UL
BASOPHILS NFR BLD AUTO: 0.6 %
BILIRUBIN URINE: NEGATIVE
BLOOD URINE: NEGATIVE
BUN SERPL-MCNC: 21 MG/DL
CALCIUM SERPL-MCNC: 9.4 MG/DL
CALCIUM SERPL-MCNC: 9.4 MG/DL
CHLORIDE SERPL-SCNC: 100 MMOL/L
CO2 SERPL-SCNC: 25 MMOL/L
COLOR: COLORLESS
CREAT SERPL-MCNC: 3.12 MG/DL
CREAT SPEC-SCNC: 22 MG/DL
CREAT SPEC-SCNC: 22 MG/DL
CREAT/PROT UR: 2.5 RATIO
EOSINOPHIL # BLD AUTO: 0.45 K/UL
EOSINOPHIL NFR BLD AUTO: 6.4 %
ESTIMATED AVERAGE GLUCOSE: 157 MG/DL
FERRITIN SERPL-MCNC: 85 NG/ML
GLUCOSE QUALITATIVE U: NORMAL
GLUCOSE SERPL-MCNC: 203 MG/DL
HBA1C MFR BLD HPLC: 7.1 %
HCT VFR BLD CALC: 39.1 %
HGB BLD-MCNC: 12.7 G/DL
HYALINE CASTS: 1 /LPF
IMM GRANULOCYTES NFR BLD AUTO: 0.3 %
IRON SATN MFR SERPL: 23 %
IRON SERPL-MCNC: 73 UG/DL
KETONES URINE: NEGATIVE
LEUKOCYTE ESTERASE URINE: NEGATIVE
LYMPHOCYTES # BLD AUTO: 2.59 K/UL
LYMPHOCYTES NFR BLD AUTO: 36.9 %
MAN DIFF?: NORMAL
MCHC RBC-ENTMCNC: 29.5 PG
MCHC RBC-ENTMCNC: 32.5 GM/DL
MCV RBC AUTO: 90.7 FL
MICROALBUMIN 24H UR DL<=1MG/L-MCNC: 31.7 MG/DL
MICROALBUMIN/CREAT 24H UR-RTO: 1410 MG/G
MICROSCOPIC-UA: NORMAL
MONOCYTES # BLD AUTO: 0.55 K/UL
MONOCYTES NFR BLD AUTO: 7.8 %
NEUTROPHILS # BLD AUTO: 3.37 K/UL
NEUTROPHILS NFR BLD AUTO: 48 %
NITRITE URINE: NEGATIVE
PARATHYROID HORMONE INTACT: 177 PG/ML
PH URINE: 7.5
PHOSPHATE SERPL-MCNC: 3.8 MG/DL
PLATELET # BLD AUTO: 251 K/UL
POTASSIUM SERPL-SCNC: 4.5 MMOL/L
PROT UR-MCNC: 56 MG/DL
PROTEIN URINE: ABNORMAL
RBC # BLD: 4.31 M/UL
RBC # FLD: 12.3 %
RED BLOOD CELLS URINE: 1 /HPF
SODIUM SERPL-SCNC: 138 MMOL/L
SPECIFIC GRAVITY URINE: 1
SQUAMOUS EPITHELIAL CELLS: 1 /HPF
TIBC SERPL-MCNC: 314 UG/DL
UIBC SERPL-MCNC: 241 UG/DL
URATE SERPL-MCNC: 7 MG/DL
UROBILINOGEN URINE: NORMAL
WBC # FLD AUTO: 7.02 K/UL
WHITE BLOOD CELLS URINE: 0 /HPF

## 2021-02-23 PROCEDURE — 99214 OFFICE O/P EST MOD 30 MIN: CPT

## 2021-02-23 PROCEDURE — 99072 ADDL SUPL MATRL&STAF TM PHE: CPT

## 2021-02-23 RX ORDER — RAMIPRIL 2.5 MG/1
2.5 CAPSULE ORAL DAILY
Qty: 90 | Refills: 3 | Status: DISCONTINUED | COMMUNITY
Start: 2020-06-17 | End: 2021-02-23

## 2021-02-23 NOTE — PHYSICAL EXAM
[General Appearance - Alert] : alert [General Appearance - In No Acute Distress] : in no acute distress [General Appearance - Well Nourished] : well nourished [Sclera] : the sclera and conjunctiva were normal [PERRL With Normal Accommodation] : pupils were equal in size, round, and reactive to light [Extraocular Movements] : extraocular movements were intact [Outer Ear] : the ears and nose were normal in appearance [Oropharynx] : the oropharynx was normal [Neck Appearance] : the appearance of the neck was normal [Jugular Venous Distention Increased] : there was no jugular-venous distention [Auscultation Breath Sounds / Voice Sounds] : lungs were clear to auscultation bilaterally [Heart Rate And Rhythm] : heart rate was normal and rhythm regular [Heart Sounds Gallop] : no gallops [Heart Sounds] : normal S1 and S2 [Murmurs] : no murmurs [Heart Sounds Pericardial Friction Rub] : no pericardial rub [Bowel Sounds] : normal bowel sounds [Abdomen Soft] : soft [Abdomen Tenderness] : non-tender [No CVA Tenderness] : no ~M costovertebral angle tenderness [No Spinal Tenderness] : no spinal tenderness [Abnormal Walk] : normal gait [Involuntary Movements] : no involuntary movements were seen [Musculoskeletal - Swelling] : no joint swelling seen [Skin Color & Pigmentation] : normal skin color and pigmentation [] : no rash [No Focal Deficits] : no focal deficits [Oriented To Time, Place, And Person] : oriented to person, place, and time [Impaired Insight] : insight and judgment were intact [Affect] : the affect was normal [Edema] : there was no peripheral edema [FreeTextEntry1] : no asterixis

## 2021-02-23 NOTE — HISTORY OF PRESENT ILLNESS
[FreeTextEntry1] : 72yo Italian M with h/o acute hyponatremia related to chlorthalidone, HTN, BPH with urinary retention, long standing uncontrolled  DMII + retinopathy here for f/u of CKD IV \par \par Feeling well. No changes to weight. No edema or SOB. No uremic sx.\par Seeing transplant center. \par Doesn't check BP at home. Denies headaches, chest pressure, dizziness. \par No trouble urinating\par \par OTC: vitamin B12\par \par All other ROS negative

## 2021-02-23 NOTE — ASSESSMENT
[FreeTextEntry1] : 70yo Ethiopian M with h/o acute on chronic hyponatremia related to chlorthalidone, HTN, BPH with urinary retention, long standing uncontrolled  DMII + retinopathy here for f/u of CKD IV\par \par # CKD IV w hyperkalemia, mild chronic hyponatremia and non nephrotic proteinuria -  likely 2/2 diabetic nephropathy\par -  baseline Cr 3.0-3.2 egfr ~19-22, reviewed Feb labs with patient, stable renal fx and K 4.5 \par - urine PCR 2.5g, prior had been around 1g, BP  not at goal, increase ramipril to 5mg BID. Recheck BMP in 3-4 weeks. 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 Has possible donor (ONUR's friend)  who's currently being evaluated to see if she's a match. 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 \par - Renal sono, 10.5/10.7cm normal looking kidneys, mildly enlarged prostate with 170cc PVR\par - DMII + retinopathy now better controlled, on lantus and tradjenta\par - Secondary hyperparathyroidism - on vitamin D daily well controlled\par -Discussed with patient the importance of both diabetic and hypertension control on delaying the progression of renal disease. Goal HbA1C <7%, exercise advised, low Na diet rich in fruits and vegetables, to avoid pre-packaged foods, eating out, juices/sodas/sweets. Advised to check BP regularly at home, record values and bring to future appointments. To call if BP consistently above goal 130/80 so we can adjust regimen in between appointments as needed.\par \par # Anemia - mild/stable, on Fe tablets\par \par RTC in 3 months for f/u\par

## 2021-03-04 ENCOUNTER — APPOINTMENT (OUTPATIENT)
Dept: INTERNAL MEDICINE | Facility: CLINIC | Age: 71
End: 2021-03-04
Payer: MEDICARE

## 2021-03-04 VITALS
HEART RATE: 81 BPM | TEMPERATURE: 98.9 F | WEIGHT: 160 LBS | OXYGEN SATURATION: 97 % | HEIGHT: 67 IN | SYSTOLIC BLOOD PRESSURE: 166 MMHG | DIASTOLIC BLOOD PRESSURE: 81 MMHG | BODY MASS INDEX: 25.11 KG/M2

## 2021-03-04 DIAGNOSIS — Z00.00 ENCOUNTER FOR GENERAL ADULT MEDICAL EXAMINATION W/OUT ABNORMAL FINDINGS: ICD-10-CM

## 2021-03-04 PROCEDURE — G0439: CPT

## 2021-03-04 PROCEDURE — 99213 OFFICE O/P EST LOW 20 MIN: CPT | Mod: 25

## 2021-03-04 PROCEDURE — 99072 ADDL SUPL MATRL&STAF TM PHE: CPT

## 2021-03-06 ENCOUNTER — RX RENEWAL (OUTPATIENT)
Age: 71
End: 2021-03-06

## 2021-03-22 LAB
ANION GAP SERPL CALC-SCNC: 13 MMOL/L
BUN SERPL-MCNC: 21 MG/DL
CALCIUM SERPL-MCNC: 8.6 MG/DL
CHLORIDE SERPL-SCNC: 97 MMOL/L
CO2 SERPL-SCNC: 24 MMOL/L
CREAT SERPL-MCNC: 3.08 MG/DL
GLUCOSE SERPL-MCNC: 198 MG/DL
POTASSIUM SERPL-SCNC: 4.6 MMOL/L
POTASSIUM SERPL-SCNC: 4.8 MMOL/L
SODIUM SERPL-SCNC: 134 MMOL/L

## 2021-03-24 NOTE — REASON FOR VISIT
Class II - visualization of the soft palate, fauces, and uvula [Follow-Up] : a follow-up visit with phonation/Class II - visualization of the soft palate, fauces, and uvula

## 2021-03-28 ENCOUNTER — RX RENEWAL (OUTPATIENT)
Age: 71
End: 2021-03-28

## 2021-04-05 ENCOUNTER — NON-APPOINTMENT (OUTPATIENT)
Age: 71
End: 2021-04-05

## 2021-04-13 ENCOUNTER — RX RENEWAL (OUTPATIENT)
Age: 71
End: 2021-04-13

## 2021-04-19 ENCOUNTER — RX RENEWAL (OUTPATIENT)
Age: 71
End: 2021-04-19

## 2021-05-10 ENCOUNTER — RX RENEWAL (OUTPATIENT)
Age: 71
End: 2021-05-10

## 2021-05-21 LAB
24R-OH-CALCIDIOL SERPL-MCNC: 29.5 PG/ML
25(OH)D3 SERPL-MCNC: 39.5 NG/ML
ALBUMIN SERPL ELPH-MCNC: 4 G/DL
ANION GAP SERPL CALC-SCNC: 13 MMOL/L
APPEARANCE: CLEAR
BACTERIA: NEGATIVE
BASOPHILS # BLD AUTO: 0.04 K/UL
BASOPHILS NFR BLD AUTO: 0.6 %
BILIRUBIN URINE: NEGATIVE
BLOOD URINE: NEGATIVE
BUN SERPL-MCNC: 24 MG/DL
CALCIUM SERPL-MCNC: 8.7 MG/DL
CALCIUM SERPL-MCNC: 8.7 MG/DL
CHLORIDE SERPL-SCNC: 100 MMOL/L
CO2 SERPL-SCNC: 24 MMOL/L
COLOR: NORMAL
CREAT SERPL-MCNC: 3.31 MG/DL
CREAT SPEC-SCNC: 73 MG/DL
CREAT/PROT UR: 1.2 RATIO
CYSTATIN C SERPL-MCNC: 2.89 MG/L
EOSINOPHIL # BLD AUTO: 0.42 K/UL
EOSINOPHIL NFR BLD AUTO: 6 %
ESTIMATED AVERAGE GLUCOSE: 154 MG/DL
FERRITIN SERPL-MCNC: 75 NG/ML
GFR/BSA.PRED SERPLBLD CYS-BASED-ARV: 18 ML/MIN
GLUCOSE QUALITATIVE U: NEGATIVE
GLUCOSE SERPL-MCNC: 188 MG/DL
HBA1C MFR BLD HPLC: 7 %
HCT VFR BLD CALC: 37.9 %
HGB BLD-MCNC: 12.3 G/DL
HYALINE CASTS: 0 /LPF
IMM GRANULOCYTES NFR BLD AUTO: 0.3 %
IRON SATN MFR SERPL: 24 %
IRON SERPL-MCNC: 63 UG/DL
KETONES URINE: NEGATIVE
LEUKOCYTE ESTERASE URINE: ABNORMAL
LYMPHOCYTES # BLD AUTO: 2.58 K/UL
LYMPHOCYTES NFR BLD AUTO: 36.7 %
MAN DIFF?: NORMAL
MCHC RBC-ENTMCNC: 29.8 PG
MCHC RBC-ENTMCNC: 32.5 GM/DL
MCV RBC AUTO: 91.8 FL
MICROSCOPIC-UA: NORMAL
MONOCYTES # BLD AUTO: 0.69 K/UL
MONOCYTES NFR BLD AUTO: 9.8 %
NEUTROPHILS # BLD AUTO: 3.28 K/UL
NEUTROPHILS NFR BLD AUTO: 46.6 %
NITRITE URINE: NEGATIVE
PARATHYROID HORMONE INTACT: 175 PG/ML
PH URINE: 6
PHOSPHATE SERPL-MCNC: 3.9 MG/DL
PLATELET # BLD AUTO: 265 K/UL
POTASSIUM SERPL-SCNC: 4.6 MMOL/L
PROT UR-MCNC: 90 MG/DL
PROTEIN URINE: ABNORMAL
RBC # BLD: 4.13 M/UL
RBC # FLD: 12.8 %
RED BLOOD CELLS URINE: 1 /HPF
SODIUM SERPL-SCNC: 136 MMOL/L
SPECIFIC GRAVITY URINE: 1.01
SQUAMOUS EPITHELIAL CELLS: 1 /HPF
TIBC SERPL-MCNC: 265 UG/DL
UIBC SERPL-MCNC: 202 UG/DL
UROBILINOGEN URINE: NORMAL
WBC # FLD AUTO: 7.03 K/UL
WHITE BLOOD CELLS URINE: 2 /HPF

## 2021-05-25 ENCOUNTER — APPOINTMENT (OUTPATIENT)
Dept: NEPHROLOGY | Facility: CLINIC | Age: 71
End: 2021-05-25
Payer: MEDICARE

## 2021-05-25 VITALS
HEART RATE: 80 BPM | SYSTOLIC BLOOD PRESSURE: 148 MMHG | DIASTOLIC BLOOD PRESSURE: 74 MMHG | OXYGEN SATURATION: 98 % | RESPIRATION RATE: 14 BRPM

## 2021-05-25 DIAGNOSIS — E87.1 HYPO-OSMOLALITY AND HYPONATREMIA: ICD-10-CM

## 2021-05-25 PROCEDURE — 99072 ADDL SUPL MATRL&STAF TM PHE: CPT

## 2021-05-25 PROCEDURE — 99214 OFFICE O/P EST MOD 30 MIN: CPT

## 2021-05-25 NOTE — HISTORY OF PRESENT ILLNESS
[FreeTextEntry1] : 72yo Palauan M with h/o acute hyponatremia related to chlorthalidone, HTN, BPH with urinary retention, long standing uncontrolled  DMII + retinopathy here for f/u of CKD IV \par \par Feeling well. No changes to weight. No edema or SOB. No uremic sx.\par Seeing transplant center. \par Doesn't check BP at home. Denies headaches, chest pressure, dizziness. \par No trouble urinating\par \par OTC: vitamin B12\par \par All other ROS negative

## 2021-05-25 NOTE — ASSESSMENT
[FreeTextEntry1] : 72yo Belgian M with h/o acute on chronic hyponatremia related to chlorthalidone, HTN, BPH with urinary retention, long standing uncontrolled  DMII + retinopathy here for f/u of CKD IV\par \par # CKD IV w hyperkalemia, mild chronic hyponatremia and non nephrotic proteinuria -  likely 2/2 diabetic nephropathy\par -  baseline Cr 3.0-3.3 egfr ~17-22, consistent with cystatin C eGFR\par -  reviewed May labs with patient, stable renal fx \par - anemia mild and stable,Fe def and 2/2 renal disease\par - Nabicarbonate 650mg BID normalized his metabolic acidosis as well as hyperkalemia with kayexelate\par - DMII + retinopathy now better controlled, on lantus and tradjenta. HTN well controlled\par - Secondary hyperparathyroidism - on vitamin D daily well controlled\par - met with [facility], has a matched donor however with eGFR ~17 and no uremia so asked pt to f/u with transplant center re: timing of transplant, explained they will likely wait to transplant until renal function declines. 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 \par - Renal sono, 10.5/10.7cm normal looking kidneys, mildly enlarged prostate with 170cc PVR\par \par RTC in 3-4 months for f/u\par

## 2021-05-25 NOTE — PHYSICAL EXAM
[General Appearance - Alert] : alert [General Appearance - In No Acute Distress] : in no acute distress [General Appearance - Well Nourished] : well nourished [Sclera] : the sclera and conjunctiva were normal [PERRL With Normal Accommodation] : pupils were equal in size, round, and reactive to light [Extraocular Movements] : extraocular movements were intact [Outer Ear] : the ears and nose were normal in appearance [Oropharynx] : the oropharynx was normal [Neck Appearance] : the appearance of the neck was normal [Jugular Venous Distention Increased] : there was no jugular-venous distention [Auscultation Breath Sounds / Voice Sounds] : lungs were clear to auscultation bilaterally [Heart Rate And Rhythm] : heart rate was normal and rhythm regular [Heart Sounds] : normal S1 and S2 [Heart Sounds Gallop] : no gallops [Murmurs] : no murmurs [Heart Sounds Pericardial Friction Rub] : no pericardial rub [Edema] : there was no peripheral edema [Bowel Sounds] : normal bowel sounds [Abdomen Soft] : soft [Abdomen Tenderness] : non-tender [No CVA Tenderness] : no ~M costovertebral angle tenderness [No Spinal Tenderness] : no spinal tenderness [Abnormal Walk] : normal gait [Involuntary Movements] : no involuntary movements were seen [Musculoskeletal - Swelling] : no joint swelling seen [Skin Color & Pigmentation] : normal skin color and pigmentation [] : no rash [No Focal Deficits] : no focal deficits [FreeTextEntry1] : no asterixis [Oriented To Time, Place, And Person] : oriented to person, place, and time [Impaired Insight] : insight and judgment were intact [Affect] : the affect was normal

## 2021-06-03 ENCOUNTER — APPOINTMENT (OUTPATIENT)
Dept: INTERNAL MEDICINE | Facility: CLINIC | Age: 71
End: 2021-06-03
Payer: MEDICARE

## 2021-06-03 VITALS
TEMPERATURE: 98 F | WEIGHT: 160 LBS | DIASTOLIC BLOOD PRESSURE: 79 MMHG | OXYGEN SATURATION: 99 % | SYSTOLIC BLOOD PRESSURE: 155 MMHG | HEIGHT: 67 IN | HEART RATE: 77 BPM | BODY MASS INDEX: 25.11 KG/M2

## 2021-06-03 PROCEDURE — 99215 OFFICE O/P EST HI 40 MIN: CPT

## 2021-06-03 PROCEDURE — 99072 ADDL SUPL MATRL&STAF TM PHE: CPT

## 2021-06-06 ENCOUNTER — RX RENEWAL (OUTPATIENT)
Age: 71
End: 2021-06-06

## 2021-07-08 ENCOUNTER — RX RENEWAL (OUTPATIENT)
Age: 71
End: 2021-07-08

## 2021-07-18 NOTE — ED PROVIDER NOTE - SKIN, MLM
Skin normal color for race, warm, dry and intact. No evidence of rash. You can access the FollowMyHealth Patient Portal offered by [facility] by registering at the following website: [email or web address]. By joining [employer]’s FollowMyHealth portal, you will also be able to view your health information using other applications (apps) compatible with our system.

## 2021-07-19 ENCOUNTER — RX RENEWAL (OUTPATIENT)
Age: 71
End: 2021-07-19

## 2021-07-30 ENCOUNTER — RX RENEWAL (OUTPATIENT)
Age: 71
End: 2021-07-30

## 2021-08-31 ENCOUNTER — RX RENEWAL (OUTPATIENT)
Age: 71
End: 2021-08-31

## 2021-09-11 ENCOUNTER — LABORATORY RESULT (OUTPATIENT)
Age: 71
End: 2021-09-11

## 2021-09-14 ENCOUNTER — NON-APPOINTMENT (OUTPATIENT)
Age: 71
End: 2021-09-14

## 2021-09-14 LAB — TSH SERPL-ACNC: 5.5 UIU/ML

## 2021-09-17 ENCOUNTER — APPOINTMENT (OUTPATIENT)
Dept: NEPHROLOGY | Facility: CLINIC | Age: 71
End: 2021-09-17
Payer: MEDICARE

## 2021-09-17 VITALS
RESPIRATION RATE: 14 BRPM | OXYGEN SATURATION: 99 % | SYSTOLIC BLOOD PRESSURE: 154 MMHG | HEART RATE: 83 BPM | DIASTOLIC BLOOD PRESSURE: 74 MMHG

## 2021-09-17 LAB
24R-OH-CALCIDIOL SERPL-MCNC: 39 PG/ML
25(OH)D3 SERPL-MCNC: 53 NG/ML
ALBUMIN SERPL ELPH-MCNC: 4.6 G/DL
ANION GAP SERPL CALC-SCNC: 13 MMOL/L
APPEARANCE: CLEAR
BACTERIA: NEGATIVE
BASOPHILS # BLD AUTO: 0.03 K/UL
BASOPHILS NFR BLD AUTO: 0.4 %
BILIRUBIN URINE: NEGATIVE
BLOOD URINE: NORMAL
BUN SERPL-MCNC: 24 MG/DL
CALCIUM SERPL-MCNC: 9.3 MG/DL
CALCIUM SERPL-MCNC: 9.3 MG/DL
CHLORIDE SERPL-SCNC: 94 MMOL/L
CO2 SERPL-SCNC: 24 MMOL/L
COLOR: COLORLESS
CREAT SERPL-MCNC: 3.5 MG/DL
CREAT SPEC-SCNC: 37 MG/DL
CREAT SPEC-SCNC: 37 MG/DL
CREAT/PROT UR: 1.4 RATIO
CYSTATIN C SERPL-MCNC: 2.94 MG/L
EOSINOPHIL # BLD AUTO: 0.37 K/UL
EOSINOPHIL NFR BLD AUTO: 4.8 %
ESTIMATED AVERAGE GLUCOSE: 157 MG/DL
FERRITIN SERPL-MCNC: 118 NG/ML
GFR/BSA.PRED SERPLBLD CYS-BASED-ARV: 18 ML/MIN
GLUCOSE QUALITATIVE U: NEGATIVE
GLUCOSE SERPL-MCNC: 171 MG/DL
HBA1C MFR BLD HPLC: 7.1 %
HCT VFR BLD CALC: 39.5 %
HGB BLD-MCNC: 12.8 G/DL
HYALINE CASTS: 1 /LPF
IMM GRANULOCYTES NFR BLD AUTO: 0.4 %
IRON SATN MFR SERPL: 31 %
IRON SERPL-MCNC: 103 UG/DL
KETONES URINE: NEGATIVE
LEUKOCYTE ESTERASE URINE: NEGATIVE
LYMPHOCYTES # BLD AUTO: 2.85 K/UL
LYMPHOCYTES NFR BLD AUTO: 37.4 %
MAGNESIUM SERPL-MCNC: 2.1 MG/DL
MAN DIFF?: NORMAL
MCHC RBC-ENTMCNC: 29.4 PG
MCHC RBC-ENTMCNC: 32.4 GM/DL
MCV RBC AUTO: 90.8 FL
MICROALBUMIN 24H UR DL<=1MG/L-MCNC: 23.7 MG/DL
MICROALBUMIN/CREAT 24H UR-RTO: 643 MG/G
MICROSCOPIC-UA: NORMAL
MONOCYTES # BLD AUTO: 0.68 K/UL
MONOCYTES NFR BLD AUTO: 8.9 %
NEUTROPHILS # BLD AUTO: 3.67 K/UL
NEUTROPHILS NFR BLD AUTO: 48.1 %
NITRITE URINE: NEGATIVE
PARATHYROID HORMONE INTACT: 171 PG/ML
PH URINE: 6.5
PHOSPHATE SERPL-MCNC: 4.4 MG/DL
PLATELET # BLD AUTO: 277 K/UL
POTASSIUM SERPL-SCNC: 5.2 MMOL/L
PROT UR-MCNC: 52 MG/DL
PROTEIN URINE: ABNORMAL
RBC # BLD: 4.35 M/UL
RBC # FLD: 12.4 %
RED BLOOD CELLS URINE: 0 /HPF
SODIUM SERPL-SCNC: 131 MMOL/L
SPECIFIC GRAVITY URINE: 1.01
SQUAMOUS EPITHELIAL CELLS: 0 /HPF
TIBC SERPL-MCNC: 330 UG/DL
UIBC SERPL-MCNC: 227 UG/DL
URATE SERPL-MCNC: 6.8 MG/DL
UROBILINOGEN URINE: NORMAL
WBC # FLD AUTO: 7.63 K/UL
WHITE BLOOD CELLS URINE: 1 /HPF

## 2021-09-17 PROCEDURE — 99214 OFFICE O/P EST MOD 30 MIN: CPT

## 2021-09-17 NOTE — ASSESSMENT
[FreeTextEntry1] : 72yo New Zealander M with h/o acute on chronic hyponatremia related to chlorthalidone, HTN, BPH with urinary retention, long standing uncontrolled  DMII + retinopathy here for f/u of CKD IV\par \par # CKD IV w hyperkalemia, mild chronic hyponatremia and non nephrotic proteinuria -  likely 2/2 diabetic nephropathy\par -  baseline Cr 3.0-3.3 egfr ~17-22, consistent with cystatin C eGFR 18\par -  reviewed Aug labs with patient, stable renal fx \par - anemia mild and stable,Fe def and 2/2 renal disease\par - Nabicarbonate 650mg BID normalized his metabolic acidosis as well as hyperkalemia with kayexelate\par - DMII + retinopathy now better controlled, on lantus and tradjenta. \par -HTN uncontrolled\par - Secondary hyperparathyroidism - on vitamin D daily well controlled\par - met with [facility], has a matched donor however with eGFR ~17 and no uremia so asked pt to f/u with transplant center re: timing of transplant, explained they will likely wait to transplant until renal function declines. 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 \par - Renal sono, 10.5/10.7cm normal looking kidneys, mildly enlarged prostate with 170cc PVR\par \par Plan\par - increase amlodipine to 10mg daily, if this worsening LE edema can take daily torsemide\par RTC in 3 months for f/u\par

## 2021-09-17 NOTE — HISTORY OF PRESENT ILLNESS
[FreeTextEntry1] : 72yo Ugandan M with h/o acute hyponatremia related to chlorthalidone, HTN, BPH with urinary retention, long standing uncontrolled  DMII + retinopathy here for f/u of CKD IV \par \par Feeling well. No changes to weight. No edema or SOB. No uremic sx.\par Seeing transplant center. \par Doesn't check BP at home. Denies headaches, chest pressure, dizziness. \par No trouble urinating\par \par OTC: vitamin B12\par \par All other ROS negative

## 2021-09-17 NOTE — PHYSICAL EXAM
[General Appearance - Alert] : alert [General Appearance - In No Acute Distress] : in no acute distress [General Appearance - Well Nourished] : well nourished [Sclera] : the sclera and conjunctiva were normal [PERRL With Normal Accommodation] : pupils were equal in size, round, and reactive to light [Extraocular Movements] : extraocular movements were intact [Outer Ear] : the ears and nose were normal in appearance [Oropharynx] : the oropharynx was normal [Neck Appearance] : the appearance of the neck was normal [Jugular Venous Distention Increased] : there was no jugular-venous distention [Auscultation Breath Sounds / Voice Sounds] : lungs were clear to auscultation bilaterally [Heart Rate And Rhythm] : heart rate was normal and rhythm regular [Heart Sounds] : normal S1 and S2 [Heart Sounds Gallop] : no gallops [Murmurs] : no murmurs [Heart Sounds Pericardial Friction Rub] : no pericardial rub [Edema] : there was no peripheral edema [Bowel Sounds] : normal bowel sounds [Abdomen Soft] : soft [Abdomen Tenderness] : non-tender [No CVA Tenderness] : no ~M costovertebral angle tenderness [No Spinal Tenderness] : no spinal tenderness [Abnormal Walk] : normal gait [Involuntary Movements] : no involuntary movements were seen [Musculoskeletal - Swelling] : no joint swelling seen [Skin Color & Pigmentation] : normal skin color and pigmentation [] : no rash [No Focal Deficits] : no focal deficits [Oriented To Time, Place, And Person] : oriented to person, place, and time [Impaired Insight] : insight and judgment were intact [Affect] : the affect was normal [FreeTextEntry1] : no asterixis

## 2021-10-01 ENCOUNTER — RX RENEWAL (OUTPATIENT)
Age: 71
End: 2021-10-01

## 2021-10-14 ENCOUNTER — APPOINTMENT (OUTPATIENT)
Dept: INTERNAL MEDICINE | Facility: CLINIC | Age: 71
End: 2021-10-14
Payer: MEDICARE

## 2021-10-14 VITALS
HEIGHT: 67 IN | DIASTOLIC BLOOD PRESSURE: 81 MMHG | BODY MASS INDEX: 25.27 KG/M2 | WEIGHT: 161 LBS | TEMPERATURE: 97.1 F | HEART RATE: 87 BPM | SYSTOLIC BLOOD PRESSURE: 178 MMHG

## 2021-10-14 DIAGNOSIS — Z23 ENCOUNTER FOR IMMUNIZATION: ICD-10-CM

## 2021-10-14 PROCEDURE — G0008: CPT

## 2021-10-14 PROCEDURE — 99214 OFFICE O/P EST MOD 30 MIN: CPT | Mod: 25

## 2021-10-14 PROCEDURE — 90662 IIV NO PRSV INCREASED AG IM: CPT

## 2021-11-21 NOTE — PROGRESS NOTE ADULT - PROBLEM SELECTOR PLAN 10
Dr. Vasquez (nephrology)  Address: [address] [place] [address]  Phone: [phone]
Dr. Vasquez (nephrology)  Address: [address] [place] [address]  Phone: [phone]
None Done

## 2021-11-25 ENCOUNTER — RX RENEWAL (OUTPATIENT)
Age: 71
End: 2021-11-25

## 2021-11-26 ENCOUNTER — RX RENEWAL (OUTPATIENT)
Age: 71
End: 2021-11-26

## 2021-12-14 LAB
24R-OH-CALCIDIOL SERPL-MCNC: 34.6 PG/ML
25(OH)D3 SERPL-MCNC: 44.6 NG/ML
ALBUMIN SERPL ELPH-MCNC: 4.1 G/DL
ANION GAP SERPL CALC-SCNC: 15 MMOL/L
APPEARANCE: ABNORMAL
BACTERIA: NEGATIVE
BASOPHILS # BLD AUTO: 0.05 K/UL
BASOPHILS NFR BLD AUTO: 0.6 %
BILIRUBIN URINE: NEGATIVE
BLOOD URINE: NEGATIVE
BUN SERPL-MCNC: 28 MG/DL
CALCIUM SERPL-MCNC: 8.5 MG/DL
CALCIUM SERPL-MCNC: 8.5 MG/DL
CHLORIDE SERPL-SCNC: 102 MMOL/L
CO2 SERPL-SCNC: 22 MMOL/L
COLOR: NORMAL
CREAT SERPL-MCNC: 4.08 MG/DL
CREAT SPEC-SCNC: 42 MG/DL
CREAT SPEC-SCNC: 42 MG/DL
CREAT/PROT UR: 1.7 RATIO
CYSTATIN C SERPL-MCNC: 3.33 MG/L
EOSINOPHIL # BLD AUTO: 0.41 K/UL
EOSINOPHIL NFR BLD AUTO: 4.8 %
ESTIMATED AVERAGE GLUCOSE: 148 MG/DL
FERRITIN SERPL-MCNC: 126 NG/ML
GFR/BSA.PRED SERPLBLD CYS-BASED-ARV: 15 ML/MIN
GLUCOSE QUALITATIVE U: NEGATIVE
GLUCOSE SERPL-MCNC: 152 MG/DL
HBA1C MFR BLD HPLC: 6.8 %
HCT VFR BLD CALC: 35 %
HGB BLD-MCNC: 11.2 G/DL
HYALINE CASTS: 0 /LPF
IMM GRANULOCYTES NFR BLD AUTO: 0.2 %
IRON SATN MFR SERPL: 14 %
IRON SERPL-MCNC: 43 UG/DL
KETONES URINE: NEGATIVE
LEUKOCYTE ESTERASE URINE: NEGATIVE
LYMPHOCYTES # BLD AUTO: 2.75 K/UL
LYMPHOCYTES NFR BLD AUTO: 31.9 %
MAGNESIUM SERPL-MCNC: 2 MG/DL
MAN DIFF?: NORMAL
MCHC RBC-ENTMCNC: 29.2 PG
MCHC RBC-ENTMCNC: 32 GM/DL
MCV RBC AUTO: 91.1 FL
MICROALBUMIN 24H UR DL<=1MG/L-MCNC: 36.8 MG/DL
MICROALBUMIN/CREAT 24H UR-RTO: 869 MG/G
MICROSCOPIC-UA: NORMAL
MONOCYTES # BLD AUTO: 0.74 K/UL
MONOCYTES NFR BLD AUTO: 8.6 %
NEUTROPHILS # BLD AUTO: 4.65 K/UL
NEUTROPHILS NFR BLD AUTO: 53.9 %
NITRITE URINE: NEGATIVE
PARATHYROID HORMONE INTACT: 213 PG/ML
PH URINE: 7
PHOSPHATE SERPL-MCNC: 4.4 MG/DL
PLATELET # BLD AUTO: 317 K/UL
POTASSIUM SERPL-SCNC: 4.6 MMOL/L
PROT UR-MCNC: 70 MG/DL
PROTEIN URINE: ABNORMAL
RBC # BLD: 3.84 M/UL
RBC # FLD: 12.6 %
RED BLOOD CELLS URINE: 0 /HPF
SODIUM SERPL-SCNC: 140 MMOL/L
SPECIFIC GRAVITY URINE: 1.01
SQUAMOUS EPITHELIAL CELLS: 0 /HPF
TIBC SERPL-MCNC: 297 UG/DL
UIBC SERPL-MCNC: 255 UG/DL
URATE SERPL-MCNC: 7.5 MG/DL
UROBILINOGEN URINE: NORMAL
WBC # FLD AUTO: 8.62 K/UL
WHITE BLOOD CELLS URINE: 1 /HPF

## 2021-12-17 ENCOUNTER — APPOINTMENT (OUTPATIENT)
Dept: NEPHROLOGY | Facility: CLINIC | Age: 71
End: 2021-12-17
Payer: MEDICARE

## 2021-12-17 VITALS — DIASTOLIC BLOOD PRESSURE: 68 MMHG | SYSTOLIC BLOOD PRESSURE: 144 MMHG

## 2021-12-17 VITALS
HEART RATE: 90 BPM | DIASTOLIC BLOOD PRESSURE: 80 MMHG | RESPIRATION RATE: 16 BRPM | SYSTOLIC BLOOD PRESSURE: 150 MMHG | OXYGEN SATURATION: 99 %

## 2021-12-17 DIAGNOSIS — E55.9 VITAMIN D DEFICIENCY, UNSPECIFIED: ICD-10-CM

## 2021-12-17 PROCEDURE — 99214 OFFICE O/P EST MOD 30 MIN: CPT

## 2021-12-22 NOTE — HISTORY OF PRESENT ILLNESS
[FreeTextEntry1] : 72yo with h/o acute hyponatremia related to chlorthalidone, HTN, BPH with urinary retention, long standing uncontrolled  DMII + retinopathy here for f/u of CKD IV/V\par \par Feeling well. LE edema which is new, and some mild COBB as well. No orthopnea.  No changes in urination. sbp 150-160s at home. \par \par OTC: vitamin B12\par \par [place]: born in [place]. No tobacco/drug use\par \par All other ROS negative

## 2021-12-22 NOTE — ASSESSMENT
[FreeTextEntry1] : 70yo with h/o acute hyponatremia related to chlorthalidone, HTN, BPH with urinary retention, long standing uncontrolled  DMII + retinopathy here for f/u of CKD IV/V\par \par # CKD IV/V w hyperkalemia, mild chronic hyponatremia and non nephrotic proteinuria -  likely 2/2 diabetic nephropathy\par -  baseline Cr 3.0-3.3 egfr ~17-22, consistent with cystatin C eGFR 18\par - reviewed dec labs with patient, cystatin C based eGFR 15ml/min, down from 18 likely due to increasing ramipril. More edema, volume overloaded\par - anemia mild and stable,Fe def and 2/2 renal disease\par - Nabicarbonate 650mg BID normalized his metabolic acidosis as well as hyperkalemia with kayexelate\par - DMII + retinopathy now better controlled, on lantus and tradjenta. \par -HTN uncontrolled\par - Secondary hyperparathyroidism - on vitamin D daily well controlled\par - met with [facility], has a matched donor however with eGFR ~17 and no uremia so asked pt to f/u with transplant center re: timing of transplant, explained they will likely wait to transplant until renal function declines. 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 \par - Renal sono, 10.5/10.7cm normal looking kidneys, mildly enlarged prostate with 170cc PVR\par \par Plan\par - increase torsemide to 20mg daily\par \par RTC in 2-3 months for f/u\par

## 2021-12-31 ENCOUNTER — RX RENEWAL (OUTPATIENT)
Age: 71
End: 2021-12-31

## 2022-01-31 ENCOUNTER — INPATIENT (INPATIENT)
Facility: HOSPITAL | Age: 72
LOS: 1 days | Discharge: HOME | DRG: 291 | End: 2022-02-02
Attending: INTERNAL MEDICINE | Admitting: INTERNAL MEDICINE
Payer: MEDICARE

## 2022-01-31 ENCOUNTER — RX RENEWAL (OUTPATIENT)
Age: 72
End: 2022-01-31

## 2022-01-31 VITALS
HEART RATE: 100 BPM | WEIGHT: 160.06 LBS | TEMPERATURE: 98 F | DIASTOLIC BLOOD PRESSURE: 75 MMHG | SYSTOLIC BLOOD PRESSURE: 161 MMHG | HEIGHT: 67 IN | RESPIRATION RATE: 18 BRPM | OXYGEN SATURATION: 94 %

## 2022-01-31 DIAGNOSIS — E11.9 TYPE 2 DIABETES MELLITUS WITHOUT COMPLICATIONS: ICD-10-CM

## 2022-01-31 DIAGNOSIS — Z29.9 ENCOUNTER FOR PROPHYLACTIC MEASURES, UNSPECIFIED: ICD-10-CM

## 2022-01-31 DIAGNOSIS — I50.9 HEART FAILURE, UNSPECIFIED: ICD-10-CM

## 2022-01-31 DIAGNOSIS — E78.5 HYPERLIPIDEMIA, UNSPECIFIED: ICD-10-CM

## 2022-01-31 DIAGNOSIS — I10 ESSENTIAL (PRIMARY) HYPERTENSION: ICD-10-CM

## 2022-01-31 LAB — GLUCOSE BLDC GLUCOMTR-MCNC: 187 MG/DL — HIGH (ref 70–99)

## 2022-01-31 PROCEDURE — 99285 EMERGENCY DEPT VISIT HI MDM: CPT | Mod: 25

## 2022-01-31 PROCEDURE — 93010 ELECTROCARDIOGRAM REPORT: CPT

## 2022-01-31 PROCEDURE — 71045 X-RAY EXAM CHEST 1 VIEW: CPT | Mod: 26

## 2022-01-31 PROCEDURE — 71250 CT THORAX DX C-: CPT | Mod: 26,MA

## 2022-01-31 RX ORDER — SODIUM POLYSTYRENE SULFONATE 4.1 MEQ/G
15 POWDER, FOR SUSPENSION ORAL DAILY
Refills: 0 | Status: DISCONTINUED | OUTPATIENT
Start: 2022-01-31 | End: 2022-01-31

## 2022-01-31 RX ORDER — FUROSEMIDE 40 MG
80 TABLET ORAL ONCE
Refills: 0 | Status: COMPLETED | OUTPATIENT
Start: 2022-01-31 | End: 2022-01-31

## 2022-01-31 RX ORDER — INSULIN LISPRO 100/ML
VIAL (ML) SUBCUTANEOUS AT BEDTIME
Refills: 0 | Status: DISCONTINUED | OUTPATIENT
Start: 2022-01-31 | End: 2022-02-02

## 2022-01-31 RX ORDER — SODIUM CHLORIDE 9 MG/ML
1000 INJECTION, SOLUTION INTRAVENOUS
Refills: 0 | Status: DISCONTINUED | OUTPATIENT
Start: 2022-01-31 | End: 2022-02-02

## 2022-01-31 RX ORDER — INSULIN GLARGINE 100 [IU]/ML
20 INJECTION, SOLUTION SUBCUTANEOUS AT BEDTIME
Refills: 0 | Status: DISCONTINUED | OUTPATIENT
Start: 2022-02-01 | End: 2022-02-02

## 2022-01-31 RX ORDER — ERGOCALCIFEROL 1.25 MG/1
0 CAPSULE ORAL
Qty: 0 | Refills: 0 | DISCHARGE

## 2022-01-31 RX ORDER — INSULIN LISPRO 100/ML
VIAL (ML) SUBCUTANEOUS
Refills: 0 | Status: DISCONTINUED | OUTPATIENT
Start: 2022-01-31 | End: 2022-02-02

## 2022-01-31 RX ORDER — DEXTROSE 50 % IN WATER 50 %
12.5 SYRINGE (ML) INTRAVENOUS ONCE
Refills: 0 | Status: DISCONTINUED | OUTPATIENT
Start: 2022-01-31 | End: 2022-02-02

## 2022-01-31 RX ORDER — FUROSEMIDE 40 MG
40 TABLET ORAL
Refills: 0 | Status: DISCONTINUED | OUTPATIENT
Start: 2022-01-31 | End: 2022-02-01

## 2022-01-31 RX ORDER — DEXTROSE 50 % IN WATER 50 %
25 SYRINGE (ML) INTRAVENOUS ONCE
Refills: 0 | Status: DISCONTINUED | OUTPATIENT
Start: 2022-01-31 | End: 2022-02-02

## 2022-01-31 RX ORDER — DEXTROSE 50 % IN WATER 50 %
15 SYRINGE (ML) INTRAVENOUS ONCE
Refills: 0 | Status: DISCONTINUED | OUTPATIENT
Start: 2022-01-31 | End: 2022-02-02

## 2022-01-31 RX ORDER — HEPARIN SODIUM 5000 [USP'U]/ML
5000 INJECTION INTRAVENOUS; SUBCUTANEOUS EVERY 8 HOURS
Refills: 0 | Status: DISCONTINUED | OUTPATIENT
Start: 2022-01-31 | End: 2022-02-02

## 2022-01-31 RX ORDER — AMLODIPINE BESYLATE 2.5 MG/1
1 TABLET ORAL
Qty: 0 | Refills: 0 | DISCHARGE

## 2022-01-31 RX ORDER — GLUCAGON INJECTION, SOLUTION 0.5 MG/.1ML
1 INJECTION, SOLUTION SUBCUTANEOUS ONCE
Refills: 0 | Status: DISCONTINUED | OUTPATIENT
Start: 2022-01-31 | End: 2022-02-02

## 2022-01-31 RX ORDER — LISINOPRIL 2.5 MG/1
40 TABLET ORAL DAILY
Refills: 0 | Status: DISCONTINUED | OUTPATIENT
Start: 2022-01-31 | End: 2022-02-02

## 2022-01-31 RX ORDER — METFORMIN HYDROCHLORIDE 850 MG/1
1 TABLET ORAL
Qty: 0 | Refills: 0 | DISCHARGE

## 2022-01-31 RX ORDER — ASPIRIN/CALCIUM CARB/MAGNESIUM 324 MG
81 TABLET ORAL DAILY
Refills: 0 | Status: DISCONTINUED | OUTPATIENT
Start: 2022-01-31 | End: 2022-02-02

## 2022-01-31 RX ORDER — FERROUS SULFATE 325(65) MG
325 TABLET ORAL DAILY
Refills: 0 | Status: DISCONTINUED | OUTPATIENT
Start: 2022-01-31 | End: 2022-02-02

## 2022-01-31 RX ORDER — SODIUM BICARBONATE 1 MEQ/ML
650 SYRINGE (ML) INTRAVENOUS
Refills: 0 | Status: DISCONTINUED | OUTPATIENT
Start: 2022-01-31 | End: 2022-02-02

## 2022-01-31 RX ORDER — PANTOPRAZOLE SODIUM 20 MG/1
40 TABLET, DELAYED RELEASE ORAL
Refills: 0 | Status: DISCONTINUED | OUTPATIENT
Start: 2022-01-31 | End: 2022-02-02

## 2022-01-31 RX ORDER — ATORVASTATIN CALCIUM 80 MG/1
20 TABLET, FILM COATED ORAL AT BEDTIME
Refills: 0 | Status: DISCONTINUED | OUTPATIENT
Start: 2022-01-31 | End: 2022-02-02

## 2022-01-31 RX ORDER — INSULIN GLARGINE 100 [IU]/ML
10 INJECTION, SOLUTION SUBCUTANEOUS ONCE
Refills: 0 | Status: COMPLETED | OUTPATIENT
Start: 2022-01-31 | End: 2022-01-31

## 2022-01-31 RX ADMIN — Medication 80 MILLIGRAM(S): at 18:20

## 2022-01-31 RX ADMIN — INSULIN GLARGINE 10 UNIT(S): 100 INJECTION, SOLUTION SUBCUTANEOUS at 22:49

## 2022-01-31 RX ADMIN — ATORVASTATIN CALCIUM 20 MILLIGRAM(S): 80 TABLET, FILM COATED ORAL at 23:32

## 2022-01-31 NOTE — H&P ADULT - HISTORY OF PRESENT ILLNESS
This is a 73yo M h/o HTN, HLD, DM2 and CKD 4 who presents with 8 to 10 days of dyspnea on minimal exertion with general "sluggishness and fatigue" and worsening bilateral lower extremity edema to thighs.  Pt reports chronic edema of bilat shins but never to the thighs and abdomen; reports prior mild SOB on exertion but states this is much more severe.  Pt reports his pants are not fitting anymore. He denies CP, palpitations, fever, chills, cough, PND, orthopnea or syncope.      In the ED, CXR showed bilat pleural effusion R>L.  CT non contrast with bilat moderate pleural effusions and patchy and nodular groundglass opacities in the left upper lobe could be due to alveolar edema but cannot exclude atypical infection.  Trop negative x 1, BNP 2702.   Pt was seen by Dr. Vasquez in the ED who reported Cr was unchanged and symptoms unlikely due to renal insufficiency; recommended cardiology admission. Pt was treated with lasix 80mg IVP x 1 and is now admitted for further management of acute CHF.  He reports last stress test was WNL one year ago, cannot remember prior echo.

## 2022-01-31 NOTE — H&P ADULT - RS GEN PE MLT RESP DETAILS PC
respirations non-labored/no rhonchi/no wheezes/diminished breath sounds, L/diminished breath sounds, R/rales

## 2022-01-31 NOTE — H&P ADULT - PROBLEM SELECTOR PLAN 1
Admit for tele monitoring, serial enzymes, IV diuresis.  Obtain echo. Daily weight, I/Os. Consider ischemic workup (obtain stress test from one year ago). Continue ACEI.

## 2022-01-31 NOTE — ED PROVIDER NOTE - NS ED ROS FT
Constitutional: No fever or chills.   Eyes: No pain, blurry vision, or discharge.  ENMT: No hearing changes, pain, discharge or infections. No neck pain or stiffness.  Cardiac: No chest pain, SOB or edema. No chest pain with exertion.  Respiratory: + Shortness of breath.  No cough or respiratory distress. No hemoptysis.   GI: No nausea, vomiting, diarrhea or abdominal pain.  : No dysuria, frequency or burning.  MS: No myalgia, muscle weakness, joint pain or back pain.  Neuro: No headache or weakness. No LOC.  Skin: No skin rash.   Endocrine: No history of thyroid disease or diabetes.  Except as documented in the HPI, all other systems are negative.

## 2022-01-31 NOTE — ED PROVIDER NOTE - OBJECTIVE STATEMENT
72 year old male with PMHx CKD, DM, HLD and HTN presents to ED with concern for shortness of breath ongoing x 1 week. 72 year old male with PMHx CKD, DM, HLD and HTN presents to ED with concern for shortness of breath ongoing x 1 week.  He notes associated increased swelling to bilateral LEs.  Patient states he contacted his nephrologist, Dr. Vasquez today and was instructed to come to ED for further evaluation.  Patient denies associated fever, chills, headache, visual changes, chest pain, abdominal pain, nausea, emesis, changes to bowel movements, calf pain/tenderness, recent travel, known sick contacts or any additional acute complaints or concerns at this time.

## 2022-01-31 NOTE — ED PROVIDER NOTE - CARE PLAN
Principal Discharge DX:	Shortness of breath   1 Principal Discharge DX:	Shortness of breath  Secondary Diagnosis:	Pleural effusion

## 2022-01-31 NOTE — H&P ADULT - NSHPLABSRESULTS_GEN_ALL_CORE
10.2   8.88  )-----------( 300      ( 31 Jan 2022 16:47 )             30.0     01-31    129<L>  |  92<L>  |  22  ----------------------------<  226<H>  3.8   |  22  |  3.66<H>    Ca    7.9<L>      31 Jan 2022 16:47  Mg     1.8     01-31    TPro  7.3  /  Alb  4.0  /  TBili  0.5  /  DBili  x   /  AST  14  /  ALT  11  /  AlkPhos  109  01-31        PT/INR - ( 31 Jan 2022 16:47 )   PT: 12.4 sec;   INR: 1.04        PTT - ( 31 Jan 2022 16:47 )  PTT:33.7 sec    CARDIAC MARKERS ( 31 Jan 2022 16:47 )  x     / 0.01 ng/mL / x     / x     / x

## 2022-01-31 NOTE — H&P ADULT - EXTREMITIES COMMENTS
3+ pitting edema bilat LE to knees with 2+ pitting edema above knees bilat   +CVS skin changes bilat shins with early skin changes seen on lateral thighs bilat

## 2022-01-31 NOTE — PATIENT PROFILE ADULT - FALL HARM RISK - HARM RISK INTERVENTIONS

## 2022-01-31 NOTE — H&P ADULT - PROBLEM SELECTOR PLAN 5
Diet: DASH/ADA/Renal, npo p MN x meds for possible testing  VTE PPX: HSQ  Code status: full (presumed)  dispo: home when stable

## 2022-01-31 NOTE — ED PROVIDER NOTE - PHYSICAL EXAMINATION
VITAL SIGNS: I have reviewed nursing notes and confirm.  CONSTITUTIONAL: Non toxic; in no acute distress.   SKIN:  Warm and dry, no acute rash.   HEAD:  Normocephalic, atraumatic.  EYES: PERRL.  EOM intact; conjunctiva and sclera clear.  ENT: No nasal discharge; airway clear.   NECK: Supple; non tender.  CARD: S1, S2 normal; no murmurs, gallops, or rubs. Regular rate and rhythm.   RESP:  Clear to auscultation b/l, no wheezes, rales or rhonchi.  ABD: Normal bowel sounds; soft; non-distended; non-tender; no guarding/rebound.  EXT: 2+ pitting edema to bilateral LEs.  Normal ROM. No clubbing, no cyanosis.  NEURO: Alert, oriented, grossly unremarkable.  5/5 strength x 4 extremities against gravity and external force.  No drift x 4 extremities.  Sensation intact and symmetric x 4 extremities.  No facial asymmetry.    PSYCH: Cooperative, mood and affect appropriate.

## 2022-01-31 NOTE — PATIENT PROFILE ADULT - FUNCTIONAL ASSESSMENT - DAILY ACTIVITY 2.
11/8 Free T4 0.6 (low); TSH 4.748 (wnl)  NBS  Still pending 11/9 am  Discussed with Dr. Lennon     Plan:  Continue to follow NBS from 11/5 results.  Follow up TFT's in one week.      3 = A little assistance

## 2022-01-31 NOTE — ED PROVIDER NOTE - CLINICAL SUMMARY MEDICAL DECISION MAKING FREE TEXT BOX
Patient in ED w concern for shortness of breath over the past week.  Patient in NAD on arrival to ED, notes dyspnea worse with any exertion.  Sent to ED by Dr. Vasquez today.  Labs, imaging ordered and all results are reviewed. Patient in ED w concern for shortness of breath over the past week.  Patient in NAD on arrival to ED, notes dyspnea worse with any exertion.  Sent to ED by Dr. Vasquez today.  Labs, imaging ordered and all results are reviewed.  Bilateral pleural effusions, elevated BNP noted.  Clinically, patient exam consistent with HF given SOB, worsening peripheral edema.  Patient is given IV lasix and Dr. Vasquez in ED to evaluate.  Dr Vasquez agreeable with plan for cardiology admission for diuresis and will follow in consultation.  Cr stable.  Patient is aware of plan and in agreement.  Will admit at this time.

## 2022-01-31 NOTE — ED PROVIDER NOTE - IV ALTEPLASE INCLUSION HIDDEN
Pt appeared to sleep approximately 6.75 Hours during the night, on Status Individual Observation.   show

## 2022-01-31 NOTE — ED ADULT TRIAGE NOTE - CHIEF COMPLAINT QUOTE
Pt presents to ED c/o shortness of breath x 1 week. SOB worse on exertion. Denies chest pain, fevers, chills. 12 lead EKG done.

## 2022-02-01 LAB
A1C WITH ESTIMATED AVERAGE GLUCOSE RESULT: 6.3 % — HIGH (ref 4–5.6)
ALBUMIN SERPL ELPH-MCNC: 3.7 G/DL — SIGNIFICANT CHANGE UP (ref 3.3–5)
ALP SERPL-CCNC: 103 U/L — SIGNIFICANT CHANGE UP (ref 40–120)
ALT FLD-CCNC: 12 U/L — SIGNIFICANT CHANGE UP (ref 10–45)
ANION GAP SERPL CALC-SCNC: 13 MMOL/L — SIGNIFICANT CHANGE UP (ref 5–17)
ANION GAP SERPL CALC-SCNC: 13 MMOL/L — SIGNIFICANT CHANGE UP (ref 5–17)
AST SERPL-CCNC: 16 U/L — SIGNIFICANT CHANGE UP (ref 10–40)
BILIRUB SERPL-MCNC: 0.5 MG/DL — SIGNIFICANT CHANGE UP (ref 0.2–1.2)
BUN SERPL-MCNC: 25 MG/DL — HIGH (ref 7–23)
BUN SERPL-MCNC: 26 MG/DL — HIGH (ref 7–23)
CALCIUM SERPL-MCNC: 7.6 MG/DL — LOW (ref 8.4–10.5)
CALCIUM SERPL-MCNC: 7.7 MG/DL — LOW (ref 8.4–10.5)
CHLORIDE SERPL-SCNC: 97 MMOL/L — SIGNIFICANT CHANGE UP (ref 96–108)
CHLORIDE SERPL-SCNC: 99 MMOL/L — SIGNIFICANT CHANGE UP (ref 96–108)
CHOLEST SERPL-MCNC: 88 MG/DL — SIGNIFICANT CHANGE UP
CO2 SERPL-SCNC: 24 MMOL/L — SIGNIFICANT CHANGE UP (ref 22–31)
CO2 SERPL-SCNC: 24 MMOL/L — SIGNIFICANT CHANGE UP (ref 22–31)
CREAT SERPL-MCNC: 3.74 MG/DL — HIGH (ref 0.5–1.3)
CREAT SERPL-MCNC: 4.01 MG/DL — HIGH (ref 0.5–1.3)
ESTIMATED AVERAGE GLUCOSE: 134 MG/DL — HIGH (ref 68–114)
GLUCOSE BLDC GLUCOMTR-MCNC: 102 MG/DL — HIGH (ref 70–99)
GLUCOSE BLDC GLUCOMTR-MCNC: 127 MG/DL — HIGH (ref 70–99)
GLUCOSE BLDC GLUCOMTR-MCNC: 139 MG/DL — HIGH (ref 70–99)
GLUCOSE BLDC GLUCOMTR-MCNC: 151 MG/DL — HIGH (ref 70–99)
GLUCOSE SERPL-MCNC: 117 MG/DL — HIGH (ref 70–99)
GLUCOSE SERPL-MCNC: 136 MG/DL — HIGH (ref 70–99)
HCT VFR BLD CALC: 28 % — LOW (ref 39–50)
HDLC SERPL-MCNC: 47 MG/DL — SIGNIFICANT CHANGE UP
HGB BLD-MCNC: 9.4 G/DL — LOW (ref 13–17)
LIPID PNL WITH DIRECT LDL SERPL: 27 MG/DL — SIGNIFICANT CHANGE UP
MAGNESIUM SERPL-MCNC: 1.8 MG/DL — SIGNIFICANT CHANGE UP (ref 1.6–2.6)
MCHC RBC-ENTMCNC: 28.6 PG — SIGNIFICANT CHANGE UP (ref 27–34)
MCHC RBC-ENTMCNC: 33.6 GM/DL — SIGNIFICANT CHANGE UP (ref 32–36)
MCV RBC AUTO: 85.1 FL — SIGNIFICANT CHANGE UP (ref 80–100)
NON HDL CHOLESTEROL: 41 MG/DL — SIGNIFICANT CHANGE UP
NRBC # BLD: 0 /100 WBCS — SIGNIFICANT CHANGE UP (ref 0–0)
PHOSPHATE SERPL-MCNC: 4.6 MG/DL — HIGH (ref 2.5–4.5)
PLATELET # BLD AUTO: 263 K/UL — SIGNIFICANT CHANGE UP (ref 150–400)
POTASSIUM SERPL-MCNC: 3.3 MMOL/L — LOW (ref 3.5–5.3)
POTASSIUM SERPL-MCNC: 3.8 MMOL/L — SIGNIFICANT CHANGE UP (ref 3.5–5.3)
POTASSIUM SERPL-SCNC: 3.3 MMOL/L — LOW (ref 3.5–5.3)
POTASSIUM SERPL-SCNC: 3.8 MMOL/L — SIGNIFICANT CHANGE UP (ref 3.5–5.3)
PROT SERPL-MCNC: 7.1 G/DL — SIGNIFICANT CHANGE UP (ref 6–8.3)
RBC # BLD: 3.29 M/UL — LOW (ref 4.2–5.8)
RBC # FLD: 12.6 % — SIGNIFICANT CHANGE UP (ref 10.3–14.5)
SODIUM SERPL-SCNC: 134 MMOL/L — LOW (ref 135–145)
SODIUM SERPL-SCNC: 136 MMOL/L — SIGNIFICANT CHANGE UP (ref 135–145)
TRIGL SERPL-MCNC: 68 MG/DL — SIGNIFICANT CHANGE UP
TROPONIN T SERPL-MCNC: 0.01 NG/ML — SIGNIFICANT CHANGE UP (ref 0–0.01)
WBC # BLD: 6.46 K/UL — SIGNIFICANT CHANGE UP (ref 3.8–10.5)
WBC # FLD AUTO: 6.46 K/UL — SIGNIFICANT CHANGE UP (ref 3.8–10.5)

## 2022-02-01 PROCEDURE — 93306 TTE W/DOPPLER COMPLETE: CPT | Mod: 26

## 2022-02-01 PROCEDURE — 99222 1ST HOSP IP/OBS MODERATE 55: CPT

## 2022-02-01 RX ORDER — POTASSIUM CHLORIDE 20 MEQ
40 PACKET (EA) ORAL ONCE
Refills: 0 | Status: COMPLETED | OUTPATIENT
Start: 2022-02-01 | End: 2022-02-02

## 2022-02-01 RX ORDER — FUROSEMIDE 40 MG
40 TABLET ORAL ONCE
Refills: 0 | Status: COMPLETED | OUTPATIENT
Start: 2022-02-01 | End: 2022-02-01

## 2022-02-01 RX ORDER — FUROSEMIDE 40 MG
80 TABLET ORAL EVERY 12 HOURS
Refills: 0 | Status: DISCONTINUED | OUTPATIENT
Start: 2022-02-01 | End: 2022-02-02

## 2022-02-01 RX ORDER — AMLODIPINE BESYLATE 2.5 MG/1
10 TABLET ORAL DAILY
Refills: 0 | Status: DISCONTINUED | OUTPATIENT
Start: 2022-02-01 | End: 2022-02-02

## 2022-02-01 RX ORDER — MAGNESIUM OXIDE 400 MG ORAL TABLET 241.3 MG
800 TABLET ORAL ONCE
Refills: 0 | Status: COMPLETED | OUTPATIENT
Start: 2022-02-01 | End: 2022-02-01

## 2022-02-01 RX ORDER — POTASSIUM CHLORIDE 20 MEQ
40 PACKET (EA) ORAL EVERY 4 HOURS
Refills: 0 | Status: COMPLETED | OUTPATIENT
Start: 2022-02-01 | End: 2022-02-01

## 2022-02-01 RX ADMIN — Medication 40 MILLIEQUIVALENT(S): at 09:35

## 2022-02-01 RX ADMIN — Medication 81 MILLIGRAM(S): at 13:31

## 2022-02-01 RX ADMIN — Medication 650 MILLIGRAM(S): at 06:06

## 2022-02-01 RX ADMIN — Medication 80 MILLIGRAM(S): at 21:26

## 2022-02-01 RX ADMIN — Medication 325 MILLIGRAM(S): at 13:30

## 2022-02-01 RX ADMIN — HEPARIN SODIUM 5000 UNIT(S): 5000 INJECTION INTRAVENOUS; SUBCUTANEOUS at 06:06

## 2022-02-01 RX ADMIN — AMLODIPINE BESYLATE 10 MILLIGRAM(S): 2.5 TABLET ORAL at 17:43

## 2022-02-01 RX ADMIN — Medication 40 MILLIGRAM(S): at 06:06

## 2022-02-01 RX ADMIN — MAGNESIUM OXIDE 400 MG ORAL TABLET 800 MILLIGRAM(S): 241.3 TABLET ORAL at 09:35

## 2022-02-01 RX ADMIN — ATORVASTATIN CALCIUM 20 MILLIGRAM(S): 80 TABLET, FILM COATED ORAL at 21:26

## 2022-02-01 RX ADMIN — Medication 650 MILLIGRAM(S): at 17:44

## 2022-02-01 RX ADMIN — HEPARIN SODIUM 5000 UNIT(S): 5000 INJECTION INTRAVENOUS; SUBCUTANEOUS at 21:27

## 2022-02-01 RX ADMIN — Medication 40 MILLIGRAM(S): at 09:34

## 2022-02-01 RX ADMIN — Medication 40 MILLIEQUIVALENT(S): at 13:31

## 2022-02-01 RX ADMIN — LISINOPRIL 40 MILLIGRAM(S): 2.5 TABLET ORAL at 06:06

## 2022-02-01 RX ADMIN — HEPARIN SODIUM 5000 UNIT(S): 5000 INJECTION INTRAVENOUS; SUBCUTANEOUS at 13:30

## 2022-02-01 RX ADMIN — PANTOPRAZOLE SODIUM 40 MILLIGRAM(S): 20 TABLET, DELAYED RELEASE ORAL at 06:06

## 2022-02-01 RX ADMIN — Medication 2: at 17:44

## 2022-02-01 NOTE — PROGRESS NOTE ADULT - PROBLEM SELECTOR PLAN 1
decreased breath sounds in bases, +1 LE edema  Trop neg x 1, BNP 2700  CT chest  Mild cardiomegaly. Moderate size bilateral pleural effusion. Thickened interlobular septae suggestive of interstitial edema Patchy and nodular groundglass opacities in the left upper lobe could be due to alveolar edema but cannot exclude atypical infection.  Echo oending  CONTINUE Lasix 80mg IV BID  Continue lisinopril 40mg (home), will need Beta blocker, home diuretic torsemide 20mg    Daily weight, strict I & Os, 1l fluid restriction  Had outpatient stress testing, obtain records if echo abnormal

## 2022-02-01 NOTE — PROGRESS NOTE ADULT - SUBJECTIVE AND OBJECTIVE BOX
Interventional Cardiology PA Adult Progress Note    Subjective Assessment: Patient seen and examined at bedside, feeling better, still with shortness of breath with walking. Denies chest pain or palpitations.   	  MEDICATIONS:  furosemide   Injectable 80 milliGRAM(s) IV Push every 12 hours  furosemide   Injectable 40 milliGRAM(s) IV Push two times a day  lisinopril 40 milliGRAM(s) Oral daily  pantoprazole    Tablet 40 milliGRAM(s) Oral before breakfast  atorvastatin 20 milliGRAM(s) Oral at bedtime  dextrose 40% Gel 15 Gram(s) Oral once  dextrose 50% Injectable 25 Gram(s) IV Push once  dextrose 50% Injectable 12.5 Gram(s) IV Push once  dextrose 50% Injectable 25 Gram(s) IV Push once  glucagon  Injectable 1 milliGRAM(s) IntraMuscular once  insulin glargine Injectable (LANTUS) 20 Unit(s) SubCutaneous at bedtime  insulin lispro (ADMELOG) corrective regimen sliding scale   SubCutaneous three times a day before meals  insulin lispro (ADMELOG) corrective regimen sliding scale   SubCutaneous at bedtime  aspirin enteric coated 81 milliGRAM(s) Oral daily  dextrose 5%. 1000 milliLiter(s) IV Continuous <Continuous>  dextrose 5%. 1000 milliLiter(s) IV Continuous <Continuous>  ferrous    sulfate 325 milliGRAM(s) Oral daily  heparin   Injectable 5000 Unit(s) SubCutaneous every 8 hours  potassium chloride    Tablet ER 40 milliEquivalent(s) Oral every 4 hours  sodium bicarbonate 650 milliGRAM(s) Oral two times a day      	    [PHYSICAL EXAM:  TELEMETRY:  T(C): 36.6 (02-01-22 @ 09:13), Max: 36.8 (01-31-22 @ 21:41)  HR: 87 (02-01-22 @ 08:37) (83 - 100)  BP: 150/72 (02-01-22 @ 08:37) (140/73 - 166/76)  RR: 18 (02-01-22 @ 08:37) (17 - 19)  SpO2: 95% (02-01-22 @ 08:37) (94% - 97%)  Wt(kg): --  I&O's Summary    31 Jan 2022 07:01  -  01 Feb 2022 07:00  --------------------------------------------------------  IN: 0 mL / OUT: 550 mL / NET: -550 mL    01 Feb 2022 07:01  -  01 Feb 2022 10:41  --------------------------------------------------------  IN: 0 mL / OUT: 700 mL / NET: -700 mL      Height (cm): 170.2 (01-31 @ 16:06)  Weight (kg): 72.6 (01-31 @ 16:06)  BMI (kg/m2): 25.1 (01-31 @ 16:06)  BSA (m2): 1.84 (01-31 @ 16:06)  Jewell:  Central/PICC/Mid Line:                                         Appearance: well appearing in no acute distress	  Neck: + JVD  Cardiovascular: Normal S1 S2, No JVD, No murmurs,   Respiratory: decreased breath sounds in bases b/l   Skin: No rashes, No ecchymoses, No cyanosis  Extremities: Normal range of motion, No clubbing, cyanosis or edema  Vascular: Peripheral pulses palpable 2+ bilaterally  Neurologic: Non-focal  Psychiatry: A & O x 3, Mood & affect appropriate                            9.4    6.46  )-----------( 263      ( 01 Feb 2022 06:39 )             28.0     02-01    134<L>  |  97  |  25<H>  ----------------------------<  117<H>  3.3<L>   |  24  |  3.74<H>    Ca    7.6<L>      01 Feb 2022 06:39  Phos  4.6     02-01  Mg     1.8     02-01    TPro  7.3  /  Alb  4.0  /  TBili  0.5  /  DBili  x   /  AST  14  /  ALT  11  /  AlkPhos  109  01-31    proBNP: Serum Pro-Brain Natriuretic Peptide: 2702 pg/mL (01-31 @ 16:47)    Lipid Profile:   HgA1c:   TSH:   PT/INR - ( 31 Jan 2022 16:47 )   PT: 12.4 sec;   INR: 1.04          PTT - ( 31 Jan 2022 16:47 )  PTT:33.7 sec    ASSESSMENT/PLAN: 	        DVT ppx:  Dispo:     Interventional Cardiology PA Adult Progress Note    Subjective Assessment: Patient seen and examined at bedside, feeling better, still with shortness of breath with walking. Denies chest pain or palpitations.   	  MEDICATIONS:  furosemide   Injectable 80 milliGRAM(s) IV Push every 12 hours  furosemide   Injectable 40 milliGRAM(s) IV Push two times a day  lisinopril 40 milliGRAM(s) Oral daily  pantoprazole    Tablet 40 milliGRAM(s) Oral before breakfast  atorvastatin 20 milliGRAM(s) Oral at bedtime  dextrose 40% Gel 15 Gram(s) Oral once  dextrose 50% Injectable 25 Gram(s) IV Push once  dextrose 50% Injectable 12.5 Gram(s) IV Push once  dextrose 50% Injectable 25 Gram(s) IV Push once  glucagon  Injectable 1 milliGRAM(s) IntraMuscular once  insulin glargine Injectable (LANTUS) 20 Unit(s) SubCutaneous at bedtime  insulin lispro (ADMELOG) corrective regimen sliding scale   SubCutaneous three times a day before meals  insulin lispro (ADMELOG) corrective regimen sliding scale   SubCutaneous at bedtime  aspirin enteric coated 81 milliGRAM(s) Oral daily  dextrose 5%. 1000 milliLiter(s) IV Continuous <Continuous>  dextrose 5%. 1000 milliLiter(s) IV Continuous <Continuous>  ferrous    sulfate 325 milliGRAM(s) Oral daily  heparin   Injectable 5000 Unit(s) SubCutaneous every 8 hours  potassium chloride    Tablet ER 40 milliEquivalent(s) Oral every 4 hours  sodium bicarbonate 650 milliGRAM(s) Oral two times a day      	    [PHYSICAL EXAM:  TELEMETRY:  T(C): 36.6 (02-01-22 @ 09:13), Max: 36.8 (01-31-22 @ 21:41)  HR: 87 (02-01-22 @ 08:37) (83 - 100)  BP: 150/72 (02-01-22 @ 08:37) (140/73 - 166/76)  RR: 18 (02-01-22 @ 08:37) (17 - 19)  SpO2: 95% (02-01-22 @ 08:37) (94% - 97%)  Wt(kg): --  I&O's Summary    31 Jan 2022 07:01  -  01 Feb 2022 07:00  --------------------------------------------------------  IN: 0 mL / OUT: 550 mL / NET: -550 mL    01 Feb 2022 07:01  -  01 Feb 2022 10:41  --------------------------------------------------------  IN: 0 mL / OUT: 700 mL / NET: -700 mL      Height (cm): 170.2 (01-31 @ 16:06)  Weight (kg): 72.6 (01-31 @ 16:06)  BMI (kg/m2): 25.1 (01-31 @ 16:06)  BSA (m2): 1.84 (01-31 @ 16:06)  Jewell:  Central/PICC/Mid Line:                                         Appearance: well appearing in no acute distress	  Neck: + JVD  Cardiovascular: Normal S1 S2, No JVD, No murmurs,   Respiratory: decreased breath sounds in bases b/l   Extremities: +1 LE edema   Vascular: Peripheral pulses palpable 2+ bilaterally  Neurologic: Non-focal  Psychiatry: A & O x 3, Mood & affect appropriate                            9.4    6.46  )-----------( 263      ( 01 Feb 2022 06:39 )             28.0     02-01    134<L>  |  97  |  25<H>  ----------------------------<  117<H>  3.3<L>   |  24  |  3.74<H>    Ca    7.6<L>      01 Feb 2022 06:39  Phos  4.6     02-01  Mg     1.8     02-01    TPro  7.3  /  Alb  4.0  /  TBili  0.5  /  DBili  x   /  AST  14  /  ALT  11  /  AlkPhos  109  01-31    proBNP: Serum Pro-Brain Natriuretic Peptide: 2702 pg/mL (01-31 @ 16:47)    PT/INR - ( 31 Jan 2022 16:47 )   PT: 12.4 sec;   INR: 1.04

## 2022-02-01 NOTE — CONSULT NOTE ADULT - ASSESSMENT
This is a 71yo M h/o CKD 4 (baseline 3.6-3.7) HTN DM presented to the ED presents to the ED with complaints of worsening COBB and LE edema for the last 8-10 days ; nephrology consulted for CKD management     Assessment/Plan:     #PRICILA on CKD, acute hypoxic respiratory failure, acute CHF exacerbation   unclear baseline creatinine  possibly PRICILA on CKD vs baseline CKD vs cardiorenal syndrome type 2     Recommend:   daily CXR   diuresis with 40mg IV lasix q8h PRN to achieve euvolemia   daily BMP  urine lytes   renal sono  TTE   cardiology consult    stephan for strict I/Os   renal diet     #hyperkalemia   likely secondary to CKD   resolved, continue to trend and commence Lokelma 10g TID x48h PRN     Thank you for the opportunity to participate in the care of your patient. The nephrology service remains available to assist with any questions or concerns. Please feel free to reach us by paging the on-call nephrology fellow for urgent issues or as below.     Roro Brown D.O  PGY-4, Nephrology Fellow   P: [phone] This is a 71yo M h/o CKD 4 (baseline 3.6-3.7) HTN DM presented to the ED presents to the ED with complaints of worsening COBB and LE edema for the last 8-10 days ; nephrology consulted for CKD management     Assessment/Plan:     #CKD at anaseline    Recommend:   daily CXR   diuresis with 80mg IV lasix BID may need to increase to TID if uop not adequate  daily BMP  urine lytes   renal sono  TTE   rothman for strict I/Os   renal diet     #hypocalcemia  likely secondary to CKD   would obtain ionized ca, Vit D 1,25 Vit D 24 and PTH  would start calcium carbonate     Thank you for the opportunity to participate in the care of your patient. The nephrology service remains available to assist with any questions or concerns. Please feel free to reach us by paging the on-call nephrology fellow for urgent issues or as below.     Roro Brown D.O  PGY-4, Nephrology Fellow   P: [phone] This is a 71yo M h/o CKD 4 (baseline 3.6-3.7) HTN DM presented to the ED presents to the ED with complaints of worsening COBB and LE edema for the last 8-10 days ; nephrology consulted for CKD management     Assessment/Plan:     #CKD IV at baseline     Recommend:   daily CXR   diuresis with 80mg IV lasix BID may need to increase to TID if uop not adequate, goal net negative at least 2L in next 24hrs  daily BMP  urine lytes   renal sono  f/u TTE, troponins, as his stable renal function but worsening edema is concerning for cardiac etiology. LFTs wnl.    rothman or strict I/Os   renal diet     #hypocalcemia  likely secondary to CKD   would obtain ionized ca, Vit D 1,25 Vit D 24 and PTH  would start calcium carbonate     # Fe deficiency anemia - give FE tabs BID    Thank you for the opportunity to participate in the care of your patient. The nephrology service remains available to assist with any questions or concerns. Please feel free to reach us by paging the on-call nephrology fellow for urgent issues or as below.     Roro Brown D.O  PGY-4, Nephrology Fellow   P: [phone]

## 2022-02-01 NOTE — CONSULT NOTE ADULT - SUBJECTIVE AND OBJECTIVE BOX
Patient is a 72y old  Male who presents with a chief complaint of COBB and swelling x 8-10 days (31 Jan 2022 21:46)      HPI:  This is a 73yo M h/o CKD 4 (baseline 3.6-3.7) HTN DM presented to the ED presents to the ED with complaints of worsening COBB and LE edema for the last 8-10 days ; patient states that his pants havent been fitting anymore and that he cant barely exert himself without getting dyspneic; denies any cough/cold symptoms no changes in any medications       (31 Jan 2022 21:46)      PAST MEDICAL & SURGICAL HISTORY:  Diabetes    Hypertension    Hyperlipidemia    CKD (chronic kidney disease), stage IV    BPH (benign prostatic hyperplasia)    No significant past surgical history          Allergies:  No Known Allergies      Home Medications:   aspirin enteric coated 81 milliGRAM(s) Oral daily  atorvastatin 20 milliGRAM(s) Oral at bedtime  dextrose 40% Gel 15 Gram(s) Oral once  dextrose 5%. 1000 milliLiter(s) IV Continuous <Continuous>  dextrose 5%. 1000 milliLiter(s) IV Continuous <Continuous>  dextrose 50% Injectable 25 Gram(s) IV Push once  dextrose 50% Injectable 12.5 Gram(s) IV Push once  dextrose 50% Injectable 25 Gram(s) IV Push once  ferrous    sulfate 325 milliGRAM(s) Oral daily  furosemide   Injectable 80 milliGRAM(s) IV Push every 12 hours  furosemide   Injectable 40 milliGRAM(s) IV Push two times a day  glucagon  Injectable 1 milliGRAM(s) IntraMuscular once  heparin   Injectable 5000 Unit(s) SubCutaneous every 8 hours  insulin glargine Injectable (LANTUS) 20 Unit(s) SubCutaneous at bedtime  insulin lispro (ADMELOG) corrective regimen sliding scale   SubCutaneous three times a day before meals  insulin lispro (ADMELOG) corrective regimen sliding scale   SubCutaneous at bedtime  lisinopril 40 milliGRAM(s) Oral daily  pantoprazole    Tablet 40 milliGRAM(s) Oral before breakfast  potassium chloride    Tablet ER 40 milliEquivalent(s) Oral every 4 hours  sodium bicarbonate 650 milliGRAM(s) Oral two times a day      Hospital Medications:   MEDICATIONS  (STANDING):  aspirin enteric coated 81 milliGRAM(s) Oral daily  atorvastatin 20 milliGRAM(s) Oral at bedtime  dextrose 40% Gel 15 Gram(s) Oral once  dextrose 5%. 1000 milliLiter(s) (50 mL/Hr) IV Continuous <Continuous>  dextrose 5%. 1000 milliLiter(s) (100 mL/Hr) IV Continuous <Continuous>  dextrose 50% Injectable 25 Gram(s) IV Push once  dextrose 50% Injectable 12.5 Gram(s) IV Push once  dextrose 50% Injectable 25 Gram(s) IV Push once  ferrous    sulfate 325 milliGRAM(s) Oral daily  furosemide   Injectable 80 milliGRAM(s) IV Push every 12 hours  furosemide   Injectable 40 milliGRAM(s) IV Push two times a day  glucagon  Injectable 1 milliGRAM(s) IntraMuscular once  heparin   Injectable 5000 Unit(s) SubCutaneous every 8 hours  insulin glargine Injectable (LANTUS) 20 Unit(s) SubCutaneous at bedtime  insulin lispro (ADMELOG) corrective regimen sliding scale   SubCutaneous three times a day before meals  insulin lispro (ADMELOG) corrective regimen sliding scale   SubCutaneous at bedtime  lisinopril 40 milliGRAM(s) Oral daily  pantoprazole    Tablet 40 milliGRAM(s) Oral before breakfast  potassium chloride    Tablet ER 40 milliEquivalent(s) Oral every 4 hours  sodium bicarbonate 650 milliGRAM(s) Oral two times a day      SOCIAL HISTORY:  Denies ETOh, Smoking,     Family History:  FAMILY HISTORY:  No pertinent family history in first degree relatives          VITALS:  T(F): 97.8 (02-01-22 @ 09:13), Max: 98.3 (01-31-22 @ 21:41)  HR: 87 (02-01-22 @ 08:37)  BP: 150/72 (02-01-22 @ 08:37)  RR: 18 (02-01-22 @ 08:37)  SpO2: 95% (02-01-22 @ 08:37)  Wt(kg): --    01-31 @ 07:01  -  02-01 @ 07:00  --------------------------------------------------------  IN: 0 mL / OUT: 550 mL / NET: -550 mL    02-01 @ 07:01  -  02-01 @ 10:19  --------------------------------------------------------  IN: 0 mL / OUT: 700 mL / NET: -700 mL      Height (cm): 170.2 (01-31 @ 16:06)  Weight (kg): 72.6 (01-31 @ 16:06)  BMI (kg/m2): 25.1 (01-31 @ 16:06)  BSA (m2): 1.84 (01-31 @ 16:06)  CAPILLARY BLOOD GLUCOSE      POCT Blood Glucose.: 127 mg/dL (01 Feb 2022 06:42)  POCT Blood Glucose.: 187 mg/dL (31 Jan 2022 22:41)      Review of Systems:  CONSTITUTIONAL: No fever or chills.  RESPIRATORY: No shortness of breath, cough  CARDIOVASCULAR: No Chest pain, shortness of breath, palpitations  GASTROINTESTINAL: No abdominal pain, nausea, vomiting, diarrhea  GENITOURINARY: No urinary frequency, gross hematuria, dysuria  NEUROLOGICAL: No headache, weakness  SKIN: No rash or skin lesion  MUSCULOSKELETAL: No swelling  Psych: Denies suicidal or homicidal ideation    PHYSICAL EXAM:  GENERAL: Alert, awake, oriented x3   HEENT: KAMILAH, EOMI, neck supple, no JVP  CHEST/LUNG: Bilateral clear breath sounds  HEART: Regular rate and rhythm, no murmur, no gallops, no rub   ABDOMEN: Soft, nontender, non distended  : No flank or supra pubic tenderness.  EXTREMITIES: no pedal edema  Neurology: AAOx3, no focal neurological deficit  SKIN: No rash or skin lesion   ACCESS: LUE AVF w/bruit and thrill     LABS:  02-01    134<L>  |  97  |  25<H>  ----------------------------<  117<H>  3.3<L>   |  24  |  3.74<H>    Ca    7.6<L>      01 Feb 2022 06:39  Phos  4.6     02-01  Mg     1.8     02-01    TPro  7.3  /  Alb  4.0  /  TBili  0.5  /  DBili      /  AST  14  /  ALT  11  /  AlkPhos  109  01-31    Creatinine Trend: 3.74 <--, 3.66 <--                        9.4    6.46  )-----------( 263      ( 01 Feb 2022 06:39 )             28.0     Urine Studies:                82M PMHx HTN, DM, and CKD presents to the ED via EMS from [facility] for evaluation of hypoxia. Consult for PRICILA on CKD     Assessment/Plan:     #PRICILA on CKD, acute hypoxic respiratory failure, acute CHF exacerbation   unclear baseline creatinine  possibly PRICILA on CKD vs baseline CKD vs cardiorenal syndrome type 2     Recommend:   daily CXR   diuresis with 40mg IV lasix q8h PRN to achieve euvolemia   daily BMP  urine lytes   renal sono  TTE   cardiology consult    rothman for strict I/Os   renal diet     #hyperkalemia   likely secondary to CKD   resolved, continue to trend and commence Lokelma 10g TID x48h PRN     Thank you for the opportunity to participate in the care of your patient. The nephrology service remains available to assist with any questions or concerns. Please feel free to reach us by paging the on-call nephrology fellow for urgent issues or as below.     Jonatan Echevarria M.D.   PGY-4, Nephrology Fellow   C: [phone]   P: [phone]  Patient is a 72y old  Male who presents with a chief complaint of COBB and swelling x 8-10 days (31 Jan 2022 21:46)      HPI:  This is a 73yo M h/o CKD 4 (baseline 3.6-3.7) HTN DM presented to the ED presents to the ED with complaints of worsening COBB and LE edema for the last 8-10 days ; patient states that his pants havent been fitting anymore and that he cant barely exert himself without getting dyspneic; denies any cough/cold symptoms ; has not been having any changes in his diet (no increase in salt or fluid intake from baseline) was changed form chlorthalidone to torsemide given pas admission for hyponatremia 2/2 chlorthalidone - chest CT done showing BL effusions, cardiomegaly and ground glass opacities, nephrology consulted for CKD management and volume control      (31 Jan 2022 21:46)      PAST MEDICAL & SURGICAL HISTORY:  Diabetes    Hypertension    Hyperlipidemia    CKD (chronic kidney disease), stage IV    BPH (benign prostatic hyperplasia)    No significant past surgical history          Allergies:  No Known Allergies      Home Medications:   aspirin enteric coated 81 milliGRAM(s) Oral daily  atorvastatin 20 milliGRAM(s) Oral at bedtime  dextrose 40% Gel 15 Gram(s) Oral once  dextrose 5%. 1000 milliLiter(s) IV Continuous <Continuous>  dextrose 5%. 1000 milliLiter(s) IV Continuous <Continuous>  dextrose 50% Injectable 25 Gram(s) IV Push once  dextrose 50% Injectable 12.5 Gram(s) IV Push once  dextrose 50% Injectable 25 Gram(s) IV Push once  ferrous    sulfate 325 milliGRAM(s) Oral daily  furosemide   Injectable 80 milliGRAM(s) IV Push every 12 hours  furosemide   Injectable 40 milliGRAM(s) IV Push two times a day  glucagon  Injectable 1 milliGRAM(s) IntraMuscular once  heparin   Injectable 5000 Unit(s) SubCutaneous every 8 hours  insulin glargine Injectable (LANTUS) 20 Unit(s) SubCutaneous at bedtime  insulin lispro (ADMELOG) corrective regimen sliding scale   SubCutaneous three times a day before meals  insulin lispro (ADMELOG) corrective regimen sliding scale   SubCutaneous at bedtime  lisinopril 40 milliGRAM(s) Oral daily  pantoprazole    Tablet 40 milliGRAM(s) Oral before breakfast  potassium chloride    Tablet ER 40 milliEquivalent(s) Oral every 4 hours  sodium bicarbonate 650 milliGRAM(s) Oral two times a day      Hospital Medications:   MEDICATIONS  (STANDING):  aspirin enteric coated 81 milliGRAM(s) Oral daily  atorvastatin 20 milliGRAM(s) Oral at bedtime  dextrose 40% Gel 15 Gram(s) Oral once  dextrose 5%. 1000 milliLiter(s) (50 mL/Hr) IV Continuous <Continuous>  dextrose 5%. 1000 milliLiter(s) (100 mL/Hr) IV Continuous <Continuous>  dextrose 50% Injectable 25 Gram(s) IV Push once  dextrose 50% Injectable 12.5 Gram(s) IV Push once  dextrose 50% Injectable 25 Gram(s) IV Push once  ferrous    sulfate 325 milliGRAM(s) Oral daily  furosemide   Injectable 80 milliGRAM(s) IV Push every 12 hours  furosemide   Injectable 40 milliGRAM(s) IV Push two times a day  glucagon  Injectable 1 milliGRAM(s) IntraMuscular once  heparin   Injectable 5000 Unit(s) SubCutaneous every 8 hours  insulin glargine Injectable (LANTUS) 20 Unit(s) SubCutaneous at bedtime  insulin lispro (ADMELOG) corrective regimen sliding scale   SubCutaneous three times a day before meals  insulin lispro (ADMELOG) corrective regimen sliding scale   SubCutaneous at bedtime  lisinopril 40 milliGRAM(s) Oral daily  pantoprazole    Tablet 40 milliGRAM(s) Oral before breakfast  potassium chloride    Tablet ER 40 milliEquivalent(s) Oral every 4 hours  sodium bicarbonate 650 milliGRAM(s) Oral two times a day      SOCIAL HISTORY:  Denies ETOh, Smoking,     Family History:  FAMILY HISTORY:  No pertinent family history in first degree relatives          VITALS:  T(F): 97.8 (02-01-22 @ 09:13), Max: 98.3 (01-31-22 @ 21:41)  HR: 87 (02-01-22 @ 08:37)  BP: 150/72 (02-01-22 @ 08:37)  RR: 18 (02-01-22 @ 08:37)  SpO2: 95% (02-01-22 @ 08:37)  Wt(kg): --    01-31 @ 07:01  -  02-01 @ 07:00  --------------------------------------------------------  IN: 0 mL / OUT: 550 mL / NET: -550 mL    02-01 @ 07:01  -  02-01 @ 10:19  --------------------------------------------------------  IN: 0 mL / OUT: 700 mL / NET: -700 mL      Height (cm): 170.2 (01-31 @ 16:06)  Weight (kg): 72.6 (01-31 @ 16:06)  BMI (kg/m2): 25.1 (01-31 @ 16:06)  BSA (m2): 1.84 (01-31 @ 16:06)  CAPILLARY BLOOD GLUCOSE      POCT Blood Glucose.: 127 mg/dL (01 Feb 2022 06:42)  POCT Blood Glucose.: 187 mg/dL (31 Jan 2022 22:41)      Review of Systems:  all other ROS negative     PHYSICAL EXAM:  GENERAL: Alert, awake, oriented x3   HEENT: KAMILAH, EOMI, neck supple, no JVP  CHEST/LUNG: Bilateral clear breath sounds  HEART: Regular rate and rhythm, no murmur, no gallops, no rub   ABDOMEN: Soft, nontender, non distended  : No flank or supra pubic tenderness.  EXTREMITIES: no pedal edema  Neurology: AAOx3, no focal neurological deficit  SKIN: No rash or skin lesion   ACCESS: LUE AVF w/bruit and thrill     LABS:  02-01    134<L>  |  97  |  25<H>  ----------------------------<  117<H>  3.3<L>   |  24  |  3.74<H>    Ca    7.6<L>      01 Feb 2022 06:39  Phos  4.6     02-01  Mg     1.8     02-01    TPro  7.3  /  Alb  4.0  /  TBili  0.5  /  DBili      /  AST  14  /  ALT  11  /  AlkPhos  109  01-31    Creatinine Trend: 3.74 <--, 3.66 <--                        9.4    6.46  )-----------( 263      ( 01 Feb 2022 06:39 )             28.0     Urine Studies:                82M PMHx HTN, DM, and CKD presents to the ED via EMS from [facility] for evaluation of hypoxia. Consult for PRICILA on CKD     Assessment/Plan:     #PRICILA on CKD, acute hypoxic respiratory failure, acute CHF exacerbation   unclear baseline creatinine  possibly PRICILA on CKD vs baseline CKD vs cardiorenal syndrome type 2     Recommend:   daily CXR   diuresis with 40mg IV lasix q8h PRN to achieve euvolemia   daily BMP  urine lytes   renal sono  TTE   cardiology consult    rothman for strict I/Os   renal diet     #hyperkalemia   likely secondary to CKD   resolved, continue to trend and commence Lokelma 10g TID x48h PRN     Thank you for the opportunity to participate in the care of your patient. The nephrology service remains available to assist with any questions or concerns. Please feel free to reach us by paging the on-call nephrology fellow for urgent issues or as below.     Jonatan Echevarria M.D.   PGY-4, Nephrology Fellow   C: [phone]   P: [phone]  Patient is a 72y old  Male who presents with a chief complaint of COBB and swelling x 8-10 days (31 Jan 2022 21:46)      HPI:  This is a 71yo M h/o CKD 4 (baseline 3.6-3.7) HTN DM presented to the ED presents to the ED with complaints of worsening COBB and LE edema for the last 8-10 days ; patient states that his pants havent been fitting anymore and that he cant barely exert himself without getting dyspneic; denies any cough/cold symptoms ; has not been having any changes in his diet (no increase in salt or fluid intake from baseline) was changed form chlorthalidone to torsemide given pas admission for hyponatremia 2/2 chlorthalidone - chest CT done showing BL effusions, cardiomegaly and ground glass opacities, nephrology consulted for CKD management and volume control      (31 Jan 2022 21:46)      PAST MEDICAL & SURGICAL HISTORY:  Diabetes    Hypertension    Hyperlipidemia    CKD (chronic kidney disease), stage IV    BPH (benign prostatic hyperplasia)    No significant past surgical history          Allergies:  No Known Allergies      Home Medications:   aspirin enteric coated 81 milliGRAM(s) Oral daily  atorvastatin 20 milliGRAM(s) Oral at bedtime  dextrose 40% Gel 15 Gram(s) Oral once  dextrose 5%. 1000 milliLiter(s) IV Continuous <Continuous>  dextrose 5%. 1000 milliLiter(s) IV Continuous <Continuous>  dextrose 50% Injectable 25 Gram(s) IV Push once  dextrose 50% Injectable 12.5 Gram(s) IV Push once  dextrose 50% Injectable 25 Gram(s) IV Push once  ferrous    sulfate 325 milliGRAM(s) Oral daily  furosemide   Injectable 80 milliGRAM(s) IV Push every 12 hours  furosemide   Injectable 40 milliGRAM(s) IV Push two times a day  glucagon  Injectable 1 milliGRAM(s) IntraMuscular once  heparin   Injectable 5000 Unit(s) SubCutaneous every 8 hours  insulin glargine Injectable (LANTUS) 20 Unit(s) SubCutaneous at bedtime  insulin lispro (ADMELOG) corrective regimen sliding scale   SubCutaneous three times a day before meals  insulin lispro (ADMELOG) corrective regimen sliding scale   SubCutaneous at bedtime  lisinopril 40 milliGRAM(s) Oral daily  pantoprazole    Tablet 40 milliGRAM(s) Oral before breakfast  potassium chloride    Tablet ER 40 milliEquivalent(s) Oral every 4 hours  sodium bicarbonate 650 milliGRAM(s) Oral two times a day      Hospital Medications:   MEDICATIONS  (STANDING):  aspirin enteric coated 81 milliGRAM(s) Oral daily  atorvastatin 20 milliGRAM(s) Oral at bedtime  dextrose 40% Gel 15 Gram(s) Oral once  dextrose 5%. 1000 milliLiter(s) (50 mL/Hr) IV Continuous <Continuous>  dextrose 5%. 1000 milliLiter(s) (100 mL/Hr) IV Continuous <Continuous>  dextrose 50% Injectable 25 Gram(s) IV Push once  dextrose 50% Injectable 12.5 Gram(s) IV Push once  dextrose 50% Injectable 25 Gram(s) IV Push once  ferrous    sulfate 325 milliGRAM(s) Oral daily  furosemide   Injectable 80 milliGRAM(s) IV Push every 12 hours  furosemide   Injectable 40 milliGRAM(s) IV Push two times a day  glucagon  Injectable 1 milliGRAM(s) IntraMuscular once  heparin   Injectable 5000 Unit(s) SubCutaneous every 8 hours  insulin glargine Injectable (LANTUS) 20 Unit(s) SubCutaneous at bedtime  insulin lispro (ADMELOG) corrective regimen sliding scale   SubCutaneous three times a day before meals  insulin lispro (ADMELOG) corrective regimen sliding scale   SubCutaneous at bedtime  lisinopril 40 milliGRAM(s) Oral daily  pantoprazole    Tablet 40 milliGRAM(s) Oral before breakfast  potassium chloride    Tablet ER 40 milliEquivalent(s) Oral every 4 hours  sodium bicarbonate 650 milliGRAM(s) Oral two times a day      SOCIAL HISTORY:  Denies ETOh, Smoking,     Family History:  FAMILY HISTORY:  No pertinent family history in first degree relatives          VITALS:  T(F): 97.8 (02-01-22 @ 09:13), Max: 98.3 (01-31-22 @ 21:41)  HR: 87 (02-01-22 @ 08:37)  BP: 150/72 (02-01-22 @ 08:37)  RR: 18 (02-01-22 @ 08:37)  SpO2: 95% (02-01-22 @ 08:37)  Wt(kg): --    01-31 @ 07:01  -  02-01 @ 07:00  --------------------------------------------------------  IN: 0 mL / OUT: 550 mL / NET: -550 mL    02-01 @ 07:01  -  02-01 @ 10:19  --------------------------------------------------------  IN: 0 mL / OUT: 700 mL / NET: -700 mL      Height (cm): 170.2 (01-31 @ 16:06)  Weight (kg): 72.6 (01-31 @ 16:06)  BMI (kg/m2): 25.1 (01-31 @ 16:06)  BSA (m2): 1.84 (01-31 @ 16:06)  CAPILLARY BLOOD GLUCOSE      POCT Blood Glucose.: 127 mg/dL (01 Feb 2022 06:42)  POCT Blood Glucose.: 187 mg/dL (31 Jan 2022 22:41)      Review of Systems:  all other ROS negative     PHYSICAL EXAM:  GENERAL: Alert, awake, oriented x3   CHEST/LUNG: + crackles at the bases  HEART: Regular rate and rhythm, no murmur, no gallops, no rub   ABDOMEN: + distended  EXTREMITIES: 2+ pitting edema BL      LABS:  02-01    134<L>  |  97  |  25<H>  ----------------------------<  117<H>  3.3<L>   |  24  |  3.74<H>    Ca    7.6<L>      01 Feb 2022 06:39  Phos  4.6     02-01  Mg     1.8     02-01    TPro  7.3  /  Alb  4.0  /  TBili  0.5  /  DBili      /  AST  14  /  ALT  11  /  AlkPhos  109  01-31    Creatinine Trend: 3.74 <--, 3.66 <--                        9.4    6.46  )-----------( 263      ( 01 Feb 2022 06:39 )             28.0     Urine Studies:                 Patient is a 72y old  Male who presents with a chief complaint of COBB and swelling x 8-10 days (31 Jan 2022 21:46)      HPI:  This is a 71yo M h/o CKD 4 (baseline 3.6-3.7) HTN DM presented to the ED presents to the ED with complaints of worsening COBB and LE edema for the last 8-10 days ; patient states that his pants havent been fitting anymore and that he cant barely exert himself without getting dyspneic; denies any cough/cold symptoms ; has not been having any changes in his diet (no increase in salt or fluid intake from baseline)     He was seen in office by Dr Vasquez 3 weeks ago and noted to have new onset LE edema so Torsemide was increased to 20mg daily from TIW however edema, abdominal distention and COBB continued to worsen. chest CT done showing BL effusions, cardiomegaly and ground glass opacities, nephrology consulted for CKD management and volume control      (31 Jan 2022 21:46)      PAST MEDICAL & SURGICAL HISTORY:  Diabetes    Hypertension    Hyperlipidemia    CKD (chronic kidney disease), stage IV    BPH (benign prostatic hyperplasia)    No significant past surgical history          Allergies:  No Known Allergies      Home Medications:   aspirin enteric coated 81 milliGRAM(s) Oral daily  atorvastatin 20 milliGRAM(s) Oral at bedtime  dextrose 40% Gel 15 Gram(s) Oral once  dextrose 5%. 1000 milliLiter(s) IV Continuous <Continuous>  dextrose 5%. 1000 milliLiter(s) IV Continuous <Continuous>  dextrose 50% Injectable 25 Gram(s) IV Push once  dextrose 50% Injectable 12.5 Gram(s) IV Push once  dextrose 50% Injectable 25 Gram(s) IV Push once  ferrous    sulfate 325 milliGRAM(s) Oral daily  furosemide   Injectable 80 milliGRAM(s) IV Push every 12 hours  furosemide   Injectable 40 milliGRAM(s) IV Push two times a day  glucagon  Injectable 1 milliGRAM(s) IntraMuscular once  heparin   Injectable 5000 Unit(s) SubCutaneous every 8 hours  insulin glargine Injectable (LANTUS) 20 Unit(s) SubCutaneous at bedtime  insulin lispro (ADMELOG) corrective regimen sliding scale   SubCutaneous three times a day before meals  insulin lispro (ADMELOG) corrective regimen sliding scale   SubCutaneous at bedtime  lisinopril 40 milliGRAM(s) Oral daily  pantoprazole    Tablet 40 milliGRAM(s) Oral before breakfast  potassium chloride    Tablet ER 40 milliEquivalent(s) Oral every 4 hours  sodium bicarbonate 650 milliGRAM(s) Oral two times a day      Hospital Medications:   MEDICATIONS  (STANDING):  aspirin enteric coated 81 milliGRAM(s) Oral daily  atorvastatin 20 milliGRAM(s) Oral at bedtime  dextrose 40% Gel 15 Gram(s) Oral once  dextrose 5%. 1000 milliLiter(s) (50 mL/Hr) IV Continuous <Continuous>  dextrose 5%. 1000 milliLiter(s) (100 mL/Hr) IV Continuous <Continuous>  dextrose 50% Injectable 25 Gram(s) IV Push once  dextrose 50% Injectable 12.5 Gram(s) IV Push once  dextrose 50% Injectable 25 Gram(s) IV Push once  ferrous    sulfate 325 milliGRAM(s) Oral daily  furosemide   Injectable 80 milliGRAM(s) IV Push every 12 hours  furosemide   Injectable 40 milliGRAM(s) IV Push two times a day  glucagon  Injectable 1 milliGRAM(s) IntraMuscular once  heparin   Injectable 5000 Unit(s) SubCutaneous every 8 hours  insulin glargine Injectable (LANTUS) 20 Unit(s) SubCutaneous at bedtime  insulin lispro (ADMELOG) corrective regimen sliding scale   SubCutaneous three times a day before meals  insulin lispro (ADMELOG) corrective regimen sliding scale   SubCutaneous at bedtime  lisinopril 40 milliGRAM(s) Oral daily  pantoprazole    Tablet 40 milliGRAM(s) Oral before breakfast  potassium chloride    Tablet ER 40 milliEquivalent(s) Oral every 4 hours  sodium bicarbonate 650 milliGRAM(s) Oral two times a day      SOCIAL HISTORY:  Denies ETOh, Smoking,     Family History:  FAMILY HISTORY:  No pertinent family history in first degree relatives          VITALS:  T(F): 97.8 (02-01-22 @ 09:13), Max: 98.3 (01-31-22 @ 21:41)  HR: 87 (02-01-22 @ 08:37)  BP: 150/72 (02-01-22 @ 08:37)  RR: 18 (02-01-22 @ 08:37)  SpO2: 95% (02-01-22 @ 08:37)  Wt(kg): --    01-31 @ 07:01  -  02-01 @ 07:00  --------------------------------------------------------  IN: 0 mL / OUT: 550 mL / NET: -550 mL    02-01 @ 07:01  -  02-01 @ 10:19  --------------------------------------------------------  IN: 0 mL / OUT: 700 mL / NET: -700 mL      Height (cm): 170.2 (01-31 @ 16:06)  Weight (kg): 72.6 (01-31 @ 16:06)  BMI (kg/m2): 25.1 (01-31 @ 16:06)  BSA (m2): 1.84 (01-31 @ 16:06)  CAPILLARY BLOOD GLUCOSE      POCT Blood Glucose.: 127 mg/dL (01 Feb 2022 06:42)  POCT Blood Glucose.: 187 mg/dL (31 Jan 2022 22:41)      Review of Systems:  all other ROS negative     PHYSICAL EXAM:  GENERAL: Alert, awake, oriented x3   CHEST/LUNG: + crackles at the bases  HEART: Regular rate and rhythm, no murmur, no gallops, no rub   ABDOMEN: + distended  EXTREMITIES: 2+ pitting edema BL      LABS:  02-01    134<L>  |  97  |  25<H>  ----------------------------<  117<H>  3.3<L>   |  24  |  3.74<H>    Ca    7.6<L>      01 Feb 2022 06:39  Phos  4.6     02-01  Mg     1.8     02-01    TPro  7.3  /  Alb  4.0  /  TBili  0.5  /  DBili      /  AST  14  /  ALT  11  /  AlkPhos  109  01-31    Creatinine Trend: 3.74 <--, 3.66 <--                        9.4    6.46  )-----------( 263      ( 01 Feb 2022 06:39 )             28.0     Urine Studies:

## 2022-02-01 NOTE — CONSULT NOTE ADULT - ATTENDING COMMENTS
CKD IV/V eGFR 15ml/min 3 weeks ago and stable at baseline today, however worsening anasarca for the past 3 weeks despite increasing toresmide to 20mg daily - r/o cardiac event. Increase diuresis to 80mg IV lasix BID, if not >1L net negative by evening suggest dosing TID. Can restart ANDRE blockade. d/w Dr. 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

## 2022-02-01 NOTE — PROGRESS NOTE ADULT - PROBLEM SELECTOR PLAN 5
Diet: DASH/ADA/Renal,   VTE PPX: HSQ  Code status: full   dispo: home when stable  Case discussed with Dr. Finney

## 2022-02-02 ENCOUNTER — TRANSCRIPTION ENCOUNTER (OUTPATIENT)
Age: 72
End: 2022-02-02

## 2022-02-02 VITALS — TEMPERATURE: 98 F

## 2022-02-02 LAB
ALBUMIN SERPL ELPH-MCNC: 3.4 G/DL — SIGNIFICANT CHANGE UP (ref 3.3–5)
ALP SERPL-CCNC: 101 U/L — SIGNIFICANT CHANGE UP (ref 40–120)
ALT FLD-CCNC: 10 U/L — SIGNIFICANT CHANGE UP (ref 10–45)
ANION GAP SERPL CALC-SCNC: 14 MMOL/L — SIGNIFICANT CHANGE UP (ref 5–17)
AST SERPL-CCNC: 16 U/L — SIGNIFICANT CHANGE UP (ref 10–40)
BASOPHILS # BLD AUTO: 0.05 K/UL — SIGNIFICANT CHANGE UP (ref 0–0.2)
BASOPHILS NFR BLD AUTO: 0.7 % — SIGNIFICANT CHANGE UP (ref 0–2)
BILIRUB SERPL-MCNC: 0.5 MG/DL — SIGNIFICANT CHANGE UP (ref 0.2–1.2)
BUN SERPL-MCNC: 23 MG/DL — SIGNIFICANT CHANGE UP (ref 7–23)
CALCIUM SERPL-MCNC: 7.8 MG/DL — LOW (ref 8.4–10.5)
CHLORIDE SERPL-SCNC: 99 MMOL/L — SIGNIFICANT CHANGE UP (ref 96–108)
CO2 SERPL-SCNC: 24 MMOL/L — SIGNIFICANT CHANGE UP (ref 22–31)
CREAT SERPL-MCNC: 4.2 MG/DL — HIGH (ref 0.5–1.3)
EOSINOPHIL # BLD AUTO: 0.52 K/UL — HIGH (ref 0–0.5)
EOSINOPHIL NFR BLD AUTO: 7.8 % — HIGH (ref 0–6)
GLUCOSE BLDC GLUCOMTR-MCNC: 103 MG/DL — HIGH (ref 70–99)
GLUCOSE BLDC GLUCOMTR-MCNC: 108 MG/DL — HIGH (ref 70–99)
GLUCOSE BLDC GLUCOMTR-MCNC: 162 MG/DL — HIGH (ref 70–99)
GLUCOSE BLDC GLUCOMTR-MCNC: 52 MG/DL — CRITICAL LOW (ref 70–99)
GLUCOSE SERPL-MCNC: 56 MG/DL — LOW (ref 70–99)
HCT VFR BLD CALC: 31 % — LOW (ref 39–50)
HGB BLD-MCNC: 10.4 G/DL — LOW (ref 13–17)
IMM GRANULOCYTES NFR BLD AUTO: 0.3 % — SIGNIFICANT CHANGE UP (ref 0–1.5)
LYMPHOCYTES # BLD AUTO: 1.78 K/UL — SIGNIFICANT CHANGE UP (ref 1–3.3)
LYMPHOCYTES # BLD AUTO: 26.6 % — SIGNIFICANT CHANGE UP (ref 13–44)
MAGNESIUM SERPL-MCNC: 2 MG/DL — SIGNIFICANT CHANGE UP (ref 1.6–2.6)
MCHC RBC-ENTMCNC: 28.7 PG — SIGNIFICANT CHANGE UP (ref 27–34)
MCHC RBC-ENTMCNC: 33.5 GM/DL — SIGNIFICANT CHANGE UP (ref 32–36)
MCV RBC AUTO: 85.6 FL — SIGNIFICANT CHANGE UP (ref 80–100)
MONOCYTES # BLD AUTO: 0.78 K/UL — SIGNIFICANT CHANGE UP (ref 0–0.9)
MONOCYTES NFR BLD AUTO: 11.7 % — SIGNIFICANT CHANGE UP (ref 2–14)
NEUTROPHILS # BLD AUTO: 3.53 K/UL — SIGNIFICANT CHANGE UP (ref 1.8–7.4)
NEUTROPHILS NFR BLD AUTO: 52.9 % — SIGNIFICANT CHANGE UP (ref 43–77)
NRBC # BLD: 0 /100 WBCS — SIGNIFICANT CHANGE UP (ref 0–0)
PLATELET # BLD AUTO: 306 K/UL — SIGNIFICANT CHANGE UP (ref 150–400)
POTASSIUM SERPL-MCNC: 4 MMOL/L — SIGNIFICANT CHANGE UP (ref 3.5–5.3)
POTASSIUM SERPL-SCNC: 4 MMOL/L — SIGNIFICANT CHANGE UP (ref 3.5–5.3)
PROT SERPL-MCNC: 6.8 G/DL — SIGNIFICANT CHANGE UP (ref 6–8.3)
RBC # BLD: 3.62 M/UL — LOW (ref 4.2–5.8)
RBC # FLD: 12.5 % — SIGNIFICANT CHANGE UP (ref 10.3–14.5)
SODIUM SERPL-SCNC: 137 MMOL/L — SIGNIFICANT CHANGE UP (ref 135–145)
WBC # BLD: 6.68 K/UL — SIGNIFICANT CHANGE UP (ref 3.8–10.5)
WBC # FLD AUTO: 6.68 K/UL — SIGNIFICANT CHANGE UP (ref 3.8–10.5)

## 2022-02-02 PROCEDURE — 83880 ASSAY OF NATRIURETIC PEPTIDE: CPT

## 2022-02-02 PROCEDURE — 99285 EMERGENCY DEPT VISIT HI MDM: CPT | Mod: 25

## 2022-02-02 PROCEDURE — 80048 BASIC METABOLIC PNL TOTAL CA: CPT

## 2022-02-02 PROCEDURE — 71045 X-RAY EXAM CHEST 1 VIEW: CPT

## 2022-02-02 PROCEDURE — U0003: CPT

## 2022-02-02 PROCEDURE — 84100 ASSAY OF PHOSPHORUS: CPT

## 2022-02-02 PROCEDURE — 99239 HOSP IP/OBS DSCHRG MGMT >30: CPT

## 2022-02-02 PROCEDURE — 85025 COMPLETE CBC W/AUTO DIFF WBC: CPT

## 2022-02-02 PROCEDURE — 96374 THER/PROPH/DIAG INJ IV PUSH: CPT

## 2022-02-02 PROCEDURE — 82962 GLUCOSE BLOOD TEST: CPT

## 2022-02-02 PROCEDURE — 71250 CT THORAX DX C-: CPT | Mod: MA

## 2022-02-02 PROCEDURE — U0005: CPT

## 2022-02-02 PROCEDURE — 85730 THROMBOPLASTIN TIME PARTIAL: CPT

## 2022-02-02 PROCEDURE — 83036 HEMOGLOBIN GLYCOSYLATED A1C: CPT

## 2022-02-02 PROCEDURE — 84484 ASSAY OF TROPONIN QUANT: CPT

## 2022-02-02 PROCEDURE — 99232 SBSQ HOSP IP/OBS MODERATE 35: CPT

## 2022-02-02 PROCEDURE — 85027 COMPLETE CBC AUTOMATED: CPT

## 2022-02-02 PROCEDURE — 36415 COLL VENOUS BLD VENIPUNCTURE: CPT

## 2022-02-02 PROCEDURE — 85610 PROTHROMBIN TIME: CPT

## 2022-02-02 PROCEDURE — C8929: CPT

## 2022-02-02 PROCEDURE — 80061 LIPID PANEL: CPT

## 2022-02-02 PROCEDURE — 83735 ASSAY OF MAGNESIUM: CPT

## 2022-02-02 PROCEDURE — 80053 COMPREHEN METABOLIC PANEL: CPT

## 2022-02-02 RX ORDER — INSULIN GLARGINE 100 [IU]/ML
10 INJECTION, SOLUTION SUBCUTANEOUS AT BEDTIME
Refills: 0 | Status: DISCONTINUED | OUTPATIENT
Start: 2022-02-02 | End: 2022-02-02

## 2022-02-02 RX ORDER — DEXTROSE 50 % IN WATER 50 %
25 SYRINGE (ML) INTRAVENOUS ONCE
Refills: 0 | Status: DISCONTINUED | OUTPATIENT
Start: 2022-02-02 | End: 2022-02-02

## 2022-02-02 RX ADMIN — Medication 2: at 12:06

## 2022-02-02 RX ADMIN — LISINOPRIL 40 MILLIGRAM(S): 2.5 TABLET ORAL at 06:15

## 2022-02-02 RX ADMIN — Medication 80 MILLIGRAM(S): at 10:30

## 2022-02-02 RX ADMIN — AMLODIPINE BESYLATE 10 MILLIGRAM(S): 2.5 TABLET ORAL at 06:16

## 2022-02-02 RX ADMIN — INSULIN GLARGINE 20 UNIT(S): 100 INJECTION, SOLUTION SUBCUTANEOUS at 00:40

## 2022-02-02 RX ADMIN — Medication 650 MILLIGRAM(S): at 10:30

## 2022-02-02 RX ADMIN — Medication 40 MILLIEQUIVALENT(S): at 00:42

## 2022-02-02 RX ADMIN — HEPARIN SODIUM 5000 UNIT(S): 5000 INJECTION INTRAVENOUS; SUBCUTANEOUS at 06:16

## 2022-02-02 RX ADMIN — Medication 325 MILLIGRAM(S): at 10:30

## 2022-02-02 RX ADMIN — Medication 81 MILLIGRAM(S): at 10:29

## 2022-02-02 RX ADMIN — PANTOPRAZOLE SODIUM 40 MILLIGRAM(S): 20 TABLET, DELAYED RELEASE ORAL at 06:15

## 2022-02-02 NOTE — DISCHARGE NOTE PROVIDER - HOSPITAL COURSE
72 y/oM, PMHx HTN, HLD, DM2 and CKD 4 presented to ED 1/31/22 w/increased COBB, fatigue and worsening BL LE. In the ED, CXR showed bilat pleural effusion R>L.  CT non contrast with bilat moderate pleural effusions and patchy and nodular groundglass opacities in the left upper lobe could be due to alveolar edema but cannot exclude atypical infection.  Trop negative x 1, BNP 2702. He was given IV Lasix 80mg x 1 and admitted to [facility] for mgmt acute HF exacerbation.       During hospitalization, trop (-) x 2. ECHO performed s/f mild TR, normal L/RV size and systolic function, EF 63% and small pericardial effusion without evidence of tamponade. He was adequately diuresed with IV Lasix as he reached euvolemia and was net neg 3.1L on the day of discharge and was transitioned to PO Torsemide 40mg. On the day of discharge, the patient was seen and examined. Symptoms improved. Vital signs are stable. Labs and imaging reviewed. Patient is medically optimized and hemodynamically stable. Return precautions discussed, medication teach back done w/ patient, and importance of physician followup emphasized for which he verbalized understanding. Patient refused home care services and outpatient cardiac rehab. He will follow up as scheduled with Dr. Russell 2/11/22 at 2:45 PM and Dr. Vasquez 2/16/22 at 1:30 PM.       DC Meds:  Amlodipine 10mg  ASA 81mg  Atorvastatin 20mg  Ramipril 10mg  Torsemide 40mg

## 2022-02-02 NOTE — DISCHARGE NOTE NURSING/CASE MANAGEMENT/SOCIAL WORK - NSDCVIVACCINE_GEN_ALL_CORE_FT
influenza, injectable, quadrivalent, preservative free; 09-Nov-2019 15:32; Doretha Otoole (CATHLEEN); [employer]; [id number] (Exp. Date: 30-Jun-2020); IntraMuscular; Deltoid Right.; 0.5 milliLiter(s); VIS (VIS Published: 15-Aug-2019, VIS Presented: 09-Nov-2019);

## 2022-02-02 NOTE — DISCHARGE NOTE PROVIDER - NSDCCPCAREPLAN_GEN_ALL_CORE_FT
PRINCIPAL DISCHARGE DIAGNOSIS  Diagnosis: Acute diastolic congestive heart failure  Assessment and Plan of Treatment: - You came to the hospital for increased shortness of breath. An echocardiogram or ultrasound of your heart was performed which showed an ejection fraction or pumping function of your heart to be 55-60% with diastolic dysfunction. In other words, you were diagnosed with diastolic heart failure. This is a type of heart failure. Heart failure is a condition in which the heart does not pump or fill with blood well. This causes the heart to lag behind in its job of moving blood throughout the body. This can lead to symptoms such as swelling, trouble breathing, and feeling tired. You were given intravenous (through your IV)  Lasix to get rid of the fluid in your lungs and to help you breathe better.   - You are to INCREASE Torsemide 20mg to 40mg daily. Please take Torsemide prescribed without missing doses. Please maintain a low salt diet (less than 2grams per day). Weigh yourself daily and report any weight gain over 2 pounds/day to your Doctor.

## 2022-02-02 NOTE — PROGRESS NOTE ADULT - ATTENDING COMMENTS
volume status greatly improved with diuresis, no evidence of cardiac or liver failure, likely due to advanced renal disease. Reinforced importance of low Na diet, daily weights. To go home on double torsemide dose of 40mg daily, he will call if he is gaining weight or getting more swollen. Has f/u with both me and his PCP w/i 1.5 weeks. Ok to cont ANDRE blockade
Initial attending contact date  on morning bedside rounds. See attending addendum to HPI for initial attending contact documentation.  See PA note written above, for details. I reviewed the PA documentation.  I have personally seen and examined this patient today. I reviewed vitals, labs, medications, cardiac studies and additional imaging.  I agree with the PA's findings and plans as written above with the following additions/amendments:  Lasix 80IV BID  Goal net neg 2-3L/day  Cont home ACE per d/w nephrology given CKD IV at b/l  R-MIlene Reyes M.D.  Cardiology Attending

## 2022-02-02 NOTE — DISCHARGE NOTE PROVIDER - NSDCFUSCHEDAPPT_GEN_ALL_CORE_FT
JAYCOB RUANO ; 02/10/2022 ; NPP Intmed [address] [place] JAYCOB Redman ; 02/16/2022 ; NPP Nephro [address] JAYCOB RUANO ; 02/10/2022 ; NPP Intmed [address] JAYCOB [address] ; 02/11/2022 ; NPP HeartVasc [address]   JAYCOB RUANO ; 02/16/2022 ; NPP Nephro [address]

## 2022-02-02 NOTE — DISCHARGE NOTE PROVIDER - PROVIDER TOKENS
PROVIDER:[TOKEN:[[id number]]] PROVIDER:[TOKEN:[[id number]],SCHEDULEDAPPT:[02/11/2022],SCHEDULEDAPPTTIME:[02:45 PM]],PROVIDER:[TOKEN:[[id number]],SCHEDULEDAPPT:[02/16/2022],SCHEDULEDAPPTTIME:[01:30 PM],ESTABLISHEDPATIENT:[T]]

## 2022-02-02 NOTE — DISCHARGE NOTE PROVIDER - NSDCMRMEDTOKEN_GEN_ALL_CORE_FT
amLODIPine 10 mg oral tablet: 1 tab(s) orally once a day  Aspirin Enteric Coated 81 mg oral delayed release tablet: 1 tab(s) orally once a day  atorvastatin 20 mg oral tablet: 1 tab(s) orally once a day  ferrous gluconate 240 mg (27 mg elemental iron) oral tablet: 1 tab(s) orally once a day  insulin glargine: 20 unit(s) subcutaneous once a day (at bedtime)  omeprazole 40 mg oral delayed release capsule: 1 cap(s) orally once a day  ramipril 10 mg oral capsule: 1 cap(s) orally once a day  sodium bicarbonate 650 mg oral tablet: 1 tab(s) orally 2 times a day  sodium polystyrene sulfonate 15 g/60 mL oral suspension: 1 dose(s) orally once a day  torsemide 20 mg oral tablet: 1 tab(s) orally once a day  Tradjenta 5 mg oral tablet: 1 tab(s) orally once a day   amLODIPine 10 mg oral tablet: 1 tab(s) orally once a day  Aspirin Enteric Coated 81 mg oral delayed release tablet: 1 tab(s) orally once a day  atorvastatin 20 mg oral tablet: 1 tab(s) orally once a day  ferrous gluconate 240 mg (27 mg elemental iron) oral tablet: 1 tab(s) orally once a day  insulin glargine: 20 unit(s) subcutaneous once a day (at bedtime)  omeprazole 40 mg oral delayed release capsule: 1 cap(s) orally once a day  ramipril 10 mg oral capsule: 1 cap(s) orally once a day  sodium bicarbonate 650 mg oral tablet: 1 tab(s) orally 2 times a day  sodium polystyrene sulfonate 15 g/60 mL oral suspension: 1 dose(s) orally once a day  torsemide 20 mg oral tablet: 2 tab(s) orally once a day   Tradjenta 5 mg oral tablet: 1 tab(s) orally once a day

## 2022-02-02 NOTE — PROVIDER CONTACT NOTE (HYPOGLYCEMIA EVENT) - NS PROVIDER CONTACT BACKGROUND-HYPO
Age: 72y    Gender: Male    POCT Blood Glucose:  103 mg/dL (02-02-22 @ 06:56)  52 mg/dL (02-02-22 @ 06:27)  108 mg/dL (02-02-22 @ 00:37)  139 mg/dL (02-01-22 @ 21:32)  151 mg/dL (02-01-22 @ 16:46)  102 mg/dL (02-01-22 @ 12:27)      eMAR:atorvastatin   20 milliGRAM(s) Oral (02-01-22 @ 21:26)    insulin glargine Injectable (LANTUS)   20 Unit(s) SubCutaneous (02-02-22 @ 00:40)    insulin lispro (ADMELOG) corrective regimen sliding scale   2 Unit(s) SubCutaneous (02-01-22 @ 17:44)

## 2022-02-02 NOTE — DISCHARGE NOTE PROVIDER - CARE PROVIDERS DIRECT ADDRESSES
[email or web address]

## 2022-02-02 NOTE — PROGRESS NOTE ADULT - ASSESSMENT
This is a 73yo M h/o CKD 4 (baseline 3.6-3.7) HTN DM presented to the ED presents to the ED with complaints of worsening COBB and LE edema for the last 8-10 days ; nephrology consulted for CKD management     Assessment/Plan:     #CKD IV at baseline   would switch to PO torsemide 50mg daily  to f/u with Dr Vasquez in 2 weeks  counseled on importance of adhering to a low salt diet      #hypocalcemia  likely secondary to CKD   to c/w calcium carbonate in DC    # Fe deficiency anemia - give FE tabs BID    Roro Brown D.O  PGY 4 nephrology fellow  [phone]
This is a 71yo M h/o HTN, HLD, DM2, and CKD 4 who presents with dyspnea on minimal exertion with general fatigue and increased edema over the past 10 days.  Seen by renal Dr. Vasquez, symptoms unlikely related to renal disease and more c/w acute CHF.  now admitted to cardiology for IV diuresis

## 2022-02-02 NOTE — DISCHARGE NOTE PROVIDER - CARE PROVIDER_API CALL
Lakia Borges  CARDIOLOGY  [address] [place] [address]  Phone: [phone]  Fax: [phone]  Follow Up Time:    Anna Russell)  Cardiology  [address] [address]  Phone: [phone]  Fax: [phone]  Scheduled Appointment: 02/11/2022 02:45 PM    Tammy Vasquez)  Nephrology  [address] [place] [address]  Phone: [phone]  Fax: [phone]  Established Patient  Scheduled Appointment: 02/16/2022 01:30 PM

## 2022-02-02 NOTE — PROGRESS NOTE ADULT - SUBJECTIVE AND OBJECTIVE BOX
Patient is a 72y Male seen and evaluated at bedside. no acute events overnight patient states that he is feeling well and is very anxious to go home feels his breathing is much improved ; net negative 3L / 24 hours       Meds:    amLODIPine   Tablet 10 daily  aspirin enteric coated 81 daily  atorvastatin 20 at bedtime  dextrose 40% Gel 15 once  dextrose 5%. 1000 <Continuous>  dextrose 5%. 1000 <Continuous>  dextrose 50% Injectable 25 once  dextrose 50% Injectable 12.5 once  dextrose 50% Injectable 25 once  ferrous    sulfate 325 daily  furosemide   Injectable 80 every 12 hours  glucagon  Injectable 1 once  heparin   Injectable 5000 every 8 hours  insulin glargine Injectable (LANTUS) 10 at bedtime  insulin lispro (ADMELOG) corrective regimen sliding scale  three times a day before meals  insulin lispro (ADMELOG) corrective regimen sliding scale  at bedtime  lisinopril 40 daily  pantoprazole    Tablet 40 before breakfast  sodium bicarbonate 650 two times a day      T(C): , Max: 36.9 (02-02-22 @ 05:22)  T(F): , Max: 98.5 (02-02-22 @ 05:22)  HR: 88 (02-02-22 @ 08:25)  BP: 140/68 (02-02-22 @ 08:25)  BP(mean): --  RR: 16 (02-02-22 @ 08:25)  SpO2: 97% (02-02-22 @ 08:25)  Wt(kg): --    02-01 @ 07:01  -  02-02 @ 07:00  --------------------------------------------------------  IN: 360 mL / OUT: 3379 mL / NET: -3019 mL    02-02 @ 07:01  -  02-02 @ 12:07  --------------------------------------------------------  IN: 240 mL / OUT: 350 mL / NET: -110 mL          Review of Systems:  all other ROS negative       PHYSICAL EXAM:  GENERAL: well-developed, well nourished, alert, no acute distress at present  CHEST/LUNG: Clear to auscultation bilaterally no rales, rhonchi or wheezing   HEART: normal S1S2, RRR  ABDOMEN: Soft, Nontender, +BS,   EXTREMITIES: 1+ edema         LABS:                        10.4   6.68  )-----------( 306      ( 02 Feb 2022 06:34 )             31.0     02-02    137  |  99  |  23  ----------------------------<  56<L>  4.0   |  24  |  4.20<H>    Ca    7.8<L>      02 Feb 2022 06:34  Phos  4.6     02-01  Mg     2.0     02-02    TPro  6.8  /  Alb  3.4  /  TBili  0.5  /  DBili  x   /  AST  16  /  ALT  10  /  AlkPhos  101  02-02      PT/INR - ( 31 Jan 2022 16:47 )   PT: 12.4 sec;   INR: 1.04          PTT - ( 31 Jan 2022 16:47 )  PTT:33.7 sec          RADIOLOGY & ADDITIONAL STUDIES:

## 2022-02-02 NOTE — DISCHARGE NOTE NURSING/CASE MANAGEMENT/SOCIAL WORK - PATIENT PORTAL LINK FT
You can access the FollowMyHealth Patient Portal offered by [facility] by registering at the following website: [email or web address]. By joining [facility]’s FollowMyHealth portal, you will also be able to view your health information using other applications (apps) compatible with our system.

## 2022-02-02 NOTE — DISCHARGE NOTE NURSING/CASE MANAGEMENT/SOCIAL WORK - NSDCPEFALRISK_GEN_ALL_CORE
For information on Fall & Injury Prevention, visit: [email or web address] OR  [email or web address] OR  https://www.cdc.gov/steadi/patient.html

## 2022-02-02 NOTE — PROVIDER CONTACT NOTE (HYPOGLYCEMIA EVENT) - NS PROVIDER CONTACT CONTRIBUTING FACTORS OF EPISODE
pt's bedtime , PA Rema Valerio and Caitlin made aware, ok to give lantus. pt given 20 units of lantus at bedtime per order./Other (Specify)

## 2022-02-04 ENCOUNTER — NON-APPOINTMENT (OUTPATIENT)
Age: 72
End: 2022-02-04

## 2022-02-10 ENCOUNTER — LABORATORY RESULT (OUTPATIENT)
Age: 72
End: 2022-02-10

## 2022-02-10 ENCOUNTER — APPOINTMENT (OUTPATIENT)
Dept: INTERNAL MEDICINE | Facility: CLINIC | Age: 72
End: 2022-02-10
Payer: MEDICARE

## 2022-02-10 VITALS
BODY MASS INDEX: 25.11 KG/M2 | HEART RATE: 94 BPM | OXYGEN SATURATION: 96 % | WEIGHT: 160 LBS | HEIGHT: 67 IN | DIASTOLIC BLOOD PRESSURE: 74 MMHG | SYSTOLIC BLOOD PRESSURE: 163 MMHG | TEMPERATURE: 97.9 F

## 2022-02-10 PROCEDURE — 36415 COLL VENOUS BLD VENIPUNCTURE: CPT

## 2022-02-10 PROCEDURE — 99495 TRANSJ CARE MGMT MOD F2F 14D: CPT | Mod: 25

## 2022-02-11 ENCOUNTER — NON-APPOINTMENT (OUTPATIENT)
Age: 72
End: 2022-02-11

## 2022-02-11 ENCOUNTER — APPOINTMENT (OUTPATIENT)
Dept: HEART AND VASCULAR | Facility: CLINIC | Age: 72
End: 2022-02-11
Payer: MEDICARE

## 2022-02-11 VITALS
HEART RATE: 94 BPM | BODY MASS INDEX: 26.68 KG/M2 | TEMPERATURE: 96.7 F | SYSTOLIC BLOOD PRESSURE: 150 MMHG | OXYGEN SATURATION: 98 % | WEIGHT: 170 LBS | HEIGHT: 67 IN | DIASTOLIC BLOOD PRESSURE: 59 MMHG

## 2022-02-11 LAB
24R-OH-CALCIDIOL SERPL-MCNC: 39.5 PG/ML
25(OH)D3 SERPL-MCNC: 48.6 NG/ML
ALBUMIN SERPL ELPH-MCNC: 4.4 G/DL
ALP BLD-CCNC: 112 U/L
ALT SERPL-CCNC: 9 U/L
ANION GAP SERPL CALC-SCNC: 19 MMOL/L
AST SERPL-CCNC: 12 U/L
BILIRUB SERPL-MCNC: 0.4 MG/DL
BUN SERPL-MCNC: 25 MG/DL
CALCIUM SERPL-MCNC: 8.4 MG/DL
CALCIUM SERPL-MCNC: 8.4 MG/DL
CHLORIDE SERPL-SCNC: 94 MMOL/L
CO2 SERPL-SCNC: 21 MMOL/L
CREAT SERPL-MCNC: 3.82 MG/DL
CREAT SPEC-SCNC: 43 MG/DL
CREAT/PROT UR: 2.1 RATIO
CYSTATIN C SERPL-MCNC: 3.29 MG/L
ESTIMATED AVERAGE GLUCOSE: 146 MG/DL
FERRITIN SERPL-MCNC: 149 NG/ML
GFR/BSA.PRED SERPLBLD CYS-BASED-ARV: 15 ML/MIN/1.73M2
GLUCOSE SERPL-MCNC: 197 MG/DL
HBA1C MFR BLD HPLC: 6.7 %
IRON SATN MFR SERPL: 11 %
IRON SERPL-MCNC: 30 UG/DL
MAGNESIUM SERPL-MCNC: 2.1 MG/DL
PARATHYROID HORMONE INTACT: 207 PG/ML
PHOSPHATE SERPL-MCNC: 4.1 MG/DL
POTASSIUM SERPL-SCNC: 4.3 MMOL/L
PROT SERPL-MCNC: 7.2 G/DL
PROT UR-MCNC: 89 MG/DL
SODIUM SERPL-SCNC: 135 MMOL/L
TIBC SERPL-MCNC: 283 UG/DL
TSH SERPL-ACNC: 9.25 UIU/ML
UIBC SERPL-MCNC: 253 UG/DL

## 2022-02-11 PROCEDURE — 99214 OFFICE O/P EST MOD 30 MIN: CPT

## 2022-02-11 PROCEDURE — 93000 ELECTROCARDIOGRAM COMPLETE: CPT

## 2022-02-14 ENCOUNTER — NON-APPOINTMENT (OUTPATIENT)
Age: 72
End: 2022-02-14

## 2022-02-14 ENCOUNTER — RX RENEWAL (OUTPATIENT)
Age: 72
End: 2022-02-14

## 2022-02-14 LAB
APPEARANCE: CLEAR
BILIRUBIN URINE: NEGATIVE
BLOOD URINE: NEGATIVE
COLOR: NORMAL
GLUCOSE QUALITATIVE U: NORMAL
KETONES URINE: NEGATIVE
LEUKOCYTE ESTERASE URINE: NEGATIVE
NITRITE URINE: NEGATIVE
PH URINE: 7.5
PROTEIN URINE: ABNORMAL
SPECIFIC GRAVITY URINE: 1.01
UROBILINOGEN URINE: NORMAL

## 2022-02-15 DIAGNOSIS — N18.4 CHRONIC KIDNEY DISEASE, STAGE 4 (SEVERE): ICD-10-CM

## 2022-02-15 DIAGNOSIS — I50.9 HEART FAILURE, UNSPECIFIED: ICD-10-CM

## 2022-02-15 DIAGNOSIS — I50.31 ACUTE DIASTOLIC (CONGESTIVE) HEART FAILURE: ICD-10-CM

## 2022-02-15 DIAGNOSIS — N40.0 BENIGN PROSTATIC HYPERPLASIA WITHOUT LOWER URINARY TRACT SYMPTOMS: ICD-10-CM

## 2022-02-15 DIAGNOSIS — Z79.4 LONG TERM (CURRENT) USE OF INSULIN: ICD-10-CM

## 2022-02-15 DIAGNOSIS — I13.0 HYPERTENSIVE HEART AND CHRONIC KIDNEY DISEASE WITH HEART FAILURE AND STAGE 1 THROUGH STAGE 4 CHRONIC KIDNEY DISEASE, OR UNSPECIFIED CHRONIC KIDNEY DISEASE: ICD-10-CM

## 2022-02-15 DIAGNOSIS — E87.6 HYPOKALEMIA: ICD-10-CM

## 2022-02-15 DIAGNOSIS — Z79.899 OTHER LONG TERM (CURRENT) DRUG THERAPY: ICD-10-CM

## 2022-02-15 DIAGNOSIS — Z79.82 LONG TERM (CURRENT) USE OF ASPIRIN: ICD-10-CM

## 2022-02-15 DIAGNOSIS — E11.22 TYPE 2 DIABETES MELLITUS WITH DIABETIC CHRONIC KIDNEY DISEASE: ICD-10-CM

## 2022-02-15 DIAGNOSIS — Z20.822 CONTACT WITH AND (SUSPECTED) EXPOSURE TO COVID-19: ICD-10-CM

## 2022-02-15 DIAGNOSIS — D50.9 IRON DEFICIENCY ANEMIA, UNSPECIFIED: ICD-10-CM

## 2022-02-15 DIAGNOSIS — E78.5 HYPERLIPIDEMIA, UNSPECIFIED: ICD-10-CM

## 2022-02-15 DIAGNOSIS — Z79.84 LONG TERM (CURRENT) USE OF ORAL HYPOGLYCEMIC DRUGS: ICD-10-CM

## 2022-02-15 DIAGNOSIS — E83.51 HYPOCALCEMIA: ICD-10-CM

## 2022-02-16 ENCOUNTER — APPOINTMENT (OUTPATIENT)
Dept: NEPHROLOGY | Facility: CLINIC | Age: 72
End: 2022-02-16
Payer: MEDICARE

## 2022-02-16 VITALS
RESPIRATION RATE: 16 BRPM | HEART RATE: 95 BPM | OXYGEN SATURATION: 99 % | SYSTOLIC BLOOD PRESSURE: 140 MMHG | BODY MASS INDEX: 26.16 KG/M2 | WEIGHT: 167 LBS | DIASTOLIC BLOOD PRESSURE: 65 MMHG

## 2022-02-16 VITALS
SYSTOLIC BLOOD PRESSURE: 142 MMHG | HEART RATE: 92 BPM | DIASTOLIC BLOOD PRESSURE: 72 MMHG | OXYGEN SATURATION: 99 % | RESPIRATION RATE: 16 BRPM

## 2022-02-16 DIAGNOSIS — N04.9 NEPHROTIC SYNDROME WITH UNSPECIFIED MORPHOLOGIC CHANGES: ICD-10-CM

## 2022-02-16 PROCEDURE — 99215 OFFICE O/P EST HI 40 MIN: CPT

## 2022-02-16 RX ORDER — B-COMPLEX WITH VITAMIN C
1250 (500 CA) TABLET ORAL
Qty: 180 | Refills: 0 | Status: ACTIVE | COMMUNITY
Start: 2022-02-16 | End: 1900-01-01

## 2022-02-21 ENCOUNTER — RX RENEWAL (OUTPATIENT)
Age: 72
End: 2022-02-21

## 2022-02-21 PROBLEM — N04.9 RENAL ANASARCA: Status: ACTIVE | Noted: 2022-02-21

## 2022-02-22 NOTE — PHYSICAL EXAM
[General Appearance - Alert] : alert [General Appearance - In No Acute Distress] : in no acute distress [General Appearance - Well Nourished] : well nourished [Sclera] : the sclera and conjunctiva were normal [PERRL With Normal Accommodation] : pupils were equal in size, round, and reactive to light [Extraocular Movements] : extraocular movements were intact [Outer Ear] : the ears and nose were normal in appearance [Oropharynx] : the oropharynx was normal [Neck Appearance] : the appearance of the neck was normal [Jugular Venous Distention Increased] : there was no jugular-venous distention [Auscultation Breath Sounds / Voice Sounds] : lungs were clear to auscultation bilaterally [Heart Rate And Rhythm] : heart rate was normal and rhythm regular [Heart Sounds] : normal S1 and S2 [Heart Sounds Gallop] : no gallops [Murmurs] : no murmurs [Heart Sounds Pericardial Friction Rub] : no pericardial rub [Edema] : there was no peripheral edema [Bowel Sounds] : normal bowel sounds [Abdomen Soft] : soft [Abdomen Tenderness] : non-tender [No CVA Tenderness] : no ~M costovertebral angle tenderness [Abnormal Walk] : normal gait [No Spinal Tenderness] : no spinal tenderness [Involuntary Movements] : no involuntary movements were seen [Musculoskeletal - Swelling] : no joint swelling seen [Skin Color & Pigmentation] : normal skin color and pigmentation [] : no rash [No Focal Deficits] : no focal deficits [Oriented To Time, Place, And Person] : oriented to person, place, and time [Impaired Insight] : insight and judgment were intact [Affect] : the affect was normal [FreeTextEntry1] : no asterixis

## 2022-02-22 NOTE — ASSESSMENT
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 \par -HTN uncontrolled\par - Secondary hyperparathyroidism - on vitamin D daily well controlled\par - met with [facility], has a matched donor however with eGFR ~15 and no uremia so asked pt to f/u with transplant center re: timing of transplant, explained they will likely wait to transplant until renal function declines. 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 \par - Renal sono, 10.5/10.7cm normal looking kidneys, mildly enlarged prostate with 170cc PVR\par \par # Diastolic ventricular dysfunction and recent anasarca\par - likely related to renal function and Na intake, much improved now on 40mg torsemide daily\par \par Plan\par RTC in 2 months for f/u\par

## 2022-02-22 NOTE — HISTORY OF PRESENT ILLNESS
[FreeTextEntry1] : 71yo with h/o acute hyponatremia related to chlorthalidone, HTN, BPH with urinary retention, long standing uncontrolled  DMII + retinopathy here for f/u of CKD IV/V\par \par Feeling well. LE edema which is new, and some mild COBB as well. No orthopnea.  No changes in urination. sbp 150-160s at home. \par \par OTC: vitamin B12\par \par Socal: born in [place]. No tobacco/drug use\par \par All other ROS negative

## 2022-02-24 ENCOUNTER — OUTPATIENT (OUTPATIENT)
Dept: OUTPATIENT SERVICES | Facility: HOSPITAL | Age: 72
LOS: 1 days | End: 2022-02-24
Payer: MEDICARE

## 2022-02-24 DIAGNOSIS — R06.00 DYSPNEA, UNSPECIFIED: ICD-10-CM

## 2022-02-24 PROCEDURE — 93018 CV STRESS TEST I&R ONLY: CPT

## 2022-02-24 PROCEDURE — 93016 CV STRESS TEST SUPVJ ONLY: CPT

## 2022-02-24 PROCEDURE — 78452 HT MUSCLE IMAGE SPECT MULT: CPT | Mod: 26

## 2022-02-25 ENCOUNTER — RESULT REVIEW (OUTPATIENT)
Age: 72
End: 2022-02-25

## 2022-02-25 ENCOUNTER — APPOINTMENT (OUTPATIENT)
Dept: ENDOCRINOLOGY | Facility: CLINIC | Age: 72
End: 2022-02-25
Payer: MEDICARE

## 2022-02-25 VITALS
OXYGEN SATURATION: 99 % | WEIGHT: 159 LBS | HEART RATE: 96 BPM | TEMPERATURE: 97.8 F | SYSTOLIC BLOOD PRESSURE: 164 MMHG | DIASTOLIC BLOOD PRESSURE: 67 MMHG | HEIGHT: 67 IN | BODY MASS INDEX: 24.96 KG/M2

## 2022-02-25 PROCEDURE — 93017 CV STRESS TEST TRACING ONLY: CPT

## 2022-02-25 PROCEDURE — A9500: CPT

## 2022-02-25 PROCEDURE — A9505: CPT

## 2022-02-25 PROCEDURE — 99204 OFFICE O/P NEW MOD 45 MIN: CPT

## 2022-02-25 PROCEDURE — 78452 HT MUSCLE IMAGE SPECT MULT: CPT

## 2022-02-25 NOTE — CONSULT LETTER
[Dear  ___] : Dear  [unfilled], [Consult Letter:] : I had the pleasure of evaluating your patient, [unfilled]. [Please see my note below.] : Please see my note below. [Consult Closing:] : Thank you very much for allowing me to participate in the care of this patient.  If you have any questions, please do not hesitate to contact me. [Sincerely,] : Sincerely, [FreeTextEntry3] : Shraddha Pillai MD

## 2022-02-25 NOTE — PHYSICAL EXAM
[Alert] : alert [Well Nourished] : well nourished [No Acute Distress] : no acute distress [EOMI] : extra ocular movement intact [Normal Hearing] : hearing was normal [No Respiratory Distress] : no respiratory distress [Clear to Auscultation] : lungs were clear to auscultation bilaterally [Normal Rate] : heart rate was normal [Right foot was examined, including] : right foot ~C was examined, including visual inspection with sensory and pulse exams [Left foot was examined, including] : left foot ~C was examined, including visual inspection with sensory and pulse exams [2+] : 2+ in the dorsalis pedis [#1 Diminished] : number 1 was diminished [#2 Diminished] : number 2 was diminished [#3 Diminished] : number 3 was diminished [#4 Diminished] : number 4 was diminished [#5 Diminished] : number 5 was diminished [#6 Diminished] : number 6 was diminished [#7 Diminished] : number 7 was diminished [#8 Diminished] : number 8 was diminished [#9 Diminished] : number 9 was diminished [#10 Diminished] : number 10 was diminished [Normal Affect] : the affect was normal [Normal Insight/Judgement] : insight and judgment were intact [Normal Mood] : the mood was normal [Foot Ulcers] : no foot ulcers [de-identified] : +bilateral trace/1+ edema

## 2022-02-25 NOTE — ASSESSMENT
[Diabetes Foot Care] : diabetes foot care [Long Term Vascular Complications] : long term vascular complications of diabetes [Carbohydrate Consistent Diet] : carbohydrate consistent diet [Importance of Diet and Exercise] : importance of diet and exercise to improve glycemic control, achieve weight loss and improve cardiovascular health [Exercise/Effect on Glucose] : exercise/effect on glucose [Hypoglycemia Management] : hypoglycemia management [Action and use of Insulin] : action and use of short and long-acting insulin [Self Monitoring of Blood Glucose] : self monitoring of blood glucose [Insulin Self-Administration] : insulin self-administration [Injection Technique, Storage, Sharps Disposal] : injection technique, storage, and sharps disposal [Retinopathy Screening] : Patient was referred to ophthalmology for retinopathy screening [Levothyroxine] : The patient was instructed to take Levothyroxine on an empty stomach, separate from vitamins, and wait at least 30 minutes before eating [FreeTextEntry1] : Patient is a 73 yo man with type 2 diabetes complicated by CKD and recent fluid overload here to establish care for diabetes and hypothyroidism\par \par 1. T2DM; complicated by CKD and possible heart failure recently\par -patient's A1c is at goal. Based on CKD, i am not aiming for tight glycemic control. Avoiding hypoglycemia is a priority\par -continue with Lantus 16 units at bedtime and daily tradjenta\par -based on food/dier recall, he has high carb foods.  He does not eat red meat and is lacto-ovo vegetarian.  Educated to decrease muffins in the morning and to substitute for egg white omelettes or even low fat yogurt\par -SMBG goals discussed. Fasting goals of 100-130 and 2 hour post-prandial < 180\par -requires follow up with ophthalmology\par -monofilament testing done. Patient with decreased senses- has callouses and very dry skin, no foot ulcers on exam today\par -follow up with renal\par \par 2. Hypothyroid\par -he states adherence to Lt4 50 mcg po daily\par -continue with medicine at this time\par -repeat TFTs at next follow up visit\par \par Follow up May 2022

## 2022-02-25 NOTE — REASON FOR VISIT
[Initial Evaluation] : an initial evaluation [DM Type 2] : DM Type 2 [FreeTextEntry2] : Dr. Shaq Boyd

## 2022-02-25 NOTE — HISTORY OF PRESENT ILLNESS
[FreeTextEntry1] : Patient is a 71 yo man with type 2 diabetes and subclinical hypothyroidism establishing endocrine care\par \par Diagnosed with type 2 diabetes beginning in the 1980's.  Reports he was "careless" with diet and it worsened. He developed CKD.  No stroke and no heart attack history.  Currently on Lantus 16 units, tradjenta 5 mg daily.\par Checks sugars twice a day\par AM: \par Pre-dinner: 200 +\par Hypoglycemia: 60-70 in the morning.  Lantus used to be 24 and now decreased to 18.  States he had sugar that was high after getting stress test yesterday.  Sugar this morning was 150\par Breakfast: small bagel with tea; tea with biscuits\par Lunch: soup with piece of bread\par Dinner: vegetables and no carbs; lentils\par Snacks: cracker or biscuit\par No soda and no juice\par Dilated eye exam: one year ago; cataract surgery; was told there was some "dryness" in the retina without a cure.  States he was getting exams at [facility]. \par \par TSH 5.56 December 2020 increased to 9.25 February 10, 2022\par States he is adhering to levothyroxine 50 mcg po daily\par No family hx of thyroid disease

## 2022-02-25 NOTE — REVIEW OF SYSTEMS
[Fatigue] : no fatigue [Decreased Appetite] : appetite not decreased [Dysphagia] : no dysphagia [Dysphonia] : no dysphonia [Shortness Of Breath] : no shortness of breath [Nausea] : no nausea [Constipation] : no constipation [Vomiting] : no vomiting [Diarrhea] : no diarrhea [Headaches] : no headaches [Tremors] : no tremors [Depression] : no depression [Cold Intolerance] : no cold intolerance

## 2022-03-04 ENCOUNTER — APPOINTMENT (OUTPATIENT)
Dept: HEART AND VASCULAR | Facility: CLINIC | Age: 72
End: 2022-03-04
Payer: MEDICARE

## 2022-03-04 PROCEDURE — 99442: CPT

## 2022-03-04 NOTE — HISTORY OF PRESENT ILLNESS
[FreeTextEntry1] : 72 y/oM, PMHx HTN, HLD, DM2 and CKD 4 presented to ED 1/31/22 w/ increased COBB,\par fatigue and worsening BL LE. In the ED, CXR showed bilat pleural effusion R>L.\par CT non contrast with bilat moderate pleural effusions and patchy and nodular\par groundglass opacities in the left upper lobe could be due to alveolar edema but\par cannot exclude atypical infection. Trop negative x 1, BNP 2702. He was given\par IV Lasix 80mg x 1 and admitted to [facility] for mgmt acute HFpEF exacerbation. TTE c/w EF 63%, small pericardial effusion.  Diuresed and dc'd on torsemide 40 qd. Now presents for initial cardiac f/u after DC. Endorses COBB, denies chest pain. No associated nausea, vomiting or diaphoresis. Compiant w/ all meidcations. \par \par \par ECG: NSR at 89 bpm, LAD

## 2022-03-04 NOTE — HISTORY OF PRESENT ILLNESS
[FreeTextEntry1] : 72 y/oM, PMHx HTN, HLD, DM2 and CKD 4 presented to ED 1/31/22 w/ increased COBB,\par fatigue and worsening BL LE. In the ED, CXR showed bilat pleural effusion R>L.\par CT non contrast with bilat moderate pleural effusions and patchy and nodular\par groundglass opacities in the left upper lobe could be due to alveolar edema but\par cannot exclude atypical infection. Trop negative x 1, BNP 2702. He was given\par IV Lasix 80mg x 1 and admitted to [facility] for mgmt acute HFpEF exacerbation. TTE c/w EF 63%, small pericardial effusion.  Diuresed and dc'd on torsemide 40 qd.  Endorses COBB, denies chest pain. No associated nausea, vomiting or diaphoresis. Compliant w/ all medications. Returns today for results of MPI via TH visit. No new complaints. No worsening of COBB. \par \par \par ECG: NSR at 89 bpm, LAD

## 2022-03-04 NOTE — ASSESSMENT
[FreeTextEntry1] : 1. COBB\par - + CAD risk factors\par - hospitalization reports reviewed\par - check MPI\par - continue current meds for now \par - further recommendations pending results \par \par 2. HFpEF\par - euvolemic now\par - hospitalization reports reviewed\par - cont torsemide 40 qd\par - repat TTE in 6 months\par \par 3. HTN\par - stable \par - cont ramipril, amlodipine and torsemide for now\par - cont to monitor\par \par 4. HLD\par - stable\par - cont atorva 20 qd\par - cont to monitor\par \par 5.DM\par - a1c 6.8, controlled\par - cont current insulin regimin\par \par 6. CKD\par - f/b renal\par - cont to monitor\par \par 7. Overweight\par - BMI 27\par - ed done re heart healthy lifestyle modifications and diet \par \par RTC after

## 2022-03-04 NOTE — ASSESSMENT
[FreeTextEntry1] : 1. COBB\par - + CAD risk factors\par - hospitalization reports reviewed\par - 2/25/22 MPI c/w a small area of mild ischemia \par - results discussed in detail with pt \par - pt states his sx have improved and agrees that if they were to return or progress, he would proceed for LHC\par - continue OMT for now including ASA/statin and BP/DM control\par \par \par 2. HFpEF\par - euvolemic now\par - hospitalization reports reviewed\par - cont torsemide 40 qd\par - repeat TTE in 6 months\par \par 3. HTN\par - stable \par - cont ramipril, amlodipine and torsemide for now\par - cont to monitor\par \par 4. HLD\par - stable\par - cont atorva 20 qd\par - cont to monitor\par \par 5.DM\par - a1c 6.8, controlled\par - cont current insulin regimen\par \par 6. CKD\par - f/b renal\par - cont to monitor\par \par 7. Overweight\par - BMI 27\par - ed done re heart healthy lifestyle modifications and diet \par \par   RTC 3 months \par Spent 11 minutes on telehealth/phone visit, all questions answered in detail.

## 2022-03-13 ENCOUNTER — RX RENEWAL (OUTPATIENT)
Age: 72
End: 2022-03-13

## 2022-03-25 ENCOUNTER — RX RENEWAL (OUTPATIENT)
Age: 72
End: 2022-03-25

## 2022-04-13 ENCOUNTER — APPOINTMENT (OUTPATIENT)
Dept: NEPHROLOGY | Facility: CLINIC | Age: 72
End: 2022-04-13
Payer: MEDICARE

## 2022-04-13 ENCOUNTER — NON-APPOINTMENT (OUTPATIENT)
Age: 72
End: 2022-04-13

## 2022-04-13 VITALS
BODY MASS INDEX: 24.12 KG/M2 | SYSTOLIC BLOOD PRESSURE: 122 MMHG | DIASTOLIC BLOOD PRESSURE: 69 MMHG | WEIGHT: 154 LBS | OXYGEN SATURATION: 98 % | HEART RATE: 98 BPM | RESPIRATION RATE: 16 BRPM

## 2022-04-13 PROCEDURE — 99214 OFFICE O/P EST MOD 30 MIN: CPT

## 2022-04-14 LAB
25(OH)D3 SERPL-MCNC: 41.6 NG/ML
ALBUMIN SERPL ELPH-MCNC: 4.4 G/DL
ANION GAP SERPL CALC-SCNC: 16 MMOL/L
APPEARANCE: CLEAR
BACTERIA: NEGATIVE
BASOPHILS # BLD AUTO: 0.05 K/UL
BASOPHILS NFR BLD AUTO: 0.6 %
BILIRUBIN URINE: NEGATIVE
BLOOD URINE: NEGATIVE
BUN SERPL-MCNC: 29 MG/DL
CALCIUM SERPL-MCNC: 7.7 MG/DL
CALCIUM SERPL-MCNC: 7.7 MG/DL
CHLORIDE SERPL-SCNC: 97 MMOL/L
CO2 SERPL-SCNC: 24 MMOL/L
COLOR: NORMAL
CREAT SERPL-MCNC: 3.75 MG/DL
CREAT SPEC-SCNC: 92 MG/DL
CREAT SPEC-SCNC: 92 MG/DL
CREAT/PROT UR: 1.2 RATIO
CYSTATIN C SERPL-MCNC: 3.09 MG/L
EGFR: 16 ML/MIN/1.73M2
EOSINOPHIL # BLD AUTO: 0.64 K/UL
EOSINOPHIL NFR BLD AUTO: 7.6 %
FERRITIN SERPL-MCNC: 130 NG/ML
GFR/BSA.PRED SERPLBLD CYS-BASED-ARV: 17 ML/MIN/1.73M2
GLUCOSE QUALITATIVE U: ABNORMAL
GLUCOSE SERPL-MCNC: 252 MG/DL
HCT VFR BLD CALC: 36.5 %
HGB BLD-MCNC: 11.8 G/DL
HYALINE CASTS: 0 /LPF
IMM GRANULOCYTES NFR BLD AUTO: 0.2 %
IRON SATN MFR SERPL: 20 %
IRON SERPL-MCNC: 54 UG/DL
KETONES URINE: NEGATIVE
LEUKOCYTE ESTERASE URINE: ABNORMAL
LYMPHOCYTES # BLD AUTO: 3.45 K/UL
LYMPHOCYTES NFR BLD AUTO: 40.9 %
MAGNESIUM SERPL-MCNC: 1.9 MG/DL
MAN DIFF?: NORMAL
MCHC RBC-ENTMCNC: 28.3 PG
MCHC RBC-ENTMCNC: 32.3 GM/DL
MCV RBC AUTO: 87.5 FL
MICROALBUMIN 24H UR DL<=1MG/L-MCNC: 49.2 MG/DL
MICROALBUMIN/CREAT 24H UR-RTO: 532 MG/G
MICROSCOPIC-UA: NORMAL
MONOCYTES # BLD AUTO: 0.78 K/UL
MONOCYTES NFR BLD AUTO: 9.2 %
NEUTROPHILS # BLD AUTO: 3.5 K/UL
NEUTROPHILS NFR BLD AUTO: 41.5 %
NITRITE URINE: NEGATIVE
PARATHYROID HORMONE INTACT: 229 PG/ML
PH URINE: 6
PHOSPHATE SERPL-MCNC: 4.9 MG/DL
PLATELET # BLD AUTO: 289 K/UL
POTASSIUM SERPL-SCNC: 3.8 MMOL/L
PROT UR-MCNC: 112 MG/DL
PROTEIN URINE: ABNORMAL
RBC # BLD: 4.17 M/UL
RBC # FLD: 14.4 %
RED BLOOD CELLS URINE: 1 /HPF
SODIUM SERPL-SCNC: 137 MMOL/L
SPECIFIC GRAVITY URINE: 1.02
SQUAMOUS EPITHELIAL CELLS: 0 /HPF
TIBC SERPL-MCNC: 265 UG/DL
UIBC SERPL-MCNC: 211 UG/DL
UROBILINOGEN URINE: NORMAL
WBC # FLD AUTO: 8.44 K/UL
WHITE BLOOD CELLS URINE: 6 /HPF

## 2022-04-15 NOTE — HISTORY OF PRESENT ILLNESS
[FreeTextEntry1] : 71yo with h/o acute hyponatremia related to chlorthalidone, HTN, BPH with urinary retention, long standing uncontrolled  DMII + retinopathy here for f/u of CKD IV/V\par \par Feeling well. LE edema which is new, and some mild COBB as well. No orthopnea.  No changes in urination. sbp 150-160s at home. \par \par OTC: vitamin B12\par \par Social: born in [place]. No tobacco/drug use\par \par All other ROS negative

## 2022-04-15 NOTE — ASSESSMENT
[FreeTextEntry1] : 73yo with h/o acute hyponatremia related to chlorthalidone, HTN, BPH with urinary retention, long standing uncontrolled  DMII + retinopathy here for f/u of CKD IV/V\par \par # CKD IV/V w hyperkalemia, mild chronic hyponatremia and non nephrotic proteinuria -  likely 2/2 diabetic nephropathy\par - reviewed April labs, baseline Cr stable 3.75, cystatin C based eGFR 17, hypocalcemic \par - anemia mild and stable,Fe def and 2/2 renal disease\par - Nabicarbonate 650mg BID normalized his metabolic acidosis as well as hyperkalemia with kayexelate\par - DMII + retinopathy now better controlled, on lantus and tradjenta. \par -HTN uncontrolled\par - Secondary hyperparathyroidism - on vitamin D daily well controlled\par - [facility], has a matched donor who is working on their blood pressure control, and pt possibly needs cardiac cath per cardiologist however deferred due to his advanced CKD, however he will likely need this before transplant anyways, will d/w cardiologist, no urgency but will benefit from cardiac cath in the future before transplant. 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 \par - Renal sono, 10.5/10.7cm normal looking kidneys, mildly enlarged prostate with 170cc PVR\par \par # Diastolic ventricular dysfunction and recent anasarca\par - likely related to renal function and Na intake, much improved now on 40mg torsemide daily\par \par Plan\par Stable renal function, euvolemic EDW 154lbs, hypocalcemia 2/2 HPT, he'll confirm he's taking CaCarbonate BID prescribed by me in Feb and we will start calcitriol daily today. \par RTC in 3 months for f/u\par

## 2022-05-04 ENCOUNTER — RX RENEWAL (OUTPATIENT)
Age: 72
End: 2022-05-04

## 2022-05-11 ENCOUNTER — RX RENEWAL (OUTPATIENT)
Age: 72
End: 2022-05-11

## 2022-05-13 ENCOUNTER — APPOINTMENT (OUTPATIENT)
Dept: INTERNAL MEDICINE | Facility: CLINIC | Age: 72
End: 2022-05-13
Payer: MEDICARE

## 2022-05-13 VITALS
OXYGEN SATURATION: 98 % | HEART RATE: 84 BPM | HEIGHT: 67 IN | SYSTOLIC BLOOD PRESSURE: 127 MMHG | DIASTOLIC BLOOD PRESSURE: 69 MMHG | TEMPERATURE: 97.3 F | WEIGHT: 158 LBS | BODY MASS INDEX: 24.8 KG/M2

## 2022-05-13 PROCEDURE — 99214 OFFICE O/P EST MOD 30 MIN: CPT

## 2022-05-28 ENCOUNTER — INPATIENT (INPATIENT)
Facility: HOSPITAL | Age: 72
LOS: 0 days | Discharge: HOME | DRG: 313 | End: 2022-05-29
Attending: INTERNAL MEDICINE | Admitting: INTERNAL MEDICINE
Payer: MEDICARE

## 2022-05-28 VITALS
HEIGHT: 67 IN | DIASTOLIC BLOOD PRESSURE: 79 MMHG | TEMPERATURE: 98 F | RESPIRATION RATE: 18 BRPM | HEART RATE: 92 BPM | SYSTOLIC BLOOD PRESSURE: 149 MMHG | OXYGEN SATURATION: 99 % | WEIGHT: 160.06 LBS

## 2022-05-28 DIAGNOSIS — N18.4 CHRONIC KIDNEY DISEASE, STAGE 4 (SEVERE): ICD-10-CM

## 2022-05-28 DIAGNOSIS — E11.9 TYPE 2 DIABETES MELLITUS WITHOUT COMPLICATIONS: ICD-10-CM

## 2022-05-28 DIAGNOSIS — D50.9 IRON DEFICIENCY ANEMIA, UNSPECIFIED: ICD-10-CM

## 2022-05-28 DIAGNOSIS — R07.9 CHEST PAIN, UNSPECIFIED: ICD-10-CM

## 2022-05-28 DIAGNOSIS — E78.5 HYPERLIPIDEMIA, UNSPECIFIED: ICD-10-CM

## 2022-05-28 DIAGNOSIS — E03.9 HYPOTHYROIDISM, UNSPECIFIED: ICD-10-CM

## 2022-05-28 DIAGNOSIS — I50.32 CHRONIC DIASTOLIC (CONGESTIVE) HEART FAILURE: ICD-10-CM

## 2022-05-28 DIAGNOSIS — I10 ESSENTIAL (PRIMARY) HYPERTENSION: ICD-10-CM

## 2022-05-28 LAB
ANION GAP SERPL CALC-SCNC: 17 MMOL/L — SIGNIFICANT CHANGE UP (ref 5–17)
BUN SERPL-MCNC: 30 MG/DL — HIGH (ref 7–23)
CALCIUM SERPL-MCNC: 8.6 MG/DL — SIGNIFICANT CHANGE UP (ref 8.4–10.5)
CHLORIDE SERPL-SCNC: 99 MMOL/L — SIGNIFICANT CHANGE UP (ref 96–108)
CK MB CFR SERPL CALC: 4.3 NG/ML — SIGNIFICANT CHANGE UP (ref 0–6.7)
CK SERPL-CCNC: 201 U/L — HIGH (ref 30–200)
CO2 SERPL-SCNC: 25 MMOL/L — SIGNIFICANT CHANGE UP (ref 22–31)
CREAT ?TM UR-MCNC: 27 MG/DL — SIGNIFICANT CHANGE UP
CREAT SERPL-MCNC: 3.73 MG/DL — HIGH (ref 0.5–1.3)
EGFR: 16 ML/MIN/1.73M2 — LOW
GLUCOSE BLDC GLUCOMTR-MCNC: 131 MG/DL — HIGH (ref 70–99)
GLUCOSE BLDC GLUCOMTR-MCNC: 197 MG/DL — HIGH (ref 70–99)
GLUCOSE SERPL-MCNC: 186 MG/DL — HIGH (ref 70–99)
HCT VFR BLD CALC: 35.8 % — LOW (ref 39–50)
HGB BLD-MCNC: 12.1 G/DL — LOW (ref 13–17)
MCHC RBC-ENTMCNC: 29 PG — SIGNIFICANT CHANGE UP (ref 27–34)
MCHC RBC-ENTMCNC: 33.8 GM/DL — SIGNIFICANT CHANGE UP (ref 32–36)
MCV RBC AUTO: 85.9 FL — SIGNIFICANT CHANGE UP (ref 80–100)
NRBC # BLD: 0 /100 WBCS — SIGNIFICANT CHANGE UP (ref 0–0)
PLATELET # BLD AUTO: 272 K/UL — SIGNIFICANT CHANGE UP (ref 150–400)
POTASSIUM SERPL-MCNC: 3.8 MMOL/L — SIGNIFICANT CHANGE UP (ref 3.5–5.3)
POTASSIUM SERPL-SCNC: 3.8 MMOL/L — SIGNIFICANT CHANGE UP (ref 3.5–5.3)
POTASSIUM UR-SCNC: 7 MMOL/L — SIGNIFICANT CHANGE UP
PROT ?TM UR-MCNC: 35 MG/DL — HIGH (ref 0–12)
PROT/CREAT UR-RTO: 1.3 RATIO — HIGH (ref 0–0.2)
RBC # BLD: 4.17 M/UL — LOW (ref 4.2–5.8)
RBC # FLD: 13.5 % — SIGNIFICANT CHANGE UP (ref 10.3–14.5)
SARS-COV-2 RNA SPEC QL NAA+PROBE: NEGATIVE — SIGNIFICANT CHANGE UP
SODIUM SERPL-SCNC: 141 MMOL/L — SIGNIFICANT CHANGE UP (ref 135–145)
SODIUM UR-SCNC: 57 MMOL/L — SIGNIFICANT CHANGE UP
TROPONIN T SERPL-MCNC: 0.01 NG/ML — SIGNIFICANT CHANGE UP (ref 0–0.01)
TROPONIN T SERPL-MCNC: 0.01 NG/ML — SIGNIFICANT CHANGE UP (ref 0–0.01)
UUN UR-MCNC: 131 MG/DL — SIGNIFICANT CHANGE UP
WBC # BLD: 8.37 K/UL — SIGNIFICANT CHANGE UP (ref 3.8–10.5)
WBC # FLD AUTO: 8.37 K/UL — SIGNIFICANT CHANGE UP (ref 3.8–10.5)

## 2022-05-28 PROCEDURE — 71045 X-RAY EXAM CHEST 1 VIEW: CPT | Mod: 26

## 2022-05-28 PROCEDURE — 93010 ELECTROCARDIOGRAM REPORT: CPT

## 2022-05-28 PROCEDURE — 99285 EMERGENCY DEPT VISIT HI MDM: CPT | Mod: 25

## 2022-05-28 RX ORDER — DEXTROSE 50 % IN WATER 50 %
25 SYRINGE (ML) INTRAVENOUS ONCE
Refills: 0 | Status: DISCONTINUED | OUTPATIENT
Start: 2022-05-28 | End: 2022-05-29

## 2022-05-28 RX ORDER — INSULIN GLARGINE 100 [IU]/ML
16 INJECTION, SOLUTION SUBCUTANEOUS AT BEDTIME
Refills: 0 | Status: DISCONTINUED | OUTPATIENT
Start: 2022-05-28 | End: 2022-05-29

## 2022-05-28 RX ORDER — DEXTROSE 50 % IN WATER 50 %
15 SYRINGE (ML) INTRAVENOUS ONCE
Refills: 0 | Status: DISCONTINUED | OUTPATIENT
Start: 2022-05-28 | End: 2022-05-29

## 2022-05-28 RX ORDER — CALCITRIOL 0.5 UG/1
0.25 CAPSULE ORAL DAILY
Refills: 0 | Status: DISCONTINUED | OUTPATIENT
Start: 2022-05-28 | End: 2022-05-29

## 2022-05-28 RX ORDER — DEXTROSE 50 % IN WATER 50 %
12.5 SYRINGE (ML) INTRAVENOUS ONCE
Refills: 0 | Status: DISCONTINUED | OUTPATIENT
Start: 2022-05-28 | End: 2022-05-29

## 2022-05-28 RX ORDER — LEVOTHYROXINE SODIUM 125 MCG
50 TABLET ORAL DAILY
Refills: 0 | Status: DISCONTINUED | OUTPATIENT
Start: 2022-05-28 | End: 2022-05-29

## 2022-05-28 RX ORDER — HEPARIN SODIUM 5000 [USP'U]/ML
5000 INJECTION INTRAVENOUS; SUBCUTANEOUS EVERY 8 HOURS
Refills: 0 | Status: DISCONTINUED | OUTPATIENT
Start: 2022-05-28 | End: 2022-05-29

## 2022-05-28 RX ORDER — METOPROLOL TARTRATE 50 MG
12.5 TABLET ORAL
Refills: 0 | Status: DISCONTINUED | OUTPATIENT
Start: 2022-05-28 | End: 2022-05-29

## 2022-05-28 RX ORDER — PANTOPRAZOLE SODIUM 20 MG/1
40 TABLET, DELAYED RELEASE ORAL
Refills: 0 | Status: DISCONTINUED | OUTPATIENT
Start: 2022-05-28 | End: 2022-05-29

## 2022-05-28 RX ORDER — ATORVASTATIN CALCIUM 80 MG/1
20 TABLET, FILM COATED ORAL AT BEDTIME
Refills: 0 | Status: DISCONTINUED | OUTPATIENT
Start: 2022-05-28 | End: 2022-05-28

## 2022-05-28 RX ORDER — ASPIRIN/CALCIUM CARB/MAGNESIUM 324 MG
325 TABLET ORAL ONCE
Refills: 0 | Status: COMPLETED | OUTPATIENT
Start: 2022-05-28 | End: 2022-05-28

## 2022-05-28 RX ORDER — ATORVASTATIN CALCIUM 80 MG/1
40 TABLET, FILM COATED ORAL AT BEDTIME
Refills: 0 | Status: DISCONTINUED | OUTPATIENT
Start: 2022-05-28 | End: 2022-05-29

## 2022-05-28 RX ORDER — FERROUS GLUCONATE 100 %
1 POWDER (GRAM) MISCELLANEOUS
Qty: 0 | Refills: 0 | DISCHARGE

## 2022-05-28 RX ORDER — SODIUM CHLORIDE 9 MG/ML
1000 INJECTION, SOLUTION INTRAVENOUS
Refills: 0 | Status: DISCONTINUED | OUTPATIENT
Start: 2022-05-28 | End: 2022-05-29

## 2022-05-28 RX ORDER — SODIUM POLYSTYRENE SULFONATE 4.1 MEQ/G
15 POWDER, FOR SUSPENSION ORAL DAILY
Refills: 0 | Status: DISCONTINUED | OUTPATIENT
Start: 2022-05-28 | End: 2022-05-28

## 2022-05-28 RX ORDER — FERROUS SULFATE 325(65) MG
325 TABLET ORAL DAILY
Refills: 0 | Status: DISCONTINUED | OUTPATIENT
Start: 2022-05-28 | End: 2022-05-29

## 2022-05-28 RX ORDER — AMLODIPINE BESYLATE 2.5 MG/1
5 TABLET ORAL DAILY
Refills: 0 | Status: DISCONTINUED | OUTPATIENT
Start: 2022-05-28 | End: 2022-05-29

## 2022-05-28 RX ORDER — GLUCAGON INJECTION, SOLUTION 0.5 MG/.1ML
1 INJECTION, SOLUTION SUBCUTANEOUS ONCE
Refills: 0 | Status: DISCONTINUED | OUTPATIENT
Start: 2022-05-28 | End: 2022-05-29

## 2022-05-28 RX ORDER — SODIUM BICARBONATE 1 MEQ/ML
650 SYRINGE (ML) INTRAVENOUS
Refills: 0 | Status: DISCONTINUED | OUTPATIENT
Start: 2022-05-28 | End: 2022-05-29

## 2022-05-28 RX ORDER — INSULIN LISPRO 100/ML
VIAL (ML) SUBCUTANEOUS AT BEDTIME
Refills: 0 | Status: DISCONTINUED | OUTPATIENT
Start: 2022-05-28 | End: 2022-05-29

## 2022-05-28 RX ORDER — ASPIRIN/CALCIUM CARB/MAGNESIUM 324 MG
81 TABLET ORAL DAILY
Refills: 0 | Status: DISCONTINUED | OUTPATIENT
Start: 2022-05-29 | End: 2022-05-29

## 2022-05-28 RX ORDER — AMLODIPINE BESYLATE 2.5 MG/1
1 TABLET ORAL
Qty: 0 | Refills: 0 | DISCHARGE

## 2022-05-28 RX ORDER — INSULIN LISPRO 100/ML
VIAL (ML) SUBCUTANEOUS
Refills: 0 | Status: DISCONTINUED | OUTPATIENT
Start: 2022-05-28 | End: 2022-05-29

## 2022-05-28 RX ADMIN — Medication 325 MILLIGRAM(S): at 15:13

## 2022-05-28 RX ADMIN — ATORVASTATIN CALCIUM 40 MILLIGRAM(S): 80 TABLET, FILM COATED ORAL at 22:59

## 2022-05-28 RX ADMIN — Medication 12.5 MILLIGRAM(S): at 18:40

## 2022-05-28 RX ADMIN — INSULIN GLARGINE 16 UNIT(S): 100 INJECTION, SOLUTION SUBCUTANEOUS at 23:12

## 2022-05-28 RX ADMIN — HEPARIN SODIUM 5000 UNIT(S): 5000 INJECTION INTRAVENOUS; SUBCUTANEOUS at 18:37

## 2022-05-28 RX ADMIN — Medication 650 MILLIGRAM(S): at 19:47

## 2022-05-28 NOTE — ED ADULT NURSE NOTE - NSICDXPASTMEDICALHX_GEN_ALL_CORE_FT
PAST MEDICAL HISTORY:  BPH (benign prostatic hyperplasia)     CAD (coronary artery disease)     CKD (chronic kidney disease), stage IV     Congestive heart failure (CHF)     Diabetes     Hyperlipidemia     Hypertension

## 2022-05-28 NOTE — H&P ADULT - PROBLEM SELECTOR PLAN 1
Currently CP free and hemodynamically stable  - Trop neg x1, f/u CE 8pm, AM  -  EKG 5/28/22: NSR @ 86 BPM with flattened T wave in I, TWI in AVL & PAC.   - CXR wet read 5/28/22: No infiltrates, no consolidation, f/u official read  - ECHO 2/1/22: LVEF 63%, mild TR, no other significant valvular disease, pulm HTN (PASP 44 mmHg), small pericardial effusion without ECHO w/o cardiac tamponade. No repeat ECHO needed as d/w Dr. Finney.   -  NST 2/25/22: small, mild reversible defect in the apex c/w mild ischemia; small, mild reversible defect in the mid-anterior wall c/w mild ischemia.  - c/w Ecotrin 81 mg PO QD, Atorvastatin 20 mg PO QD, Amlodipine 10 mg PO QD  - Plan for possible cath this admission pending renal clearance, will need to be consented Currently CP free and hemodynamically stable  - Trop neg x1, f/u CE 8pm, AM  -  EKG 5/28/22: NSR @ 86 BPM with flattened T wave in I, TWI in AVL & PAC.   - CXR wet read 5/28/22: No infiltrates, no consolidation, f/u official read  - ECHO 2/1/22: LVEF 63%, mild TR, no other significant valvular disease, pulm HTN (PASP 44 mmHg), small pericardial effusion without ECHO w/o cardiac tamponade. No repeat ECHO needed as d/w Dr. Finney.   -  NST 2/25/22: small, mild reversible defect in the apex c/w mild ischemia; small, mild reversible defect in the mid-anterior wall c/w mild ischemia.  - Will start Lopressor 12.5 mg PO BID  - s/p Ecotrin 325 mg PO x1 in ED. c/w Ecotrin 81 mg PO QD, Atorvastatin 20 mg PO QD, Amlodipine 10 mg PO QD  - Plan for possible cath this admission pending renal clearance, will need to be consented Currently CP free and hemodynamically stable  - Trop neg x1, f/u CE 8pm, AM  -  EKG 5/28/22: NSR @ 86 BPM with flattened T wave in I, TWI in AVL & PAC.   - CXR wet read 5/28/22: No infiltrates, no consolidation, f/u official read  - ECHO 2/1/22: LVEF 63%, mild TR, no other significant valvular disease, pulm HTN (PASP 44 mmHg), small pericardial effusion without ECHO w/o cardiac tamponade. No repeat ECHO needed as d/w Dr. Finney.   -  NST 2/25/22: small, mild reversible defect in the apex c/w mild ischemia; small, mild reversible defect in the mid-anterior wall c/w mild ischemia.  - Will start Lopressor 12.5 mg PO BID  - s/p Ecotrin 325 mg PO x1 in ED. c/w Ecotrin 81 mg PO QD, Atorvastatin 20 mg PO QD, Amlodipine 5 mg PO QD  - Plan for possible cath this admission pending renal clearance, will need to be consented

## 2022-05-28 NOTE — H&P ADULT - PROBLEM SELECTOR PLAN 7
- f/u HgbA1C  - Home meds: Lantus 20 units qHS, Tradjenta 5 mg PO QD  - c/w MISS, Lantus 16 units qHS    VTE ppx: SQH  Dispo: Pending renal clearance, ischemic w/u Recently started on Synthroid  - c/w Synthroid 50 mcg PO QD  - f/u AM TSH

## 2022-05-28 NOTE — ED PROVIDER NOTE - CLINICAL SUMMARY MEDICAL DECISION MAKING FREE TEXT BOX
Pt c/o L sided cp this am, now improved.  EKG w/o ischemic changes. + recent pharm thall stress w + mild ischemic changes apex, ant wall.  Sx concerning for acs.  Plan labs, asa, discuss w card - likely tele admit.

## 2022-05-28 NOTE — H&P ADULT - ASSESSMENT
ECHO 2/1/22: s/f mild TR, normal L/RV size and systolic function, EF 63% and small pericardial effusion without evidence of tamponade. Transitioned to PO Torsemide 40mg. On the day of discharge, the patient was seen and examined. NST 2/25/22:    Myocardial perfusion imaging is abnormal.    There is a small, mild reversible defect in the apex consistent with mild   ischemia.    There is also a small, mild reversible defect in the mid-anterior wall   consistent with mild ischemia.    Overall left ventricular systolic function is normal without regional   wall motion abnormalities.    The EKG stress test is normal.    DC Meds:  Amlodipine 10mg  ASA 81mg  Atorvastatin 20mg  Ramipril 10mg  Torsemide 40mg  daily     DC Meds:  Amlodipine 10mg  ASA 81mg  Atorvastatin 20mg  Ramipril 10mg  Torsemide 40mg  daily   71yo M h/o HTN, HLD, DM2, chronic HFpEF (EF 63% by ECHO, recent admit for acute on chronic HFpEF exacerbation 1/2022), CKD IV (baseline Cr 3.6-3.7, known to Dr. Vasquez), ZOHAIB (on iron tabs), BPH who presented to [facility] ED 5/28/22 c/o intermittent, non-radiating LSCP described as sharp and mild intensity occurring independent of exertion, lasting few seconds--> minutes before spontaneously resolving that started @ 9:30 AM this AM.  While in ED, pt received Ecotrin 325 mg PO x1.  Admitted to [facility] for r/o ACS, renal consult, possible cardiac cath pending renal clearance.

## 2022-05-28 NOTE — ED PROVIDER NOTE - WR INTERPRETATION 1
CXR negative - No infiltrates, No consolidation, No atelectasis seen, + cardiomegaly unchanged from 1/22

## 2022-05-28 NOTE — H&P ADULT - HISTORY OF PRESENT ILLNESS
INCOMPLETE    73yo M h/o HTN, HLD, DM2, chronic HFpEF (EF 63% by ECHO, recent admit for acute on chronic HFpEF exacerbation 1/2022), CKD IV (baseline Cr 3.6-3.7, known to Dr. Vasquez), ZOHAIB (on iron tabs), BPH who presented to [facility] ED 5/28/22 c/o intermittent, non-radiating LSCP described as sharp and mild intensity occurring independent of exertion, lasting ___, over past _____.     Denies SOB, dizziness, diaphoresis, palpitations, fatigue, LE edema, orthopnea, PND, syncope, N/V, abdominal pain, f/c, sick contact, recent travel.    Upon admit, VS -       ECHO performed s/f mild TR, normal L/RV size and systolic function, EF 63% and small pericardial effusion without evidence of tamponade. Transitioned to PO Torsemide 40mg. On the day of discharge, the patient was seen and examined.    DC Meds:  Amlodipine 10mg  ASA 81mg  Atorvastatin 20mg  Ramipril 10mg  Torsemide 40mg  daily   INCOMPLETE    73yo M h/o HTN, HLD, DM2, chronic HFpEF (EF 63% by ECHO, recent admit for acute on chronic HFpEF exacerbation 1/2022), CKD IV (baseline Cr 3.6-3.7, known to Dr. Vasquez), ZOHAIB (on iron tabs), BPH who presented to [facility] ED 5/28/22 c/o intermittent, non-radiating LSCP described as sharp and mild intensity occurring independent of exertion, lasting few seconds--> minutes before spontaneously resolving that started @ 9:30 AM this AM.     Denies SOB, dizziness, diaphoresis, palpitations, fatigue, LE edema, orthopnea, PND, syncope, N/V, abdominal pain, f/c, sick contact, recent travel.    Upon admit, VS -  T 98.1F, HR 92 BPM, /79, RR 18, SpO2 99% on RA. Labs significant for H/H 12.1/35.8 (was 10.4/31.0 2/2/22), BUN/Cr 30/3.73 (was 23/4.20 2/22/22),  trop neg x1, covid neg x1.  EKG 5/28/22: NSR @ 86 BPM with flattened T wave in I, TWI in AVL & PAC. CXR wet read 5/28/22: No infiltrates, no consolidation, no atelectasis seen. While in ED, pt received Ecotrin 325 mg PO x1.  Admitted to [facility] for r/o ACS, renal consult, possible cardiac cath pending renal clearance.  73yo M h/o HTN, HLD, DM2, chronic HFpEF (EF 63% by ECHO, recent admit for acute on chronic HFpEF exacerbation 1/2022), CKD IV (baseline Cr 3.6-3.7, known to Dr. Vasquez), ZOHAIB (on iron tabs), BPH who presented to [facility] ED 5/28/22 c/o intermittent, non-radiating LSCP described as sharp and mild intensity occurring independent of exertion, lasting few seconds--> minutes before spontaneously resolving that started @ 9:30 AM this AM.  Denies SOB, dizziness, diaphoresis, palpitations, fatigue, LE edema, orthopnea, PND, syncope, N/V, abdominal pain, f/c, sick contact, recent travel. Upon admit, VS -  T 98.1F, HR 92 BPM, /79, RR 18, SpO2 99% on RA. Labs significant for H/H 12.1/35.8 (was 10.4/31.0 2/2/22), BUN/Cr 30/3.73 (was 23/4.20 2/22/22),  trop neg x1, covid neg x1.  EKG 5/28/22: NSR @ 86 BPM with flattened T wave in I, TWI in AVL & PAC. CXR wet read 5/28/22: No infiltrates, no consolidation, no atelectasis seen. While in ED, pt received Ecotrin 325 mg PO x1.  Admitted to [facility] for r/o ACS, renal consult, possible cardiac cath pending renal clearance.  73yo M h/o HTN, HLD, DM2, chronic HFpEF (EF 63% by ECHO, recent admit for acute on chronic HFpEF exacerbation 1/2022), CKD IV (baseline Cr 3.6-3.7, known to Dr. Vasquez), ZOHAIB (on iron tabs), BPH who presented to [facility] ED 5/28/22 c/o intermittent, non-radiating LSCP described as sharp and 3/10 intensity occurring independent of exertion, no alleviating factors, lasting few seconds--> minutes before spontaneously resolving that started @ 9:30 AM this AM.  Denies SOB, dizziness, diaphoresis, palpitations, fatigue, LE edema, orthopnea, PND, syncope, N/V, abdominal pain, f/c, sick contact, recent travel, BRBPR, melena. Upon admit, VS -  T 98.1F, HR 92 BPM, /79, RR 18, SpO2 99% on RA. Labs significant for H/H 12.1/35.8 (was 10.4/31.0 2/2/22), BUN/Cr 30/3.73 (was 23/4.20 2/22/22),  trop neg x1, covid neg x1.  EKG 5/28/22: NSR @ 86 BPM with flattened T wave in I, TWI in AVL & PAC. CXR wet read 5/28/22: No infiltrates, no consolidation, no atelectasis seen. While in ED, pt received Ecotrin 325 mg PO x1.  Admitted to [facility] for r/o ACS, renal consult, possible cardiac cath pending renal clearance.  Meds verified from outpatient pharmacy and cross-referenced by [employer].     71yo M h/o HTN, HLD, DM2, chronic HFpEF (EF 63% by ECHO, recent admit for acute on chronic HFpEF exacerbation 1/2022), CKD IV (baseline Cr 3.6-3.7, known to Dr. Vasquez), secondary hyperparathyroidism, ZOHAIB (on iron tabs), BPH who presented to [facility] ED 5/28/22 c/o intermittent, non-radiating LSCP described as sharp and 3/10 intensity occurring independent of exertion, no alleviating factors, lasting few seconds--> minutes before spontaneously resolving that started @ 9:30 AM this AM.  Denies SOB, dizziness, diaphoresis, palpitations, fatigue, LE edema, orthopnea, PND, syncope, N/V, abdominal pain, f/c, sick contact, recent travel, BRBPR, melena. Upon admit, VS -  T 98.1F, HR 92 BPM, /79, RR 18, SpO2 99% on RA. Labs significant for H/H 12.1/35.8 (was 10.4/31.0 2/2/22), BUN/Cr 30/3.73 (was 23/4.20 2/22/22),  trop neg x1, covid neg x1.  EKG 5/28/22: NSR @ 86 BPM with flattened T wave in I, TWI in AVL & PAC. CXR wet read 5/28/22: No infiltrates, no consolidation, no atelectasis seen. While in ED, pt received Ecotrin 325 mg PO x1.  Admitted to [facility] for r/o ACS, renal consult, possible cardiac cath pending renal clearance.

## 2022-05-28 NOTE — H&P ADULT - PROBLEM SELECTOR PLAN 3
Does not appear to be in acute exacerbation  - ECHO as above  - Home Ramipril 10 mg PO QD held 2/2 CKD IV  - Holding home Torsemide 40 mg PO QD for now  - Core measures, strict I&Os, daily weights, 1.5L fluid restriction

## 2022-05-28 NOTE — ED PROVIDER NOTE - PHYSICAL EXAMINATION
VITAL SIGNS: I have reviewed nursing notes and confirm.  CONSTITUTIONAL: Well-developed; well-nourished; in no acute distress.   SKIN:  warm and dry, no acute rash.   HEAD:  normocephalic, atraumatic.  EYES: PERRL, EOM intact; conjunctiva and sclera clear.  ENT: No nasal discharge; airway clear.   NECK: Supple; non tender.  CARD: S1, S2 normal; no murmurs, gallops, or rubs. Regular rate and rhythm.   RESP:  Clear to auscultation b/l, no wheezes, rales or rhonchi.  ABD: Normal bowel sounds; soft; non-distended; non-tender; no guarding/ rebound.  MSK: Normal ROM. No clubbing, cyanosis, trace edema bilat le. no ttp bilat le  NEURO: Alert, oriented, grossly unremarkable  PSYCH: Cooperative, mood and affect appropriate.

## 2022-05-28 NOTE — ED PROVIDER NOTE - OBJECTIVE STATEMENT
73 yo male h/o htn, hld, dm, chf, cad, ckd c/o L sided cp intermittently since 930-10 am this am.  Pain was sharp, lasting sec to min, improving and then returning.  No radiation, associated nausea, dizziness, diaphoresis, sob, palpitations, change in baseline le edema.  Pt w admit lat Jan for chf.  Echo - mild TR, ef 63%, thallium stress - neg ekg stress, + small reversible areas of wall motion abnl apex and mid ant wall c/w mild ischemia.  Pt denies recent exertional cp, no change w exertion today.  No trauma/lift/strain, uri sx/cough/fever.  No current cp.  Last episode en route to er while in cab. 71 yo male h/o htn, hld, dm, chf, cad, ckd (prior cr 3.7-4), c/o L sided cp intermittently since 930-10 am this am.  Pain was sharp, lasting sec to min, improving and then returning.  No radiation, associated nausea, dizziness, diaphoresis, sob, palpitations, change in baseline le edema.  Pt w admit lat Jan for chf.  Echo - mild TR, ef 63%, thallium stress - neg ekg stress, + small reversible areas of wall motion abnl apex and mid ant wall c/w mild ischemia.  Pt denies recent exertional cp, no change w exertion today.  No trauma/lift/strain, uri sx/cough/fever.  No current cp.  Last episode en route to er while in cab.

## 2022-05-28 NOTE — H&P ADULT - PROBLEM SELECTOR PLAN 6
- f/u HgbA1C  - Home meds: Lantus 20 units qHS  - c/w MISS, Lantus 16 units qHS    VTE ppx: SQH  Dispo: Pending renal clearance, ischemic w/u - f/u AM lipid panel  - c/w home Atorvastatin 20 mg PO QD - f/u AM lipid panel  - home Atorvastatin 20 mg increased to 40 mg PO QD (10 year ASCVD risk score 17%)

## 2022-05-28 NOTE — H&P ADULT - PROBLEM SELECTOR PLAN 8
- f/u HgbA1C  - Home meds: Lantus 20 units qHS, Tradjenta 5 mg PO QD  - c/w MISS, Lantus 16 units qHS    VTE ppx: SQH  Dispo: Pending renal clearance, ischemic w/u

## 2022-05-28 NOTE — PROVIDER CONTACT NOTE (OTHER) - ASSESSMENT
Pt with no complaints of pain, mild aching at site but improved from yesterday, bruising noted around incision and on R upper arm. Pt was assessed in AM by MD Cespedes, RN pointed out swelling to MD who did not endorse concern

## 2022-05-28 NOTE — H&P ADULT - NSHPLABSRESULTS_GEN_ALL_CORE
12.1   8.37  )-----------( 272      ( 28 May 2022 14:42 )             35.8       05-28    141  |  99  |  30<H>  ----------------------------<  186<H>  3.8   |  25  |  3.73<H>    Ca    8.6      28 May 2022 14:42            CARDIAC MARKERS ( 28 May 2022 14:42 )  x     / 0.01 ng/mL / x     / x     / x

## 2022-05-28 NOTE — H&P ADULT - PROBLEM SELECTOR PLAN 5
- f/u AM lipid panel  - c/w home Atorvastatin 20 mg PO QD -140s  - Holding home Ramipril 10 mg PO QD, Torsemide 40 mg PO QD for now  - Started Lopressor 12.5 mg PO  BID.  - c/w home Amlodipine 10 mg PO QD. -140s  - Holding home Ramipril 10 mg PO QD, Torsemide 40 mg PO QD for now  - Started Lopressor 12.5 mg PO  BID.  - c/w home Amlodipine 5mg PO QD.

## 2022-05-28 NOTE — PATIENT PROFILE ADULT - FALL HARM RISK - HARM RISK INTERVENTIONS

## 2022-05-28 NOTE — ED PROVIDER NOTE - PROGRESS NOTE DETAILS
Labs w nl cbc, chem c/w pt's ckd, trop neg, cxr neg.  Pt feeling well.  Pt discussed w Dr Finney and ignacio pa - will admit.  PMD, Dr Boyd, paged to update. Dr Boyd updated; agrees w plan.

## 2022-05-28 NOTE — H&P ADULT - PROBLEM SELECTOR PLAN 2
Baseline Cr 3.6-3.7, current Cr 3.71 (at baseline), known to Dr. Vasquez  - f/u US retroperitoneal, renal lytes  - Consult renal in AM for clearance Baseline Cr 3.6-3.7, current Cr 3.71 (at baseline), known to Dr. Vasquez  - f/u US retroperitoneal, renal lytes  - c/w home sodium bicarb 650 mg PO BID, home Kayexalate 15 gm PO QD  - Consult renal in AM for clearance Baseline Cr 3.6-3.7, current Cr 3.71 (at baseline), known to Dr. Vasquez  - f/u US retroperitoneal, renal lytes  - c/w home sodium bicarb 650 mg PO BID, Kayexalate 15 gm PO QD, Calcitrol 0.25 mg PO QD  - Per Dr. Vasquez's o/p note - possible candidate for renal transplant however many need cardiac cath prior but previously deferred 2/2 CKD   - Consult renal in AM for clearance

## 2022-05-28 NOTE — H&P ADULT - PROBLEM SELECTOR PLAN 4
-140s  - Holding home Ramipril 10 mg PO QD, Torsemide 40 mg PO QD for now  - Started Lopressor 12.5 mg PO  BID.  - c/w home Amlodipine 10 mg PO QD. h/o ZOHAIB (on iron supplements at home), H/H 12.1/35.8 5/28/22 (was 10.4/31.0 2/2/22)  - f/u AM iron studies  - c/w iron tabs while in hospital

## 2022-05-28 NOTE — ED ADULT NURSE NOTE - OBJECTIVE STATEMENT
72y M, PMHx HTN, CKD (edema to bilat extremities at baseline), presents to the ED c/o intermittent "shooting" left sided chest pain since this morning. Denies back pain, SOB, palpitations, NVD, lightheadedness, dizziness, fever, chills. numbness, tingling. Pt A&Ox4, ambulatory with steady gait, speaking in clear/full sentences, vital signs stable. Continuous cardiac/pulse oximetry monitoring initiated.

## 2022-05-29 ENCOUNTER — TRANSCRIPTION ENCOUNTER (OUTPATIENT)
Age: 72
End: 2022-05-29

## 2022-05-29 VITALS — TEMPERATURE: 97 F

## 2022-05-29 LAB
A1C WITH ESTIMATED AVERAGE GLUCOSE RESULT: 7 % — HIGH (ref 4–5.6)
ALBUMIN SERPL ELPH-MCNC: 3.6 G/DL — SIGNIFICANT CHANGE UP (ref 3.3–5)
ALP SERPL-CCNC: 87 U/L — SIGNIFICANT CHANGE UP (ref 40–120)
ALT FLD-CCNC: 8 U/L — LOW (ref 10–45)
ANION GAP SERPL CALC-SCNC: 10 MMOL/L — SIGNIFICANT CHANGE UP (ref 5–17)
AST SERPL-CCNC: 11 U/L — SIGNIFICANT CHANGE UP (ref 10–40)
BASOPHILS # BLD AUTO: 0.04 K/UL — SIGNIFICANT CHANGE UP (ref 0–0.2)
BASOPHILS NFR BLD AUTO: 0.7 % — SIGNIFICANT CHANGE UP (ref 0–2)
BILIRUB SERPL-MCNC: 0.6 MG/DL — SIGNIFICANT CHANGE UP (ref 0.2–1.2)
BUN SERPL-MCNC: 27 MG/DL — HIGH (ref 7–23)
CALCIUM SERPL-MCNC: 8.2 MG/DL — LOW (ref 8.4–10.5)
CHLORIDE SERPL-SCNC: 104 MMOL/L — SIGNIFICANT CHANGE UP (ref 96–108)
CHOLEST SERPL-MCNC: 117 MG/DL — SIGNIFICANT CHANGE UP
CK MB CFR SERPL CALC: 3.8 NG/ML — SIGNIFICANT CHANGE UP (ref 0–6.7)
CK SERPL-CCNC: 185 U/L — SIGNIFICANT CHANGE UP (ref 30–200)
CO2 SERPL-SCNC: 28 MMOL/L — SIGNIFICANT CHANGE UP (ref 22–31)
CREAT SERPL-MCNC: 3.8 MG/DL — HIGH (ref 0.5–1.3)
EGFR: 16 ML/MIN/1.73M2 — LOW
EOSINOPHIL # BLD AUTO: 0.47 K/UL — SIGNIFICANT CHANGE UP (ref 0–0.5)
EOSINOPHIL NFR BLD AUTO: 8 % — HIGH (ref 0–6)
ESTIMATED AVERAGE GLUCOSE: 154 MG/DL — HIGH (ref 68–114)
FERRITIN SERPL-MCNC: 135 NG/ML — SIGNIFICANT CHANGE UP (ref 30–400)
GLUCOSE BLDC GLUCOMTR-MCNC: 77 MG/DL — SIGNIFICANT CHANGE UP (ref 70–99)
GLUCOSE SERPL-MCNC: 69 MG/DL — LOW (ref 70–99)
HCT VFR BLD CALC: 33.6 % — LOW (ref 39–50)
HDLC SERPL-MCNC: 36 MG/DL — LOW
HGB BLD-MCNC: 11 G/DL — LOW (ref 13–17)
IMM GRANULOCYTES NFR BLD AUTO: 0.3 % — SIGNIFICANT CHANGE UP (ref 0–1.5)
IRON SATN MFR SERPL: 34 % — SIGNIFICANT CHANGE UP (ref 16–55)
IRON SATN MFR SERPL: 74 UG/DL — SIGNIFICANT CHANGE UP (ref 45–165)
LIPID PNL WITH DIRECT LDL SERPL: 63 MG/DL — SIGNIFICANT CHANGE UP
LYMPHOCYTES # BLD AUTO: 2.18 K/UL — SIGNIFICANT CHANGE UP (ref 1–3.3)
LYMPHOCYTES # BLD AUTO: 37.3 % — SIGNIFICANT CHANGE UP (ref 13–44)
MAGNESIUM SERPL-MCNC: 1.9 MG/DL — SIGNIFICANT CHANGE UP (ref 1.6–2.6)
MCHC RBC-ENTMCNC: 28.4 PG — SIGNIFICANT CHANGE UP (ref 27–34)
MCHC RBC-ENTMCNC: 32.7 GM/DL — SIGNIFICANT CHANGE UP (ref 32–36)
MCV RBC AUTO: 86.6 FL — SIGNIFICANT CHANGE UP (ref 80–100)
MONOCYTES # BLD AUTO: 0.6 K/UL — SIGNIFICANT CHANGE UP (ref 0–0.9)
MONOCYTES NFR BLD AUTO: 10.3 % — SIGNIFICANT CHANGE UP (ref 2–14)
NEUTROPHILS # BLD AUTO: 2.53 K/UL — SIGNIFICANT CHANGE UP (ref 1.8–7.4)
NEUTROPHILS NFR BLD AUTO: 43.4 % — SIGNIFICANT CHANGE UP (ref 43–77)
NON HDL CHOLESTEROL: 81 MG/DL — SIGNIFICANT CHANGE UP
NRBC # BLD: 0 /100 WBCS — SIGNIFICANT CHANGE UP (ref 0–0)
PHOSPHATE SERPL-MCNC: 4.8 MG/DL — HIGH (ref 2.5–4.5)
PLATELET # BLD AUTO: 243 K/UL — SIGNIFICANT CHANGE UP (ref 150–400)
POTASSIUM SERPL-MCNC: 3.7 MMOL/L — SIGNIFICANT CHANGE UP (ref 3.5–5.3)
POTASSIUM SERPL-SCNC: 3.7 MMOL/L — SIGNIFICANT CHANGE UP (ref 3.5–5.3)
PROT SERPL-MCNC: 6.4 G/DL — SIGNIFICANT CHANGE UP (ref 6–8.3)
RBC # BLD: 3.88 M/UL — LOW (ref 4.2–5.8)
RBC # FLD: 13.5 % — SIGNIFICANT CHANGE UP (ref 10.3–14.5)
SODIUM SERPL-SCNC: 142 MMOL/L — SIGNIFICANT CHANGE UP (ref 135–145)
TIBC SERPL-MCNC: 215 UG/DL — LOW (ref 220–430)
TRANSFERRIN SERPL-MCNC: 199 MG/DL — LOW (ref 200–360)
TRIGL SERPL-MCNC: 92 MG/DL — SIGNIFICANT CHANGE UP
TROPONIN T SERPL-MCNC: 0.01 NG/ML — SIGNIFICANT CHANGE UP (ref 0–0.01)
TSH SERPL-MCNC: 4.08 UIU/ML — SIGNIFICANT CHANGE UP (ref 0.27–4.2)
UIBC SERPL-MCNC: 141 UG/DL — SIGNIFICANT CHANGE UP (ref 110–370)
WBC # BLD: 5.84 K/UL — SIGNIFICANT CHANGE UP (ref 3.8–10.5)
WBC # FLD AUTO: 5.84 K/UL — SIGNIFICANT CHANGE UP (ref 3.8–10.5)

## 2022-05-29 PROCEDURE — 84540 ASSAY OF URINE/UREA-N: CPT

## 2022-05-29 PROCEDURE — 83550 IRON BINDING TEST: CPT

## 2022-05-29 PROCEDURE — 84466 ASSAY OF TRANSFERRIN: CPT

## 2022-05-29 PROCEDURE — 99239 HOSP IP/OBS DSCHRG MGMT >30: CPT

## 2022-05-29 PROCEDURE — 84156 ASSAY OF PROTEIN URINE: CPT

## 2022-05-29 PROCEDURE — 80053 COMPREHEN METABOLIC PANEL: CPT

## 2022-05-29 PROCEDURE — 83735 ASSAY OF MAGNESIUM: CPT

## 2022-05-29 PROCEDURE — 83036 HEMOGLOBIN GLYCOSYLATED A1C: CPT

## 2022-05-29 PROCEDURE — 84300 ASSAY OF URINE SODIUM: CPT

## 2022-05-29 PROCEDURE — 99285 EMERGENCY DEPT VISIT HI MDM: CPT | Mod: 25

## 2022-05-29 PROCEDURE — 85027 COMPLETE CBC AUTOMATED: CPT

## 2022-05-29 PROCEDURE — 82550 ASSAY OF CK (CPK): CPT

## 2022-05-29 PROCEDURE — 82553 CREATINE MB FRACTION: CPT

## 2022-05-29 PROCEDURE — 82570 ASSAY OF URINE CREATININE: CPT

## 2022-05-29 PROCEDURE — 36415 COLL VENOUS BLD VENIPUNCTURE: CPT

## 2022-05-29 PROCEDURE — 93005 ELECTROCARDIOGRAM TRACING: CPT

## 2022-05-29 PROCEDURE — 84443 ASSAY THYROID STIM HORMONE: CPT

## 2022-05-29 PROCEDURE — 84484 ASSAY OF TROPONIN QUANT: CPT

## 2022-05-29 PROCEDURE — 82962 GLUCOSE BLOOD TEST: CPT

## 2022-05-29 PROCEDURE — 80048 BASIC METABOLIC PNL TOTAL CA: CPT

## 2022-05-29 PROCEDURE — 80061 LIPID PANEL: CPT

## 2022-05-29 PROCEDURE — 84100 ASSAY OF PHOSPHORUS: CPT

## 2022-05-29 PROCEDURE — 82728 ASSAY OF FERRITIN: CPT

## 2022-05-29 PROCEDURE — 83540 ASSAY OF IRON: CPT

## 2022-05-29 PROCEDURE — 84133 ASSAY OF URINE POTASSIUM: CPT

## 2022-05-29 PROCEDURE — 71045 X-RAY EXAM CHEST 1 VIEW: CPT

## 2022-05-29 PROCEDURE — 87635 SARS-COV-2 COVID-19 AMP PRB: CPT

## 2022-05-29 PROCEDURE — 85025 COMPLETE CBC W/AUTO DIFF WBC: CPT

## 2022-05-29 RX ORDER — ISOSORBIDE MONONITRATE 60 MG/1
30 TABLET, EXTENDED RELEASE ORAL DAILY
Refills: 0 | Status: DISCONTINUED | OUTPATIENT
Start: 2022-05-29 | End: 2022-05-29

## 2022-05-29 RX ORDER — LISINOPRIL 2.5 MG/1
40 TABLET ORAL DAILY
Refills: 0 | Status: DISCONTINUED | OUTPATIENT
Start: 2022-05-29 | End: 2022-05-29

## 2022-05-29 RX ORDER — METOPROLOL TARTRATE 50 MG
1 TABLET ORAL
Qty: 30 | Refills: 2
Start: 2022-05-29 | End: 2022-08-26

## 2022-05-29 RX ORDER — ISOSORBIDE MONONITRATE 60 MG/1
1 TABLET, EXTENDED RELEASE ORAL
Qty: 30 | Refills: 2
Start: 2022-05-29 | End: 2022-08-26

## 2022-05-29 RX ORDER — SODIUM POLYSTYRENE SULFONATE 4.1 MEQ/G
1 POWDER, FOR SUSPENSION ORAL
Qty: 0 | Refills: 0 | DISCHARGE

## 2022-05-29 RX ORDER — METOPROLOL TARTRATE 50 MG
25 TABLET ORAL DAILY
Refills: 0 | Status: DISCONTINUED | OUTPATIENT
Start: 2022-05-29 | End: 2022-05-29

## 2022-05-29 RX ADMIN — CALCITRIOL 0.25 MICROGRAM(S): 0.5 CAPSULE ORAL at 11:16

## 2022-05-29 RX ADMIN — Medication 50 MICROGRAM(S): at 05:55

## 2022-05-29 RX ADMIN — AMLODIPINE BESYLATE 5 MILLIGRAM(S): 2.5 TABLET ORAL at 05:55

## 2022-05-29 RX ADMIN — Medication 12.5 MILLIGRAM(S): at 05:55

## 2022-05-29 RX ADMIN — HEPARIN SODIUM 5000 UNIT(S): 5000 INJECTION INTRAVENOUS; SUBCUTANEOUS at 05:54

## 2022-05-29 RX ADMIN — Medication 325 MILLIGRAM(S): at 11:16

## 2022-05-29 RX ADMIN — PANTOPRAZOLE SODIUM 40 MILLIGRAM(S): 20 TABLET, DELAYED RELEASE ORAL at 05:55

## 2022-05-29 RX ADMIN — Medication 650 MILLIGRAM(S): at 07:26

## 2022-05-29 RX ADMIN — ISOSORBIDE MONONITRATE 30 MILLIGRAM(S): 60 TABLET, EXTENDED RELEASE ORAL at 11:16

## 2022-05-29 RX ADMIN — Medication 81 MILLIGRAM(S): at 11:16

## 2022-05-29 NOTE — PROGRESS NOTE ADULT - PROBLEM SELECTOR PLAN 6
- Lipid profile noted  - home Atorvastatin 20 mg increased to 40 mg PO QD (10 year ASCVD risk score 17%)

## 2022-05-29 NOTE — DISCHARGE NOTE PROVIDER - NSDCFUSCHEDAPPT_GEN_ALL_CORE_FT
Anna Russell  [facility] Physician [facility]  HeartVasc [address]  Scheduled Appointment: 06/10/2022    Shraddha Pillai  [facility] Physician [facility]  Endocrin [address]  Scheduled Appointment: 06/14/2022    Tammy Vasquez  [facility]  Nephro [address]  Scheduled Appointment: 07/13/2022

## 2022-05-29 NOTE — PROGRESS NOTE ADULT - PROBLEM SELECTOR PLAN 1
- Currently CP free and hemodynamically stable  - Trop neg x 3   -  EKG 5/28/22: NSR @ 86 BPM with flattened T wave in I, TWI in AVL & PAC.   - CXR  5/28/22: No infiltrates, no consolidation, f/u official read  - ECHO 2/1/22: LVEF 63%, mild TR, no other significant valvular disease, pulm HTN (PASP 44 mmHg), small pericardial effusion without ECHO w/o cardiac tamponade. No repeat ECHO needed as d/w Dr. Finney.   -  NST 2/25/22: small, mild reversible defect in the apex c/w mild ischemia; small, mild reversible defect in the mid-anterior wall c/w mild ischemia.  -  Lopressor 12.5 mg PO BID started 5/28  - s/p Ecotrin 325 mg PO x1 in ED. c/w Ecotrin 81 mg PO QD, Atorvastatin 40 mg PO QD, Amlodipine 5 mg PO QD  - Plan for possible cath this admission pending renal clearance, will need to be consented

## 2022-05-29 NOTE — PROGRESS NOTE ADULT - PROBLEM SELECTOR PLAN 2
Baseline Cr 3.6-3.7, current Cr 3.71 at admission, Cr 3.8 today, known to Dr. Vasquez  - f/u US retroperitoneal   - c/w home sodium bicarb 650 mg PO BID, Calcitrol 0.25 mg PO QD  - home  Kayexalate 15 gm PO QD held 2/2 K 3.7 -3.8 this admission  - Per Dr. Vasquez's o/p note - possible candidate for renal transplant however many need cardiac cath prior but previously deferred 2/2 CKD   - Consult renal in AM for clearance

## 2022-05-29 NOTE — DISCHARGE NOTE NURSING/CASE MANAGEMENT/SOCIAL WORK - NSDCPEFALRISK_GEN_ALL_CORE
For information on Fall & Injury Prevention, visit: [email or web address] OR  [email or web address] OR  https://www.cdc.gov/steadi/patient.html

## 2022-05-29 NOTE — DISCHARGE NOTE NURSING/CASE MANAGEMENT/SOCIAL WORK - PATIENT PORTAL LINK FT
You can access the FollowMyHealth Patient Portal offered by [facility] by registering at the following website: [email or web address]. By joining [facility]’s FollowMyHealth portal, you will also be able to view your health information using other applications (apps) compatible with our system.

## 2022-05-29 NOTE — PROGRESS NOTE ADULT - ASSESSMENT
71yo M h/o HTN, HLD, DM2, chronic HFpEF (EF 63% by ECHO, recent admit for acute on chronic HFpEF exacerbation 1/2022), CKD IV (baseline Cr 3.6-3.7, known to Dr. Vasquez), ZOHAIB (on iron tabs), BPH who is Admitted to [facility] for r/o ACS, renal consult, possible cardiac cath pending renal clearance.

## 2022-05-29 NOTE — PROGRESS NOTE ADULT - PROBLEM SELECTOR PLAN 4
h/o ZOHAIB (on iron supplements at home), H/H 12.1/35.8 5/28/22 (was 10.4/31.0 2/2/22)  - iron studies consistent with AOCD  - c/w iron tabs while in hospital

## 2022-05-29 NOTE — DISCHARGE NOTE PROVIDER - CARE PROVIDERS DIRECT ADDRESSES
[email or web address]

## 2022-05-29 NOTE — PROGRESS NOTE ADULT - PROBLEM SELECTOR PLAN 8
- HgbA1C 7  - Home meds: Lantus 20 units qHS, Tradjenta 5 mg PO QD  - c/w MISS, Lantus 16 units qHS    VTE ppx: SQH  Dispo: Pending renal clearance, ischemic w/u

## 2022-05-29 NOTE — DISCHARGE NOTE PROVIDER - NSDCMRMEDTOKEN_GEN_ALL_CORE_FT
amLODIPine 5 mg oral tablet: 1 tab(s) orally once a day  Aspirin Enteric Coated 81 mg oral delayed release tablet: 1 tab(s) orally once a day  atorvastatin 20 mg oral tablet: 1 tab(s) orally once a day  calcitriol 0.25 mcg oral capsule: 1 cap(s) orally once a day  ferrous gluconate 240 mg (27 mg elemental iron) oral tablet: 1 tab(s) orally 2 times a day  insulin glargine: 24 unit(s) subcutaneous once a day (at bedtime)  levothyroxine 50 mcg (0.05 mg) oral tablet: 1 tab(s) orally once a day  omeprazole 40 mg oral delayed release capsule: 1 cap(s) orally once a day  ramipril 10 mg oral capsule: 1 cap(s) orally once a day  sodium bicarbonate 650 mg oral tablet: 1 tab(s) orally 2 times a day  sodium polystyrene sulfonate 15 g/60 mL oral suspension: 1 dose(s) orally once a day  torsemide 20 mg oral tablet: 2 tab(s) orally once a day   Tradjenta 5 mg oral tablet: 1 tab(s) orally once a day   amLODIPine 5 mg oral tablet: 1 tab(s) orally once a day  Aspirin Enteric Coated 81 mg oral delayed release tablet: 1 tab(s) orally once a day  atorvastatin 20 mg oral tablet: 1 tab(s) orally once a day  calcitriol 0.25 mcg oral capsule: 1 cap(s) orally once a day  cardiac rehab: We have provided you with a prescription for cardiac rehab which is medically supervised exercise program for your heart and has been shown to improve the quantity and quality of life of people with heart disease like yours. You should attend cardiac rehab 3 times per week for 12 weeks. We have provided you with a list of nearby facilities. Please call your insurance carrier to determine which of these facilities are covered under your plan.    Cardiologist: Dr. Russell  ferrous gluconate 240 mg (27 mg elemental iron) oral tablet: 1 tab(s) orally 2 times a day  insulin glargine: 24 unit(s) subcutaneous once a day (at bedtime)  isosorbide mononitrate 30 mg oral tablet, extended release: 1 tab(s) orally once a day  levothyroxine 50 mcg (0.05 mg) oral tablet: 1 tab(s) orally once a day  metoprolol succinate 25 mg oral tablet, extended release: 1 tab(s) orally once a day  omeprazole 40 mg oral delayed release capsule: 1 cap(s) orally once a day  ramipril 10 mg oral capsule: 1 cap(s) orally once a day  sodium bicarbonate 650 mg oral tablet: 1 tab(s) orally 2 times a day  torsemide 20 mg oral tablet: 2 tab(s) orally once a day   Tradjenta 5 mg oral tablet: 1 tab(s) orally once a day

## 2022-05-29 NOTE — DISCHARGE NOTE NURSING/CASE MANAGEMENT/SOCIAL WORK - NSDCVIVACCINE_GEN_ALL_CORE_FT
influenza, injectable, quadrivalent, preservative free; 09-Nov-2019 15:32; Doretha Otoole (CATHLEEN); [employer]; [id number] (Exp. Date: 30-Jun-2020); IntraMuscular; Deltoid Right.; 0.5 milliLiter(s); VIS (VIS Published: 15-Aug-2019, VIS Presented: 09-Nov-2019);

## 2022-05-29 NOTE — DISCHARGE NOTE PROVIDER - CARE PROVIDER_API CALL
Anna Russell)  Cardiology  [address] [address]  Phone: [phone]  Fax: [phone]  Follow Up Time:     Tammy Vasquez)  Nephrology  [address], [address] [place] [address]  Phone: [phone]  Fax: [phone]  Follow Up Time:

## 2022-05-29 NOTE — DISCHARGE NOTE PROVIDER - NSDCFUADDAPPT_GEN_ALL_CORE_FT
- Please follow up with your cardiologist Dr. Russell in 1 week. Please call her office and make an appointment to see her.   - Please follow up with your nephrologist DR. Vasquez in 1-2 week.

## 2022-05-29 NOTE — DISCHARGE NOTE PROVIDER - HOSPITAL COURSE
INCOMPLETE !!!!!     71yo M h/o HTN, HLD, DM2, chronic HFpEF (EF 63% by ECHO, recent admit for acute on chronic HFpEF exacerbation 1/2022), CKD IV (baseline Cr 3.6-3.7, known to Dr. Vasquez), ZOHAIB (on iron tabs), BPH who presented to [facility] ED 5/28/22 c/o intermittent, non-radiating LSCP described as sharp and mild intensity occurring independent of exertion, lasting few seconds--> minutes before spontaneously resolving that started @ 9:30 AM this AM.  While in ED, pt received Ecotrin 325 mg PO x1.  Admitted to [facility] for r/o ACS, renal consult, possible cardiac cath pending renal clearance.     Trop T neg X 3; on 5/29 am: pt seen at bedside, comfortable; denies any CP, SOB, dizziness, palpitation, N/V, LE edema, PND/orthopnea.......     		  VSS. Labs stable o/n. home meds reviewed with Dr. Finney and pt. to be d/c on   	  Pt. stable to be d/c as per Dr. Finney and to f/u with  in 1 week and Dr. Vasquez in 1-2 week.   Patient has been given appropriate discharge instructions including medication regimen, access site management and follow up. Prescriptions have been e-prescribed to patient's preferred pharmacy         73yo M h/o HTN, HLD, DM2, chronic HFpEF (EF 63% by ECHO, recent admit for acute on chronic HFpEF exacerbation 1/2022), CKD IV (baseline Cr 3.6-3.7, known to Dr. Vasquez), ZOHAIB (on iron tabs), BPH who presented to [facility] ED 5/28/22 c/o intermittent, non-radiating LSCP described as sharp and mild intensity occurring independent of exertion, lasting few seconds--> minutes before spontaneously resolving that started @ 9:30 AM this AM.  While in ED, pt received Ecotrin 325 mg PO x1.  Admitted to [facility] for r/o ACS, renal consult, possible cardiac cath pending renal clearance.     Trop T neg X 3; on 5/29 am: pt seen at bedside, comfortable; denies any CP, SOB, dizziness, palpitation, N/V, LE edema, PND/orthopnea.. On day of discharge; pt requesting all cardiac w/u to be done as o/p as he has a kidney donor for renal transplant to be done at [facility]; Transplant held for now 2/2 donors BP elevated currently and being managed; he wants cardiac angiogram and after renal transplant at same time. Dr. garcia discussed case with Dr. Vasquez.  VSS. Labs stable o/n. home meds reviewed with Dr. Garcia and pt. to be d/c on torsemide 40 mg daily, Ramipril 10 mg daily, lipitor 20 mg daily,  torsemide 40 mg daily, norvasc 5 mg daily and started on Imdur 30 mg daily and Toprol 25 mg daily,   	  Pt. stable to be d/c as per Dr. Garcia and to f/u with  in 1 week and Dr. Vasquez in 1-2 week. No f/u appointments made 2/2 weekend. Pt aware he needs to make his own appointments.   Patient has been given appropriate discharge instructions including medication regimen, access site management and follow up. Prescriptions have been e-prescribed to patient's preferred pharmacy.     Cardiac Rehab (STEMI/NSTEMI/ACS/Unstable Angina/CHF/Post PCI):            *Education on benefits of Cardiac Rehab provided to patient: Yes/No         *Referral and Prescription Given for Cardiac Rehab : Yes/No.  If No, Why Not?         *Pt given list of locations & instructed to contact their insurance company to review list of participating providers: YES

## 2022-05-29 NOTE — PROGRESS NOTE ADULT - PROBLEM SELECTOR PLAN 5
-140s  - Holding home Ramipril 10 mg PO QD, Torsemide 40 mg PO QD for now  - Started Lopressor 12.5 mg PO  BID.  - c/w home Amlodipine 5mg PO QD.

## 2022-05-29 NOTE — DISCHARGE NOTE PROVIDER - NSDCCPCAREPLAN_GEN_ALL_CORE_FT
PRINCIPAL DISCHARGE DIAGNOSIS  Diagnosis: Chest pain  Assessment and Plan of Treatment:       SECONDARY DISCHARGE DIAGNOSES  Diagnosis: Chronic heart failure with preserved ejection fraction (HFpEF)  Assessment and Plan of Treatment: -You have a history of weakened heart muscle called congestive heart failure.   -Please make sure you follow up with your cardiologist within one week of discharge.   -Please weigh yourself daily: if you have gained more than 2-3 lbs in one day or 5 lbs in one week contact your doctor immediately as you may be retaining water weight  -In addition, restrict your salt intake to less than 2 grams a day  -If you develop worsening shortening of breath, leg swelling, fatigue, chest pain, difficulty sleeping at night due to shortness of breath, contact your cardiologist immediately.  -       Diagnosis: Hyperlipidemia  Assessment and Plan of Treatment: Please continue to take home meds as listed.    Diagnosis: DM (diabetes mellitus)  Assessment and Plan of Treatment: PLease continue to take home meds as listed and follow up with your endocrinologist/PCP as directed.    Diagnosis: CKD (chronic kidney disease), stage IV  Assessment and Plan of Treatment:     Diagnosis: Hypothyroid  Assessment and Plan of Treatment: Please continue to take home synthroid 50 mcg daily.    Diagnosis: Hypertension  Assessment and Plan of Treatment: Hypertension, commonly called high blood pressure, is when the force of blood pumping through your arteries is too strong. Hypertension forces your heart to work harder to pump blood. Your arteries may become narrow or stiff. Having untreated or uncontrolled hypertension for a long period of time can cause heart attack, stroke, kidney disease, and other problems. If started on a medication, take exactly as prescribed by your health care professional. Maintain a healthy lifestyle and follow up with your primary care physician.  - Please take        PRINCIPAL DISCHARGE DIAGNOSIS  Diagnosis: Chest pain  Assessment and Plan of Treatment: you came in the hospital with chest pain, we did blood work which is negative for any heart attack. We have started you on Toprol 25 mg daily and Imdur 30 mg daily. We recommended you to get a cardiac angiogram which you currently want to hold and want to get it at the same time as your kidney transplant.   - Please continue to take Aspirin 81 mg daily.   Please follow up with your cardiologist Dr. Russell in 1 week and please call your cardiologist and go to the nearest emergency room if you have any worsening chest pain, SOB, dizziness, palpitation, Nausea, vomitting and syncopal episodes.      SECONDARY DISCHARGE DIAGNOSES  Diagnosis: Chronic heart failure with preserved ejection fraction (HFpEF)  Assessment and Plan of Treatment: -You have a history of weakened heart muscle called congestive heart failure.   -Please make sure you follow up with your cardiologist within one week of discharge.   -Please weigh yourself daily: if you have gained more than 2-3 lbs in one day or 5 lbs in one week contact your doctor immediately as you may be retaining water weight  -In addition, restrict your salt intake to less than 2 grams a day  -If you develop worsening shortening of breath, leg swelling, fatigue, chest pain, difficulty sleeping at night due to shortness of breath, contact your cardiologist immediately.  - Please continue to take home Torsemide 40 mg daily .   We have provided you with a prescription for cardiac rehab which is medically supervised exercise program for your heart and has been shown to improve the quantity and quality of life of people with heart disease like yours. You should attend cardiac rehab 3 times per week for 12 weeks. We have provided you with a list of nearby facilities. Please call your insurance carrier to determine which of these facilities are covered under your plan.      Diagnosis: Hyperlipidemia  Assessment and Plan of Treatment: Please continue to take home meds as listed.    Diagnosis: DM (diabetes mellitus)  Assessment and Plan of Treatment: PLease continue to take home meds as listed and follow up with your endocrinologist/PCP as directed.    Diagnosis: CKD (chronic kidney disease), stage IV  Assessment and Plan of Treatment: Your creatinine which checks the function of your kidney is 3.8    Diagnosis: Hypothyroid  Assessment and Plan of Treatment: Please continue to take home synthroid 50 mcg daily.    Diagnosis: Hypertension  Assessment and Plan of Treatment: Hypertension, commonly called high blood pressure, is when the force of blood pumping through your arteries is too strong. Hypertension forces your heart to work harder to pump blood. Your arteries may become narrow or stiff. Having untreated or uncontrolled hypertension for a long period of time can cause heart attack, stroke, kidney disease, and other problems. If started on a medication, take exactly as prescribed by your health care professional. Maintain a healthy lifestyle and follow up with your primary care physician.  - Please continue to take home  Ramipril 10 mg daily and Torsemide 40 mg daily.   - Please start taking Toprol 25 mg daily and Imdur 30 mg daily,

## 2022-06-01 ENCOUNTER — NON-APPOINTMENT (OUTPATIENT)
Age: 72
End: 2022-06-01

## 2022-06-02 ENCOUNTER — RX RENEWAL (OUTPATIENT)
Age: 72
End: 2022-06-02

## 2022-06-02 DIAGNOSIS — N18.4 CHRONIC KIDNEY DISEASE, STAGE 4 (SEVERE): ICD-10-CM

## 2022-06-02 DIAGNOSIS — Z79.84 LONG TERM (CURRENT) USE OF ORAL HYPOGLYCEMIC DRUGS: ICD-10-CM

## 2022-06-02 DIAGNOSIS — Z88.8 ALLERGY STATUS TO OTHER DRUGS, MEDICAMENTS AND BIOLOGICAL SUBSTANCES: ICD-10-CM

## 2022-06-02 DIAGNOSIS — E11.22 TYPE 2 DIABETES MELLITUS WITH DIABETIC CHRONIC KIDNEY DISEASE: ICD-10-CM

## 2022-06-02 DIAGNOSIS — I50.32 CHRONIC DIASTOLIC (CONGESTIVE) HEART FAILURE: ICD-10-CM

## 2022-06-02 DIAGNOSIS — R07.9 CHEST PAIN, UNSPECIFIED: ICD-10-CM

## 2022-06-02 DIAGNOSIS — Z79.82 LONG TERM (CURRENT) USE OF ASPIRIN: ICD-10-CM

## 2022-06-02 DIAGNOSIS — Z76.82 AWAITING ORGAN TRANSPLANT STATUS: ICD-10-CM

## 2022-06-02 DIAGNOSIS — Z79.4 LONG TERM (CURRENT) USE OF INSULIN: ICD-10-CM

## 2022-06-02 DIAGNOSIS — I13.0 HYPERTENSIVE HEART AND CHRONIC KIDNEY DISEASE WITH HEART FAILURE AND STAGE 1 THROUGH STAGE 4 CHRONIC KIDNEY DISEASE, OR UNSPECIFIED CHRONIC KIDNEY DISEASE: ICD-10-CM

## 2022-06-02 DIAGNOSIS — N25.81 SECONDARY HYPERPARATHYROIDISM OF RENAL ORIGIN: ICD-10-CM

## 2022-06-02 DIAGNOSIS — Z41.8 ENCOUNTER FOR OTHER PROCEDURES FOR PURPOSES OTHER THAN REMEDYING HEALTH STATE: ICD-10-CM

## 2022-06-02 DIAGNOSIS — I25.10 ATHEROSCLEROTIC HEART DISEASE OF NATIVE CORONARY ARTERY WITHOUT ANGINA PECTORIS: ICD-10-CM

## 2022-06-02 DIAGNOSIS — N40.0 BENIGN PROSTATIC HYPERPLASIA WITHOUT LOWER URINARY TRACT SYMPTOMS: ICD-10-CM

## 2022-06-02 DIAGNOSIS — E78.5 HYPERLIPIDEMIA, UNSPECIFIED: ICD-10-CM

## 2022-06-02 DIAGNOSIS — D63.8 ANEMIA IN OTHER CHRONIC DISEASES CLASSIFIED ELSEWHERE: ICD-10-CM

## 2022-06-02 DIAGNOSIS — D50.9 IRON DEFICIENCY ANEMIA, UNSPECIFIED: ICD-10-CM

## 2022-06-02 DIAGNOSIS — Z53.8 PROCEDURE AND TREATMENT NOT CARRIED OUT FOR OTHER REASONS: ICD-10-CM

## 2022-06-02 PROBLEM — I50.9 HEART FAILURE, UNSPECIFIED: Chronic | Status: ACTIVE | Noted: 2022-05-28

## 2022-06-03 ENCOUNTER — APPOINTMENT (OUTPATIENT)
Dept: INTERNAL MEDICINE | Facility: CLINIC | Age: 72
End: 2022-06-03
Payer: MEDICARE

## 2022-06-03 PROCEDURE — 99496 TRANSJ CARE MGMT HIGH F2F 7D: CPT | Mod: 95

## 2022-06-06 ENCOUNTER — NON-APPOINTMENT (OUTPATIENT)
Age: 72
End: 2022-06-06

## 2022-06-07 ENCOUNTER — RX RENEWAL (OUTPATIENT)
Age: 72
End: 2022-06-07

## 2022-06-10 ENCOUNTER — APPOINTMENT (OUTPATIENT)
Dept: HEART AND VASCULAR | Facility: CLINIC | Age: 72
End: 2022-06-10
Payer: MEDICARE

## 2022-06-10 VITALS
SYSTOLIC BLOOD PRESSURE: 126 MMHG | BODY MASS INDEX: 25.27 KG/M2 | OXYGEN SATURATION: 97 % | HEIGHT: 67 IN | TEMPERATURE: 97.6 F | WEIGHT: 161 LBS | HEART RATE: 81 BPM | DIASTOLIC BLOOD PRESSURE: 63 MMHG

## 2022-06-10 DIAGNOSIS — R07.9 CHEST PAIN, UNSPECIFIED: ICD-10-CM

## 2022-06-10 DIAGNOSIS — R94.39 ABNORMAL RESULT OF OTHER CARDIOVASCULAR FUNCTION STUDY: ICD-10-CM

## 2022-06-10 PROCEDURE — 99214 OFFICE O/P EST MOD 30 MIN: CPT

## 2022-06-10 PROCEDURE — 93000 ELECTROCARDIOGRAM COMPLETE: CPT

## 2022-06-10 NOTE — ASSESSMENT
[FreeTextEntry1] : 1. COBB\par - + CAD risk factors\par - hospitalization reports reviewed\par - 2/25/22 MPI c/w a small area of mild ischemia \par - results discussed in detail with pt \par - I had a lenghty discussion with pt re risks/benefits of delaying LHC; he has agreed to proceed with the LHC now\par - continue OMT for now including ASA/statin/BB/nitrate\par \par 2. HFpEF\par - euvolemic now\par - hospitalization reports reviewed\par - cont torsemide 40 qd\par - repeat TTE in 6 months\par \par 3. HTN\par - stable \par - cont ramipril, amlodipine, toprol ,imdur and torsemide for now\par - cont to monitor\par \par 4. HLD\par - stable\par - cont atorva 20 qd\par - cont to monitor\par \par 5.DM\par - a1c 6.8, controlled\par - cont current insulin regimen\par \par 6. CKD\par - f/b renal\par - cont to monitor\par \par 7. Overweight\par - BMI 25, improved \par - ed done re heart healthy lifestyle modifications and diet \par \par   RTC after LHC

## 2022-06-10 NOTE — HISTORY OF PRESENT ILLNESS
[FreeTextEntry1] : 72 y/oM, PMHx HTN, HLD, DM2 and CKD 4 presented to ED 1/31/22 w/ increased COBB,\par fatigue and worsening BL LE. In the ED, CXR showed bilat pleural effusion R>L.\par CT non contrast with bilat moderate pleural effusions and patchy and nodular\par groundglass opacities in the left upper lobe could be due to alveolar edema but\par cannot exclude atypical infection. Trop negative x 1, BNP 2702. He was given\par IV Lasix 80mg x 1 and admitted to [facility] for mgmt acute HFpEF exacerbation. TTE c/w EF 63%, small pericardial effusion.  Diuresed and dc'd on torsemide 40 qd.  Previously endorsed COBB, denied chest pain. No associated nausea, vomiting or diaphoresis. MPI performed 2/22 + for a small area of ischemia.  PT refused to proceed for LHC at the time stating that his sx had resolved. Now returns after 3 months after another hospitalization, now for chest pain. Pt refused LHC again because feared he would need to start HD right after. Pt stats he has a kidney donor but her BPs have been uncontrolled so his is just waiting for it to improve for the transplant. \par \par 6/10/22 ECG: NSR at 75 bpm, nl axis \par ECG: NSR at 89 bpm, LAD

## 2022-06-14 ENCOUNTER — APPOINTMENT (OUTPATIENT)
Dept: ENDOCRINOLOGY | Facility: CLINIC | Age: 72
End: 2022-06-14

## 2022-06-20 ENCOUNTER — LABORATORY RESULT (OUTPATIENT)
Age: 72
End: 2022-06-20

## 2022-06-20 VITALS
TEMPERATURE: 97 F | HEART RATE: 68 BPM | RESPIRATION RATE: 16 BRPM | HEIGHT: 67 IN | SYSTOLIC BLOOD PRESSURE: 126 MMHG | OXYGEN SATURATION: 100 % | WEIGHT: 160.06 LBS | DIASTOLIC BLOOD PRESSURE: 62 MMHG

## 2022-06-20 RX ORDER — CHLORHEXIDINE GLUCONATE 213 G/1000ML
1 SOLUTION TOPICAL ONCE
Refills: 0 | Status: DISCONTINUED | OUTPATIENT
Start: 2022-06-22 | End: 2022-06-23

## 2022-06-20 NOTE — H&P ADULT - HISTORY OF PRESENT ILLNESS
Cardiologist: Dr. Russell   Escort: [employer]   Ph[facility]acy: [facility] (in system)   COVID: 6/20 @ [facility]      *Verify Meds* -- pt states there have been no changes since d/c 5/2022     73 yo M, PMHx HTN, HLD, DM2, chronic HFpEF (EF 63%), CKD IV (baseline Cr 3.6-3.7, known to Dr. Vasquez, awaiting kidney transplant), ZOHAIB (baseline Hb 10-12), BPH, recent admission to [facility] 5/2022 for chest pain however deferred cath 2/2 pending renal transplant who presented to outpatient cardiologist Dr. Russell for follow up. Pt states all of his symptoms have resolved and he feels well since being discharged 5/2022. Denies CP, SOB, dizziness, palpitations, syncope, LE edema, orthopnea/PND, BRBPR, hematuria. ECHO 2/1/22: normal LVEF and RV function, non-significant valvular disease, small pericardial effusion w/o evidence of tamponade. NST 2/25/22: small, mild reversible defect in the apex c/w ischemia, small mild reversible defect in the mid anterior wall, LVEF 67% and normal wall motion. In light of pt's risk factors, abnormal NST, patient is referred for cardiac catheterization with possible intervention if clinically indicated.        Of note: Spoke directly with patient on 6/16/22. States his renal transplant is likely in 1 month from now and that his transplant team at [facility] is aware he is getting an angiogram. When asked specifically if they know he may require a stent and thus need to be on Plavix he wasn’t sure if they were aware.       Cardiologist: Dr. Russell   Escort: [facility]   [facility]acy: [facility] (in system)   COVID: 6/20 @ [facility] , negative (in HIE)      71 yo M, PMHx HTN, HLD, DM2, chronic HFpEF (EF 63%), CKD IV (baseline Cr 3.6-3.7, known to Dr. Vasquez, awaiting kidney transplant), ZOHAIB (baseline Hb 10-12), BPH, recent admission to [facility] 5/2022 for chest pain however deferred cath 2/2 pending renal transplant who presented to outpatient cardiologist Dr. Russell for follow up. Pt states all of his symptoms have resolved and he feels well since being discharged 5/2022. Denies CP, SOB, dizziness, palpitations, syncope, LE edema, orthopnea/PND, BRBPR, hematuria. ECHO 2/1/22: normal LVEF and RV function, non-significant valvular disease, small pericardial effusion w/o evidence of tamponade. NST 2/25/22: small, mild reversible defect in the apex c/w ischemia, small mild reversible defect in the mid anterior wall, LVEF 67% and normal wall motion. In light of pt's risk factors, abnormal NST, patient is referred for cardiac catheterization with possible intervention if clinically indicated for pre-op clearance for renal transplant.     Of note: Spoke directly with patient on 6/16/22. States his renal transplant is likely in 1 month from now and that his transplant team at [facility] is aware he is getting an angiogram. When asked specifically if they know he may require a stent and thus need to be on Plavix he wasn’t sure if they were aware.       Cardiologist: Dr. Russell   Escort: [facility]   [facility]armacy: [facility] (in system), meds verified by pt's report and last discharge summary & cross referenced by Yair  COVID: 6/20 @ [facility] , negative (in HIE)      73 yo M, PMHx HTN, HLD, DM2, chronic HFpEF (EF 63%), CKD IV (baseline Cr 3.6-3.7, known to Dr. Vasquez, awaiting kidney transplant), ZOHAIB (baseline Hb 10-12), BPH, recent admission to [facility] 5/2022 for chest pain however deferred cath 2/2 pending renal transplant who presented to outpatient cardiologist Dr. Russell for follow up. Pt states all of his symptoms have resolved and he feels well since being discharged 5/2022. Denies CP, SOB, dizziness, palpitations, syncope, LE edema, orthopnea/PND, BRBPR, hematuria. ECHO 2/1/22: normal LVEF and RV function, non-significant valvular disease, small pericardial effusion w/o evidence of tamponade. NST 2/25/22: small, mild reversible defect in the apex c/w ischemia, small mild reversible defect in the mid anterior wall, LVEF 67% and normal wall motion. In light of pt's risk factors, abnormal NST, patient is referred for cardiac catheterization with possible intervention if clinically indicated for pre-op clearance for renal transplant.     Of note: Spoke directly with patient on 6/16/22. States his renal transplant is likely in 1 month from now and that his transplant team at [facility] is aware he is getting an angiogram. When asked specifically if they know he may require a stent and thus need to be on Plavix he wasn’t sure if they were aware.

## 2022-06-20 NOTE — H&P ADULT - CARDIOVASCULAR
normal/regular rate and rhythm/S1 S2 present/no murmur/no JVD/no pedal edema TRACE ankle edema B/L/normal/regular rate and rhythm/S1 S2 present/no murmur/no JVD/vascular

## 2022-06-20 NOTE — H&P ADULT - NSHPLABSRESULTS_GEN_ALL_CORE
12.6   9.12  )-----------( 279      ( 22 Jun 2022 10:12 )             39.1                             EKG: 12.6   9.12  )-----------( 279      ( 22 Jun 2022 10:12 )             39.1                             EKG: NSR @ 67 BPM without ST/T segment changes

## 2022-06-20 NOTE — H&P ADULT - ASSESSMENT
73 yo M, PMHx HTN, HLD, DM2, chronic HFpEF (EF 63%), CKD IV (baseline Cr 3.6-3.7, known to Dr. Vasquez, awaiting kidney transplant), ZOHAIB (baseline Hb 10-12), BPH, recent admission to [facility] 5/2022 for chest pain however deferred cath 2/2 pending renal transplant who presented to outpatient cardiologist Dr. Russell for follow up. Pt states all of his symptoms have resolved and he feels well since being discharged 5/2022. In light of pt's risk factors, abnormal NST, patient is referred for cardiac catheterization with possible intervention if clinically indicated for pre-op clearance for renal transplant.     ASA Class III    Mallampati Class III    Risks & benefits of procedure and alternative therapy have been explained to the patient including but not limited to: allergic reaction, bleeding with possible need for blood transfusion, infection, renal and vascular compromise, limb damage, arrhythmia, stroke, vessel dissection/perforation, Myocardial infarction, emergent CABG. Informed consent obtained and in chart.       Patient a candidate for sedation: Yes    Sedation Type: Moderate   73 yo M, PMHx HTN, HLD, DM2, chronic HFpEF (EF 63%), CKD IV (baseline Cr 3.6-3.7, known to Dr. Vasquez, awaiting kidney transplant), ZOHAIB (baseline Hb 10-12), BPH, recent admission to [facility] 5/2022 for chest pain however deferred cath 2/2 pending renal transplant who presented to outpatient cardiologist Dr. Russell for follow up. Pt states all of his symptoms have resolved and he feels well since being discharged 5/2022. In light of pt's risk factors, abnormal NST, patient is referred for cardiac catheterization with possible intervention if clinically indicated for pre-op clearance for renal transplant.     ASA Class III    Mallampati Class III    Last dose Aspirin 6/21/22 and endorses daily compliance. Renal transplant pending cardiac clearance (not scheduled). Will provide Ecotrin 81 mg PO x1 & Plavix 600 mg PO x1.     Trace edema to B/L ankles only. Lungs CTA. Will provide  cc bolus x1 per hydration protocol.     CKD IV. If pt stays, team to consult renal (known to Dr. Vasquez). Patient instructed to follow up with Dr. Vasquez within 1-3 days of discharge.     Risks & benefits of procedure and alternative therapy have been explained to the patient including but not limited to: allergic reaction, bleeding with possible need for blood transfusion, infection, renal and vascular compromise, limb damage, arrhythmia, stroke, vessel dissection/perforation, Myocardial infarction, emergent CABG. Informed consent obtained and in chart.       Patient a candidate for sedation: Yes    Sedation Type: Moderate   73 yo M, PMHx HTN, HLD, DM2, chronic HFpEF (EF 63%), CKD IV (baseline Cr 3.6-3.7, known to Dr. Vasquez, awaiting kidney transplant), ZOHAIB (baseline Hb 10-12), BPH, recent admission to [facility] 5/2022 for chest pain however deferred cath 2/2 pending renal transplant who presented to outpatient cardiologist Dr. Russell for follow up. Pt states all of his symptoms have resolved and he feels well since being discharged 5/2022. In light of pt's risk factors, abnormal NST, patient is referred for cardiac catheterization with possible intervention if clinically indicated for pre-op clearance for renal transplant.     ASA Class III    Mallampati Class III    Last dose Aspirin 6/21/22 and endorses daily compliance. Renal transplant pending cardiac clearance (not scheduled). Will provide Ecotrin 81 mg PO x1 & Plavix 600 mg PO x1.     Trace edema to B/L ankles only. Lungs CTA. Saturating well on RA> Will provide  cc bolus x1 per hydration protocol.     CKD IV. If pt stays, team to consult renal (known to Dr. Vasquez). Patient instructed to follow up with Dr. Vasquez within 1-3 days of discharge.     Risks & benefits of procedure and alternative therapy have been explained to the patient including but not limited to: allergic reaction, bleeding with possible need for blood transfusion, infection, renal and vascular compromise, limb damage, arrhythmia, stroke, vessel dissection/perforation, Myocardial infarction, emergent CABG. Informed consent obtained and in chart.       Patient a candidate for sedation: Yes    Sedation Type: Moderate

## 2022-06-22 ENCOUNTER — INPATIENT (INPATIENT)
Facility: HOSPITAL | Age: 72
LOS: 0 days | Discharge: HOME | DRG: 247 | End: 2022-06-23
Attending: HOSPITALIST | Admitting: HOSPITALIST
Payer: MEDICARE

## 2022-06-22 LAB
A1C WITH ESTIMATED AVERAGE GLUCOSE RESULT: 6.8 % — HIGH (ref 4–5.6)
ALBUMIN SERPL ELPH-MCNC: 4.7 G/DL — SIGNIFICANT CHANGE UP (ref 3.3–5)
ALP SERPL-CCNC: 100 U/L — SIGNIFICANT CHANGE UP (ref 40–120)
ALT FLD-CCNC: 11 U/L — SIGNIFICANT CHANGE UP (ref 10–45)
ANION GAP SERPL CALC-SCNC: 14 MMOL/L — SIGNIFICANT CHANGE UP (ref 5–17)
APTT BLD: 30.2 SEC — SIGNIFICANT CHANGE UP (ref 27.5–35.5)
AST SERPL-CCNC: 12 U/L — SIGNIFICANT CHANGE UP (ref 10–40)
BASOPHILS # BLD AUTO: 0.04 K/UL — SIGNIFICANT CHANGE UP (ref 0–0.2)
BASOPHILS NFR BLD AUTO: 0.4 % — SIGNIFICANT CHANGE UP (ref 0–2)
BILIRUB SERPL-MCNC: 0.6 MG/DL — SIGNIFICANT CHANGE UP (ref 0.2–1.2)
BUN SERPL-MCNC: 47 MG/DL — HIGH (ref 7–23)
CALCIUM SERPL-MCNC: 8.5 MG/DL — SIGNIFICANT CHANGE UP (ref 8.4–10.5)
CHLORIDE SERPL-SCNC: 100 MMOL/L — SIGNIFICANT CHANGE UP (ref 96–108)
CHOLEST SERPL-MCNC: 128 MG/DL — SIGNIFICANT CHANGE UP
CK MB CFR SERPL CALC: 6.8 NG/ML — HIGH (ref 0–6.7)
CK SERPL-CCNC: 251 U/L — HIGH (ref 30–200)
CO2 SERPL-SCNC: 23 MMOL/L — SIGNIFICANT CHANGE UP (ref 22–31)
CREAT SERPL-MCNC: 4.74 MG/DL — HIGH (ref 0.5–1.3)
EGFR: 12 ML/MIN/1.73M2 — LOW
EOSINOPHIL # BLD AUTO: 0.47 K/UL — SIGNIFICANT CHANGE UP (ref 0–0.5)
EOSINOPHIL NFR BLD AUTO: 5.2 % — SIGNIFICANT CHANGE UP (ref 0–6)
ESTIMATED AVERAGE GLUCOSE: 148 MG/DL — HIGH (ref 68–114)
GLUCOSE BLDC GLUCOMTR-MCNC: 100 MG/DL — HIGH (ref 70–99)
GLUCOSE BLDC GLUCOMTR-MCNC: 122 MG/DL — HIGH (ref 70–99)
GLUCOSE BLDC GLUCOMTR-MCNC: 179 MG/DL — HIGH (ref 70–99)
GLUCOSE BLDC GLUCOMTR-MCNC: 87 MG/DL — SIGNIFICANT CHANGE UP (ref 70–99)
GLUCOSE BLDC GLUCOMTR-MCNC: 91 MG/DL — SIGNIFICANT CHANGE UP (ref 70–99)
GLUCOSE SERPL-MCNC: 104 MG/DL — HIGH (ref 70–99)
HCT VFR BLD CALC: 39.1 % — SIGNIFICANT CHANGE UP (ref 39–50)
HDLC SERPL-MCNC: 38 MG/DL — LOW
HGB BLD-MCNC: 12.6 G/DL — LOW (ref 13–17)
IMM GRANULOCYTES NFR BLD AUTO: 0.3 % — SIGNIFICANT CHANGE UP (ref 0–1.5)
INR BLD: 0.98 — SIGNIFICANT CHANGE UP (ref 0.88–1.16)
ISTAT INR: 1.1 — SIGNIFICANT CHANGE UP (ref 0.88–1.16)
ISTAT PT: 12.9 SEC — SIGNIFICANT CHANGE UP (ref 10–12.9)
LIPID PNL WITH DIRECT LDL SERPL: 71 MG/DL — SIGNIFICANT CHANGE UP
LYMPHOCYTES # BLD AUTO: 3.49 K/UL — HIGH (ref 1–3.3)
LYMPHOCYTES # BLD AUTO: 38.3 % — SIGNIFICANT CHANGE UP (ref 13–44)
MCHC RBC-ENTMCNC: 28.7 PG — SIGNIFICANT CHANGE UP (ref 27–34)
MCHC RBC-ENTMCNC: 32.2 GM/DL — SIGNIFICANT CHANGE UP (ref 32–36)
MCV RBC AUTO: 89.1 FL — SIGNIFICANT CHANGE UP (ref 80–100)
MONOCYTES # BLD AUTO: 0.76 K/UL — SIGNIFICANT CHANGE UP (ref 0–0.9)
MONOCYTES NFR BLD AUTO: 8.3 % — SIGNIFICANT CHANGE UP (ref 2–14)
NEUTROPHILS # BLD AUTO: 4.33 K/UL — SIGNIFICANT CHANGE UP (ref 1.8–7.4)
NEUTROPHILS NFR BLD AUTO: 47.5 % — SIGNIFICANT CHANGE UP (ref 43–77)
NON HDL CHOLESTEROL: 90 MG/DL — SIGNIFICANT CHANGE UP
NRBC # BLD: 0 /100 WBCS — SIGNIFICANT CHANGE UP (ref 0–0)
PLATELET # BLD AUTO: 279 K/UL — SIGNIFICANT CHANGE UP (ref 150–400)
POCT ISTAT CREATININE: 4.9 MG/DL — HIGH (ref 0.5–1.3)
POTASSIUM SERPL-MCNC: 4.9 MMOL/L — SIGNIFICANT CHANGE UP (ref 3.5–5.3)
POTASSIUM SERPL-SCNC: 4.9 MMOL/L — SIGNIFICANT CHANGE UP (ref 3.5–5.3)
PROT SERPL-MCNC: 8.2 G/DL — SIGNIFICANT CHANGE UP (ref 6–8.3)
PROTHROM AB SERPL-ACNC: 11.6 SEC — SIGNIFICANT CHANGE UP (ref 10.5–13.4)
RBC # BLD: 4.39 M/UL — SIGNIFICANT CHANGE UP (ref 4.2–5.8)
RBC # FLD: 12.9 % — SIGNIFICANT CHANGE UP (ref 10.3–14.5)
SODIUM SERPL-SCNC: 137 MMOL/L — SIGNIFICANT CHANGE UP (ref 135–145)
TRIGL SERPL-MCNC: 93 MG/DL — SIGNIFICANT CHANGE UP
WBC # BLD: 9.12 K/UL — SIGNIFICANT CHANGE UP (ref 3.8–10.5)
WBC # FLD AUTO: 9.12 K/UL — SIGNIFICANT CHANGE UP (ref 3.8–10.5)

## 2022-06-22 PROCEDURE — 93458 L HRT ARTERY/VENTRICLE ANGIO: CPT | Mod: 26,59

## 2022-06-22 PROCEDURE — 99223 1ST HOSP IP/OBS HIGH 75: CPT

## 2022-06-22 PROCEDURE — 99152 MOD SED SAME PHYS/QHP 5/>YRS: CPT

## 2022-06-22 PROCEDURE — 93010 ELECTROCARDIOGRAM REPORT: CPT

## 2022-06-22 PROCEDURE — 92978 ENDOLUMINL IVUS OCT C 1ST: CPT | Mod: 26,LD

## 2022-06-22 PROCEDURE — 92928 PRQ TCAT PLMT NTRAC ST 1 LES: CPT | Mod: LD

## 2022-06-22 PROCEDURE — 99221 1ST HOSP IP/OBS SF/LOW 40: CPT

## 2022-06-22 RX ORDER — DEXTROSE 50 % IN WATER 50 %
15 SYRINGE (ML) INTRAVENOUS ONCE
Refills: 0 | Status: DISCONTINUED | OUTPATIENT
Start: 2022-06-22 | End: 2022-06-23

## 2022-06-22 RX ORDER — PANTOPRAZOLE SODIUM 20 MG/1
40 TABLET, DELAYED RELEASE ORAL
Refills: 0 | Status: DISCONTINUED | OUTPATIENT
Start: 2022-06-22 | End: 2022-06-23

## 2022-06-22 RX ORDER — FERROUS GLUCONATE 100 %
1 POWDER (GRAM) MISCELLANEOUS
Qty: 0 | Refills: 0 | DISCHARGE

## 2022-06-22 RX ORDER — LEVOTHYROXINE SODIUM 125 MCG
1 TABLET ORAL
Qty: 0 | Refills: 0 | DISCHARGE

## 2022-06-22 RX ORDER — ASPIRIN/CALCIUM CARB/MAGNESIUM 324 MG
81 TABLET ORAL ONCE
Refills: 0 | Status: COMPLETED | OUTPATIENT
Start: 2022-06-22 | End: 2022-06-22

## 2022-06-22 RX ORDER — LEVOTHYROXINE SODIUM 125 MCG
50 TABLET ORAL DAILY
Refills: 0 | Status: DISCONTINUED | OUTPATIENT
Start: 2022-06-22 | End: 2022-06-23

## 2022-06-22 RX ORDER — CLOPIDOGREL BISULFATE 75 MG/1
75 TABLET, FILM COATED ORAL DAILY
Refills: 0 | Status: DISCONTINUED | OUTPATIENT
Start: 2022-06-23 | End: 2022-06-23

## 2022-06-22 RX ORDER — SODIUM BICARBONATE 1 MEQ/ML
1 SYRINGE (ML) INTRAVENOUS
Qty: 0 | Refills: 0 | DISCHARGE

## 2022-06-22 RX ORDER — AMLODIPINE BESYLATE 2.5 MG/1
5 TABLET ORAL DAILY
Refills: 0 | Status: DISCONTINUED | OUTPATIENT
Start: 2022-06-22 | End: 2022-06-23

## 2022-06-22 RX ORDER — CALCITRIOL 0.5 UG/1
1 CAPSULE ORAL
Qty: 0 | Refills: 0 | DISCHARGE

## 2022-06-22 RX ORDER — CALCITRIOL 0.5 UG/1
0.25 CAPSULE ORAL DAILY
Refills: 0 | Status: DISCONTINUED | OUTPATIENT
Start: 2022-06-22 | End: 2022-06-23

## 2022-06-22 RX ORDER — DEXTROSE 50 % IN WATER 50 %
25 SYRINGE (ML) INTRAVENOUS ONCE
Refills: 0 | Status: DISCONTINUED | OUTPATIENT
Start: 2022-06-22 | End: 2022-06-23

## 2022-06-22 RX ORDER — SODIUM CHLORIDE 9 MG/ML
500 INJECTION INTRAMUSCULAR; INTRAVENOUS; SUBCUTANEOUS
Refills: 0 | Status: DISCONTINUED | OUTPATIENT
Start: 2022-06-22 | End: 2022-06-23

## 2022-06-22 RX ORDER — SODIUM CHLORIDE 9 MG/ML
1000 INJECTION, SOLUTION INTRAVENOUS
Refills: 0 | Status: DISCONTINUED | OUTPATIENT
Start: 2022-06-22 | End: 2022-06-23

## 2022-06-22 RX ORDER — CLOPIDOGREL BISULFATE 75 MG/1
600 TABLET, FILM COATED ORAL ONCE
Refills: 0 | Status: COMPLETED | OUTPATIENT
Start: 2022-06-22 | End: 2022-06-22

## 2022-06-22 RX ORDER — ISOSORBIDE MONONITRATE 60 MG/1
30 TABLET, EXTENDED RELEASE ORAL DAILY
Refills: 0 | Status: DISCONTINUED | OUTPATIENT
Start: 2022-06-22 | End: 2022-06-23

## 2022-06-22 RX ORDER — DEXTROSE 50 % IN WATER 50 %
12.5 SYRINGE (ML) INTRAVENOUS ONCE
Refills: 0 | Status: DISCONTINUED | OUTPATIENT
Start: 2022-06-22 | End: 2022-06-23

## 2022-06-22 RX ORDER — SODIUM BICARBONATE 1 MEQ/ML
650 SYRINGE (ML) INTRAVENOUS
Refills: 0 | Status: DISCONTINUED | OUTPATIENT
Start: 2022-06-22 | End: 2022-06-23

## 2022-06-22 RX ORDER — INSULIN LISPRO 100/ML
VIAL (ML) SUBCUTANEOUS
Refills: 0 | Status: DISCONTINUED | OUTPATIENT
Start: 2022-06-22 | End: 2022-06-23

## 2022-06-22 RX ORDER — LINAGLIPTIN 5 MG/1
1 TABLET, FILM COATED ORAL
Qty: 0 | Refills: 0 | DISCHARGE

## 2022-06-22 RX ORDER — GLUCAGON INJECTION, SOLUTION 0.5 MG/.1ML
1 INJECTION, SOLUTION SUBCUTANEOUS ONCE
Refills: 0 | Status: DISCONTINUED | OUTPATIENT
Start: 2022-06-22 | End: 2022-06-23

## 2022-06-22 RX ORDER — ASPIRIN/CALCIUM CARB/MAGNESIUM 324 MG
81 TABLET ORAL DAILY
Refills: 0 | Status: DISCONTINUED | OUTPATIENT
Start: 2022-06-23 | End: 2022-06-23

## 2022-06-22 RX ORDER — ATORVASTATIN CALCIUM 80 MG/1
20 TABLET, FILM COATED ORAL AT BEDTIME
Refills: 0 | Status: DISCONTINUED | OUTPATIENT
Start: 2022-06-22 | End: 2022-06-23

## 2022-06-22 RX ORDER — SODIUM CHLORIDE 9 MG/ML
250 INJECTION INTRAMUSCULAR; INTRAVENOUS; SUBCUTANEOUS ONCE
Refills: 0 | Status: COMPLETED | OUTPATIENT
Start: 2022-06-22 | End: 2022-06-22

## 2022-06-22 RX ORDER — METOPROLOL TARTRATE 50 MG
25 TABLET ORAL DAILY
Refills: 0 | Status: DISCONTINUED | OUTPATIENT
Start: 2022-06-22 | End: 2022-06-23

## 2022-06-22 RX ORDER — INSULIN GLARGINE 100 [IU]/ML
10 INJECTION, SOLUTION SUBCUTANEOUS ONCE
Refills: 0 | Status: COMPLETED | OUTPATIENT
Start: 2022-06-22 | End: 2022-06-22

## 2022-06-22 RX ORDER — OMEPRAZOLE 10 MG/1
1 CAPSULE, DELAYED RELEASE ORAL
Qty: 0 | Refills: 0 | DISCHARGE

## 2022-06-22 RX ADMIN — INSULIN GLARGINE 10 UNIT(S): 100 INJECTION, SOLUTION SUBCUTANEOUS at 23:45

## 2022-06-22 RX ADMIN — Medication 20 MILLIGRAM(S): at 15:43

## 2022-06-22 RX ADMIN — Medication 81 MILLIGRAM(S): at 10:53

## 2022-06-22 RX ADMIN — SODIUM CHLORIDE 210 MILLILITER(S): 9 INJECTION INTRAMUSCULAR; INTRAVENOUS; SUBCUTANEOUS at 14:01

## 2022-06-22 RX ADMIN — CLOPIDOGREL BISULFATE 600 MILLIGRAM(S): 75 TABLET, FILM COATED ORAL at 10:53

## 2022-06-22 RX ADMIN — SODIUM CHLORIDE 500 MILLILITER(S): 9 INJECTION INTRAMUSCULAR; INTRAVENOUS; SUBCUTANEOUS at 10:53

## 2022-06-22 RX ADMIN — ATORVASTATIN CALCIUM 20 MILLIGRAM(S): 80 TABLET, FILM COATED ORAL at 22:41

## 2022-06-22 RX ADMIN — Medication 650 MILLIGRAM(S): at 17:13

## 2022-06-22 NOTE — PROGRESS NOTE ADULT - ASSESSMENT
Patient is a 72y Male w/ history PMHx HTN, HLD, DM2, chronic HFpEF (EF 63%), CKD IV (baseline Cr 3.6-3.7, known to Dr. Vasquez, awaiting kidney transplant), ZOHAIB (baseline Hb 10-12), BPH, recent admission to [facility] 5/2022 for chest pain however deferred cath 2/2 pending renal transplant now presenting w/o symptoms for cath. S/p PCI with 2VD DESx1 pLAD, non dominant RCA 70%. Renal consulted for PRICILA on CKD4.       #PRICILA on CKD Plan: PRICILA (acute kidney injury)on CKD in the setting of HTN/DM. Awaiting kidney transplant (transplant team out of [facility]). Recent creatinine on d/c in May 5/29 was 3.80, BUN 27. Patient w/ Cr. 4.7.0 and BUN 47 on admission. S/p PCI w/ DESx1 today. Prehydration with 250cc bolus and currently is on  ml/hr. Patient eating and drinking w/o difficulty this week and making urine w/o difficulty.   DDX: Pre-renal and possible iATN vs progression of existing renal disease  -UA w/ urine lytes  -f/u Bladder scan to r/o obstruction    -f/u US renal  -avoid nephrotoxins, renally dose meds  -c/w sodium bicarb tabs         Patient is a 72y Male w/ history PMHx HTN, HLD, DM2, chronic HFpEF (EF 63%), CKD IV (baseline Cr 3.6-3.7, known to Dr. Vasquez, awaiting kidney transplant), ZOHAIB (baseline Hb 10-12), BPH, recent admission to [facility] 5/2022 for chest pain however deferred cath 2/2 pending renal transplant now presenting w/o symptoms for cath. S/p PCI with 2VD DESx1 pLAD, non dominant RCA 70%. Renal consulted for PRICILA on CKD4.       #PRICILA on CKD5 pending transplant Plan: PRICILA (acute kidney injury)on CKD in the setting of HTN/DM. Awaiting kidney transplant (transplant team out of [facility]). Recent creatinine on d/c in May 5/29 was 3.80, BUN 27. Patient w/ Cr. 4.7.0 and BUN 47 on admission. S/p PCI w/ DESx1 today. Prehydration with 250cc bolus and currently is on  ml/hr. Patient eating and drinking w/o difficulty this week and making urine w/o difficulty.   DDX: Pre-renal and possible iATN vs progression of existing renal disease   -UA w/ urine lytes  -f/u Bladder scan to r/o obstruction    -avoid nephrotoxins, renally dose meds  -c/w sodium bicarb tabs         Patient is a 72y Male w/ history PMHx HTN, HLD, DM2, chronic HFpEF (EF 63%), CKD IV (baseline Cr 3.6-3.7, known to Dr. Vasquez, awaiting kidney transplant), ZOHAIB (baseline Hb 10-12), BPH, recent admission to [facility] 5/2022 for chest pain however deferred cath 2/2 pending renal transplant now presenting w/o symptoms for cath. S/p PCI with 2VD DESx1 pLAD, non dominant RCA 70%. Renal consulted for PRICILA on CKD4.       #PRICILA on CKD5 pending transplant Plan: PRICILA (acute kidney injury)on CKD in the setting of HTN/DM. Awaiting kidney transplant (transplant team out of [facility]). Recent creatinine on d/c in May 5/29 was 3.80, BUN 27. Patient w/ Cr. 4.7 and BUN 47 on admission. S/p PCI w/ DESx1 today. Prehydration with 250cc bolus and currently is on  ml/hr. Patient eating and drinking w/o difficulty this week and making urine w/o difficulty.   DDX: possible iATN vs progression of existing renal disease   - c/w post PCI hydration  ml/hr   - UA w/ urine lytes  - f/u Bladder scan to r/o obstruction    - avoid nephrotoxins, renally dose meds  - c/w sodium bicarb tabs

## 2022-06-22 NOTE — PATIENT PROFILE ADULT - FALL HARM RISK - HARM RISK INTERVENTIONS

## 2022-06-22 NOTE — PROGRESS NOTE ADULT - SUBJECTIVE AND OBJECTIVE BOX
NEPHROLOGY CONSULTATION NOTE    Patient is a 72y Male w/ history PMHx HTN, HLD, DM2, chronic HFpEF (EF 63%), CKD IV (baseline Cr 3.6-3.7, known to Dr. Vasquez, awaiting kidney transplant), ZOHAIB (baseline Hb 10-12), BPH, recent admission to [facility] 5/2022 for chest pain however deferred cath 2/2 pending renal transplant now presenting w/o symptoms for cath. S/p PCI w/ stenting. Renal consulted for PRICILA on CKD4.       PAST MEDICAL & SURGICAL HISTORY:  Diabetes  Hypertension  Hyperlipidemia  CKD (chronic kidney disease), stage IV  BPH (benign prostatic hyperplasia)  Congestive heart failure (CHF)  CAD (coronary artery disease)    No significant past surgical history    Allergies:  chlorthalidone (Other)    Home Medications Reviewed  Hospital Medications:   MEDICATIONS  (STANDING):  amLODIPine   Tablet 5 milliGRAM(s) Oral daily  atorvastatin 20 milliGRAM(s) Oral at bedtime  calcitriol   Capsule 0.25 MICROGram(s) Oral daily  chlorhexidine 4% Liquid 1 Application(s) Topical once  dextrose 5%. 1000 milliLiter(s) (100 mL/Hr) IV Continuous <Continuous>  dextrose 5%. 1000 milliLiter(s) (50 mL/Hr) IV Continuous <Continuous>  dextrose 50% Injectable 25 Gram(s) IV Push once  dextrose 50% Injectable 12.5 Gram(s) IV Push once  dextrose 50% Injectable 25 Gram(s) IV Push once  glucagon  Injectable 1 milliGRAM(s) IntraMuscular once  insulin lispro (ADMELOG) corrective regimen sliding scale   SubCutaneous three times a day before meals  isosorbide   mononitrate ER Tablet (IMDUR) 30 milliGRAM(s) Oral daily  levothyroxine 50 MICROGram(s) Oral daily  metoprolol succinate ER 25 milliGRAM(s) Oral daily  pantoprazole    Tablet 40 milliGRAM(s) Oral before breakfast  sodium bicarbonate 650 milliGRAM(s) Oral two times a day  sodium chloride 0.9%. 500 milliLiter(s) (210 mL/Hr) IV Continuous <Continuous>  torsemide 20 milliGRAM(s) Oral two times a day      SOCIAL HISTORY:  Denies ETOH,Smoking,   FAMILY HISTORY:  No pertinent family history in first degree relatives      REVIEW OF SYSTEMS:  CONSTITUTIONAL: No weakness, fevers or chills  EYES/ENT: No visual changes;  No vertigo or throat pain   NECK: No pain or stiffness  RESPIRATORY: No cough, wheezing, hemoptysis; No shortness of breath  CARDIOVASCULAR: No chest pain or palpitations.  GASTROINTESTINAL: No abdominal or epigastric pain. No nausea, vomiting, or hematemesis; No diarrhea or constipation. No melena or hematochezia.  GENITOURINARY: No dysuria, frequency, foamy urine, urinary urgency, incontinence or hematuria  NEUROLOGICAL: No numbness or weakness  SKIN: No itching, burning, rashes, or lesions   VASCULAR: No bilateral lower extremity edema.   All other review of systems is negative unless indicated above.    VITALS:  T(F): --  HR: 78 (06-22-22 @ 14:50)  BP: 146/80 (06-22-22 @ 14:50)  RR: 16 (06-22-22 @ 14:50)  SpO2: 100% (06-22-22 @ 14:50)    Height (cm): 170.2 (06-22 @ 10:52)  Weight (kg): 72.6 (06-22 @ 10:52)  BMI (kg/m2): 25.1 (06-22 @ 10:52)  BSA (m2): 1.84 (06-22 @ 10:52)    I&O's     Creatine Kinase, Serum: 251 U/L (06-22-22 @ 10:38)      PHYSICAL EXAM:  Constitutional: NAD  HEENT: anicteric sclera, oropharynx clear, MMM  Neck: No JVD  Respiratory: CTAB, no wheezes, rales or rhonchi  Cardiovascular: S1, S2, RRR  Gastrointestinal: BS+, soft, NT/ND  Extremities: No cyanosis or clubbing. No peripheral edema  Neurological: A/O x 3, no focal deficits  Psychiatric: Normal mood, normal affect  : No CVA tenderness. No rothman.   Skin: No rashes  Vascular Access: peripheral IVs     LABS:  06-22    137  |  100  |  47<H>  ----------------------------<  104<H>  4.9   |  23  |  4.74<H>    Ca    8.5      22 Jun 2022 10:38    TPro  8.2  /  Alb  4.7  /  TBili  0.6  /  DBili      /  AST  12  /  ALT  11  /  AlkPhos  100  06-22    Creatinine Trend: 4.74 (06/22) <--, 3.80 (05/29) <--, 3.73 (05/28)                        12.6   9.12  )-----------( 279      ( 22 Jun 2022 10:12 )             39.1     Urine Studies:          RADIOLOGY & ADDITIONAL STUDIES:

## 2022-06-23 ENCOUNTER — TRANSCRIPTION ENCOUNTER (OUTPATIENT)
Age: 72
End: 2022-06-23

## 2022-06-23 VITALS — TEMPERATURE: 97 F

## 2022-06-23 LAB
ANION GAP SERPL CALC-SCNC: 11 MMOL/L — SIGNIFICANT CHANGE UP (ref 5–17)
APPEARANCE UR: CLEAR — SIGNIFICANT CHANGE UP
BACTERIA # UR AUTO: PRESENT /HPF
BILIRUB UR-MCNC: NEGATIVE — SIGNIFICANT CHANGE UP
BUN SERPL-MCNC: 38 MG/DL — HIGH (ref 7–23)
CALCIUM SERPL-MCNC: 8.7 MG/DL — SIGNIFICANT CHANGE UP (ref 8.4–10.5)
CHLORIDE SERPL-SCNC: 104 MMOL/L — SIGNIFICANT CHANGE UP (ref 96–108)
CO2 SERPL-SCNC: 23 MMOL/L — SIGNIFICANT CHANGE UP (ref 22–31)
COLOR SPEC: YELLOW — SIGNIFICANT CHANGE UP
COMMENT - URINE: SIGNIFICANT CHANGE UP
CREAT ?TM UR-MCNC: 37 MG/DL — SIGNIFICANT CHANGE UP
CREAT SERPL-MCNC: 4.42 MG/DL — HIGH (ref 0.5–1.3)
DIFF PNL FLD: ABNORMAL
EGFR: 13 ML/MIN/1.73M2 — LOW
EPI CELLS # UR: SIGNIFICANT CHANGE UP /HPF (ref 0–5)
GLUCOSE BLDC GLUCOMTR-MCNC: 99 MG/DL — SIGNIFICANT CHANGE UP (ref 70–99)
GLUCOSE SERPL-MCNC: 108 MG/DL — HIGH (ref 70–99)
GLUCOSE UR QL: NEGATIVE — SIGNIFICANT CHANGE UP
HCT VFR BLD CALC: 40.3 % — SIGNIFICANT CHANGE UP (ref 39–50)
HGB BLD-MCNC: 13.2 G/DL — SIGNIFICANT CHANGE UP (ref 13–17)
KETONES UR-MCNC: NEGATIVE — SIGNIFICANT CHANGE UP
LEUKOCYTE ESTERASE UR-ACNC: NEGATIVE — SIGNIFICANT CHANGE UP
MAGNESIUM SERPL-MCNC: 2.1 MG/DL — SIGNIFICANT CHANGE UP (ref 1.6–2.6)
MCHC RBC-ENTMCNC: 29.1 PG — SIGNIFICANT CHANGE UP (ref 27–34)
MCHC RBC-ENTMCNC: 32.8 GM/DL — SIGNIFICANT CHANGE UP (ref 32–36)
MCV RBC AUTO: 89 FL — SIGNIFICANT CHANGE UP (ref 80–100)
NITRITE UR-MCNC: NEGATIVE — SIGNIFICANT CHANGE UP
NRBC # BLD: 0 /100 WBCS — SIGNIFICANT CHANGE UP (ref 0–0)
OSMOLALITY UR: 320 MOSM/KG — SIGNIFICANT CHANGE UP (ref 300–900)
PH UR: 7.5 — SIGNIFICANT CHANGE UP (ref 5–8)
PLATELET # BLD AUTO: 262 K/UL — SIGNIFICANT CHANGE UP (ref 150–400)
POTASSIUM SERPL-MCNC: 4.7 MMOL/L — SIGNIFICANT CHANGE UP (ref 3.5–5.3)
POTASSIUM SERPL-SCNC: 4.7 MMOL/L — SIGNIFICANT CHANGE UP (ref 3.5–5.3)
POTASSIUM UR-SCNC: 18 MMOL/L — SIGNIFICANT CHANGE UP
PROT ?TM UR-MCNC: 32 MG/DL — HIGH (ref 0–12)
PROT UR-MCNC: 30 MG/DL
PROT/CREAT UR-RTO: 0.1 RATIO — SIGNIFICANT CHANGE UP (ref 0–0.2)
RBC # BLD: 4.53 M/UL — SIGNIFICANT CHANGE UP (ref 4.2–5.8)
RBC # FLD: 13.1 % — SIGNIFICANT CHANGE UP (ref 10.3–14.5)
RBC CASTS # UR COMP ASSIST: < 5 /HPF — SIGNIFICANT CHANGE UP
SODIUM SERPL-SCNC: 138 MMOL/L — SIGNIFICANT CHANGE UP (ref 135–145)
SODIUM UR-SCNC: 107 MMOL/L — SIGNIFICANT CHANGE UP
SP GR SPEC: 1.01 — SIGNIFICANT CHANGE UP (ref 1–1.03)
UROBILINOGEN FLD QL: 0.2 E.U./DL — SIGNIFICANT CHANGE UP
UUN UR-MCNC: 217 MG/DL — SIGNIFICANT CHANGE UP
WBC # BLD: 9.22 K/UL — SIGNIFICANT CHANGE UP (ref 3.8–10.5)
WBC # FLD AUTO: 9.22 K/UL — SIGNIFICANT CHANGE UP (ref 3.8–10.5)
WBC UR QL: < 5 /HPF — SIGNIFICANT CHANGE UP

## 2022-06-23 PROCEDURE — 99239 HOSP IP/OBS DSCHRG MGMT >30: CPT

## 2022-06-23 PROCEDURE — 82570 ASSAY OF URINE CREATININE: CPT

## 2022-06-23 PROCEDURE — 82553 CREATINE MB FRACTION: CPT

## 2022-06-23 PROCEDURE — C1769: CPT

## 2022-06-23 PROCEDURE — 84156 ASSAY OF PROTEIN URINE: CPT

## 2022-06-23 PROCEDURE — 80048 BASIC METABOLIC PNL TOTAL CA: CPT

## 2022-06-23 PROCEDURE — C1874: CPT

## 2022-06-23 PROCEDURE — 80061 LIPID PANEL: CPT

## 2022-06-23 PROCEDURE — C1894: CPT

## 2022-06-23 PROCEDURE — 80053 COMPREHEN METABOLIC PANEL: CPT

## 2022-06-23 PROCEDURE — 84540 ASSAY OF URINE/UREA-N: CPT

## 2022-06-23 PROCEDURE — 83735 ASSAY OF MAGNESIUM: CPT

## 2022-06-23 PROCEDURE — 84300 ASSAY OF URINE SODIUM: CPT

## 2022-06-23 PROCEDURE — C1760: CPT

## 2022-06-23 PROCEDURE — 81001 URINALYSIS AUTO W/SCOPE: CPT

## 2022-06-23 PROCEDURE — 85610 PROTHROMBIN TIME: CPT

## 2022-06-23 PROCEDURE — 99232 SBSQ HOSP IP/OBS MODERATE 35: CPT

## 2022-06-23 PROCEDURE — C1725: CPT

## 2022-06-23 PROCEDURE — 36415 COLL VENOUS BLD VENIPUNCTURE: CPT

## 2022-06-23 PROCEDURE — 84133 ASSAY OF URINE POTASSIUM: CPT

## 2022-06-23 PROCEDURE — 82565 ASSAY OF CREATININE: CPT

## 2022-06-23 PROCEDURE — 83935 ASSAY OF URINE OSMOLALITY: CPT

## 2022-06-23 PROCEDURE — 85027 COMPLETE CBC AUTOMATED: CPT

## 2022-06-23 PROCEDURE — 93005 ELECTROCARDIOGRAM TRACING: CPT

## 2022-06-23 PROCEDURE — 85025 COMPLETE CBC W/AUTO DIFF WBC: CPT

## 2022-06-23 PROCEDURE — 82550 ASSAY OF CK (CPK): CPT

## 2022-06-23 PROCEDURE — 85347 COAGULATION TIME ACTIVATED: CPT

## 2022-06-23 PROCEDURE — 83036 HEMOGLOBIN GLYCOSYLATED A1C: CPT

## 2022-06-23 PROCEDURE — C1887: CPT

## 2022-06-23 PROCEDURE — 82962 GLUCOSE BLOOD TEST: CPT

## 2022-06-23 PROCEDURE — 85730 THROMBOPLASTIN TIME PARTIAL: CPT

## 2022-06-23 PROCEDURE — C1753: CPT

## 2022-06-23 RX ORDER — ATORVASTATIN CALCIUM 80 MG/1
1 TABLET, FILM COATED ORAL
Qty: 0 | Refills: 0 | DISCHARGE

## 2022-06-23 RX ORDER — ASPIRIN/CALCIUM CARB/MAGNESIUM 324 MG
1 TABLET ORAL
Qty: 30 | Refills: 11
Start: 2022-06-23 | End: 2023-06-17

## 2022-06-23 RX ORDER — ASPIRIN/CALCIUM CARB/MAGNESIUM 324 MG
1 TABLET ORAL
Qty: 0 | Refills: 0 | DISCHARGE

## 2022-06-23 RX ORDER — AMLODIPINE BESYLATE 2.5 MG/1
1 TABLET ORAL
Qty: 30 | Refills: 3
Start: 2022-06-23 | End: 2022-10-20

## 2022-06-23 RX ORDER — AMLODIPINE BESYLATE 2.5 MG/1
1 TABLET ORAL
Qty: 0 | Refills: 0 | DISCHARGE

## 2022-06-23 RX ORDER — RAMIPRIL 5 MG
1 CAPSULE ORAL
Qty: 0 | Refills: 0 | DISCHARGE

## 2022-06-23 RX ORDER — AMLODIPINE BESYLATE 2.5 MG/1
5 TABLET ORAL ONCE
Refills: 0 | Status: COMPLETED | OUTPATIENT
Start: 2022-06-23 | End: 2022-06-23

## 2022-06-23 RX ORDER — CLOPIDOGREL BISULFATE 75 MG/1
1 TABLET, FILM COATED ORAL
Qty: 30 | Refills: 11
Start: 2022-06-23 | End: 2023-06-17

## 2022-06-23 RX ORDER — ATORVASTATIN CALCIUM 80 MG/1
1 TABLET, FILM COATED ORAL
Qty: 30 | Refills: 3
Start: 2022-06-23 | End: 2022-10-20

## 2022-06-23 RX ADMIN — Medication 25 MILLIGRAM(S): at 05:44

## 2022-06-23 RX ADMIN — Medication 650 MILLIGRAM(S): at 07:28

## 2022-06-23 RX ADMIN — Medication 20 MILLIGRAM(S): at 05:44

## 2022-06-23 RX ADMIN — AMLODIPINE BESYLATE 5 MILLIGRAM(S): 2.5 TABLET ORAL at 11:29

## 2022-06-23 RX ADMIN — AMLODIPINE BESYLATE 5 MILLIGRAM(S): 2.5 TABLET ORAL at 05:44

## 2022-06-23 RX ADMIN — Medication 81 MILLIGRAM(S): at 11:27

## 2022-06-23 RX ADMIN — CLOPIDOGREL BISULFATE 75 MILLIGRAM(S): 75 TABLET, FILM COATED ORAL at 11:27

## 2022-06-23 RX ADMIN — CALCITRIOL 0.25 MICROGRAM(S): 0.5 CAPSULE ORAL at 11:27

## 2022-06-23 RX ADMIN — Medication 50 MICROGRAM(S): at 05:44

## 2022-06-23 RX ADMIN — PANTOPRAZOLE SODIUM 40 MILLIGRAM(S): 20 TABLET, DELAYED RELEASE ORAL at 05:44

## 2022-06-23 RX ADMIN — ISOSORBIDE MONONITRATE 30 MILLIGRAM(S): 60 TABLET, EXTENDED RELEASE ORAL at 11:27

## 2022-06-23 NOTE — DISCHARGE NOTE PROVIDER - NSDCMRMEDTOKEN_GEN_ALL_CORE_FT
amLODIPine 5 mg oral tablet: 1 tab(s) orally once a day  Aspirin Enteric Coated 81 mg oral delayed release tablet: 1 tab(s) orally once a day  atorvastatin 20 mg oral tablet: 1 tab(s) orally once a day  calcitriol 0.25 mcg oral capsule: 1 cap(s) orally once a day  ferrous gluconate 240 mg (27 mg elemental iron) oral tablet: 1 tab(s) orally 2 times a day  insulin glargine: 20- 24 unit(s) subcutaneous once a day (at bedtime)  isosorbide mononitrate 30 mg oral tablet, extended release: 1 tab(s) orally once a day  levothyroxine 50 mcg (0.05 mg) oral tablet: 1 tab(s) orally once a day  metoprolol succinate 25 mg oral tablet, extended release: 1 tab(s) orally once a day  omeprazole 40 mg oral delayed release capsule: 1 cap(s) orally once a day  ramipril 10 mg oral capsule: 1 cap(s) orally once a day  sodium bicarbonate 650 mg oral tablet: 1 tab(s) orally 2 times a day  torsemide 20 mg oral tablet: 1 tab(s) orally 2 times a day  Tradjenta 5 mg oral tablet: 1 tab(s) orally once a day   amLODIPine 5 mg oral tablet: 1 tab(s) orally once a day  Aspirin Enteric Coated 81 mg oral delayed release tablet: 1 tab(s) orally once a day  atorvastatin 40 mg oral tablet: 1 tab(s) orally once a day  calcitriol 0.25 mcg oral capsule: 1 cap(s) orally once a day  cardiac rehab 3x/week x12 weeks post PCI: Cardiac rehab 3x/week x12 weeks post PCI  clopidogrel 75 mg oral tablet: 1 tab(s) orally once a day  ferrous gluconate 240 mg (27 mg elemental iron) oral tablet: 1 tab(s) orally 2 times a day  insulin glargine: 20- 24 unit(s) subcutaneous once a day (at bedtime)  isosorbide mononitrate 30 mg oral tablet, extended release: 1 tab(s) orally once a day  levothyroxine 50 mcg (0.05 mg) oral tablet: 1 tab(s) orally once a day  metoprolol succinate 25 mg oral tablet, extended release: 1 tab(s) orally once a day  omeprazole 40 mg oral delayed release capsule: 1 cap(s) orally once a day  ramipril 10 mg oral capsule: 1 cap(s) orally once a day  sodium bicarbonate 650 mg oral tablet: 1 tab(s) orally 2 times a day  torsemide 20 mg oral tablet: 1 tab(s) orally 2 times a day  Tradjenta 5 mg oral tablet: 1 tab(s) orally once a day   amLODIPine 5 mg oral tablet: 1 tab(s) orally once a day  Aspirin Enteric Coated 81 mg oral delayed release tablet: 1 tab(s) orally once a day  calcitriol 0.25 mcg oral capsule: 1 cap(s) orally once a day  cardiac rehab 3x/week x12 weeks post PCI: Cardiac rehab 3x/week x12 weeks post PCI  ferrous gluconate 240 mg (27 mg elemental iron) oral tablet: 1 tab(s) orally 2 times a day  insulin glargine: 20- 24 unit(s) subcutaneous once a day (at bedtime)  isosorbide mononitrate 30 mg oral tablet, extended release: 1 tab(s) orally once a day  levothyroxine 50 mcg (0.05 mg) oral tablet: 1 tab(s) orally once a day  metoprolol succinate 25 mg oral tablet, extended release: 1 tab(s) orally once a day  omeprazole 40 mg oral delayed release capsule: 1 cap(s) orally once a day  sodium bicarbonate 650 mg oral tablet: 1 tab(s) orally 2 times a day  torsemide 20 mg oral tablet: 1 tab(s) orally 2 times a day  Tradjenta 5 mg oral tablet: 1 tab(s) orally once a day   amLODIPine 10 mg oral tablet: 1 tab(s) orally once a day  Aspirin Enteric Coated 81 mg oral delayed release tablet: 1 tab(s) orally once a day  calcitriol 0.25 mcg oral capsule: 1 cap(s) orally once a day  cardiac rehab 3x/week x12 weeks post PCI: Cardiac rehab 3x/week x12 weeks post PCI  ferrous gluconate 240 mg (27 mg elemental iron) oral tablet: 1 tab(s) orally 2 times a day  insulin glargine: 20- 24 unit(s) subcutaneous once a day (at bedtime)  isosorbide mononitrate 30 mg oral tablet, extended release: 1 tab(s) orally once a day  levothyroxine 50 mcg (0.05 mg) oral tablet: 1 tab(s) orally once a day  metoprolol succinate 25 mg oral tablet, extended release: 1 tab(s) orally once a day  omeprazole 40 mg oral delayed release capsule: 1 cap(s) orally once a day  sodium bicarbonate 650 mg oral tablet: 1 tab(s) orally 2 times a day  torsemide 20 mg oral tablet: 1 tab(s) orally 2 times a day  Tradjenta 5 mg oral tablet: 1 tab(s) orally once a day

## 2022-06-23 NOTE — PROGRESS NOTE ADULT - ATTENDING COMMENTS
I agree with the Resident's findings and plans as written above with the following additions/amendments:    Seen and examined at bedside. Feels well, discussed need for close communication between cardiology team and transplant team to discuss DAPT and surgery timing. Otherwise no issues, will follow up outside of hospital. Further recs as above
I agree with the fellow's findings and plans as written above with the following additions/amendments:    Seen and examined at bedside. PRICILA on CKD 4 vs progression to CKD5, s/p IV contrast, will monitor for worsening renal failure. Strict I/Os paramount as first sign will be anuria and will be earliest indicator of need for HD, Monitoring closely. Further recs as above.

## 2022-06-23 NOTE — DISCHARGE NOTE PROVIDER - CARE PROVIDER_API CALL
Anna Russell)  Cardiology  [address] [address]  Phone: [phone]  Fax: [phone]  Established Patient  Follow Up Time: 1 week   Anna Russell)  Cardiology  [address] [address]  Phone: [phone]  Fax: [phone]  Established Patient  Follow Up Time: 1 week    Tammy Vasquez)  Nephrology  [address] [place] [address]  Phone: [phone]  Fax: [phone]  Established Patient  Follow Up Time: 1 week

## 2022-06-23 NOTE — PROGRESS NOTE ADULT - TIME BILLING
As above; Coordination of care with primary team, discussed transplant followup  This excludes any time spent on separate procedures or teaching.

## 2022-06-23 NOTE — DISCHARGE NOTE PROVIDER - PROVIDER TOKENS
PROVIDER:[TOKEN:[[id number]],FOLLOWUP:[1 week],ESTABLISHEDPATIENT:[T]] PROVIDER:[TOKEN:[[id number]],FOLLOWUP:[1 week],ESTABLISHEDPATIENT:[T]],PROVIDER:[TOKEN:[[id number]],FOLLOWUP:[1 week],ESTABLISHEDPATIENT:[T]]

## 2022-06-23 NOTE — DISCHARGE NOTE PROVIDER - NSDCFUSCHEDAPPT_GEN_ALL_CORE_FT
[facility]  Nephro [address] [facility] [address]  Scheduled Appointment: 07/13/2022

## 2022-06-23 NOTE — DISCHARGE NOTE NURSING/CASE MANAGEMENT/SOCIAL WORK - NSDCVIVACCINE_GEN_ALL_CORE_FT
influenza, injectable, quadrivalent, preservative free; 09-Nov-2019 15:32; Doretha Otoole (CATHLEEN); [employer]; [id number] (Exp. Date: 30-Jun-2020); IntraMuscular; Deltoid Right.; 0.5 milliLiter(s); VIS (VIS Published: 15-Aug-2019, VIS Presented: 09-Nov-2019);

## 2022-06-23 NOTE — DISCHARGE NOTE NURSING/CASE MANAGEMENT/SOCIAL WORK - PATIENT PORTAL LINK FT
You can access the FollowMyHealth Patient Portal offered by [facility] by registering at the following website: [email or web address]. By joining [employer]’s FollowMyHealth portal, you will also be able to view your health information using other applications (apps) compatible with our system.

## 2022-06-23 NOTE — PROGRESS NOTE ADULT - SUBJECTIVE AND OBJECTIVE BOX
Patient is a 72y Male seen and evaluated at bedside. S/p cardiac cath w/ 2 RAMANA stents placed. Now complaints this morning.     Meds:    amLODIPine   Tablet 5 daily  aspirin enteric coated 81 daily  atorvastatin 20 at bedtime  calcitriol   Capsule 0.25 daily  chlorhexidine 4% Liquid 1 once  clopidogrel Tablet 75 daily  dextrose 5%. 1000 <Continuous>  dextrose 5%. 1000 <Continuous>  dextrose 50% Injectable 25 once  dextrose 50% Injectable 12.5 once  dextrose 50% Injectable 25 once  dextrose Oral Gel 15 once PRN  glucagon  Injectable 1 once  insulin lispro (ADMELOG) corrective regimen sliding scale  three times a day before meals  isosorbide   mononitrate ER Tablet (IMDUR) 30 daily  levothyroxine 50 daily  metoprolol succinate ER 25 daily  pantoprazole    Tablet 40 before breakfast  sodium bicarbonate 650 two times a day  sodium chloride 0.9%. 500 <Continuous>  torsemide 20 two times a day      T(C): , Max: 37.1 (22 @ 06:28)  T(F): , Max: 98.7 (22 @ 06:28)  HR: 90 (22 @ 08:20)  BP: 147/70 (22 @ 08:20)  BP(mean): 106 (22 @ 04:54)  RR: 16 (22 @ 08:20)  SpO2: 100% (22 @ 08:20)  Wt(kg): --     @ 07:01  -   @ 07:00  --------------------------------------------------------  IN: 1020 mL / OUT: 1375 mL / NET: -355 mL     @ 07:01  -   @ 13:47  --------------------------------------------------------  IN: 180 mL / OUT: 400 mL / NET: -220 mL          Review of Systems:  RESPIRATORY: No shortness of breath  CARDIOVASCULAR: No chest pain   MUSCULOSKELETAL: No leg edema    PHYSICAL EXAM:  GENERAL: alert, no acute distress at present on   NECK: supple, No JVD  CHEST/LUNG: Clear to auscultation bilaterally  HEART: normal S1S2, RRR  ABDOMEN: Soft, Nontender, No flank tenderness bilateral  EXTREMITIES: No edema    LABS:                        13.2   9.22  )-----------( 262      ( 2022 08:35 )             40.3         138  |  104  |  38<H>  ----------------------------<  108<H>  4.7   |  23  |  4.42<H>    Ca    8.7      2022 08:35  Mg     2.1         TPro  8.2  /  Alb  4.7  /  TBili  0.6  /  DBili  x   /  AST  12  /  ALT  11  /  AlkPhos  100        PT/INR - ( 2022 10:12 )   PT: 11.6 sec;   INR: 0.98          PTT - ( 2022 10:12 )  PTT:30.2 sec  Urinalysis Basic - ( 2022 07:58 )    Color: Yellow / Appearance: Clear / S.010 / pH: x  Gluc: x / Ketone: NEGATIVE  / Bili: Negative / Urobili: 0.2 E.U./dL   Blood: x / Protein: 30 mg/dL / Nitrite: NEGATIVE   Leuk Esterase: NEGATIVE / RBC: < 5 /HPF / WBC < 5 /HPF   Sq Epi: x / Non Sq Epi: 0-5 /HPF / Bacteria: Present /HPF      Sodium, Random Urine: 107 mmol/L ( @ 07:58)  Creatinine, Random Urine: 37 mg/dL ( @ 07:58)  Protein/Creatinine Ratio Calculation: 0.1 Ratio ( @ 07:58)  Osmolality, Random Urine: 320 mosm/kg ( @ 07:58)  Potassium, Random Urine: 18 mmol/L ( @ 07:58)        RADIOLOGY & ADDITIONAL STUDIES:           Patient is a 72y Male seen and evaluated at bedside. S/p cardiac cath w/ 2 RAMANA stents placed. No complaints this morning.     Meds:    amLODIPine   Tablet 5 daily  aspirin enteric coated 81 daily  atorvastatin 20 at bedtime  calcitriol   Capsule 0.25 daily  chlorhexidine 4% Liquid 1 once  clopidogrel Tablet 75 daily  dextrose 5%. 1000 <Continuous>  dextrose 5%. 1000 <Continuous>  dextrose 50% Injectable 25 once  dextrose 50% Injectable 12.5 once  dextrose 50% Injectable 25 once  dextrose Oral Gel 15 once PRN  glucagon  Injectable 1 once  insulin lispro (ADMELOG) corrective regimen sliding scale  three times a day before meals  isosorbide   mononitrate ER Tablet (IMDUR) 30 daily  levothyroxine 50 daily  metoprolol succinate ER 25 daily  pantoprazole    Tablet 40 before breakfast  sodium bicarbonate 650 two times a day  sodium chloride 0.9%. 500 <Continuous>  torsemide 20 two times a day      T(C): , Max: 37.1 (22 @ 06:28)  T(F): , Max: 98.7 (22 @ 06:28)  HR: 90 (22 @ 08:20)  BP: 147/70 (22 @ 08:20)  BP(mean): 106 (22 @ 04:54)  RR: 16 (22 @ 08:20)  SpO2: 100% (22 @ 08:20)  Wt(kg): --     @ 07:01  -   @ 07:00  --------------------------------------------------------  IN: 1020 mL / OUT: 1375 mL / NET: -355 mL     @ 07:01  -   @ 13:47  --------------------------------------------------------  IN: 180 mL / OUT: 400 mL / NET: -220 mL          Review of Systems:  RESPIRATORY: No shortness of breath  CARDIOVASCULAR: No chest pain   MUSCULOSKELETAL: No leg edema    PHYSICAL EXAM:  GENERAL: alert, no acute distress at present on RA  NECK: supple, No JVD  CHEST/LUNG: Clear to auscultation bilaterally  HEART: normal S1S2, RRR  ABDOMEN: Soft, Nontender, No flank tenderness bilateral  EXTREMITIES: No edema, warm  SKIN: warm, dry, intact  Neuro: Awake, alert    LABS:                        13.2   9.22  )-----------( 262      ( 2022 08:35 )             40.3         138  |  104  |  38<H>  ----------------------------<  108<H>  4.7   |  23  |  4.42<H>    Ca    8.7      2022 08:35  Mg     2.1         TPro  8.2  /  Alb  4.7  /  TBili  0.6  /  DBili  x   /  AST  12  /  ALT  11  /  AlkPhos  100        PT/INR - ( 2022 10:12 )   PT: 11.6 sec;   INR: 0.98          PTT - ( 2022 10:12 )  PTT:30.2 sec  Urinalysis Basic - ( 2022 07:58 )    Color: Yellow / Appearance: Clear / S.010 / pH: x  Gluc: x / Ketone: NEGATIVE  / Bili: Negative / Urobili: 0.2 E.U./dL   Blood: x / Protein: 30 mg/dL / Nitrite: NEGATIVE   Leuk Esterase: NEGATIVE / RBC: < 5 /HPF / WBC < 5 /HPF   Sq Epi: x / Non Sq Epi: 0-5 /HPF / Bacteria: Present /HPF      Sodium, Random Urine: 107 mmol/L ( @ 07:58)  Creatinine, Random Urine: 37 mg/dL ( @ 07:58)  Protein/Creatinine Ratio Calculation: 0.1 Ratio ( @ 07:58)  Osmolality, Random Urine: 320 mosm/kg ( @ 07:58)  Potassium, Random Urine: 18 mmol/L ( @ 07:58)        RADIOLOGY & ADDITIONAL STUDIES:

## 2022-06-23 NOTE — DISCHARGE NOTE PROVIDER - HOSPITAL COURSE
71 yo M, PMHx HTN, HLD, DM2, chronic HFpEF (EF 63%), CKD IV (baseline Cr 3.6-3.7, known to Dr. Vasquez, awaiting kidney transplant), ZOHAIB (baseline Hb 10-12), BPH, recent admission to [facility] 5/2022 for chest pain however deferred cath 2/2 pending renal transplant who presented to outpatient cardiologist Dr. Russell for follow up. Pt states all of his symptoms have resolved and he feels well since being discharged 5/2022. Denies CP, SOB, dizziness, palpitations, syncope, LE edema, orthopnea/PND, BRBPR, hematuria. ECHO 2/1/22: normal LVEF and RV function, non-significant valvular disease, small pericardial effusion w/o evidence of tamponade. NST 2/25/22: small, mild reversible defect in the apex c/w ischemia, small mild reversible defect in the mid anterior wall, LVEF 67% and normal wall motion. In light of pt's risk factors, abnormal NST, patient is referred for cardiac catheterization with possible intervention if clinically indicated for pre-op clearance for renal transplant. Of note, pt believes renal transplant likely in 1 month and transplant team at [facility] is aware of angiogram. Pre-hydrated w/ 250cc bolus now s/p cath 6/22/22 w/ 2VD RAMANA x1 pLAD, non dominant RCA 70%, EDP normal (low), RFA PC. Now on maintenance IVFs 210cc/hr. Renal following. Urine lytes _____, bladder scan _____.    Patient seen and examined at the bedside on ______. AM labs wnl, VSS/HD stable. No overnight events on telemetry. Denies any complaints at this time. Patient given appropriate d/c instructions and verbalized understanding. Medications sent to preferred pharmacy. Patient deemed stable for discharge per  ______ and will f/u with  _______ in 1-2 weeks for further management.     VS Stable  Gen: NAD, A&O x3  Cards: RRR, clear S1 and S2 without murmur  Pulm: CTA B/L without w/r/r  Vascular: Peripheral pulses 2+ B/L  Abd: soft, NT  Ext: no LE edema or ulcerations B/L       ** INCOMPLETE **    73 yo M, PMHx HTN, HLD, DM2, chronic HFpEF (EF 63%), CKD IV (baseline Cr 3.6-3.7, known to Dr. Vasquez, awaiting kidney transplant), ZOHAIB (baseline Hb 10-12), BPH, recent admission to [facility] 5/2022 for chest pain however deferred cath 2/2 pending renal transplant who presented to outpatient cardiologist Dr. Russell for follow up. Pt states all of his symptoms have resolved and he feels well since being discharged 5/2022. Denies CP, SOB, dizziness, palpitations, syncope, LE edema, orthopnea/PND, BRBPR, hematuria. ECHO 2/1/22: normal LVEF and RV function, non-significant valvular disease, small pericardial effusion w/o evidence of tamponade. NST 2/25/22: small, mild reversible defect in the apex c/w ischemia, small mild reversible defect in the mid anterior wall, LVEF 67% and normal wall motion. In light of pt's risk factors, abnormal NST, patient is referred for cardiac catheterization with possible intervention if clinically indicated for pre-op clearance for renal transplant. Of note, pt believes renal transplant likely in 1 month and transplant team at [facility] is aware of angiogram. Pre-hydrated w/ 250cc bolus now s/p cath 6/22/22 w/ 2VD RAMANA x1 pLAD, non dominant RCA 70%, EDP normal (low), RFA PC. Now on maintenance IVFs 210cc/hr. Renal following. Urine lytes _____, bladder scan 650cc prior to .    Patient seen and examined at the bedside on 6/23/2022. AM labs wnl, VSS/HD stable. R groin site stable; no erythema, edema or underlying hematoma. No overnight events on telemetry. Denies any complaints at this time. Patient given appropriate d/c instructions and verbalized understanding. Medications sent to preferred pharmacy. Patient deemed stable for discharge per Dr. Mujica and will f/u with Dr. Russell in 1-2 weeks for further management.     VS Stable  Gen: NAD, A&O x3  Cards: RRR, clear S1 and S2 without murmur  Pulm: CTA B/L without w/r/r  Vascular: Peripheral pulses 2+ B/L  Abd: soft, NT  Ext: no LE edema or ulcerations B/L       ** INCOMPLETE **    73 yo M, PMHx HTN, HLD, DM2, chronic HFpEF (EF 63%), CKD IV (baseline Cr 3.6-3.7, known to Dr. Vasquez, awaiting kidney transplant), ZOHAIB (baseline Hb 10-12), BPH, recent admission to [facility] 5/2022 for chest pain however deferred cath 2/2 pending renal transplant who presented to outpatient cardiologist Dr. Russell for follow up. Pt states all of his symptoms have resolved and he feels well since being discharged 5/2022. Denies CP, SOB, dizziness, palpitations, syncope, LE edema, orthopnea/PND, BRBPR, hematuria. ECHO 2/1/22: normal LVEF and RV function, non-significant valvular disease, small pericardial effusion w/o evidence of tamponade. NST 2/25/22: small, mild reversible defect in the apex c/w ischemia, small mild reversible defect in the mid anterior wall, LVEF 67% and normal wall motion. In light of pt's risk factors, abnormal NST, patient is referred for cardiac catheterization with possible intervention if clinically indicated for pre-op clearance for renal transplant. Of note, pt believes renal transplant likely in 1 month and transplant team at [facility] is aware of angiogram. Pre-hydrated w/ 250cc bolus now s/p cath 6/22/22 w/ 2VD RAMANA x1 pLAD, non dominant RCA 70%, EDP normal (low), RFA PC. Now on maintenance IVFs 210cc/hr. Renal following. Urine lytes _____, bladder scan 650cc prior to .    Patient seen and examined at the bedside on 6/23/2022. AM labs wnl, VSS/HD stable. R groin site stable; no erythema, edema or underlying hematoma. No overnight events on telemetry. Denies any complaints at this time. Patient given appropriate d/c instructions and verbalized understanding. Medications sent to preferred pharmacy. Patient deemed stable for discharge per Dr. Mujica and will f/u with Dr. Russell in 1-2 weeks for further management.     VS Stable  Gen: NAD, A&O x3  Cards: RRR, clear S1 and S2 without murmur  Pulm: CTA B/L without w/r/r  Vascular: Peripheral pulses 2+ B/L  Abd: soft, NT  Ext: no LE edema or ulcerations B/L    Cardiac Rehab (STEMI/NSTEMI/ACS/Unstable Angina/CHF/Chronic Stable Angina/Heart Surgery (CABG,Valve)/Post PCI):              *Education on benefits of Cardiac Rehab provided to patient: Yes         *Referral and Prescription Given for Cardiac Rehab : Yes         *Pt given list of locations & instructed to contact their insurance company to review list of participating providers          *Pt instructed to bring Cardiac Rehab prescription with them to Cardiology Follow up appointment for assistance with enrollment: Yes ** INCOMPLETE **    71 yo M, PMHx HTN, HLD, DM2, chronic HFpEF (EF 63%), CKD IV (baseline Cr 3.6-3.7, known to Dr. Vasquez, awaiting kidney transplant), ZOHAIB (baseline Hb 10-12), BPH, recent admission to [facility] 5/2022 for chest pain however deferred cath 2/2 pending renal transplant who presented to outpatient cardiologist Dr. Russell for follow up. Pt states all of his symptoms have resolved and he feels well since being discharged 5/2022. Denies CP, SOB, dizziness, palpitations, syncope, LE edema, orthopnea/PND, BRBPR, hematuria. ECHO 2/1/22: normal LVEF and RV function, non-significant valvular disease, small pericardial effusion w/o evidence of tamponade. NST 2/25/22: small, mild reversible defect in the apex c/w ischemia, small mild reversible defect in the mid anterior wall, LVEF 67% and normal wall motion. In light of pt's risk factors, abnormal NST, patient is referred for cardiac catheterization with possible intervention if clinically indicated for pre-op clearance for renal transplant. Of note, pt believes renal transplant likely in 1 month and transplant team at [facility] is aware of angiogram. Pre-hydrated w/ 250cc bolus now s/p cath 6/22/22 w/ 2VD RAMANA x1 pLAD, non dominant RCA 70%, EDP normal (low), RFA PC. Now on maintenance IVFs 210cc/hr. Renal following. Urine lytes _____, bladder scan w/ PVD of 350cc; likely patient's baseline given NAD, voiding w/o any difficulty, abdomen nondistended, nontender.    Patient seen and examined at the bedside on 6/23/2022. AM labs wnl, VSS/HD stable. R groin site stable; no erythema, edema or underlying hematoma. No overnight events on telemetry. Denies any complaints at this time. Patient given appropriate d/c instructions and verbalized understanding. Medications sent to preferred pharmacy. Patient deemed stable for discharge per Dr. Mujica and will f/u with Dr. Russell in 1-2 weeks for further management.     VS Stable  Gen: NAD, A&O x3  Cards: RRR, clear S1 and S2 without murmur  Pulm: CTA B/L without w/r/r  Vascular: Peripheral pulses 2+ B/L  Abd: soft, NT  Ext: no LE edema or ulcerations B/L    Cardiac Rehab (STEMI/NSTEMI/ACS/Unstable Angina/CHF/Chronic Stable Angina/Heart Surgery (CABG,Valve)/Post PCI):              *Education on benefits of Cardiac Rehab provided to patient: Yes         *Referral and Prescription Given for Cardiac Rehab : Yes         *Pt given list of locations & instructed to contact their insurance company to review list of participating providers          *Pt instructed to bring Cardiac Rehab prescription with them to Cardiology Follow up appointment for assistance with enrollment: Yes *Incomplete - needs med req (no further ramipril)    73 yo M, PMHx HTN, HLD, DM2, chronic HFpEF (EF 63%), CKD IV (baseline Cr 3.6-3.7, known to Dr. Vasquez, awaiting kidney transplant), ZOHAIB (baseline Hb 10-12), BPH, recent admission to [facility] 5/2022 for chest pain however deferred cath 2/2 pending renal transplant who presented to outpatient cardiologist Dr. Russell for follow up. Pt states all of his symptoms have resolved and he feels well since being discharged 5/2022. Denies CP, SOB, dizziness, palpitations, syncope, LE edema, orthopnea/PND, BRBPR, hematuria. ECHO 2/1/22: normal LVEF and RV function, non-significant valvular disease, small pericardial effusion w/o evidence of tamponade. NST 2/25/22: small, mild reversible defect in the apex c/w ischemia, small mild reversible defect in the mid anterior wall, LVEF 67% and normal wall motion. In light of pt's risk factors, abnormal NST, patient is referred for cardiac catheterization with possible intervention if clinically indicated for pre-op clearance for renal transplant. Of note, pt believes renal transplant likely in 1 month and transplant team at [facility] is aware of angiogram. Pre-hydrated w/ 250cc bolus now s/p cath 6/22/22 w/ 2VD RAMANA x1 pLAD, non dominant RCA 70%, EDP normal (low), RFA PC. Now on maintenance IVFs 210cc/hr. Renal following, urine lytes were sent, bladder scan w/ PVD of 350cc; likely patient's baseline given NAD, voiding w/o any difficulty, abdomen nondistended, nontender.    Patient seen and examined at the bedside on 6/23/2022. AM labs wnl, VSS/HD stable. R groin site stable; no erythema, edema or underlying hematoma. No overnight events on telemetry. Denies any complaints at this time. Patient given appropriate d/c instructions and verbalized understanding. Medications sent to preferred pharmacy. Patient deemed stable for discharge per Dr. Mujica will f/u with Dr. Russell in 1-2 weeks, and Dr. Vasquez in 1 week for further management.     VS Stable  Gen: NAD, A&O x3  Cards: RRR, clear S1 and S2 without murmur  Pulm: CTA B/L without w/r/r  Vascular: Peripheral pulses 2+ B/L  Abd: soft, NT  Ext: no LE edema or ulcerations B/L    Cardiac Rehab (STEMI/NSTEMI/ACS/Unstable Angina/CHF/Chronic Stable Angina/Heart Surgery (CABG,Valve)/Post PCI):              *Education on benefits of Cardiac Rehab provided to patient: Yes         *Referral and Prescription Given for Cardiac Rehab : Yes         *Pt given list of locations & instructed to contact their insurance company to review list of participating providers          *Pt instructed to bring Cardiac Rehab prescription with them to Cardiology Follow up appointment for assistance with enrollment: Yes 73 yo M, PMHx HTN, HLD, DM2, chronic HFpEF (EF 63%), CKD IV (baseline Cr 3.6-3.7, known to Dr. Vasquez, awaiting kidney transplant), ZOHAIB (baseline Hb 10-12), BPH, recent admission to [facility] 5/2022 for chest pain however deferred cath 2/2 pending renal transplant who presented to outpatient cardiologist Dr. Russell for follow up. Pt states all of his symptoms have resolved and he feels well since being discharged 5/2022. Denies CP, SOB, dizziness, palpitations, syncope, LE edema, orthopnea/PND, BRBPR, hematuria. ECHO 2/1/22: normal LVEF and RV function, non-significant valvular disease, small pericardial effusion w/o evidence of tamponade. NST 2/25/22: small, mild reversible defect in the apex c/w ischemia, small mild reversible defect in the mid anterior wall, LVEF 67% and normal wall motion. In light of pt's risk factors, abnormal NST, patient is referred for cardiac catheterization with possible intervention if clinically indicated for pre-op clearance for renal transplant. Of note, pt believes renal transplant likely in 1 month and transplant team at [facility] is aware of angiogram. Pre-hydrated w/ 250cc bolus now s/p cath 6/22/22 w/ 2VD RAMANA x1 pLAD, non dominant RCA 70%, EDP normal (low), RFA PC. Now on maintenance IVFs 210cc/hr. Renal following, urine lytes were sent, bladder scan w/ PVD of 350cc; likely patient's baseline given NAD, voiding w/o any difficulty, abdomen nondistended, nontender. Mildly hypertensive while inpatient; Amlodipine 5mg uptitrated to 10mg daily. Patient's Ramipril 10mg daily discontinued on discharge d/t CKD, will f/u outpatient for further management.     Patient seen and examined at the bedside on 6/23/2022. AM labs wnl, VSS/HD stable. R groin site stable; no erythema, edema or underlying hematoma. No overnight events on telemetry. Denies any complaints at this time. Patient given appropriate d/c instructions and verbalized understanding. Medications sent to preferred pharmacy. Patient deemed stable for discharge per Dr. Mujica will f/u with Dr. Russell in 1-2 weeks, and Dr. Vasquez in 1 week for further management.     VS Stable  Gen: NAD, A&O x3  Cards: RRR, clear S1 and S2 without murmur  Pulm: CTA B/L without w/r/r  Vascular: Peripheral pulses 2+ B/L  Abd: soft, NT  Ext: no LE edema or ulcerations B/L    Cardiac Rehab (STEMI/NSTEMI/ACS/Unstable Angina/CHF/Chronic Stable Angina/Heart Surgery (CABG,Valve)/Post PCI):              *Education on benefits of Cardiac Rehab provided to patient: Yes         *Referral and Prescription Given for Cardiac Rehab : Yes         *Pt given list of locations & instructed to contact their insurance company to review list of participating providers          *Pt instructed to bring Cardiac Rehab prescription with them to Cardiology Follow up appointment for assistance with enrollment: Yes

## 2022-06-23 NOTE — DISCHARGE NOTE PROVIDER - NSDCFUADDAPPT_GEN_ALL_CORE_FT
Please follow up with your cardiologist within 1 weeks of discharge. Please follow up with your cardiologist within 1 week of discharge.  Please follow up with your nephrologist within 1 week of discharge.   Please follow up with your primary care doctor within 1-2 weeks of discharge.

## 2022-06-23 NOTE — DISCHARGE NOTE NURSING/CASE MANAGEMENT/SOCIAL WORK - NSDCPEFALRISK_GEN_ALL_CORE
For information on Fall & Injury Prevention, visit: [email or web address] OR  [email or web address] OR  https://www.cdc.gov/steadi/patient.html

## 2022-06-23 NOTE — PROGRESS NOTE ADULT - ASSESSMENT
Patient is a 72y Male w/ history PMHx HTN, HLD, DM2, chronic HFpEF (EF 63%), CKD IV (baseline Cr 3.6-3.7, known to Dr. Vasquez, awaiting kidney transplant), ZOHAIB (baseline Hb 10-12), BPH, recent admission to [facility] 5/2022 for chest pain however deferred cath 2/2 pending renal transplant now presenting w/o symptoms for cath. S/p PCI with 2VD DESx1 pLAD, non dominant RCA 70%. Renal consulted for PRICILA on CKD4.         #PRICILA on CKD5 pending transplant Plan: PRICILA (acute kidney injury)on CKD in the setting of HTN/DM. Awaiting kidney transplant (transplant team out of [facility]). Recent creatinine on d/c in May 5/29 was 3.80, BUN 27. Patient w/ Cr. 4.7 and BUN 47 on admission. S/p PCI w/ DESx1 today. Patient eating and drinking w/o difficulty this week and making urine w/o difficulty.   - Recommend close coordination between cardiac service and patients renal transplant team about pending renal transplant i/s/o patient being on DAPT.   - avoid nephrotoxins, renally dose meds  - Close outpatient FU with Dr. Vasquez

## 2022-06-23 NOTE — DISCHARGE NOTE PROVIDER - NSDCFUADDINST_GEN_ALL_CORE_FT
ACTIVITY:     Do not drive or operate hazardous machinery for 24 hours.                        Limit your physical activities for 24 hours.                        Do not engage in sports, heavy work or heavy lifting for 72 hours.    DIET:             You may resume your regular diet.                        Drinking extra fluids (water, juice) is encouraged.                        Abstain from alcohol for 24 hours.    HYGIENE:     Shower and take off the puncture site dressing the next morning.                        If you received a "closure device" in your groin, you may shower the next day, but not take a bath, hot tub or swim for 5 days.    PAIN MEDICATION:   A mild pain at the puncture site is not unusual.                                        You may take Tylenol 1-2 tabs every 4-6 hours as needed, for one day.                                        If the pain persists contact the office at [phone]    SPECIAL INSTRUCTIONS:  Signs and symptoms to look out for:       1. Jaspreet bleeding from the puncture site is an emergency.            Put direct pressure on the site and go directly to your local Emergency Room for treatment.       2. Bleeding under the skin may also occur and a small "black and blue may be expected.         If there appears to be an expanding mass or swelling around the puncture site, apply manual compression and go          immediately to your local Emergency Room for treatment.       3. If your foot/leg or hand/arm (puncture site) becomes cool or blue and/or you are unable to move it, this must be treated as an emergency.         Go directly to your local emergency room for treatment.       4. Excessive puncture site pain is abnormal and should be assessed.      5.  Look out for signs of infection in the puncture site: fever, red streaking of the leg, discharge, pain.      6.  Lack of adequate urine output, provided you are drinking enough fluids, may be cause for concern, notify us if you think this is the case.

## 2022-06-23 NOTE — DISCHARGE NOTE PROVIDER - NSDCCPCAREPLAN_GEN_ALL_CORE_FT
PRINCIPAL DISCHARGE DIAGNOSIS  Diagnosis: CAD (coronary artery disease)  Assessment and Plan of Treatment: You underwent a cardiac catheterization 6/22/2022 and the blockage in your proximal left anterior descending artery was opened with stent placement. You are to take ASPIRIN 81 mg daily and Plavix 75 mg daily. These medications work to keep your stents open.  NEVER MISS A DOSE OF ASPIRIN OR PLAVIX; IF YOU DO, YOU ARE AT RISK OF YOUR STENT CLOSING AND HAVING A HEART ATTACK. DO NOT STOP THESE TWO MEDICATIONS UNLESS INSTRUCTED TO DO SO BY YOUR CARDIOLOGIST.  Additionally, please continue Amlodipine 5mg daily, Isosorbide 30mg daily, Metoprolol 25mg daily, Ramipril 10mg daily, Torsemide 20mg twice a day as these help keep your heart strong.  Your procedure was done through your groin or your wrist. You do not need to keep this area covered and you may shower. Please avoid any heavy lifting  (no more than 3 to 5 lbs) or strenuous activity for five days. If you develop any swelling, bleeding, hardening of the skin (hematoma formation), acute pain, numbness/tingling  in your arm or leg please contact your doctor immediately or call our 24/7 line: [phone]. Please return to the hospital/seek immediate medical attention if worsening of symptoms- including not limited to chest pain, shortness of breath. Please follow up with Dr. Russell in 1-2 weeks. Please call his office and make an appointment to see him. Please continue a heart healthy diet low in sodium, cholesterol, and fat.      SECONDARY DISCHARGE DIAGNOSES  Diagnosis: Stage 5 chronic kidney disease not on chronic dialysis  Assessment and Plan of Treatment: You have kidney disease and are pending a renal transplant at [facility]. You were hydrated with IV fluids prior to your cardiac catheterization and continued on maintanence fluids. You were seen by the nephrology team here.    Diagnosis: HTN (hypertension)  Assessment and Plan of Treatment: You have a diagnosis of Hypertension or elevated blood pressure. Please continue taking Amlodipine 5mg daily, Isosorbide 30mg daily, Metoprolol 25mg daily, Ramipril 10mg daily, Torsemide 20mg twice a day to keep your blood pressure controlled. In addition, there are multiple lifestyle modifications that have been proven to lower blood pressure: maintaining a healthy body weight, engaging in regular physical activity for at least 30 minutes per day on most days, and consuming a diet rich in fruits, vegetables, and low-fat dairy products with a reduced amount of total and saturated fats and sodium.  For blood pressures at home that are too high or low please see your Doctor or go to the Emergency Room as necessary.    Diagnosis: Type 2 diabetes mellitus  Assessment and Plan of Treatment: Your Hemoglobin A1c, which measures your average blood sugar level over the last three months, is 6.8%  which is BELOW your goal of less than 7%. Please continue a diet low in sugar, exercise, monitoring your fingersticks, and adhering to your medication regimen of Lantus and Tradjenta. Please follow up with the doctor that manages your diabetes.    Diagnosis: Hyperlipemia  Assessment and Plan of Treatment: Your LDL is 71 and your goal LDL is less than 70. LDL is also known as "bad cholesterol" because it takes cholesterol to your arteries, where it may collect in artery walls. Too much cholesterol in your arteries may lead to a buildup of plaque known as atherosclerosis and contribute to heart disease. Please STOP taking Atorvastatin 20mg daily and START taking Atorvastatin 40mg daily. Appropriate refills were sent to your preferred pharmacy. Please follow-up with your cardiologist for further management.     PRINCIPAL DISCHARGE DIAGNOSIS  Diagnosis: CAD (coronary artery disease)  Assessment and Plan of Treatment: You underwent a cardiac catheterization 6/22/2022 and the blockage in your proximal left anterior descending artery was opened with stent placement. You are to take ASPIRIN 81 mg daily and Plavix (Clopidogrel) 75 mg daily. These medications work to keep your stents open.  NEVER MISS A DOSE OF ASPIRIN OR PLAVIX; IF YOU DO, YOU ARE AT RISK OF YOUR STENT CLOSING AND HAVING A HEART ATTACK. DO NOT STOP THESE TWO MEDICATIONS UNLESS INSTRUCTED TO DO SO BY YOUR CARDIOLOGIST.  Additionally, please continue Amlodipine 5mg daily, Isosorbide 30mg daily, Metoprolol 25mg daily, Ramipril 10mg daily, Torsemide 20mg twice a day as these help keep your heart strong.  Your procedure was done through your groin or your wrist. You do not need to keep this area covered and you may shower. Please avoid any heavy lifting  (no more than 3 to 5 lbs) or strenuous activity for five days. If you develop any swelling, bleeding, hardening of the skin (hematoma formation), acute pain, numbness/tingling  in your arm or leg please contact your doctor immediately or call our 24/7 line: [phone]. Please return to the hospital/seek immediate medical attention if worsening of symptoms- including not limited to chest pain, shortness of breath. Please follow up with Dr. Russell in 1-2 weeks. Please call his office and make an appointment to see him. Please continue a heart healthy diet low in sodium, cholesterol, and fat.      SECONDARY DISCHARGE DIAGNOSES  Diagnosis: Stage 5 chronic kidney disease not on chronic dialysis  Assessment and Plan of Treatment: You have kidney disease and are pending a renal transplant at [facility]. You were hydrated with IV fluids prior to your cardiac catheterization and continued on maintanence fluids. You were seen by the nephrology team here. Continue taking the sodium bicarbonate 650mg tablets daily.    Diagnosis: HTN (hypertension)  Assessment and Plan of Treatment: You have a diagnosis of Hypertension or elevated blood pressure. Please continue taking Amlodipine 5mg daily, Isosorbide 30mg daily, Metoprolol 25mg daily, Ramipril 10mg daily, Torsemide 20mg twice a day to keep your blood pressure controlled. In addition, there are multiple lifestyle modifications that have been proven to lower blood pressure: maintaining a healthy body weight, engaging in regular physical activity for at least 30 minutes per day on most days, and consuming a diet rich in fruits, vegetables, and low-fat dairy products with a reduced amount of total and saturated fats and sodium.  For blood pressures at home that are too high or low please see your Doctor or go to the Emergency Room as necessary.    Diagnosis: Type 2 diabetes mellitus  Assessment and Plan of Treatment: Your Hemoglobin A1c, which measures your average blood sugar level over the last three months, is 6.8%  which is BELOW your goal of less than 7%. Please continue a diet low in sugar, exercise, monitoring your fingersticks, and adhering to your medication regimen of Lantus and Tradjenta. Please follow up with the doctor that manages your diabetes.    Diagnosis: Hyperlipemia  Assessment and Plan of Treatment: Your LDL is 71 and your goal LDL is less than 70. LDL is also known as "bad cholesterol" because it takes cholesterol to your arteries, where it may collect in artery walls. Too much cholesterol in your arteries may lead to a buildup of plaque known as atherosclerosis and contribute to heart disease. Please STOP taking Atorvastatin 20mg daily and START taking Atorvastatin 40mg daily. Appropriate refills were sent to your preferred pharmacy. Please follow-up with your cardiologist for further management.    Diagnosis: Hypothyroidism  Assessment and Plan of Treatment: Please continue taking Levothyroxine (Synthroid) 50mcg tablets daily. Please follow up with your primary care provider for continued follow up.     PRINCIPAL DISCHARGE DIAGNOSIS  Diagnosis: CAD (coronary artery disease)  Assessment and Plan of Treatment: You underwent a cardiac catheterization 6/22/2022 and the blockage in your proximal left anterior descending artery was opened with stent placement. You are to take ASPIRIN 81 mg daily and Plavix (Clopidogrel) 75 mg daily. These medications work to keep your stents open.  NEVER MISS A DOSE OF ASPIRIN OR PLAVIX; IF YOU DO, YOU ARE AT RISK OF YOUR STENT CLOSING AND HAVING A HEART ATTACK. DO NOT STOP THESE TWO MEDICATIONS UNLESS INSTRUCTED TO DO SO BY YOUR CARDIOLOGIST.  Additionally, please continue Amlodipine 5mg daily, Isosorbide 30mg daily, Metoprolol 25mg daily, Ramipril 10mg daily, Torsemide 20mg twice a day as these help keep your heart strong.  Your procedure was done through your groin or your wrist. You do not need to keep this area covered and you may shower. Please avoid any heavy lifting  (no more than 3 to 5 lbs) or strenuous activity for five days. If you develop any swelling, bleeding, hardening of the skin (hematoma formation), acute pain, numbness/tingling  in your arm or leg please contact your doctor immediately or call our 24/7 line: [phone]. Please return to the hospital/seek immediate medical attention if worsening of symptoms- including not limited to chest pain, shortness of breath. Please follow up with Dr. Russell in 1-2 weeks. Please call his office and make an appointment to see him. Please continue a heart healthy diet low in sodium, cholesterol, and fat.      SECONDARY DISCHARGE DIAGNOSES  Diagnosis: Stage 5 chronic kidney disease not on chronic dialysis  Assessment and Plan of Treatment: You have kidney disease and are pending a renal transplant at [facility]. You were hydrated with IV fluids prior to your cardiac catheterization and continued on maintanence fluids. You were seen by the nephrology team here. Continue taking the sodium bicarbonate 650mg tablets daily. Please STOP taking your Ramipril 10mg daily, and follow up with your nephrologist in 1 week for further management.    Diagnosis: HTN (hypertension)  Assessment and Plan of Treatment: You have a diagnosis of Hypertension or elevated blood pressure. Please continue taking Amlodipine 5mg daily, Isosorbide 30mg daily, Metoprolol 25mg daily, Ramipril 10mg daily, Torsemide 20mg twice a day to keep your blood pressure controlled. In addition, there are multiple lifestyle modifications that have been proven to lower blood pressure: maintaining a healthy body weight, engaging in regular physical activity for at least 30 minutes per day on most days, and consuming a diet rich in fruits, vegetables, and low-fat dairy products with a reduced amount of total and saturated fats and sodium.  For blood pressures at home that are too high or low please see your Doctor or go to the Emergency Room as necessary.    Diagnosis: Type 2 diabetes mellitus  Assessment and Plan of Treatment: Your Hemoglobin A1c, which measures your average blood sugar level over the last three months, is 6.8%  which is BELOW your goal of less than 7%. Please continue a diet low in sugar, exercise, monitoring your fingersticks, and adhering to your medication regimen of Lantus and Tradjenta. Please follow up with the doctor that manages your diabetes.    Diagnosis: Hyperlipemia  Assessment and Plan of Treatment: Your LDL is 71 and your goal LDL is less than 70. LDL is also known as "bad cholesterol" because it takes cholesterol to your arteries, where it may collect in artery walls. Too much cholesterol in your arteries may lead to a buildup of plaque known as atherosclerosis and contribute to heart disease. Please STOP taking Atorvastatin 20mg daily and START taking Atorvastatin 40mg daily. Appropriate refills were sent to your preferred pharmacy. Please follow-up with your cardiologist for further management.    Diagnosis: Hypothyroidism  Assessment and Plan of Treatment: Please continue taking Levothyroxine (Synthroid) 50mcg tablets daily. Please follow up with your primary care provider for continued follow up.    Diagnosis: Encounter for cardiac rehabilitation  Assessment and Plan of Treatment: We have provided you with a prescription for cardiac rehab which is medically supervised exercise program for your heart and has been shown to improve the quantity and quality of life of people with heart disease like yours. You should attend cardiac rehab 3 times per week for 12 weeks. We have provided you with a list of nearby facilities. Please call your insurance carrier to determine which of these facilities are covered under your plan.     PRINCIPAL DISCHARGE DIAGNOSIS  Diagnosis: CAD (coronary artery disease)  Assessment and Plan of Treatment: You underwent a cardiac catheterization 6/22/2022 and the blockage in your proximal left anterior descending artery was opened with stent placement. You are to take ASPIRIN 81 mg daily and Plavix (Clopidogrel) 75 mg daily. These medications work to keep your stents open.  NEVER MISS A DOSE OF ASPIRIN OR PLAVIX; IF YOU DO, YOU ARE AT RISK OF YOUR STENT CLOSING AND HAVING A HEART ATTACK. DO NOT STOP THESE TWO MEDICATIONS UNLESS INSTRUCTED TO DO SO BY YOUR CARDIOLOGIST.  Additionally, please continue Amlodipine 5mg daily, Isosorbide 30mg daily, Metoprolol 25mg daily, Ramipril 10mg daily, Torsemide 20mg twice a day as these help keep your heart strong.  Your procedure was done through your groin. You do not need to keep this area covered and you may shower. Please avoid any heavy lifting  (no more than 3 to 5 lbs) or strenuous activity for five days. If you develop any swelling, bleeding, hardening of the skin (hematoma formation), acute pain, numbness/tingling  in your arm or leg please contact your doctor immediately or call our 24/7 line: [phone]. Please return to the hospital/seek immediate medical attention if worsening of symptoms- including not limited to chest pain, shortness of breath. Please follow up with Dr. Russell in 1-2 weeks. Please call his or her office and make an appointment to see them. Please continue a heart healthy diet low in sodium, cholesterol, and fat.      SECONDARY DISCHARGE DIAGNOSES  Diagnosis: Stage 5 chronic kidney disease not on chronic dialysis  Assessment and Plan of Treatment: You have kidney disease and are pending a renal transplant at [facility]. You were hydrated with IV fluids prior to your cardiac catheterization and continued on maintanence fluids after your procedure. You were seen by the nephrology team here. Continue taking the sodium bicarbonate 650mg tablets daily. Please STOP taking your Ramipril 10mg daily, and follow up with your nephrologist in 1 week for further management.    Diagnosis: HTN (hypertension)  Assessment and Plan of Treatment: You have a diagnosis of Hypertension or elevated blood pressure. Please continue taking Amlodipine 5mg daily, Isosorbide 30mg daily, Metoprolol 25mg daily, Ramipril 10mg daily, Torsemide 20mg twice a day to keep your blood pressure controlled. In addition, there are multiple lifestyle modifications that have been proven to lower blood pressure: maintaining a healthy body weight, engaging in regular physical activity for at least 30 minutes per day on most days, and consuming a diet rich in fruits, vegetables, and low-fat dairy products with a reduced amount of total and saturated fats and sodium.  For blood pressures at home that are too high or low please see your Doctor or go to the Emergency Room as necessary.    Diagnosis: Type 2 diabetes mellitus  Assessment and Plan of Treatment: Your Hemoglobin A1c, which measures your average blood sugar level over the last three months, is 6.8%  which is BELOW your goal of less than 7%. Please continue a diet low in sugar, exercise, monitoring your fingersticks, and adhering to your medication regimen of Lantus and Tradjenta. Please follow up with the doctor that manages your diabetes.    Diagnosis: Hyperlipemia  Assessment and Plan of Treatment: Your LDL is 71 and your goal LDL is less than 70. LDL is also known as "bad cholesterol" because it takes cholesterol to your arteries, where it may collect in artery walls. Too much cholesterol in your arteries may lead to a buildup of plaque known as atherosclerosis and contribute to heart disease. Please STOP taking Atorvastatin 20mg daily and START taking Atorvastatin 40mg daily. Appropriate refills were sent to your preferred pharmacy. Please follow-up with your cardiologist for further management.    Diagnosis: Hypothyroidism  Assessment and Plan of Treatment: Please continue taking Levothyroxine (Synthroid) 50mcg tablets daily. Please follow up with your primary care provider for continued follow up.    Diagnosis: Encounter for cardiac rehabilitation  Assessment and Plan of Treatment: We have provided you with a prescription for cardiac rehab which is medically supervised exercise program for your heart and has been shown to improve the quantity and quality of life of people with heart disease like yours. You should attend cardiac rehab 3 times per week for 12 weeks. We have provided you with a list of nearby facilities. Please call your insurance carrier to determine which of these facilities are covered under your plan.     PRINCIPAL DISCHARGE DIAGNOSIS  Diagnosis: CAD (coronary artery disease)  Assessment and Plan of Treatment: You underwent a cardiac catheterization 6/22/2022 and the blockage in your proximal left anterior descending artery was opened with stent placement. You are to take ASPIRIN 81 mg daily and Plavix (Clopidogrel) 75 mg daily. These medications work to keep your stents open.  NEVER MISS A DOSE OF ASPIRIN OR PLAVIX; IF YOU DO, YOU ARE AT RISK OF YOUR STENT CLOSING AND HAVING A HEART ATTACK. DO NOT STOP THESE TWO MEDICATIONS UNLESS INSTRUCTED TO DO SO BY YOUR CARDIOLOGIST.  Additionally, please continue Amlodipine 5mg daily, Isosorbide 30mg daily, Metoprolol 25mg daily, Ramipril 10mg daily, Torsemide 20mg twice a day as these help keep your heart strong.  Your procedure was done through your groin. You do not need to keep this area covered and you may shower. Please avoid any heavy lifting  (no more than 3 to 5 lbs) or strenuous activity for five days. If you develop any swelling, bleeding, hardening of the skin (hematoma formation), acute pain, numbness/tingling  in your arm or leg please contact your doctor immediately or call our 24/7 line: [phone]. Please return to the hospital/seek immediate medical attention if worsening of symptoms- including not limited to chest pain, shortness of breath. Please follow up with Dr. Russell in 1-2 weeks. Please call his or her office and make an appointment to see them. Please continue a heart healthy diet low in sodium, cholesterol, and fat.      SECONDARY DISCHARGE DIAGNOSES  Diagnosis: Stage 5 chronic kidney disease not on chronic dialysis  Assessment and Plan of Treatment: You have kidney disease and are pending a renal transplant at [facility]. You were hydrated with IV fluids prior to your cardiac catheterization and continued on maintanence fluids after your procedure. You were seen by the nephrology team here. Continue taking the sodium bicarbonate 650mg tablets daily. Please STOP taking your Ramipril 10mg daily, and follow up with your nephrologist Dr. Vasquez in 1 week for further management.    Diagnosis: HTN (hypertension)  Assessment and Plan of Treatment: You have a diagnosis of Hypertension or elevated blood pressure. Please continue taking Amlodipine 5mg daily, Isosorbide 30mg daily, Metoprolol 25mg daily, Ramipril 10mg daily, Torsemide 20mg twice a day to keep your blood pressure controlled. In addition, there are multiple lifestyle modifications that have been proven to lower blood pressure: maintaining a healthy body weight, engaging in regular physical activity for at least 30 minutes per day on most days, and consuming a diet rich in fruits, vegetables, and low-fat dairy products with a reduced amount of total and saturated fats and sodium.  For blood pressures at home that are too high or low please see your Doctor or go to the Emergency Room as necessary.    Diagnosis: Type 2 diabetes mellitus  Assessment and Plan of Treatment: Your Hemoglobin A1c, which measures your average blood sugar level over the last three months, is 6.8%  which is BELOW your goal of less than 7%. Please continue a diet low in sugar, exercise, monitoring your fingersticks, and adhering to your medication regimen of Lantus and Tradjenta. Please follow up with the doctor that manages your diabetes.    Diagnosis: Hyperlipemia  Assessment and Plan of Treatment: Your LDL is 71 and your goal LDL is less than 70. LDL is also known as "bad cholesterol" because it takes cholesterol to your arteries, where it may collect in artery walls. Too much cholesterol in your arteries may lead to a buildup of plaque known as atherosclerosis and contribute to heart disease. Please STOP taking Atorvastatin 20mg daily and START taking Atorvastatin 40mg daily. Appropriate refills were sent to your preferred pharmacy. Please follow-up with your cardiologist for further management.    Diagnosis: Hypothyroidism  Assessment and Plan of Treatment: Please continue taking Levothyroxine (Synthroid) 50mcg tablets daily. Please follow up with your primary care provider for continued follow up.    Diagnosis: Encounter for cardiac rehabilitation  Assessment and Plan of Treatment: We have provided you with a prescription for cardiac rehab which is medically supervised exercise program for your heart and has been shown to improve the quantity and quality of life of people with heart disease like yours. You should attend cardiac rehab 3 times per week for 12 weeks. We have provided you with a list of nearby facilities. Please call your insurance carrier to determine which of these facilities are covered under your plan.     PRINCIPAL DISCHARGE DIAGNOSIS  Diagnosis: CAD (coronary artery disease)  Assessment and Plan of Treatment: You underwent a cardiac catheterization 6/22/2022 and the blockage in your proximal left anterior descending artery was opened with stent placement. You are to take Aspirin 81 mg daily and Plavix (Clopidogrel) 75 mg daily. These medications work to keep your stents open.  NEVER MISS A DOSE OF ASPIRIN OR PLAVIX; IF YOU DO, YOU ARE AT RISK OF YOUR STENT CLOSING AND HAVING A HEART ATTACK. DO NOT STOP THESE TWO MEDICATIONS UNLESS INSTRUCTED TO DO SO BY YOUR CARDIOLOGIST.  Additionally, please STOP taking Amlodipine 5mg daily and START taking Amlodipine 10mg daily. Please continue Isosorbide 30mg daily, Metoprolol 25mg daily, Torsemide 20mg twice a day as these help keep your heart strong. STOP taking your Ramipril 10mg daily.   Your procedure was done through your groin. You do not need to keep this area covered and you may shower. Please avoid any heavy lifting  (no more than 3 to 5 lbs) or strenuous activity for five days. If you develop any swelling, bleeding, hardening of the skin (hematoma formation), acute pain, numbness/tingling  in your arm or leg please contact your doctor immediately or call our 24/7 line: [phone]. Please return to the hospital/seek immediate medical attention if worsening of symptoms- including not limited to chest pain, shortness of breath. Please follow up with Dr. Russell in 1-2 weeks. Please call his or her office and make an appointment to see them. Please continue a heart healthy diet low in sodium, cholesterol, and fat.      SECONDARY DISCHARGE DIAGNOSES  Diagnosis: Stage 5 chronic kidney disease not on chronic dialysis  Assessment and Plan of Treatment: You have kidney disease and are pending a renal transplant at [facility]. You were hydrated with IV fluids prior to your cardiac catheterization and continued on maintanence fluids after your procedure. You were seen by the nephrology team here. Continue taking the sodium bicarbonate 650mg tablets daily. Please STOP taking your Ramipril 10mg daily, and follow up with your nephrologist Dr. Vasquez in 1 week for further management.    Diagnosis: HTN (hypertension)  Assessment and Plan of Treatment: You have a diagnosis of Hypertension or elevated blood pressure. Please STOP taking Amlodipine 5mg daily and START taking Amlodipine 10mg daily. Please continue Isosorbide 30mg daily, Metoprolol 25mg daily, Torsemide 20mg twice a day to keep your blood pressure controlled. STOP taking your Ramipril 10mg daily. In addition, there are multiple lifestyle modifications that have been proven to lower blood pressure: maintaining a healthy body weight, engaging in regular physical activity for at least 30 minutes per day on most days, and consuming a diet rich in fruits, vegetables, and low-fat dairy products with a reduced amount of total and saturated fats and sodium.  For blood pressures at home that are too high or low please see your Doctor or go to the Emergency Room as necessary.    Diagnosis: Type 2 diabetes mellitus  Assessment and Plan of Treatment: Your Hemoglobin A1c, which measures your average blood sugar level over the last three months, is 6.8%  which is BELOW your goal of less than 7%. Please continue a diet low in sugar, exercise, monitoring your fingersticks, and adhering to your medication regimen of Lantus and Tradjenta. Please follow up with the doctor that manages your diabetes.    Diagnosis: Hyperlipemia  Assessment and Plan of Treatment: Your LDL is 71 and your goal LDL is less than 70. LDL is also known as "bad cholesterol" because it takes cholesterol to your arteries, where it may collect in artery walls. Too much cholesterol in your arteries may lead to a buildup of plaque known as atherosclerosis and contribute to heart disease. Please STOP taking Atorvastatin 20mg daily and START taking Atorvastatin 40mg daily. Appropriate refills were sent to your preferred pharmacy. Please follow-up with your cardiologist for further management.    Diagnosis: Hypothyroidism  Assessment and Plan of Treatment: Please continue taking Levothyroxine (Synthroid) 50mcg tablets daily. Please follow up with your primary care provider for continued follow up.    Diagnosis: Encounter for cardiac rehabilitation  Assessment and Plan of Treatment: We have provided you with a prescription for cardiac rehab which is medically supervised exercise program for your heart and has been shown to improve the quantity and quality of life of people with heart disease like yours. You should attend cardiac rehab 3 times per week for 12 weeks. We have provided you with a list of nearby facilities. Please call your insurance carrier to determine which of these facilities are covered under your plan.

## 2022-06-23 NOTE — DISCHARGE NOTE NURSING/CASE MANAGEMENT/SOCIAL WORK - NSDCFUADDAPPT_GEN_ALL_CORE_FT
Please follow up with your cardiologist within 1 week of discharge.  Please follow up with your nephrologist within 1 week of discharge.   Please follow up with your primary care doctor within 1-2 weeks of discharge.

## 2022-06-23 NOTE — DISCHARGE NOTE PROVIDER - CARE PROVIDERS DIRECT ADDRESSES
[email or web address]

## 2022-06-27 LAB
ISTAT ACTK (ACTIVATED CLOTTING TIME KAOLIN): 393 SEC — HIGH (ref 74–137)
ISTAT ACTK (ACTIVATED CLOTTING TIME KAOLIN): 393 SEC — HIGH (ref 74–137)

## 2022-06-28 ENCOUNTER — APPOINTMENT (OUTPATIENT)
Dept: NEPHROLOGY | Facility: CLINIC | Age: 72
End: 2022-06-28

## 2022-06-28 VITALS — SYSTOLIC BLOOD PRESSURE: 136 MMHG | HEART RATE: 77 BPM | DIASTOLIC BLOOD PRESSURE: 64 MMHG | RESPIRATION RATE: 16 BRPM

## 2022-06-28 DIAGNOSIS — N40.0 BENIGN PROSTATIC HYPERPLASIA WITHOUT LOWER URINARY TRACT SYMPTOMS: ICD-10-CM

## 2022-06-28 DIAGNOSIS — I25.118 ATHEROSCLEROTIC HEART DISEASE OF NATIVE CORONARY ARTERY WITH OTHER FORMS OF ANGINA PECTORIS: ICD-10-CM

## 2022-06-28 DIAGNOSIS — E03.9 HYPOTHYROIDISM, UNSPECIFIED: ICD-10-CM

## 2022-06-28 DIAGNOSIS — Z79.82 LONG TERM (CURRENT) USE OF ASPIRIN: ICD-10-CM

## 2022-06-28 DIAGNOSIS — N17.9 ACUTE KIDNEY FAILURE, UNSPECIFIED: ICD-10-CM

## 2022-06-28 DIAGNOSIS — Z76.82 AWAITING ORGAN TRANSPLANT STATUS: ICD-10-CM

## 2022-06-28 DIAGNOSIS — E78.5 HYPERLIPIDEMIA, UNSPECIFIED: ICD-10-CM

## 2022-06-28 DIAGNOSIS — Z79.4 LONG TERM (CURRENT) USE OF INSULIN: ICD-10-CM

## 2022-06-28 DIAGNOSIS — I10 ESSENTIAL (PRIMARY) HYPERTENSION: ICD-10-CM

## 2022-06-28 DIAGNOSIS — N18.5 CHRONIC KIDNEY DISEASE, STAGE 5: ICD-10-CM

## 2022-06-28 DIAGNOSIS — I13.11 HYPERTENSIVE HEART AND CHRONIC KIDNEY DISEASE WITHOUT HEART FAILURE, WITH STAGE 5 CHRONIC KIDNEY DISEASE, OR END STAGE RENAL DISEASE: ICD-10-CM

## 2022-06-28 DIAGNOSIS — I50.30 UNSPECIFIED DIASTOLIC (CONGESTIVE) HEART FAILURE: ICD-10-CM

## 2022-06-28 DIAGNOSIS — D50.9 IRON DEFICIENCY ANEMIA, UNSPECIFIED: ICD-10-CM

## 2022-06-28 DIAGNOSIS — E11.9 TYPE 2 DIABETES MELLITUS WITHOUT COMPLICATIONS: ICD-10-CM

## 2022-06-28 PROCEDURE — 99215 OFFICE O/P EST HI 40 MIN: CPT

## 2022-06-29 ENCOUNTER — EMERGENCY (EMERGENCY)
Facility: HOSPITAL | Age: 72
LOS: 1 days | Discharge: HOME | End: 2022-06-29
Attending: STUDENT IN AN ORGANIZED HEALTH CARE EDUCATION/TRAINING PROGRAM | Admitting: STUDENT IN AN ORGANIZED HEALTH CARE EDUCATION/TRAINING PROGRAM
Payer: MEDICARE

## 2022-06-29 VITALS
HEIGHT: 67 IN | DIASTOLIC BLOOD PRESSURE: 70 MMHG | TEMPERATURE: 98 F | OXYGEN SATURATION: 99 % | WEIGHT: 160.06 LBS | SYSTOLIC BLOOD PRESSURE: 143 MMHG | HEART RATE: 70 BPM | RESPIRATION RATE: 18 BRPM

## 2022-06-29 VITALS
SYSTOLIC BLOOD PRESSURE: 151 MMHG | OXYGEN SATURATION: 98 % | TEMPERATURE: 98 F | HEART RATE: 85 BPM | DIASTOLIC BLOOD PRESSURE: 71 MMHG | RESPIRATION RATE: 18 BRPM

## 2022-06-29 DIAGNOSIS — N18.4 CHRONIC KIDNEY DISEASE, STAGE 4 (SEVERE): ICD-10-CM

## 2022-06-29 DIAGNOSIS — E87.5 HYPERKALEMIA: ICD-10-CM

## 2022-06-29 DIAGNOSIS — I13.0 HYPERTENSIVE HEART AND CHRONIC KIDNEY DISEASE WITH HEART FAILURE AND STAGE 1 THROUGH STAGE 4 CHRONIC KIDNEY DISEASE, OR UNSPECIFIED CHRONIC KIDNEY DISEASE: ICD-10-CM

## 2022-06-29 DIAGNOSIS — N40.0 BENIGN PROSTATIC HYPERPLASIA WITHOUT LOWER URINARY TRACT SYMPTOMS: ICD-10-CM

## 2022-06-29 DIAGNOSIS — E78.5 HYPERLIPIDEMIA, UNSPECIFIED: ICD-10-CM

## 2022-06-29 DIAGNOSIS — I50.9 HEART FAILURE, UNSPECIFIED: ICD-10-CM

## 2022-06-29 DIAGNOSIS — Z79.82 LONG TERM (CURRENT) USE OF ASPIRIN: ICD-10-CM

## 2022-06-29 DIAGNOSIS — I25.10 ATHEROSCLEROTIC HEART DISEASE OF NATIVE CORONARY ARTERY WITHOUT ANGINA PECTORIS: ICD-10-CM

## 2022-06-29 DIAGNOSIS — E11.22 TYPE 2 DIABETES MELLITUS WITH DIABETIC CHRONIC KIDNEY DISEASE: ICD-10-CM

## 2022-06-29 DIAGNOSIS — Z79.4 LONG TERM (CURRENT) USE OF INSULIN: ICD-10-CM

## 2022-06-29 DIAGNOSIS — Z94.0 KIDNEY TRANSPLANT STATUS: ICD-10-CM

## 2022-06-29 DIAGNOSIS — Z95.5 PRESENCE OF CORONARY ANGIOPLASTY IMPLANT AND GRAFT: ICD-10-CM

## 2022-06-29 LAB
ALBUMIN SERPL ELPH-MCNC: 4.6 G/DL
ANION GAP SERPL CALC-SCNC: 15 MMOL/L — SIGNIFICANT CHANGE UP (ref 5–17)
ANION GAP SERPL CALC-SCNC: 16 MMOL/L
APPEARANCE UR: CLEAR — SIGNIFICANT CHANGE UP
BACTERIA # UR AUTO: PRESENT /HPF
BASOPHILS # BLD AUTO: 0.03 K/UL — SIGNIFICANT CHANGE UP (ref 0–0.2)
BASOPHILS NFR BLD AUTO: 0.4 % — SIGNIFICANT CHANGE UP (ref 0–2)
BILIRUB UR-MCNC: NEGATIVE — SIGNIFICANT CHANGE UP
BUN SERPL-MCNC: 50 MG/DL — HIGH (ref 7–23)
BUN SERPL-MCNC: 60 MG/DL
CALCIUM SERPL-MCNC: 8.3 MG/DL — LOW (ref 8.4–10.5)
CALCIUM SERPL-MCNC: 8.5 MG/DL
CHLORIDE SERPL-SCNC: 101 MMOL/L
CHLORIDE SERPL-SCNC: 98 MMOL/L — SIGNIFICANT CHANGE UP (ref 96–108)
CO2 SERPL-SCNC: 20 MMOL/L
CO2 SERPL-SCNC: 22 MMOL/L — SIGNIFICANT CHANGE UP (ref 22–31)
COLOR SPEC: YELLOW — SIGNIFICANT CHANGE UP
CREAT ?TM UR-MCNC: 45 MG/DL — SIGNIFICANT CHANGE UP
CREAT SERPL-MCNC: 5.72 MG/DL — HIGH (ref 0.5–1.3)
CREAT SERPL-MCNC: 6.51 MG/DL
CYSTATIN C SERPL-MCNC: 4.02 MG/L
DIFF PNL FLD: ABNORMAL
EGFR: 10 ML/MIN/1.73M2 — LOW
EGFR: 8 ML/MIN/1.73M2
EOSINOPHIL # BLD AUTO: 0.4 K/UL — SIGNIFICANT CHANGE UP (ref 0–0.5)
EOSINOPHIL NFR BLD AUTO: 5.5 % — SIGNIFICANT CHANGE UP (ref 0–6)
EPI CELLS # UR: SIGNIFICANT CHANGE UP /HPF (ref 0–5)
GFR/BSA.PRED SERPLBLD CYS-BASED-ARV: 12 ML/MIN/1.73M2
GLUCOSE SERPL-MCNC: 102 MG/DL
GLUCOSE SERPL-MCNC: 203 MG/DL — HIGH (ref 70–99)
GLUCOSE UR QL: NEGATIVE — SIGNIFICANT CHANGE UP
HCT VFR BLD CALC: 36.1 % — LOW (ref 39–50)
HGB BLD-MCNC: 11.8 G/DL — LOW (ref 13–17)
IMM GRANULOCYTES NFR BLD AUTO: 0.4 % — SIGNIFICANT CHANGE UP (ref 0–1.5)
KETONES UR-MCNC: NEGATIVE — SIGNIFICANT CHANGE UP
LEUKOCYTE ESTERASE UR-ACNC: ABNORMAL
LYMPHOCYTES # BLD AUTO: 2.38 K/UL — SIGNIFICANT CHANGE UP (ref 1–3.3)
LYMPHOCYTES # BLD AUTO: 32.7 % — SIGNIFICANT CHANGE UP (ref 13–44)
MAGNESIUM SERPL-MCNC: 1.8 MG/DL
MCHC RBC-ENTMCNC: 28.9 PG — SIGNIFICANT CHANGE UP (ref 27–34)
MCHC RBC-ENTMCNC: 32.7 GM/DL — SIGNIFICANT CHANGE UP (ref 32–36)
MCV RBC AUTO: 88.5 FL — SIGNIFICANT CHANGE UP (ref 80–100)
MONOCYTES # BLD AUTO: 0.74 K/UL — SIGNIFICANT CHANGE UP (ref 0–0.9)
MONOCYTES NFR BLD AUTO: 10.2 % — SIGNIFICANT CHANGE UP (ref 2–14)
NEUTROPHILS # BLD AUTO: 3.7 K/UL — SIGNIFICANT CHANGE UP (ref 1.8–7.4)
NEUTROPHILS NFR BLD AUTO: 50.8 % — SIGNIFICANT CHANGE UP (ref 43–77)
NITRITE UR-MCNC: NEGATIVE — SIGNIFICANT CHANGE UP
NRBC # BLD: 0 /100 WBCS — SIGNIFICANT CHANGE UP (ref 0–0)
OSMOLALITY UR: 252 MOSM/KG — LOW (ref 300–900)
PH UR: 7 — SIGNIFICANT CHANGE UP (ref 5–8)
PHOSPHATE SERPL-MCNC: 5.8 MG/DL
PLATELET # BLD AUTO: 262 K/UL — SIGNIFICANT CHANGE UP (ref 150–400)
POTASSIUM SERPL-MCNC: 5.7 MMOL/L — HIGH (ref 3.5–5.3)
POTASSIUM SERPL-SCNC: 5.7 MMOL/L — HIGH (ref 3.5–5.3)
POTASSIUM SERPL-SCNC: 6.2 MMOL/L
POTASSIUM SERPL-SCNC: 6.4 MMOL/L
POTASSIUM UR-SCNC: 23 MMOL/L — SIGNIFICANT CHANGE UP
PROT ?TM UR-MCNC: 48 MG/DL — HIGH (ref 0–12)
PROT UR-MCNC: 100 MG/DL
PROT/CREAT UR-RTO: 1 RATIO — HIGH (ref 0–0.2)
RBC # BLD: 4.08 M/UL — LOW (ref 4.2–5.8)
RBC # FLD: 12.7 % — SIGNIFICANT CHANGE UP (ref 10.3–14.5)
RBC CASTS # UR COMP ASSIST: < 5 /HPF — SIGNIFICANT CHANGE UP
SODIUM SERPL-SCNC: 135 MMOL/L — SIGNIFICANT CHANGE UP (ref 135–145)
SODIUM SERPL-SCNC: 137 MMOL/L
SODIUM UR-SCNC: 59 MMOL/L — SIGNIFICANT CHANGE UP
SP GR SPEC: 1.02 — SIGNIFICANT CHANGE UP (ref 1–1.03)
UROBILINOGEN FLD QL: 0.2 E.U./DL — SIGNIFICANT CHANGE UP
UUN UR-MCNC: 264 MG/DL — SIGNIFICANT CHANGE UP
WBC # BLD: 7.28 K/UL — SIGNIFICANT CHANGE UP (ref 3.8–10.5)
WBC # FLD AUTO: 7.28 K/UL — SIGNIFICANT CHANGE UP (ref 3.8–10.5)
WBC UR QL: < 5 /HPF — SIGNIFICANT CHANGE UP

## 2022-06-29 PROCEDURE — 36415 COLL VENOUS BLD VENIPUNCTURE: CPT

## 2022-06-29 PROCEDURE — 99284 EMERGENCY DEPT VISIT MOD MDM: CPT

## 2022-06-29 PROCEDURE — 81001 URINALYSIS AUTO W/SCOPE: CPT

## 2022-06-29 PROCEDURE — 84156 ASSAY OF PROTEIN URINE: CPT

## 2022-06-29 PROCEDURE — 93005 ELECTROCARDIOGRAM TRACING: CPT

## 2022-06-29 PROCEDURE — 85025 COMPLETE CBC W/AUTO DIFF WBC: CPT

## 2022-06-29 PROCEDURE — 82570 ASSAY OF URINE CREATININE: CPT

## 2022-06-29 PROCEDURE — 84300 ASSAY OF URINE SODIUM: CPT

## 2022-06-29 PROCEDURE — 80048 BASIC METABOLIC PNL TOTAL CA: CPT

## 2022-06-29 PROCEDURE — 84133 ASSAY OF URINE POTASSIUM: CPT

## 2022-06-29 PROCEDURE — 83935 ASSAY OF URINE OSMOLALITY: CPT

## 2022-06-29 PROCEDURE — 84540 ASSAY OF URINE/UREA-N: CPT

## 2022-06-29 RX ORDER — RAMIPRIL 10 MG/1
10 CAPSULE ORAL DAILY
Qty: 90 | Refills: 3 | Status: DISCONTINUED | COMMUNITY
Start: 2018-09-05 | End: 2022-06-29

## 2022-06-29 RX ORDER — SODIUM ZIRCONIUM CYCLOSILICATE 10 G/10G
10 POWDER, FOR SUSPENSION ORAL ONCE
Refills: 0 | Status: COMPLETED | OUTPATIENT
Start: 2022-06-29 | End: 2022-06-29

## 2022-06-29 RX ORDER — AMLODIPINE BESYLATE 5 MG/1
5 TABLET ORAL
Qty: 90 | Refills: 0 | Status: DISCONTINUED | COMMUNITY
Start: 2021-07-19 | End: 2022-06-29

## 2022-06-29 RX ORDER — SODIUM ZIRCONIUM CYCLOSILICATE 10 G/10G
10 POWDER, FOR SUSPENSION ORAL
Qty: 50 | Refills: 0
Start: 2022-06-29 | End: 2022-07-03

## 2022-06-29 RX ADMIN — SODIUM ZIRCONIUM CYCLOSILICATE 10 GRAM(S): 10 POWDER, FOR SUSPENSION ORAL at 21:18

## 2022-06-29 NOTE — ED PROVIDER NOTE - PATIENT PORTAL LINK FT
You can access the FollowMyHealth Patient Portal offered by [facility] by registering at the following website: [email or web address]. By joining [facility]’s FollowMyHealth portal, you will also be able to view your health information using other applications (apps) compatible with our system.

## 2022-06-29 NOTE — ED ADULT NURSE NOTE - OBJECTIVE STATEMENT
Pt AOX4. Pt c/o elevated BUN and creatinine outpatient. Hx of CHF, CKD. Pt has stent placed at [facility] 1 week ago. Pt denies lower back pain, abdominal pain, dysuria, hematuria, fevers, chills, n/v, SOB, chest pain. Pt speaking in full complete sentences. Respirations even and unlabored.

## 2022-06-29 NOTE — PHYSICAL EXAM
[General Appearance - Alert] : alert [General Appearance - In No Acute Distress] : in no acute distress [General Appearance - Well Nourished] : well nourished [Sclera] : the sclera and conjunctiva were normal [PERRL With Normal Accommodation] : pupils were equal in size, round, and reactive to light [Extraocular Movements] : extraocular movements were intact [Outer Ear] : the ears and nose were normal in appearance [Oropharynx] : the oropharynx was normal [Neck Appearance] : the appearance of the neck was normal [Jugular Venous Distention Increased] : there was no jugular-venous distention [Auscultation Breath Sounds / Voice Sounds] : lungs were clear to auscultation bilaterally [Heart Rate And Rhythm] : heart rate was normal and rhythm regular [Heart Sounds] : normal S1 and S2 [Heart Sounds Gallop] : no gallops [Murmurs] : no murmurs [Heart Sounds Pericardial Friction Rub] : no pericardial rub [Bowel Sounds] : normal bowel sounds [Abdomen Soft] : soft [Abdomen Tenderness] : non-tender [No CVA Tenderness] : no ~M costovertebral angle tenderness [No Spinal Tenderness] : no spinal tenderness [Abnormal Walk] : normal gait [Involuntary Movements] : no involuntary movements were seen [Musculoskeletal - Swelling] : no joint swelling seen [Skin Color & Pigmentation] : normal skin color and pigmentation [] : no rash [No Focal Deficits] : no focal deficits [Oriented To Time, Place, And Person] : oriented to person, place, and time [Impaired Insight] : insight and judgment were intact [Affect] : the affect was normal [FreeTextEntry1] : + ferozis

## 2022-06-29 NOTE — HISTORY OF PRESENT ILLNESS
[FreeTextEntry1] : 71yo with h/o acute hyponatremia related to chlorthalidone, HTN, BPH with urinary retention, long standing uncontrolled  DMII + retinopathy here for f/u of CKD IV/V\par \par Cardiac cath done 6/22, 2 RAMANA placed. Holding Ramipril. Amlodipine at 10mg now. Hasn't checked BP since getting home. No LE edema, SOB, dizziness and chest pain. \par Intermittent hand shaking that comes and goes, worse today\par \par OTC: vitamin B12\par \par Social: born in [place]. No tobacco/drug use\par \par All other ROS negative

## 2022-06-29 NOTE — ASSESSMENT
[FreeTextEntry1] : 71yo with h/o acute hyponatremia related to chlorthalidone, HTN, BPH with urinary retention, long standing uncontrolled  DMII + retinopathy here for f/u of CKD IV/V\par \par # CKD IV/V w hyperkalemia, mild chronic hyponatremia and non nephrotic proteinuria -  likely 2/2 diabetic nephropathy\par - reviewed inpatient labs/documents and cath reports. Cr up to mid 4's eGFR\par  April labs, baseline Cr stable 3.75, cystatin C based eGFR 17, hypocalcemic \par - anemia mild and stable,Fe def and 2/2 renal disease\par - Nabicarbonate 650mg BID normalized his metabolic acidosis \par - DMII + retinopathy now better controlled, on lantus and tradjenta. \par -HTN uncontrolled\par - Secondary hyperparathyroidism - on vitamin D daily well controlled\par - [facility], has a matched donor who is working on their blood pressure control, he's s/p cardiac cath for transplant eval.  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 \par - Renal sono, 10.5/10.7cm normal looking kidneys, mildly enlarged prostate with 170cc PVR\par \par # Diastolic ventricular dysfunction and recent anasarca\par - likely related to renal function and Na intake, much improved now on 40mg torsemide daily\par \par Plan\par - concern for uremia given recent contrast exposure and now asterixis on exam, check stat labs\par - d/w pt he will likely need dialysis initiation prior to transplant which he was hoping to avoid, does not want access placed at this time.\par \par \par

## 2022-06-29 NOTE — CONSULT NOTE ADULT - SUBJECTIVE AND OBJECTIVE BOX
HPI:  72M with pmhx of CKD Stage V, HTN, HLD, BPH and CHF who presented to the ED after having found to have an PRICILA on CKD as well as Hyperkalemia on outpatient labs. Per patient had a cardiac cath performed last week with intervention, since procedure has been feeling ok without any acute complaints. He came in for a routine check up yesterday and was notified that his labs were abnormal and he should come to the emergency room. He denies any SOB, chest pain, palpitations, loss of appetite, weight loss or metallic taste in his mouth.    PAST MEDICAL & SURGICAL HISTORY:  Diabetes    Hypertension    Hyperlipidemia    CKD (chronic kidney disease), stage IV    BPH (benign prostatic hyperplasia)    Congestive heart failure (CHF)    CAD (coronary artery disease)    No significant past surgical history    Allergies:  chlorthalidone (Other)    Hospital Medications:   MEDICATIONS  (STANDING):      SOCIAL HISTORY:  Denies ETOh, Smoking    Family History:  FAMILY HISTORY:  No pertinent family history in first degree relatives    VITALS:  T(F): 97.6 (22 @ 15:07), Max: 97.6 (22 @ 15:07)  HR: 70 (22 @ 15:07)  BP: 143/70 (22 @ 15:07)  RR: 18 (22 @ 15:07)  SpO2: 99% (22 @ 15:07)  Wt(kg): --    Height (cm): 170.2 ( @ 15:07)  Weight (kg): 72.6 ( @ 15:07)  BMI (kg/m2): 25.1 ( @ 15:07)  BSA (m2): 1.84 ( @ 15:07)  CAPILLARY BLOOD GLUCOSE    Review of Systems:  ROS negative except as per HPI    PHYSICAL EXAM:  GENERAL: Alert, awake, oriented x3   HEENT: KAMILAH, EOMI, neck supple, no JVP  CHEST/LUNG: Bilateral clear breath sounds  HEART: Regular rate and rhythm, no murmur, no gallops, no rub   ABDOMEN: Soft, nontender, non distended  EXTREMITIES: 1+ pitting edema b/l lower extremities  Neurology: AAOx3, no focal neurological deficit  SKIN: Resolving erythematous rash on Left arm and b/l inner thighs    LABS:    Creatinine Trend: 4.42 <--    Urine Studies:  Urinalysis Basic - ( 2022 07:58 )    Color: Yellow / Appearance: Clear / S.010 / pH:   Gluc:  / Ketone: NEGATIVE  / Bili: Negative / Urobili: 0.2 E.U./dL   Blood:  / Protein: 30 mg/dL / Nitrite: NEGATIVE   Leuk Esterase: NEGATIVE / RBC: < 5 /HPF / WBC < 5 /HPF   Sq Epi:  / Non Sq Epi: 0-5 /HPF / Bacteria: Present /HPF      Sodium, Random Urine: 107 mmol/L ( @ 07:58)  Creatinine, Random Urine: 37 mg/dL ( @ 07:58)  Protein/Creatinine Ratio Calculation: 0.1 Ratio ( @ 07:58)  Osmolality, Random Urine: 320 mosm/kg ( @ 07:58)  Potassium, Random Urine: 18 mmol/L ( @ 07:58)

## 2022-06-29 NOTE — CONSULT NOTE ADULT - ASSESSMENT
72M with pmhx of CKD Stage V, HTN, HLD, BPH and CHF who presented to the ED after having found to have an PRICILA on CKD as well as Hyperkalemia on outpatient labs.     Assessment/Plan:   #PRICILA on CKD Stage V  #Hyperkalemia  Creatinine on outpatient labs 6.51 from baseline of 3.7-4  PT presenting to the ER today after having outpatient labs on 6/28. Labs significant for PRICILA on CKD. Patient was here last week and underwent cardiac cath. He is endorsing having noticed a rash which raises suspicion for possible cholesterol emboli phenomenon after procedure. He has not endorsed any poor po intake to suggest an ischemic ATN like picture or have episodes of hypotension that we are aware of. His home medications include Torsemide 20mg BID which he endorses taking. Will need further w/u to rule out other etiologies. Discussed with patient at length will do our best to manage medically, but there is a possibility he may progress towards dialysis  -Obtain BMP  -Once BMP drawn can give a dose of Lokelma 10g  -If K comes back elevated; can treat sodium bicarbonate gtt, insulin and d50 and inform on call nephrology in regards to potassium  -Obtain urine studies (Ua, urine sodium, urine osm, urine creatinine, urea nitrogen)  -Strict I/O's  -Renal Diet  -Avoid Nephroxic agents    Thank you for the opportunity to participate in the care of your patient. The nephrology service remains available to assist with any questions or concerns. Please feel free to reach us by paging the on-call nephrology fellow for urgent issues or as below.     Sourav Adame D.O.  PGY 4 - Nephrology Fellow  [phone]

## 2022-06-29 NOTE — ED PROVIDER NOTE - OBJECTIVE STATEMENT
72yoM w/ DM, HTN, HLD, CHF, CKD stage 4 (is on transplant list, follows w/ Dr Vasquez), had stent placed last week for CAD  sent in from Dr Vasquez office for labs and urine studies due to concern of posisble worsen kidney function  pt states he feels good, has been mildly itchy but other than that no complaints no chest pain or SOB. No difficulty urinating

## 2022-06-29 NOTE — CONSULT NOTE ADULT - ATTENDING COMMENTS
I agree with the fellow's findings and plans as written above with the following additions/amendments:    Seen and examined at bedside. Potassium elevated, would treat with lokelma, lasix, and sodium bicarbonate and repeat to ensure downtrending, and if so can d/c on lokelma. Monitoring closely, please call with further results. Further recs as above

## 2022-06-30 ENCOUNTER — APPOINTMENT (OUTPATIENT)
Dept: INTERNAL MEDICINE | Facility: CLINIC | Age: 72
End: 2022-06-30

## 2022-06-30 DIAGNOSIS — L29.9 PRURITUS, UNSPECIFIED: ICD-10-CM

## 2022-06-30 PROCEDURE — 99214 OFFICE O/P EST MOD 30 MIN: CPT | Mod: 95

## 2022-07-06 ENCOUNTER — NON-APPOINTMENT (OUTPATIENT)
Age: 72
End: 2022-07-06

## 2022-07-08 ENCOUNTER — APPOINTMENT (OUTPATIENT)
Dept: HEART AND VASCULAR | Facility: CLINIC | Age: 72
End: 2022-07-08

## 2022-07-08 ENCOUNTER — NON-APPOINTMENT (OUTPATIENT)
Age: 72
End: 2022-07-08

## 2022-07-08 VITALS
DIASTOLIC BLOOD PRESSURE: 65 MMHG | BODY MASS INDEX: 24.64 KG/M2 | HEIGHT: 67 IN | HEART RATE: 75 BPM | WEIGHT: 157 LBS | TEMPERATURE: 97.1 F | SYSTOLIC BLOOD PRESSURE: 125 MMHG | OXYGEN SATURATION: 98 %

## 2022-07-08 DIAGNOSIS — Z92.89 PERSONAL HISTORY OF OTHER MEDICAL TREATMENT: ICD-10-CM

## 2022-07-08 PROCEDURE — 93000 ELECTROCARDIOGRAM COMPLETE: CPT

## 2022-07-08 PROCEDURE — 99214 OFFICE O/P EST MOD 30 MIN: CPT | Mod: 25

## 2022-07-11 LAB
24R-OH-CALCIDIOL SERPL-MCNC: 38.2 PG/ML
25(OH)D3 SERPL-MCNC: 42.5 NG/ML
ALBUMIN SERPL ELPH-MCNC: 4.2 G/DL
ANION GAP SERPL CALC-SCNC: 18 MMOL/L
APPEARANCE: CLEAR
BACTERIA: NEGATIVE
BASOPHILS # BLD AUTO: 0.05 K/UL
BASOPHILS NFR BLD AUTO: 0.7 %
BILIRUBIN URINE: NEGATIVE
BLOOD URINE: NEGATIVE
BUN SERPL-MCNC: 50 MG/DL
CALCIUM SERPL-MCNC: 7.9 MG/DL
CALCIUM SERPL-MCNC: 7.9 MG/DL
CHLORIDE SERPL-SCNC: 98 MMOL/L
CO2 SERPL-SCNC: 22 MMOL/L
COLOR: NORMAL
CREAT SERPL-MCNC: 5.02 MG/DL
CREAT SPEC-SCNC: 72 MG/DL
CREAT SPEC-SCNC: 72 MG/DL
CREAT/PROT UR: 0.8 RATIO
CYSTATIN C SERPL-MCNC: 3.42 MG/L
EGFR: 11 ML/MIN/1.73M2
EOSINOPHIL # BLD AUTO: 0.46 K/UL
EOSINOPHIL NFR BLD AUTO: 6 %
ESTIMATED AVERAGE GLUCOSE: 146 MG/DL
FERRITIN SERPL-MCNC: 132 NG/ML
GFR/BSA.PRED SERPLBLD CYS-BASED-ARV: 14 ML/MIN/1.73M2
GLUCOSE QUALITATIVE U: NEGATIVE
GLUCOSE SERPL-MCNC: 212 MG/DL
HBA1C MFR BLD HPLC: 6.7 %
HCT VFR BLD CALC: 34.8 %
HGB BLD-MCNC: 11.3 G/DL
HYALINE CASTS: 0 /LPF
IMM GRANULOCYTES NFR BLD AUTO: 0.4 %
IRON SATN MFR SERPL: 25 %
IRON SERPL-MCNC: 66 UG/DL
KETONES URINE: NEGATIVE
LEUKOCYTE ESTERASE URINE: NEGATIVE
LYMPHOCYTES # BLD AUTO: 2.73 K/UL
LYMPHOCYTES NFR BLD AUTO: 35.5 %
MAGNESIUM SERPL-MCNC: 1.5 MG/DL
MAN DIFF?: NORMAL
MCHC RBC-ENTMCNC: 29 PG
MCHC RBC-ENTMCNC: 32.5 GM/DL
MCV RBC AUTO: 89.2 FL
MICROALBUMIN 24H UR DL<=1MG/L-MCNC: 23.6 MG/DL
MICROALBUMIN/CREAT 24H UR-RTO: 327 MG/G
MICROSCOPIC-UA: NORMAL
MONOCYTES # BLD AUTO: 0.66 K/UL
MONOCYTES NFR BLD AUTO: 8.6 %
NEUTROPHILS # BLD AUTO: 3.76 K/UL
NEUTROPHILS NFR BLD AUTO: 48.8 %
NITRITE URINE: NEGATIVE
PARATHYROID HORMONE INTACT: 178 PG/ML
PH URINE: 6
PHOSPHATE SERPL-MCNC: 5.5 MG/DL
PLATELET # BLD AUTO: 320 K/UL
POTASSIUM SERPL-SCNC: 4.2 MMOL/L
PROT UR-MCNC: 54 MG/DL
PROTEIN URINE: ABNORMAL
RBC # BLD: 3.9 M/UL
RBC # FLD: 12.9 %
RED BLOOD CELLS URINE: 2 /HPF
SODIUM SERPL-SCNC: 137 MMOL/L
SPECIFIC GRAVITY URINE: 1.01
SQUAMOUS EPITHELIAL CELLS: 0 /HPF
TIBC SERPL-MCNC: 260 UG/DL
UIBC SERPL-MCNC: 194 UG/DL
UROBILINOGEN URINE: NORMAL
WBC # FLD AUTO: 7.69 K/UL
WHITE BLOOD CELLS URINE: 2 /HPF

## 2022-07-27 NOTE — PATIENT PROFILE ADULT - FUNCTIONAL ASSESSMENT - BASIC MOBILITY SCORE.
Physical Therapy  Visit Type: treatment  Precautions:  Medical precautions:  fall risk; standard precautions.  The patient is a 82 year old male admitted on 7/25/2022.  Pt admitted with diagnosis of Primary osteoarthritis of right hip (M16.11)  Status post hip replacement, right (Z96.641)     Prior to admission pt lives with his wife in a house with 2 MICHEL.  At baseline pt uses cane/walker for ambulation.          Lines:     Basic: IV      Lines in chart and on patient reviewed, precautions maintained throughout session.  Lower Extremity:    Right:  weight bearing: as tolerated.  hip - direct anterior precautions  Safety Measures: chair alarm    SUBJECTIVE  Patient agreed to participate in therapy this date.  Pt reported feeling better today.  Patient / Family Goal: maximize function, return to previous functional status and return home    Pain     At onset of session (out of 10): 1  RN informed on pain level     OBJECTIVE   Pulse Ox: 96  Blood Pressure: 109/58  Position: sitting  Heart Rate: 81    152/80 standing  137/78 end of session  Oriented to person, place, time and situation     Arousal alertness: appropriate responses to stimuli    Affect/Behavior: alert, calm, cooperative and appropriate  Functional Communication/Cognition    Overall status:  Within functional limits    Form of communication:  Verbal   Attention span:  Appears intact    Commands: follows all commands and directions consistently.    Transition between tasks: transitions tasks without difficulty.  Strength (out of 5 unless otherwise indicated)   WFL: LLE, except as noted  Hip:  Hip Flexion: Right: 4-  Knee:   - Extension:      • Right: 4-  Ankle:    - Dorsiflexion:      • Right: 4+  Balance (trials measured in sec unless otherwise indicted)    Sitting: Static: supervision back unsupported and double lower extremity support    Standing - Firm Surface - Eyes Open: Static: stand by assist double upper extremity support  Dynamic: stand by assist  18

## 2022-08-01 ENCOUNTER — RX RENEWAL (OUTPATIENT)
Age: 72
End: 2022-08-01

## 2022-08-01 NOTE — PATIENT PROFILE ADULT - PSYCHOSOCIAL CONCERNS
Noted, entered, nursing let him know, try to arrange with nursing to complete
Patient's mother called and scheduled for patient to come in on 8/3/2022.
Please advise - called patient who needs TB Quantiferon and Tdap -once entered  please notify patient afterwards to schedule nurse visit for immunization - to DR. ALEXANDRE  ( patient will got to [facility])
Please call patient to schedule nurse visit for Tdap and let him know blood test is also ordered thanks- to 
Pt needs TB Quantiferon gold/T spot test   and Tdap vaccine for clinical rotation     Proof of immunization required by Aug 18    Requests orders for tests/vaccine    Please call to confirm so pt can schedule [phone]
none

## 2022-08-04 ENCOUNTER — RX RENEWAL (OUTPATIENT)
Age: 72
End: 2022-08-04

## 2022-08-04 PROBLEM — Z92.89 HOSPITALIZATION WITHIN LAST 30 DAYS: Status: ACTIVE | Noted: 2022-03-04

## 2022-08-16 LAB
24R-OH-CALCIDIOL SERPL-MCNC: 26.4 PG/ML
25(OH)D3 SERPL-MCNC: 42.8 NG/ML
ALBUMIN SERPL ELPH-MCNC: 4.3 G/DL
ANION GAP SERPL CALC-SCNC: 15 MMOL/L
APPEARANCE: CLEAR
BACTERIA: NEGATIVE
BASOPHILS # BLD AUTO: 0.05 K/UL
BASOPHILS NFR BLD AUTO: 0.6 %
BILIRUBIN URINE: NEGATIVE
BLOOD URINE: NEGATIVE
BUN SERPL-MCNC: 34 MG/DL
CALCIUM SERPL-MCNC: 9 MG/DL
CALCIUM SERPL-MCNC: 9 MG/DL
CHLORIDE SERPL-SCNC: 94 MMOL/L
CO2 SERPL-SCNC: 23 MMOL/L
COLOR: NORMAL
CREAT SERPL-MCNC: 4.3 MG/DL
CREAT SPEC-SCNC: 107 MG/DL
CREAT SPEC-SCNC: 107 MG/DL
CREAT/PROT UR: 0.9 RATIO
CYSTATIN C SERPL-MCNC: 3.7 MG/L
EGFR: 14 ML/MIN/1.73M2
EOSINOPHIL # BLD AUTO: 0.55 K/UL
EOSINOPHIL NFR BLD AUTO: 6.7 %
ESTIMATED AVERAGE GLUCOSE: 154 MG/DL
FERRITIN SERPL-MCNC: 143 NG/ML
GFR/BSA.PRED SERPLBLD CYS-BASED-ARV: 13 ML/MIN/1.73M2
GLUCOSE QUALITATIVE U: NEGATIVE
GLUCOSE SERPL-MCNC: 227 MG/DL
GRANULAR CASTS: 3 /LPF
HBA1C MFR BLD HPLC: 7 %
HCT VFR BLD CALC: 34.9 %
HGB BLD-MCNC: 11.6 G/DL
HYALINE CASTS: 2 /LPF
IMM GRANULOCYTES NFR BLD AUTO: 0.4 %
IRON SATN MFR SERPL: 23 %
IRON SERPL-MCNC: 62 UG/DL
KETONES URINE: NEGATIVE
LEUKOCYTE ESTERASE URINE: ABNORMAL
LYMPHOCYTES # BLD AUTO: 3.5 K/UL
LYMPHOCYTES NFR BLD AUTO: 42.9 %
MAGNESIUM SERPL-MCNC: 2.1 MG/DL
MAN DIFF?: NORMAL
MCHC RBC-ENTMCNC: 30.1 PG
MCHC RBC-ENTMCNC: 33.2 GM/DL
MCV RBC AUTO: 90.4 FL
MICROALBUMIN 24H UR DL<=1MG/L-MCNC: 42.3 MG/DL
MICROALBUMIN/CREAT 24H UR-RTO: 396 MG/G
MICROSCOPIC-UA: NORMAL
MONOCYTES # BLD AUTO: 0.74 K/UL
MONOCYTES NFR BLD AUTO: 9.1 %
NEUTROPHILS # BLD AUTO: 3.29 K/UL
NEUTROPHILS NFR BLD AUTO: 40.3 %
NITRITE URINE: NEGATIVE
PARATHYROID HORMONE INTACT: 221 PG/ML
PH URINE: 6
PHOSPHATE SERPL-MCNC: 4.9 MG/DL
PLATELET # BLD AUTO: 266 K/UL
POTASSIUM SERPL-SCNC: 4.8 MMOL/L
PROT UR-MCNC: 91 MG/DL
PROTEIN URINE: ABNORMAL
RBC # BLD: 3.86 M/UL
RBC # FLD: 12.8 %
RED BLOOD CELLS URINE: 2 /HPF
SODIUM SERPL-SCNC: 132 MMOL/L
SPECIFIC GRAVITY URINE: 1.01
SQUAMOUS EPITHELIAL CELLS: 1 /HPF
TIBC SERPL-MCNC: 264 UG/DL
UIBC SERPL-MCNC: 202 UG/DL
UROBILINOGEN URINE: NORMAL
WBC # FLD AUTO: 8.16 K/UL
WHITE BLOOD CELLS URINE: 20 /HPF

## 2022-08-19 ENCOUNTER — APPOINTMENT (OUTPATIENT)
Dept: NEPHROLOGY | Facility: CLINIC | Age: 72
End: 2022-08-19

## 2022-08-19 VITALS — DIASTOLIC BLOOD PRESSURE: 64 MMHG | SYSTOLIC BLOOD PRESSURE: 123 MMHG | HEART RATE: 72 BPM

## 2022-08-19 DIAGNOSIS — E83.51 HYPOCALCEMIA: ICD-10-CM

## 2022-08-19 DIAGNOSIS — E87.2 ACIDOSIS: ICD-10-CM

## 2022-08-19 PROCEDURE — 99214 OFFICE O/P EST MOD 30 MIN: CPT

## 2022-08-22 NOTE — ASSESSMENT
[FreeTextEntry1] : 71yo with h/o acute hyponatremia related to chlorthalidone, HTN, BPH with urinary retention, long standing uncontrolled  DMII + retinopathy here for f/u of CKD IV/V\par \par # CKD IV/V w hyperkalemia, mild chronic hyponatremia and non nephrotic proteinuria -  likely 2/2 diabetic nephropathy\par - reviewed Aug labs, Cr has almost returned to baseline s/p cardiac cath, no uremic sx\par - anemia mild and stable,Fe def and 2/2 renal disease\par - Nabicarbonate 650mg BID normalized his metabolic acidosis \par - DMII + retinopathy now better controlled, on lantus and tradjenta. 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 \par - Renal sono, 10.5/10.7cm normal looking kidneys, mildly enlarged prostate with 170cc PVR\par \par # Diastolic ventricular dysfunction and recent anasarca\par - likely related to renal function and Na intake, much improved now on 40mg torsemide daily -> reduce to 20mg daily\par \par \par

## 2022-08-22 NOTE — HISTORY OF PRESENT ILLNESS
[FreeTextEntry1] : 73yo with h/o acute hyponatremia related to chlorthalidone, HTN, BPH with urinary retention, long standing uncontrolled  DMII + retinopathy here for f/u of CKD IV/V\par \par Cardiac cath done 6/22, 2 RAMANA placed. \par Constipated\par Weight stable\par \par OTC: vitamin B12\par \par Social: born in [place]. No tobacco/drug use\par \par All other ROS negative

## 2022-08-29 RX ORDER — METOPROLOL SUCCINATE 25 MG/1
25 TABLET, EXTENDED RELEASE ORAL
Qty: 90 | Refills: 1 | Status: ACTIVE | COMMUNITY
Start: 2022-06-03 | End: 1900-01-01

## 2022-09-05 ENCOUNTER — RX RENEWAL (OUTPATIENT)
Age: 72
End: 2022-09-05

## 2022-09-20 ENCOUNTER — LABORATORY RESULT (OUTPATIENT)
Age: 72
End: 2022-09-20

## 2022-09-20 ENCOUNTER — APPOINTMENT (OUTPATIENT)
Dept: INTERNAL MEDICINE | Facility: CLINIC | Age: 72
End: 2022-09-20

## 2022-09-20 VITALS
HEIGHT: 67 IN | TEMPERATURE: 98 F | DIASTOLIC BLOOD PRESSURE: 69 MMHG | HEART RATE: 76 BPM | OXYGEN SATURATION: 98 % | SYSTOLIC BLOOD PRESSURE: 136 MMHG | BODY MASS INDEX: 24.64 KG/M2 | WEIGHT: 157 LBS

## 2022-09-20 DIAGNOSIS — J34.9 UNSPECIFIED DISORDER OF NOSE AND NASAL SINUSES: ICD-10-CM

## 2022-09-20 PROCEDURE — 99214 OFFICE O/P EST MOD 30 MIN: CPT | Mod: 25

## 2022-09-20 PROCEDURE — 36415 COLL VENOUS BLD VENIPUNCTURE: CPT

## 2022-09-20 RX ORDER — AMLODIPINE BESYLATE 10 MG/1
10 TABLET ORAL DAILY
Qty: 90 | Refills: 0 | Status: ACTIVE | COMMUNITY
Start: 2019-03-12 | End: 1900-01-01

## 2022-09-21 ENCOUNTER — NON-APPOINTMENT (OUTPATIENT)
Age: 72
End: 2022-09-21

## 2022-09-21 LAB
CHOLEST SERPL-MCNC: 126 MG/DL
HDLC SERPL-MCNC: 34 MG/DL
LDLC SERPL CALC-MCNC: 57 MG/DL
NONHDLC SERPL-MCNC: 93 MG/DL
TRIGL SERPL-MCNC: 175 MG/DL
TSH SERPL-ACNC: 5.77 UIU/ML

## 2022-10-04 ENCOUNTER — RX RENEWAL (OUTPATIENT)
Age: 72
End: 2022-10-04

## 2022-10-11 ENCOUNTER — TRANSCRIPTION ENCOUNTER (OUTPATIENT)
Age: 72
End: 2022-10-11

## 2022-10-11 ENCOUNTER — APPOINTMENT (OUTPATIENT)
Dept: NEPHROLOGY | Facility: CLINIC | Age: 72
End: 2022-10-11

## 2022-10-11 LAB
24R-OH-CALCIDIOL SERPL-MCNC: 37.7 PG/ML
25(OH)D3 SERPL-MCNC: 36.6 NG/ML
ALBUMIN SERPL ELPH-MCNC: 4.6 G/DL
ANION GAP SERPL CALC-SCNC: 15 MMOL/L
APPEARANCE: CLEAR
BACTERIA: NEGATIVE
BASOPHILS # BLD AUTO: 0.04 K/UL
BASOPHILS NFR BLD AUTO: 0.5 %
BILIRUBIN URINE: NEGATIVE
BLOOD URINE: NEGATIVE
BUN SERPL-MCNC: 32 MG/DL
CALCIUM SERPL-MCNC: 8.9 MG/DL
CALCIUM SERPL-MCNC: 8.9 MG/DL
CHLORIDE SERPL-SCNC: 96 MMOL/L
CO2 SERPL-SCNC: 23 MMOL/L
COLOR: NORMAL
CREAT SERPL-MCNC: 4.1 MG/DL
CREAT SPEC-SCNC: 80 MG/DL
CREAT SPEC-SCNC: 80 MG/DL
CREAT/PROT UR: 1.6 RATIO
CYSTATIN C SERPL-MCNC: 3.54 MG/L
EGFR: 15 ML/MIN/1.73M2
EOSINOPHIL # BLD AUTO: 0.51 K/UL
EOSINOPHIL NFR BLD AUTO: 6.4 %
ESTIMATED AVERAGE GLUCOSE: 163 MG/DL
FERRITIN SERPL-MCNC: 146 NG/ML
GFR/BSA.PRED SERPLBLD CYS-BASED-ARV: 14 ML/MIN/1.73M2
GLUCOSE QUALITATIVE U: NEGATIVE
GLUCOSE SERPL-MCNC: 221 MG/DL
HBA1C MFR BLD HPLC: 7.3 %
HCT VFR BLD CALC: 35.4 %
HGB BLD-MCNC: 11.8 G/DL
HYALINE CASTS: 1 /LPF
IMM GRANULOCYTES NFR BLD AUTO: 0.5 %
IRON SATN MFR SERPL: 20 %
IRON SERPL-MCNC: 63 UG/DL
KETONES URINE: NEGATIVE
LEUKOCYTE ESTERASE URINE: ABNORMAL
LYMPHOCYTES # BLD AUTO: 3.16 K/UL
LYMPHOCYTES NFR BLD AUTO: 39.5 %
MAGNESIUM SERPL-MCNC: 2 MG/DL
MAN DIFF?: NORMAL
MCHC RBC-ENTMCNC: 30 PG
MCHC RBC-ENTMCNC: 33.3 GM/DL
MCV RBC AUTO: 90.1 FL
MICROALBUMIN 24H UR DL<=1MG/L-MCNC: 62.4 MG/DL
MICROALBUMIN/CREAT 24H UR-RTO: 782 MG/G
MICROSCOPIC-UA: NORMAL
MONOCYTES # BLD AUTO: 0.65 K/UL
MONOCYTES NFR BLD AUTO: 8.1 %
NEUTROPHILS # BLD AUTO: 3.61 K/UL
NEUTROPHILS NFR BLD AUTO: 45 %
NITRITE URINE: NEGATIVE
PARATHYROID HORMONE INTACT: 209 PG/ML
PH URINE: 6
PHOSPHATE SERPL-MCNC: 4.8 MG/DL
PLATELET # BLD AUTO: 251 K/UL
POTASSIUM SERPL-SCNC: 4.6 MMOL/L
PROT UR-MCNC: 124 MG/DL
PROTEIN URINE: ABNORMAL
RBC # BLD: 3.93 M/UL
RBC # FLD: 12.8 %
RED BLOOD CELLS URINE: 2 /HPF
SODIUM SERPL-SCNC: 135 MMOL/L
SPECIFIC GRAVITY URINE: 1.02
SQUAMOUS EPITHELIAL CELLS: 1 /HPF
TIBC SERPL-MCNC: 317 UG/DL
UIBC SERPL-MCNC: 254 UG/DL
UROBILINOGEN URINE: NORMAL
WBC # FLD AUTO: 8.01 K/UL
WHITE BLOOD CELLS URINE: 6 /HPF

## 2022-10-11 PROCEDURE — 99214 OFFICE O/P EST MOD 30 MIN: CPT

## 2022-10-11 RX ORDER — ASPIRIN ENTERIC COATED TABLETS 81 MG 81 MG/1
81 TABLET, DELAYED RELEASE ORAL
Qty: 90 | Refills: 3 | Status: ACTIVE | COMMUNITY
Start: 2021-03-04 | End: 1900-01-01

## 2022-10-14 ENCOUNTER — APPOINTMENT (OUTPATIENT)
Dept: HEART AND VASCULAR | Facility: CLINIC | Age: 72
End: 2022-10-14

## 2022-10-14 ENCOUNTER — NON-APPOINTMENT (OUTPATIENT)
Age: 72
End: 2022-10-14

## 2022-10-14 VITALS
OXYGEN SATURATION: 98 % | SYSTOLIC BLOOD PRESSURE: 132 MMHG | DIASTOLIC BLOOD PRESSURE: 68 MMHG | BODY MASS INDEX: 25.74 KG/M2 | WEIGHT: 164 LBS | TEMPERATURE: 98.3 F | HEIGHT: 67 IN | HEART RATE: 80 BPM

## 2022-10-14 DIAGNOSIS — Z01.810 ENCOUNTER FOR PREPROCEDURAL CARDIOVASCULAR EXAMINATION: ICD-10-CM

## 2022-10-14 PROCEDURE — 99214 OFFICE O/P EST MOD 30 MIN: CPT | Mod: 25

## 2022-10-14 PROCEDURE — 93000 ELECTROCARDIOGRAM COMPLETE: CPT

## 2022-10-25 ENCOUNTER — RX RENEWAL (OUTPATIENT)
Age: 72
End: 2022-10-25

## 2022-10-28 NOTE — HISTORY OF PRESENT ILLNESS
[FreeTextEntry1] : 73yo with h/o acute hyponatremia related to chlorthalidone, HTN, BPH with urinary retention, long standing uncontrolled  DMII + retinopathy here for f/u of CKD IV/V\par \par Cardiac cath done 6/22, 2 RAMANA placed. Per pt can come off plavix in Dec, he is hoping for kidney tx in January but needs to f/u with [facility], donor not approved yet officially\par Weight stable\par No uremic sx\par No edema\par \par OTC: vitamin B12\par \par Social: born in [place]. No tobacco/drug use\par \par All other ROS negative

## 2022-10-28 NOTE — ASSESSMENT
[FreeTextEntry1] : 71yo with h/o acute hyponatremia related to chlorthalidone, HTN, BPH with urinary retention, long standing uncontrolled  DMII + retinopathy here for f/u of CKD IV/V\par \par # CKD IV/V w hyperkalemia, mild chronic hyponatremia and non nephrotic proteinuria -  likely 2/2 diabetic nephropathy\par - reviewed recent labs, renal fx remains stable, acceptable electrolytes and volume status, no uremic sx\par - anemia mild and stable,Fe def and 2/2 renal disease\par - Nabicarbonate normalized his metabolic acidosis \par - DMII + retinopathy now better controlled, on lantus and tradjenta. 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 \par - Renal sono, 10.5/10.7cm normal looking kidneys, mildly enlarged prostate with 170cc PVR\par \par \par \par

## 2022-10-28 NOTE — PHYSICAL EXAM
[General Appearance - Alert] : alert [General Appearance - In No Acute Distress] : in no acute distress [General Appearance - Well Nourished] : well nourished [Sclera] : the sclera and conjunctiva were normal [PERRL With Normal Accommodation] : pupils were equal in size, round, and reactive to light [Extraocular Movements] : extraocular movements were intact [Outer Ear] : the ears and nose were normal in appearance [Oropharynx] : the oropharynx was normal [Neck Appearance] : the appearance of the neck was normal [Jugular Venous Distention Increased] : there was no jugular-venous distention [Auscultation Breath Sounds / Voice Sounds] : lungs were clear to auscultation bilaterally [Heart Rate And Rhythm] : heart rate was normal and rhythm regular [Heart Sounds] : normal S1 and S2 [Heart Sounds Gallop] : no gallops [Murmurs] : no murmurs [Heart Sounds Pericardial Friction Rub] : no pericardial rub [Bowel Sounds] : normal bowel sounds [Abdomen Soft] : soft [Abdomen Tenderness] : non-tender [No CVA Tenderness] : no ~M costovertebral angle tenderness [No Spinal Tenderness] : no spinal tenderness [Abnormal Walk] : normal gait [Involuntary Movements] : no involuntary movements were seen [Musculoskeletal - Swelling] : no joint swelling seen [Skin Color & Pigmentation] : normal skin color and pigmentation [] : no rash [No Focal Deficits] : no focal deficits [FreeTextEntry1] : + ferozis [Oriented To Time, Place, And Person] : oriented to person, place, and time [Impaired Insight] : insight and judgment were intact [Affect] : the affect was normal

## 2022-11-05 ENCOUNTER — RX RENEWAL (OUTPATIENT)
Age: 72
End: 2022-11-05

## 2022-11-28 PROBLEM — Z01.810 PREOPERATIVE CARDIOVASCULAR EXAMINATION: Status: ACTIVE | Noted: 2022-11-28

## 2022-12-07 ENCOUNTER — RX RENEWAL (OUTPATIENT)
Age: 72
End: 2022-12-07

## 2022-12-13 ENCOUNTER — RX RENEWAL (OUTPATIENT)
Age: 72
End: 2022-12-13

## 2022-12-16 ENCOUNTER — RX RENEWAL (OUTPATIENT)
Age: 72
End: 2022-12-16

## 2022-12-20 ENCOUNTER — APPOINTMENT (OUTPATIENT)
Dept: INTERNAL MEDICINE | Facility: CLINIC | Age: 72
End: 2022-12-20

## 2022-12-20 VITALS
DIASTOLIC BLOOD PRESSURE: 72 MMHG | SYSTOLIC BLOOD PRESSURE: 158 MMHG | BODY MASS INDEX: 25.74 KG/M2 | OXYGEN SATURATION: 98 % | TEMPERATURE: 97.3 F | HEART RATE: 87 BPM | WEIGHT: 164 LBS | HEIGHT: 67 IN

## 2022-12-20 VITALS — SYSTOLIC BLOOD PRESSURE: 185 MMHG | DIASTOLIC BLOOD PRESSURE: 87 MMHG

## 2022-12-20 VITALS — SYSTOLIC BLOOD PRESSURE: 170 MMHG | DIASTOLIC BLOOD PRESSURE: 79 MMHG

## 2022-12-20 DIAGNOSIS — J32.9 CHRONIC SINUSITIS, UNSPECIFIED: ICD-10-CM

## 2022-12-20 PROCEDURE — 99214 OFFICE O/P EST MOD 30 MIN: CPT

## 2022-12-22 ENCOUNTER — RX RENEWAL (OUTPATIENT)
Age: 72
End: 2022-12-22

## 2022-12-27 ENCOUNTER — NON-APPOINTMENT (OUTPATIENT)
Age: 72
End: 2022-12-27

## 2022-12-30 ENCOUNTER — APPOINTMENT (OUTPATIENT)
Dept: HEART AND VASCULAR | Facility: CLINIC | Age: 72
End: 2022-12-30
Payer: MEDICARE

## 2022-12-30 ENCOUNTER — NON-APPOINTMENT (OUTPATIENT)
Age: 72
End: 2022-12-30

## 2022-12-30 VITALS
TEMPERATURE: 98.9 F | OXYGEN SATURATION: 98 % | DIASTOLIC BLOOD PRESSURE: 78 MMHG | BODY MASS INDEX: 24.48 KG/M2 | SYSTOLIC BLOOD PRESSURE: 158 MMHG | HEART RATE: 79 BPM | WEIGHT: 156 LBS | HEIGHT: 67 IN

## 2022-12-30 DIAGNOSIS — R06.00 DYSPNEA, UNSPECIFIED: ICD-10-CM

## 2022-12-30 PROCEDURE — 93000 ELECTROCARDIOGRAM COMPLETE: CPT

## 2022-12-30 PROCEDURE — 99214 OFFICE O/P EST MOD 30 MIN: CPT | Mod: 25

## 2022-12-31 PROBLEM — R06.00 DOE (DYSPNEA ON EXERTION): Status: ACTIVE | Noted: 2022-02-11

## 2023-01-04 NOTE — HISTORY OF PRESENT ILLNESS
[FreeTextEntry1] : 72 y/oM, PMHx HTN, HLD, DM2 and CKD 4 presented to ED 1/31/22 w/ increased COBB,\par fatigue and worsening BL LE. In the ED, CXR showed bilat pleural effusion R>L.\par CT non contrast with bilat moderate pleural effusions and patchy and nodular\par groundglass opacities in the left upper lobe could be due to alveolar edema but\par cannot exclude atypical infection. Trop negative x 1, BNP 2702. He was given\par IV Lasix 80mg x 1 and admitted to [facility] for mgmt acute HFpEF exacerbation. TTE c/w EF 63%, small pericardial effusion.  Diuresed and dc'd on torsemide 40 qd.  Previously endorsed COBB, denied chest pain. No associated nausea, vomiting or diaphoresis. MPI performed 2/22 + for a small area of ischemia.  PT refused to proceed for LHC at the time stating that his sx had resolved. Then admitted again for chest pain and again refused LHC in fear of having to start HD. On a subsequent visit pt agreed for LHC and is now s/p RAMANA-mLAD. feels well, no complaints. Still awaiting news about his renal transplant. Thinks he may be scheduled in January 2023 for the procedure. \par \par 10/14/22 ECG: NSR at 77 bpm, nl axis, LAE, NSTWC \par 7/8/22 ECG: NSR at 73 bpm, nl axis \par 6/10/22 ECG: NSR at 75 bpm, nl axis \par ECG: NSR at 89 bpm, LAD

## 2023-01-04 NOTE — ASSESSMENT
[FreeTextEntry1] : 1. COBB\par - + CAD risk factors\par - hospitalization reports reviewed\par - 2/25/22 MPI c/w a small area of mild ischemia \par - results discussed in detail with pt \par - I had a lenghty discussion with pt re risks/benefits of delaying LHC; he then agreed to proceed with the LHC \par - 6/22/22 s/p LHC w/ RAMANA-pLAD\par - continue OMT for now including ASA/statin/BB/nitrate\par \par 2. HFpEF\par - euvolemic now\par - hospitalization reports reviewed\par - cont torsemide 40 qd\par - was to repeat TTE in 6 months however pt states MD (Dr. Pillai)  at [facility] will repeat TTE prior to his renal transplant \par \par 3. HTN\par - stable \par - cont ramipril, amlodipine, toprol ,imdur and torsemide for now\par - cont to monitor\par \par 4. HLD\par - stable\par - cont atorva 20 qd\par - cont to monitor\par \par 5.DM\par - a1c 6.8, controlled\par - cont current insulin regimen\par \par 6. CKD\par - f/b renal\par - cont to monitor\par \par 7. Overweight\par - BMI 26\par - ed done re heart healthy lifestyle modifications and diet \par \par RTC 12/2022 for preop

## 2023-01-04 NOTE — HISTORY OF PRESENT ILLNESS
[FreeTextEntry1] : 72 y/oM, PMHx HTN, HLD, DM2 and CKD 4 presented to ED 1/31/22 w/ increased COBB,\par fatigue and worsening BL LE. In the ED, CXR showed bilat pleural effusion R>L.\par CT non contrast with bilat moderate pleural effusions and patchy and nodular\par groundglass opacities in the left upper lobe could be due to alveolar edema but\par cannot exclude atypical infection. Trop negative x 1, BNP 2702. He was given\par IV Lasix 80mg x 1 and admitted to [facility] for mgmt acute HFpEF exacerbation. TTE c/w EF 63%, small pericardial effusion.  Diuresed and dc'd on torsemide 40 qd.  Previously endorsed COBB, denied chest pain. No associated nausea, vomiting or diaphoresis. MPI performed 2/22 + for a small area of ischemia.  PT refused to proceed for LHC at the time stating that his sx had resolved. Then admitted again for chest pain and again refused LHC in fear of having to start HD. Last visit pt agreed for lHC and is now s/p RAMANA-mLAD. feels well, no complaints. Still awaiting news about his renal transplant. \par \par 7/8/22 ECG: NSR at 73 bpm, nl axis \par 6/10/22 ECG: NSR at 75 bpm, nl axis \par ECG: NSR at 89 bpm, LAD

## 2023-01-04 NOTE — ASSESSMENT
[FreeTextEntry1] : 1. COBB\par - + CAD risk factors\par - hospitalization reports reviewed\par - 2/25/22 MPI c/w a small area of mild ischemia \par - results discussed in detail with pt \par - I had a lenghty discussion with pt re risks/benefits of delaying LHC; he then agreed to proceed with the LHC \par - now s/p LHC w/ RAMANA-mLAD\par - continue OMT for now including ASA/statin/BB/nitrate\par \par 2. HFpEF\par - euvolemic now\par - hospitalization reports reviewed\par - cont torsemide 40 qd\par - repeat TTE in 6 months\par \par 3. HTN\par - stable \par - cont ramipril, amlodipine, toprol ,imdur and torsemide for now\par - cont to monitor\par \par 4. HLD\par - stable\par - cont atorva 20 qd\par - cont to monitor\par \par 5.DM\par - a1c 6.8, controlled\par - cont current insulin regimen\par \par 6. CKD\par - f/b renal\par - cont to monitor\par \par 7. Overweight\par - BMI 25, improved \par - ed done re heart healthy lifestyle modifications and diet \par \par RTC 3 months

## 2023-01-06 RX ORDER — ISOSORBIDE MONONITRATE 60 MG/1
60 TABLET, EXTENDED RELEASE ORAL DAILY
Qty: 90 | Refills: 1 | Status: ACTIVE | COMMUNITY
Start: 2022-06-03 | End: 1900-01-01

## 2023-01-10 ENCOUNTER — RX RENEWAL (OUTPATIENT)
Age: 73
End: 2023-01-10

## 2023-01-11 LAB — TSH SERPL-ACNC: 2.86 UIU/ML

## 2023-01-13 ENCOUNTER — APPOINTMENT (OUTPATIENT)
Dept: NEPHROLOGY | Facility: CLINIC | Age: 73
End: 2023-01-13
Payer: MEDICARE

## 2023-01-13 VITALS — HEART RATE: 85 BPM | RESPIRATION RATE: 16 BRPM | SYSTOLIC BLOOD PRESSURE: 129 MMHG | DIASTOLIC BLOOD PRESSURE: 66 MMHG

## 2023-01-13 DIAGNOSIS — E87.5 HYPERKALEMIA: ICD-10-CM

## 2023-01-13 LAB
24R-OH-CALCIDIOL SERPL-MCNC: 27.9 PG/ML
25(OH)D3 SERPL-MCNC: 47.5 NG/ML
ALBUMIN SERPL ELPH-MCNC: 4.3 G/DL
ANION GAP SERPL CALC-SCNC: 17 MMOL/L
APPEARANCE: CLEAR
BACTERIA: NEGATIVE
BASOPHILS # BLD AUTO: 0.05 K/UL
BASOPHILS NFR BLD AUTO: 0.6 %
BILIRUBIN URINE: NEGATIVE
BLOOD URINE: NEGATIVE
BUN SERPL-MCNC: 33 MG/DL
CALCIUM SERPL-MCNC: 9 MG/DL
CALCIUM SERPL-MCNC: 9 MG/DL
CHLORIDE SERPL-SCNC: 99 MMOL/L
CO2 SERPL-SCNC: 24 MMOL/L
COLOR: NORMAL
CREAT SERPL-MCNC: 4.2 MG/DL
CREAT SPEC-SCNC: 122 MG/DL
CREAT SPEC-SCNC: 122 MG/DL
CREAT/PROT UR: 1.1 RATIO
CYSTATIN C SERPL-MCNC: 3.72 MG/L
EGFR: 14 ML/MIN/1.73M2
EOSINOPHIL # BLD AUTO: 0.66 K/UL
EOSINOPHIL NFR BLD AUTO: 7.5 %
ESTIMATED AVERAGE GLUCOSE: 174 MG/DL
FERRITIN SERPL-MCNC: 145 NG/ML
GFR/BSA.PRED SERPLBLD CYS-BASED-ARV: 13 ML/MIN/1.73M2
GLUCOSE QUALITATIVE U: NEGATIVE
GLUCOSE SERPL-MCNC: 287 MG/DL
HBA1C MFR BLD HPLC: 7.7 %
HCT VFR BLD CALC: 37.7 %
HGB BLD-MCNC: 12.5 G/DL
HYALINE CASTS: 2 /LPF
IMM GRANULOCYTES NFR BLD AUTO: 0.5 %
IRON SATN MFR SERPL: 28 %
IRON SERPL-MCNC: 78 UG/DL
KETONES URINE: NEGATIVE
LEUKOCYTE ESTERASE URINE: NEGATIVE
LYMPHOCYTES # BLD AUTO: 2.91 K/UL
LYMPHOCYTES NFR BLD AUTO: 33 %
MAGNESIUM SERPL-MCNC: 2 MG/DL
MAN DIFF?: NORMAL
MCHC RBC-ENTMCNC: 29.4 PG
MCHC RBC-ENTMCNC: 33.2 GM/DL
MCV RBC AUTO: 88.7 FL
MICROALBUMIN 24H UR DL<=1MG/L-MCNC: 61.9 MG/DL
MICROALBUMIN/CREAT 24H UR-RTO: 507 MG/G
MICROSCOPIC-UA: NORMAL
MONOCYTES # BLD AUTO: 0.7 K/UL
MONOCYTES NFR BLD AUTO: 7.9 %
NEUTROPHILS # BLD AUTO: 4.47 K/UL
NEUTROPHILS NFR BLD AUTO: 50.5 %
NITRITE URINE: NEGATIVE
PARATHYROID HORMONE INTACT: 180 PG/ML
PH URINE: 6
PHOSPHATE SERPL-MCNC: 5 MG/DL
PLATELET # BLD AUTO: 251 K/UL
POTASSIUM SERPL-SCNC: 4.5 MMOL/L
PROT UR-MCNC: 138 MG/DL
PROTEIN URINE: ABNORMAL
RBC # BLD: 4.25 M/UL
RBC # FLD: 13.2 %
RED BLOOD CELLS URINE: 8 /HPF
SODIUM SERPL-SCNC: 139 MMOL/L
SPECIFIC GRAVITY URINE: >=1.03
SQUAMOUS EPITHELIAL CELLS: 1 /HPF
TIBC SERPL-MCNC: 284 UG/DL
UIBC SERPL-MCNC: 206 UG/DL
UROBILINOGEN URINE: NORMAL
WBC # FLD AUTO: 8.83 K/UL
WHITE BLOOD CELLS URINE: 4 /HPF

## 2023-01-13 PROCEDURE — 99215 OFFICE O/P EST HI 40 MIN: CPT

## 2023-01-13 NOTE — ASSESSMENT
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 \par - Renal sono, 10.5/10.7cm normal looking kidneys, mildly enlarged prostate with 170cc PVR\par \par \par \par

## 2023-01-13 NOTE — HISTORY OF PRESENT ILLNESS
[FreeTextEntry1] : 72yo with h/o acute hyponatremia related to chlorthalidone, HTN, BPH with urinary retention, long standing uncontrolled  DMII + retinopathy here for f/u of CKD IV/V\par \par Told in [place] that his retina is "detached", saw retinal specialist, planning on observation. \par Per pt [place] transplant [facility] will likely make him active next week, donor not approved yet officially\par Weight stable\par No uremic sx\par No edema\par \par OTC: vitamin B12\par \par Social: born in [place]. No tobacco/drug use\par \par All other ROS negative

## 2023-01-30 ENCOUNTER — RX RENEWAL (OUTPATIENT)
Age: 73
End: 2023-01-30

## 2023-02-05 NOTE — HISTORY OF PRESENT ILLNESS
[FreeTextEntry1] : 72M with a hx of HTN, HLD, DM2, and CKD 4 presented to ED on 1/31/22 w/ increased COBB, fatigue and worsening BL LE. In the ED, CXR showed b/l pleural effusion R>L. CT non contrast with b/l moderate pleural effusions and patchy and nodular ground glass opacities in the left upper lobe could be due to alveolar edema but\par cannot exclude atypical infection. Trop negative x 1, BNP 2702. He was given IV Lasix 80mg x 1 and admitted to [facility] for mgmt acute HFpEF exacerbation. TTE c/w EF 63%, small pericardial effusion. Diuresed and d/c'd on torsemide 40 qd. Now presents for initial cardiac f/u after d/c. \par \par \par 12/30/22 OV: no new complaints. States he had his TTE done at [facility] and was told "everything was normal". \par 10/14/22 OV: no new complaints. Thinks he may be scheduled for his renal transplant in January 2023.\par 7/8/22 OV: s/p RAMANA-pLAD 6/22/22. Patient reports he is feeling well and has no complaints. Still awaiting news about his renal transplant. \par 6/10/22 OV: s/p hospitalization for chest pain, refused LHC due to fear of needing HD after. Patient now agreeable to proceed with LHC.\par 3/4/22 TH: no new complaints, no worsening of COBB. MPI results from 2/22 reviewed, + small area of ischemia. Refusing LHC stating that his sx have resolved.\par 2/11/22 OV:  endorses COBB, denies chest pain. No associated nausea, vomiting or diaphoresis. Compliant w/ all medications.\par \par \par 12/30/22 ECG: NSR at 79 bpm, nl; axis, LAE, NSTWC\par 10/14/22 ECG: NSR at 77 bpm, nl axis, LAE, NSTWC \par 7/8/22 ECG: NSR at 73 bpm, nl axis \par 6/10/22 ECG: NSR at 75 bpm, nl axis \par 2/11/22 ECG: NSR at 89 bpm, LAD

## 2023-02-05 NOTE — ASSESSMENT
[FreeTextEntry1] : 1. CAD\par - MPI on 2/25/22 c/w a small area of mild ischemia \par - s/p LHC on 6/22/22 w/ RAMANA-pLAD\par - cont OMT for now including ASA/statin/BB/nitrate\par - pt denies cp/sob\par - TTE wnl\par \par 2. COBB\par - now resolved\par - will cont to monitor\par \par 3. HFpEF\par - euvolemic now\par - hospitalization reports reviewed\par - cont torsemide 40 qd\par -  repeat TTE by Dr. Pillai at [facility] prior to his renal transplant - pt states "everything was normal"\par \par 4. HTN\par - suboptimally controlled \par - /78 in office today, pt reports he hasn't been taking nitro holiday\par - cont ramipril, amlodipine, toprol , imdur and torsemide\par - ed done re nitro holiday again\par - cont to monitor\par \par 5. HLD\par - stable\par - cont atorva 20 qd\par - cont to monitor\par \par 6.DM\par - a1c now 7.3\par - cont current insulin regimen\par \par 7. CKD\par - f/b renal\par - cont to monitor\par \par 8. Overweight\par - BMI now 24\par - ed done re heart healthy lifestyle modifications and diet \par \par \par RTC 4 months

## 2023-02-22 ENCOUNTER — RX RENEWAL (OUTPATIENT)
Age: 73
End: 2023-02-22

## 2023-03-04 ENCOUNTER — RX RENEWAL (OUTPATIENT)
Age: 73
End: 2023-03-04

## 2023-03-08 LAB
ALBUMIN SERPL ELPH-MCNC: 4.4 G/DL
ANION GAP SERPL CALC-SCNC: 16 MMOL/L
BASOPHILS # BLD AUTO: 0.03 K/UL
BASOPHILS NFR BLD AUTO: 0.3 %
BUN SERPL-MCNC: 35 MG/DL
CALCIUM SERPL-MCNC: 9 MG/DL
CHLORIDE SERPL-SCNC: 96 MMOL/L
CO2 SERPL-SCNC: 25 MMOL/L
CREAT SERPL-MCNC: 4.28 MG/DL
EGFR: 14 ML/MIN/1.73M2
EOSINOPHIL # BLD AUTO: 0.54 K/UL
EOSINOPHIL NFR BLD AUTO: 6.3 %
GLUCOSE SERPL-MCNC: 189 MG/DL
HCT VFR BLD CALC: 39.7 %
HGB BLD-MCNC: 13.2 G/DL
IMM GRANULOCYTES NFR BLD AUTO: 0.2 %
LYMPHOCYTES # BLD AUTO: 2.98 K/UL
LYMPHOCYTES NFR BLD AUTO: 34.6 %
MAGNESIUM SERPL-MCNC: 2 MG/DL
MAN DIFF?: NORMAL
MCHC RBC-ENTMCNC: 29.3 PG
MCHC RBC-ENTMCNC: 33.2 GM/DL
MCV RBC AUTO: 88.2 FL
MONOCYTES # BLD AUTO: 0.81 K/UL
MONOCYTES NFR BLD AUTO: 9.4 %
NEUTROPHILS # BLD AUTO: 4.23 K/UL
NEUTROPHILS NFR BLD AUTO: 49.2 %
PHOSPHATE SERPL-MCNC: 4.1 MG/DL
PLATELET # BLD AUTO: 239 K/UL
POTASSIUM SERPL-SCNC: 4.2 MMOL/L
RBC # BLD: 4.5 M/UL
RBC # FLD: 13.4 %
SODIUM SERPL-SCNC: 138 MMOL/L
WBC # FLD AUTO: 8.61 K/UL

## 2023-03-10 ENCOUNTER — APPOINTMENT (OUTPATIENT)
Dept: NEPHROLOGY | Facility: CLINIC | Age: 73
End: 2023-03-10
Payer: MEDICARE

## 2023-03-10 VITALS — OXYGEN SATURATION: 100 % | HEART RATE: 81 BPM | DIASTOLIC BLOOD PRESSURE: 66 MMHG | SYSTOLIC BLOOD PRESSURE: 121 MMHG

## 2023-03-10 DIAGNOSIS — N17.9 ACUTE KIDNEY FAILURE, UNSPECIFIED: ICD-10-CM

## 2023-03-10 DIAGNOSIS — N40.0 BENIGN PROSTATIC HYPERPLASIA WITHOUT LOWER URINARY TRACT SYMPMS: ICD-10-CM

## 2023-03-10 DIAGNOSIS — N18.4 CHRONIC KIDNEY DISEASE, STAGE 4 (SEVERE): ICD-10-CM

## 2023-03-10 PROCEDURE — 99214 OFFICE O/P EST MOD 30 MIN: CPT

## 2023-03-10 NOTE — HISTORY OF PRESENT ILLNESS
[FreeTextEntry1] : 74yo with h/o acute hyponatremia related to chlorthalidone, HTN, BPH with urinary retention, long standing uncontrolled  DMII + retinopathy here for f/u of CKD IV/V\par \par He found out his donor's BP is still labile and she is no longer eligible. \par No uremic sx\par No edema\par \par OTC: vitamin B12\par \par Social: born in [place]. No tobacco/drug use\par \par All other ROS negative

## 2023-03-19 ENCOUNTER — NON-APPOINTMENT (OUTPATIENT)
Age: 73
End: 2023-03-19

## 2023-03-20 ENCOUNTER — NON-APPOINTMENT (OUTPATIENT)
Age: 73
End: 2023-03-20

## 2023-03-28 ENCOUNTER — NON-APPOINTMENT (OUTPATIENT)
Age: 73
End: 2023-03-28

## 2023-04-11 RX ORDER — LANCETS 30 GAUGE
EACH MISCELLANEOUS
Qty: 4 | Refills: 0 | Status: ACTIVE | COMMUNITY
Start: 2023-04-11 | End: 1900-01-01

## 2023-04-13 ENCOUNTER — RX RENEWAL (OUTPATIENT)
Age: 73
End: 2023-04-13

## 2023-05-01 ENCOUNTER — RX RENEWAL (OUTPATIENT)
Age: 73
End: 2023-05-01

## 2023-05-04 ENCOUNTER — NON-APPOINTMENT (OUTPATIENT)
Age: 73
End: 2023-05-04

## 2023-05-06 ENCOUNTER — RX RENEWAL (OUTPATIENT)
Age: 73
End: 2023-05-06

## 2023-05-17 NOTE — PATIENT PROFILE ADULT - CENTRAL VENOUS CATHETER/PICC LINE
no Mohs Method Verbiage: The operative site was curetted for margins and a beveled incision following the standard Mohs approach was done.  The specimen was harvested as a microscopic controlled layer and a map of the extirpated tissue was made for orientation.

## 2023-06-12 ENCOUNTER — LABORATORY RESULT (OUTPATIENT)
Age: 73
End: 2023-06-12

## 2023-06-16 ENCOUNTER — APPOINTMENT (OUTPATIENT)
Dept: NEPHROLOGY | Facility: CLINIC | Age: 73
End: 2023-06-16
Payer: MEDICARE

## 2023-06-16 VITALS
DIASTOLIC BLOOD PRESSURE: 72 MMHG | RESPIRATION RATE: 16 BRPM | OXYGEN SATURATION: 99 % | HEART RATE: 62 BPM | SYSTOLIC BLOOD PRESSURE: 164 MMHG

## 2023-06-16 DIAGNOSIS — Z79.899 OTHER LONG TERM (CURRENT) DRUG THERAPY: ICD-10-CM

## 2023-06-16 PROCEDURE — 99214 OFFICE O/P EST MOD 30 MIN: CPT

## 2023-06-16 RX ORDER — CALCITRIOL 0.25 UG/1
0.25 CAPSULE, LIQUID FILLED ORAL
Qty: 90 | Refills: 3 | Status: DISCONTINUED | COMMUNITY
Start: 2022-04-13 | End: 2023-06-16

## 2023-06-16 RX ORDER — SODIUM ZIRCONIUM CYCLOSILICATE 10 G/10G
10 POWDER, FOR SUSPENSION ORAL DAILY
Qty: 90 | Refills: 3 | Status: DISCONTINUED | COMMUNITY
End: 2023-06-16

## 2023-06-16 RX ORDER — SODIUM POLYSTYRENE SULFONATE 4.1 MEQ/G
POWDER, FOR SUSPENSION ORAL; RECTAL DAILY
Qty: 3 | Refills: 6 | Status: DISCONTINUED | COMMUNITY
Start: 2019-05-24 | End: 2023-06-16

## 2023-06-16 RX ORDER — SODIUM BICARBONATE 650 MG/1
650 TABLET ORAL
Qty: 180 | Refills: 3 | Status: DISCONTINUED | COMMUNITY
Start: 2020-02-11 | End: 2023-06-16

## 2023-06-16 RX ORDER — FERROUS GLUCONATE 240(27)MG
240 (27 FE) TABLET ORAL
Qty: 180 | Refills: 3 | Status: DISCONTINUED | COMMUNITY
Start: 2021-02-01 | End: 2023-06-16

## 2023-06-28 PROBLEM — Z79.899 HIGH RISK MEDICATION USE: Status: ACTIVE | Noted: 2023-06-28

## 2023-06-28 NOTE — HISTORY OF PRESENT ILLNESS
[FreeTextEntry1] : 72yo with h/o acute hyponatremia related to chlorthalidone, HTN, BPH with urinary retention, long standing uncontrolled  DMII + retinopathy here for f/u of CKD V now s/p DDRT March 20 2023 at [facility]\par \par Dx uti treated w ertapenem then amox end of May\par On bactrim TIW\par Following low K diet.\par \par tacro 2mg q12hrs (recent increase) cellcept 500q12 asa atorv 40 b12 plavix, Fe, insulin mg 400 nif 30 omeprazole\par trajenda\par valcyte 450 BIW\par \par Social: born in [place]. No tobacco/drug use\par \par All other ROS negative

## 2023-06-28 NOTE — ASSESSMENT
[FreeTextEntry1] : 72yo with h/o acute hyponatremia related to chlorthalidone, HTN, BPH with urinary retention, long standing uncontrolled  DMII + retinopathy here for f/u of CKD V now s/p DDRT March 20 2023 at [facility]\par \par # s/p DDRT\par - Cr baseline post op mid to high 1's eGFR ~40ml/min, reviewed [facility] labs, Cr 1.7\par - hypomag on Mg\par - hyponatremia of unclear etiology - will request latest labs from transplant center\par - cont tacro, MMF. 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 \par - Renal sono, 10.5/10.7cm normal looking kidneys, mildly enlarged prostate with 170cc PVR\par \par Addendum: reviewed [facility] labs from June 20 = Na 125. Started on cefpodoxime for UTI and Lokelma 10gm daily for hyperkalemia. CMV negative, BK pending. no urine Na/osm noted. Tacro 8.7\par \par \par

## 2023-07-05 ENCOUNTER — RX RENEWAL (OUTPATIENT)
Age: 73
End: 2023-07-05

## 2023-07-13 ENCOUNTER — APPOINTMENT (OUTPATIENT)
Dept: INTERNAL MEDICINE | Facility: CLINIC | Age: 73
End: 2023-07-13
Payer: MEDICARE

## 2023-07-13 VITALS
TEMPERATURE: 97.3 F | SYSTOLIC BLOOD PRESSURE: 159 MMHG | HEIGHT: 67 IN | WEIGHT: 150 LBS | DIASTOLIC BLOOD PRESSURE: 75 MMHG | HEART RATE: 71 BPM | OXYGEN SATURATION: 98 % | BODY MASS INDEX: 23.54 KG/M2

## 2023-07-13 PROCEDURE — 99214 OFFICE O/P EST MOD 30 MIN: CPT

## 2023-07-13 NOTE — HISTORY OF PRESENT ILLNESS
[FreeTextEntry1] : diabetes [de-identified] : \par 72 yo M w/ h/o controlled HTN, stable BPH, stable Diabetes, stable stage 4 CKD, CAD here for diabetes\par \par \par - home bp 125-130; evening 130-140\par \par Other active problems, past medical history, and surgical history, family history, social history, medications and allergies reviewed and reconciled.\par \par

## 2023-07-13 NOTE — PHYSICAL EXAM
[No Acute Distress] : no acute distress [Well Nourished] : well nourished [Well Developed] : well developed [Well-Appearing] : well-appearing [Normal Voice/Communication] : normal voice/communication [Normal Sclera/Conjunctiva] : normal sclera/conjunctiva [Normal Outer Ear/Nose] : the outer ears and nose were normal in appearance [No JVD] : no jugular venous distention [No Respiratory Distress] : no respiratory distress  [No Accessory Muscle Use] : no accessory muscle use [No Joint Swelling] : no joint swelling [Grossly Normal Strength/Tone] : grossly normal strength/tone [Coordination Grossly Intact] : coordination grossly intact [Normal Gait] : normal gait [Speech Grossly Normal] : speech grossly normal [Memory Grossly Normal] : memory grossly normal [Normal Affect] : the affect was normal [Normal Mood] : the mood was normal [Normal Insight/Judgement] : insight and judgment were intact

## 2023-07-13 NOTE — PLAN
[FreeTextEntry1] : \par \par \par \par ====== chart reviewed in detail since last visit ==========\par \par [] check at home last covid vaccine- to see if due\par [] f/u renal transplant, cards, \par [] f/u renal for CKD\par \par [] f/u renal closely, endo, cards\par [] see optho, renal\par \par

## 2023-07-28 ENCOUNTER — APPOINTMENT (OUTPATIENT)
Dept: HEART AND VASCULAR | Facility: CLINIC | Age: 73
End: 2023-07-28
Payer: MEDICARE

## 2023-07-28 ENCOUNTER — NON-APPOINTMENT (OUTPATIENT)
Age: 73
End: 2023-07-28

## 2023-07-28 VITALS
TEMPERATURE: 98.3 F | HEART RATE: 68 BPM | OXYGEN SATURATION: 98 % | BODY MASS INDEX: 22.76 KG/M2 | HEIGHT: 67 IN | SYSTOLIC BLOOD PRESSURE: 130 MMHG | DIASTOLIC BLOOD PRESSURE: 68 MMHG | WEIGHT: 145 LBS

## 2023-07-28 PROCEDURE — 99214 OFFICE O/P EST MOD 30 MIN: CPT | Mod: 25

## 2023-07-28 PROCEDURE — 93000 ELECTROCARDIOGRAM COMPLETE: CPT

## 2023-08-05 ENCOUNTER — RX RENEWAL (OUTPATIENT)
Age: 73
End: 2023-08-05

## 2023-08-06 ENCOUNTER — RX RENEWAL (OUTPATIENT)
Age: 73
End: 2023-08-06

## 2023-08-11 ENCOUNTER — APPOINTMENT (OUTPATIENT)
Dept: HEART AND VASCULAR | Facility: CLINIC | Age: 73
End: 2023-08-11
Payer: MEDICARE

## 2023-08-11 VITALS
OXYGEN SATURATION: 98 % | TEMPERATURE: 98.3 F | DIASTOLIC BLOOD PRESSURE: 70 MMHG | HEART RATE: 64 BPM | SYSTOLIC BLOOD PRESSURE: 140 MMHG | HEIGHT: 67 IN

## 2023-08-11 DIAGNOSIS — I25.10 ATHEROSCLEROTIC HEART DISEASE OF NATIVE CORONARY ARTERY W/OUT ANGINA PECTORIS: ICD-10-CM

## 2023-08-11 DIAGNOSIS — Z98.61 ATHEROSCLEROTIC HEART DISEASE OF NATIVE CORONARY ARTERY W/OUT ANGINA PECTORIS: ICD-10-CM

## 2023-08-11 DIAGNOSIS — E66.3 OVERWEIGHT: ICD-10-CM

## 2023-08-11 PROCEDURE — 99213 OFFICE O/P EST LOW 20 MIN: CPT

## 2023-08-11 PROCEDURE — 93306 TTE W/DOPPLER COMPLETE: CPT

## 2023-08-11 PROCEDURE — 93880 EXTRACRANIAL BILAT STUDY: CPT

## 2023-08-15 PROBLEM — I25.10 CAD S/P PERCUTANEOUS CORONARY ANGIOPLASTY: Status: ACTIVE | Noted: 2022-08-04

## 2023-08-15 PROBLEM — E66.3 OVERWEIGHT (BMI 25.0-29.9): Status: RESOLVED | Noted: 2022-03-04 | Resolved: 2023-08-15

## 2023-08-29 NOTE — PHYSICAL EXAM
[Well Developed] : well developed [Well Nourished] : well nourished [No Acute Distress] : no acute distress [Normal Conjunctiva] : normal conjunctiva [Normal Venous Pressure] : normal venous pressure [Normal S1, S2] : normal S1, S2 [No Murmur] : no murmur [No Rub] : no rub [No Gallop] : no gallop [Clear Lung Fields] : clear lung fields [Good Air Entry] : good air entry [No Respiratory Distress] : no respiratory distress  [Soft] : abdomen soft [Non Tender] : non-tender [No Masses/organomegaly] : no masses/organomegaly [Normal Bowel Sounds] : normal bowel sounds [Normal Gait] : normal gait [No Edema] : no edema [No Cyanosis] : no cyanosis [No Clubbing] : no clubbing [No Varicosities] : no varicosities [No Rash] : no rash [No Skin Lesions] : no skin lesions [Moves all extremities] : moves all extremities [No Focal Deficits] : no focal deficits [Normal Speech] : normal speech [Alert and Oriented] : alert and oriented [Normal memory] : normal memory [de-identified] : +carotid bruit

## 2023-08-29 NOTE — HISTORY OF PRESENT ILLNESS
[FreeTextEntry1] : 73M with a hx of HTN, HLD, DM2, and CKD 4 presented to ED on 1/31/22 w/ increased COBB, fatigue and worsening BL LE. In the ED, CXR showed b/l pleural effusion R>L. CT non contrast with b/l moderate pleural effusions and patchy and nodular ground glass opacities in the left upper lobe could be due to alveolar edema but cannot exclude atypical infection. Trop negative x 1, BNP 2702. He was given IV Lasix 80mg x 1 and admitted to [facility] for mgmt acute HFpEF exacerbation. TTE c/w EF 63%, small pericardial effusion. Diuresed and d/c'd on torsemide 40 qd. Now presents for initial cardiac f/u after d/c.    7/28/23 OV: returns today for f/u, now s/p renal transplant, feeling well 12/30/22 OV: no new complaints. States he had his TTE done at [facility] and was told "everything was normal".  10/14/22 OV: no new complaints. Thinks he may be scheduled for his renal transplant in January 2023. 7/8/22 OV: s/p RAMANA-pLAD 6/22/22. Patient reports he is feeling well and has no complaints. Still awaiting news about his renal transplant.  6/10/22 OV: s/p hospitalization for chest pain, refused LHC due to fear of needing HD after. Patient now agreeable to proceed with LHC. 3/4/22 TH: no new complaints, no worsening of COBB. MPI results from 2/22 reviewed, + small area of ischemia. Refusing LHC stating that his sx have resolved. 2/11/22 OV:  endorses COBB, denies chest pain. No associated nausea, vomiting or diaphoresis. Compliant w/ all medications.   7/28/23 ECG: NSR @ 61bpm, early repol 12/30/22 ECG: NSR at 79 bpm, nl; axis, LAE, NSTWC 10/14/22 ECG: NSR at 77 bpm, nl axis, LAE, NSTWC  7/8/22 ECG: NSR at 73 bpm, nl axis  6/10/22 ECG: NSR at 75 bpm, nl axis  2/11/22 ECG: NSR at 89 bpm, LAD

## 2023-08-29 NOTE — ASSESSMENT
[FreeTextEntry1] : 1. CAD - MPI on 2/25/22 c/w a small area of mild ischemia  - s/p LHC on 6/22/22 w/ RAMANA-pLAD - cont OMT for now including ASA/statin/BB/nitrate - pt denies cp/sob - reviewed recent labs, wnl - repeat TTE  2. + carotid bruit on exam - check CUS - further recommendations pending results   2. COBB - now resolved - will cont to monitor  3. HFpEF - euvolemic now - hospitalization reports reviewed - cont torsemide 40 qd - TTE by Dr. Pillai at [facility] prior to his renal transplant - pt states "everything was normal" - repeat TTE as above   4. HTN - stable  - BP  - cont ramipril, amlodipine, toprol , imdur and torsemide - cont to monitor  5. HLD - stable - cont atorva 20 qd - cont to monitor  6.DM - a1c now - cont current insulin regimen  7. CKD - f/b renal - cont to monitor  8. Overweight - BMI now  - ed done re heart healthy lifestyle modifications and diet    RTC

## 2023-08-30 NOTE — ASSESSMENT
[FreeTextEntry1] : # CAD - MPI on 2/25/22 c/w a small area of mild ischemia  - s/p LHC on 6/22/22 w/ RAMANA-pLAD - cont OMT for now including ASA/statin/BB/nitrate - pt denies cp/sob  # HTN - stable  - /70 in office today - home BP cuff not correlating with office BPs - advised to return cuurent BP monitor and exchange for a new one   # HLD - stable - cont atorva 20 qd - cont to monitor  # HFpEF - euvolemic now -  repeat TTE by Dr. Pillai at [facility] prior to his renal transplant - pt states "everything was normal" - will bring in med list at next OV  # DM - a1c now 6.6% - cont current insulin regimen  # COBB - now resolved - will cont to monitor  # carotid bruit - carotid US c/w L pICA mild stenosis, R pICA no stenosis - will cont to monitor - cont ASA and statin

## 2023-08-30 NOTE — HISTORY OF PRESENT ILLNESS
[FreeTextEntry1] : 73M with a hx of HTN, HLD, DM2, and CKD 4 presented to ED on 1/31/22 w/ increased COBB, fatigue and worsening BL LE. In the ED, CXR showed b/l pleural effusion R>L. CT non contrast with b/l moderate pleural effusions and patchy and nodular ground glass opacities in the left upper lobe could be due to alveolar edema but cannot exclude atypical infection. Trop negative x 1, BNP 2702. He was given IV Lasix 80mg x 1 and admitted to [facility] for mgmt acute HFpEF exacerbation. TTE c/w EF 63%, small pericardial effusion. Diuresed and d/c'd on torsemide 40 qd. Now presents for initial cardiac f/u after d/c.   8/11/23 OV: pt returns today for BP cuff calibration and med rec. No new complaints. 7/28/23 OV: pt returns today s/p kidney transplant. No new symptoms.  12/30/22 OV: no new complaints. States he had his TTE done at [facility] and was told "everything was normal".  10/14/22 OV: no new complaints. Thinks he may be scheduled for his renal transplant in January 2023. 7/8/22 OV: s/p RAMANA-pLAD 6/22/22. Patient reports he is feeling well and has no complaints. Still awaiting news about his renal transplant.  6/10/22 OV: s/p hospitalization for chest pain, refused LHC due to fear of needing HD after. Patient now agreeable to proceed with LHC. 3/4/22 TH: no new complaints, no worsening of COBB. MPI results from 2/22 reviewed, + small area of ischemia. Refusing LHC stating that his sx have resolved. 2/11/22 OV:  endorses COBB, denies chest pain. No associated nausea, vomiting or diaphoresis. Compliant w/ all medications.  7/28/23 ECG: NSR at 61 bpm, early repol 12/30/22 ECG: NSR at 79 bpm, nl; axis, LAE, NSTWC 10/14/22 ECG: NSR at 77 bpm, nl axis, LAE, NSTWC  7/8/22 ECG: NSR at 73 bpm, nl axis  6/10/22 ECG: NSR at 75 bpm, nl axis  2/11/22 ECG: NSR at 89 bpm, LAD

## 2023-09-10 ENCOUNTER — RX RENEWAL (OUTPATIENT)
Age: 73
End: 2023-09-10

## 2023-09-12 ENCOUNTER — LABORATORY RESULT (OUTPATIENT)
Age: 73
End: 2023-09-12

## 2023-09-14 NOTE — ED ADULT NURSE NOTE - BREATHING, MLM
Radiation Oncology Consultation    Date of Visit: 9/14/2023    Referring Physician: Dr. Osmany Rawls  Additional Treatment Team: Dr. 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    -Progressed over last month    Recommendations:  Mr. Ray is a good candidate for definitive RT, pending assessment to r/o deeper invasion with CT given his progressive disease over the last month.    We discussed the risks, benefits, alternatives, personnel, and equipment involved with radiation therapy to the head and neck as outlined on our consent form. We discussed that the intent of treatment is curative. All questions were answered. He expressed understanding.     At the conclusion of our conversation, he expressed a desire to proceed with treatment.    Next steps  -CT neck to eval for preepiglottic/paraglottic space invasion  -If no invasion that would upstage to T3 disease, plan 6300 cGy/28 fx. He would prefer treatment in [place].  -Pain plan: No pain    ------------------------------------------------------------------------------------------------------------    Chief Complaint: Hoarseness    History of Present Illness:  Pt is a 72 year old male with PMH of arthritis, HLD, recovering alcoholic (in remission), former smoker (45 yr x 0.75 ppd, quit 2015) who began developing symptoms of hoarseness. Symptoms progressed until he saw his PCP on 6/27/23 and was given trial of omeprazole and referral to ENT. He underwent further workup as follows:    7/31/23: He saw Dr. Dukes in ENT. Flex laryngoscopy exam was remarkable for: \"He has a raised white serrated border-lesion right mid cord to posterior 3rd.  No distinct mucosal disruption.  Vocal cord mobility full bilaterally.\"    8/14/23: DIRECT MICROLARYNGOSCOPY demonstrated  \"Photo documentation was made of a right vocal cord lesion occupying most of the anterior to posterior length of the vocal cord.  This was a corrugated raised leukoplakic lesion.\".   Vocal cord, right, biopsy:   - Invasive squamous cell carcinoma with focal keratinization    8/24/23: PET/CT demonstrated a modest-sized hypermetabolic focus in the vocal area without evidence of millie or distant metastases.    He was referred to us for discussion of the role of radiotherapy in his care.     9/14/2023: Pt seen in multidisciplinary consultation with Dr. Rawls. Pt c/o hoarseness. Denies dysphagia, odynophagia.    Contraindications to radiation: None    Past Medical History:  History of Connective Tissue Disorder: negative  History of Inflammatory Bowel Disease: negative  Pacemaker / AICD: No  Pregnancy Status: N/A, Male Patient  Past Medical History:   Diagnosis Date   • Arthritis     L hip   • Does use hearing aid    • ED (erectile dysfunction)    • Hyperlipidemia    • IFG (impaired fasting glucose)    • Recovering alcoholic in remission (CMD)    • Tobacco dependence        Past Surgical History:  Past Surgical History:   Procedure Laterality Date   • ------------other------------- Bilateral 2014    vericose veins on legs   • Colonoscopy diagnostic  11/20/2007   • Rotator cuff repair Bilateral    • Rotator cuff repair Right 2019   • Tonsillectomy         Medications:  Current Outpatient Medications   Medication Sig Dispense Refill   • omeprazole (PrilOSEC) 20 MG capsule TAKE 1 CAPSULE BY MOUTH DAILY 90 capsule 1   • amLODIPine (NORVASC) 5 MG tablet Take 1 tablet by mouth daily. 100 tablet 1   • atorvastatin (LIPITOR) 40 MG tablet Take 1 tablet by mouth daily. 90 tablet 3   • aspirin 81 MG tablet Take 1 tablet by mouth daily after a meal. 30 tablet 11   • Docusate Calcium (STOOL SOFTENER PO) Take 1 tablet by mouth daily.     • Cholecalciferol (VITAMIN D3) 2000 UNITS TABS Take 1 tablet by mouth daily.       No current  facility-administered medications for this visit.     Facility-Administered Medications Ordered in Other Visits   Medication   • sodium chloride (PF) 0.9 % injection 20 mL       Allergies:  ALLERGIES:  No Known Allergies    Family History:  family history includes Cancer in his brother, brother, brother, and father; Cancer, Kidney in his brother; Heart disease in his brother; Hypertension in his brother, brother, brother, brother, father, mother, sister, and sister; Other in his paternal grandfather; Patient is unaware of any medical problems in his daughter, daughter, maternal grandfather, maternal grandmother, paternal grandmother, sister, sister, sister, son, and another family member; Stroke in his mother; Substance Abuse in his son.    Social History:  Social History     Tobacco Use   • Smoking status: Former     Current packs/day: 0.00     Average packs/day: 0.8 packs/day for 45.0 years (33.8 ttl pk-yrs)     Types: Cigarettes     Start date: 1970     Quit date: 2015     Years since quittin.7   • Smokeless tobacco: Never   Vaping Use   • Vaping Use: Every day   • Substances: Nicotine, Flavoring   • Devices: Pre-filled or refillable cartridge, Refillable tank   Substance Use Topics   • Alcohol use: Not Currently     Alcohol/week: 0.0 standard drinks of alcohol   • Drug use: Yes     Frequency: 2.0 times per week     Types: Marijuana     Comment: few times a week       Review of Systems:  A ROS was performed by the nurse and is reviewed. All pertinent findings are as noted in the history of present illness. All other systems negative.  Pain: no  Level of pain: 0 /10    Physical Exam:  Visit Vitals  /72   Pulse 73   Ht 5' 6\" (1.676 m)   SpO2 94%   BMI 24.37 kg/m²     Gen: Alert, NAD  Eyes: Sclera anicteric, no ptosis  Neck: Symmetric, supple, trachea midline, no masses  Resp: No respiratory distress, no accessory muscle usage, no stridor. Voice hoarse.  Cardiovascular: Regular rate, no peripheral  Spontaneous, unlabored and symmetrical edema  Abdomen: Soft. Nondistended.  Musculoskeletal: Normal muscle tone. Normal gross ROM of extremities. Gait normal.  Neuro: CN II-XII grossly intact. Strength intact and symmetric. No focal deficits.  Skin: No rashes noted  Lymph: No cervical or supraclavicular adenopathy bilaterally.    ECOG Performance Status: 1: Restricted in physically strenuous activity but ambulatory and able to carry out work of a light or sedentary nature, e.g., light house work, office work    Laboratory Data:  Sodium (mmol/L)   Date Value   09/08/2023 131 (L)     Potassium (mmol/L)   Date Value   09/08/2023 4.7     Chloride (mmol/L)   Date Value   09/08/2023 96 (L)     Carbon Dioxide (mmol/L)   Date Value   09/08/2023 28     BUN (mg/dL)   Date Value   09/08/2023 11     Creatinine (mg/dL)   Date Value   09/08/2023 0.75     Glucose (mg/dL)   Date Value   09/08/2023 109 (H)     Calcium (mg/dL)   Date Value   09/08/2023 9.1     No results found for: \"MAGNESIUM\"  No results found for: \"PHOSPHORUS\"  Bilirubin, Total (mg/dL)   Date Value   01/09/2023 0.4     GOT/AST (Units/L)   Date Value   01/09/2023 22     GPT/ALT (Units/L)   Date Value   01/09/2023 20     No components found for: \"TOTAL PROTEIN\"  Albumin (g/dL)   Date Value   01/09/2023 3.8     Alkaline Phosphatase (Units/L)   Date Value   01/09/2023 92     WBC (K/mcL)   Date Value   09/08/2023 8.7     HGB (g/dL)   Date Value   09/08/2023 13.7     HCT (%)   Date Value   09/08/2023 39.1     PLT (K/mcL)   Date Value   09/08/2023 351     Absolute Neutrophils (K/mcL)   Date Value   09/08/2023 5.1       Review of Imaging:  The recent imaging was personally reviewed.    Thank you Dr. Ralws for including us in the care of your patient.     Marshall Lynch MD     Number and Complexity of Problems Addressed at the Encounter    Yes_Pt has a malignancy that can potentially pose a threat to life and or bodily function.    Amount and/or Complexity of Data to Be Reviewed and Analyzed (2 out of  3)    Category 1: Tests, documents or independent historian(s)    Yes_ Review of prior notes (number of notes reviewed: 4)    Yes_ Review of tests (number of tests reviewed: 2)    Yes_ Ordering of tests    No_ Assessment requiring an independent historian(s)  Comments:    Category 2: Independent interpretation of tests    Yes_ Independent interpretation of test(s)   Comments:    Category 3: Discussion of management or test interpretation    Yes_ Discussion of management or test interpretation with external physician    Risk of Complications    Yes_I explained the potentially significant morbidity arising from treatment; in particular, risks, benefits, and alternatives as well as treatment rationale were discussed in detail.     DISCLAIMER  The 21st Century Cures Act makes medical notes like these available to patients in the interest of transparency. However, be advised this is a medical document. It is intended as peer to peer communication. It is written in medical language and may contain abbreviations or verbiage that are unfamiliar to the general public. It may appear blunt or direct. Medical documents are intended to carry relevant information, facts as evident, and the clinical opinion of the practitioner. This document is not intended to be a comprehensive summary of the medical record. Plans may change based on information that is not conveyed in this note.

## 2023-09-15 ENCOUNTER — APPOINTMENT (OUTPATIENT)
Dept: NEPHROLOGY | Facility: CLINIC | Age: 73
End: 2023-09-15
Payer: MEDICARE

## 2023-09-15 VITALS — DIASTOLIC BLOOD PRESSURE: 50 MMHG | RESPIRATION RATE: 14 BRPM | HEART RATE: 65 BPM | SYSTOLIC BLOOD PRESSURE: 112 MMHG

## 2023-09-15 DIAGNOSIS — D64.9 ANEMIA, UNSPECIFIED: ICD-10-CM

## 2023-09-15 DIAGNOSIS — N25.81 SECONDARY HYPERPARATHYROIDISM OF RENAL ORIGIN: ICD-10-CM

## 2023-09-15 DIAGNOSIS — R80.9 PROTEINURIA, UNSPECIFIED: ICD-10-CM

## 2023-09-15 LAB
ALBUMIN SERPL ELPH-MCNC: 4.3 G/DL
ALP BLD-CCNC: 56 U/L
ALT SERPL-CCNC: 9 U/L
ANION GAP SERPL CALC-SCNC: 14 MMOL/L
APPEARANCE: CLEAR
AST SERPL-CCNC: 18 U/L
BACTERIA: NEGATIVE /HPF
BILIRUB SERPL-MCNC: 0.4 MG/DL
BILIRUBIN URINE: NEGATIVE
BKV DNA SPEC QL NAA+PROBE: NOT DETECTED IU/ML
BLOOD URINE: NEGATIVE
BUN SERPL-MCNC: 14 MG/DL
CALCIUM SERPL-MCNC: 9.3 MG/DL
CAST: 0 /LPF
CHLORIDE SERPL-SCNC: 99 MMOL/L
CMV DNA SPEC QL NAA+PROBE: NOT DETECTED IU/ML
CMVPCR LOG: NOT DETECTED LOG10IU/ML
CO2 SERPL-SCNC: 26 MMOL/L
COLOR: YELLOW
CREAT SERPL-MCNC: 1.5 MG/DL
CREAT SPEC-SCNC: 87 MG/DL
CREAT/PROT UR: 0.1 RATIO
EGFR: 48 ML/MIN/1.73M2
EPITHELIAL CELLS: 0 /HPF
ESTIMATED AVERAGE GLUCOSE: 169 MG/DL
GLUCOSE QUALITATIVE U: 250 MG/DL
GLUCOSE SERPL-MCNC: 282 MG/DL
HBA1C MFR BLD HPLC: 7.5 %
KETONES URINE: NEGATIVE MG/DL
LEUKOCYTE ESTERASE URINE: ABNORMAL
MAGNESIUM SERPL-MCNC: 1.7 MG/DL
MICROSCOPIC-UA: NORMAL
NITRITE URINE: NEGATIVE
PH URINE: 6
POTASSIUM SERPL-SCNC: 5.3 MMOL/L
PROT SERPL-MCNC: 6.8 G/DL
PROT UR-MCNC: 11 MG/DL
PROTEIN URINE: NEGATIVE MG/DL
RED BLOOD CELLS URINE: 1 /HPF
SODIUM SERPL-SCNC: 139 MMOL/L
SPECIFIC GRAVITY URINE: 1.01
TACROLIMUS SERPL-MCNC: 8.5 NG/ML
UROBILINOGEN URINE: 0.2 MG/DL
WHITE BLOOD CELLS URINE: 1 /HPF

## 2023-09-15 PROCEDURE — 99215 OFFICE O/P EST HI 40 MIN: CPT

## 2023-09-15 PROCEDURE — 76775 US EXAM ABDO BACK WALL LIM: CPT | Mod: 26,59

## 2023-09-18 PROBLEM — D64.9 ANEMIA: Status: ACTIVE | Noted: 2019-05-07

## 2023-09-18 PROBLEM — N25.81 SECONDARY HYPERPARATHYROIDISM: Status: ACTIVE | Noted: 2022-02-16

## 2023-09-18 PROBLEM — R80.9 NON-NEPHROTIC RANGE PROTEINURIA: Status: ACTIVE | Noted: 2019-03-12

## 2023-09-18 LAB — BACTERIA UR CULT: ABNORMAL

## 2023-09-21 NOTE — PROGRESS NOTE ADULT - SUBJECTIVE AND OBJECTIVE BOX
Interventional Cardiology PA Adult Progress Note    CC: CP    Subjective Assessment:    ROS neg except as per subjective HPI.   	  MEDICATIONS:  amLODIPine   Tablet 5 milliGRAM(s) Oral daily  metoprolol tartrate 12.5 milliGRAM(s) Oral two times a day  pantoprazole    Tablet 40 milliGRAM(s) Oral before breakfast  atorvastatin 40 milliGRAM(s) Oral at bedtime  dextrose 50% Injectable 25 Gram(s) IV Push once  dextrose 50% Injectable 12.5 Gram(s) IV Push once  dextrose 50% Injectable 25 Gram(s) IV Push once  dextrose Oral Gel 15 Gram(s) Oral once PRN  glucagon  Injectable 1 milliGRAM(s) IntraMuscular once  insulin glargine Injectable (LANTUS) 16 Unit(s) SubCutaneous at bedtime  insulin lispro (ADMELOG) corrective regimen sliding scale   SubCutaneous three times a day before meals  insulin lispro (ADMELOG) corrective regimen sliding scale   SubCutaneous at bedtime  levothyroxine 50 MICROGram(s) Oral daily  aspirin enteric coated 81 milliGRAM(s) Oral daily  calcitriol   Capsule 0.25 MICROGram(s) Oral daily  dextrose 5%. 1000 milliLiter(s) IV Continuous <Continuous>  dextrose 5%. 1000 milliLiter(s) IV Continuous <Continuous>  ferrous    sulfate 325 milliGRAM(s) Oral daily  heparin   Injectable 5000 Unit(s) SubCutaneous every 8 hours  sodium bicarbonate 650 milliGRAM(s) Oral two times a day      	    [PHYSICAL EXAM:  TELEMETRY:  T(C): 36.5 (05-29-22 @ 06:53), Max: 36.8 (05-28-22 @ 16:41)  HR: 71 (05-29-22 @ 00:30) (70 - 94)  BP: 120/61 (05-29-22 @ 00:30) (120/61 - 167/77)  RR: 16 (05-29-22 @ 00:30) (16 - 18)  SpO2: 99% (05-29-22 @ 00:30) (98% - 100%)  Wt(kg): --  I&O's Summary    28 May 2022 07:01  -  29 May 2022 07:00  --------------------------------------------------------  IN: 180 mL / OUT: 300 mL / NET: -120 mL      Height (cm): 170.2 (05-28 @ 13:50)  Weight (kg): 72.6 (05-28 @ 13:50)  BMI (kg/m2): 25.1 (05-28 @ 13:50)  BSA (m2): 1.84 (05-28 @ 13:50)  Jewell:  Central/PICC/Mid Line:                                               	    ECG:  	  RADIOLOGY:   DIAGNOSTIC TESTING:  [ ] Echocardiogram:  [ ]  Catheterization:  [ ] Stress Test:    [ ] BRETT  OTHER: 	    LABS:	 	  CARDIAC MARKERS:                                  11.0   5.84  )-----------( 243      ( 29 May 2022 05:47 )             33.6     05-29    142  |  104  |  27<H>  ----------------------------<  69<L>  3.7   |  28  |  3.80<H>    Ca    8.2<L>      29 May 2022 05:47  Phos  4.8     05-29  Mg     1.9     05-29    TPro  6.4  /  Alb  3.6  /  TBili  0.6  /  DBili  x   /  AST  11  /  ALT  8<L>  /  AlkPhos  87  05-29    proBNP:   Lipid Profile:   HgA1c:   TSH: Thyroid Stimulating Hormone, Serum: 4.080 uIU/mL (05-29 @ 05:47)        ASSESSMENT/PLAN: 	        DVT ppx:  Dispo:     Primary Defect Length In Cm (Final Defect Size - Required For Flaps/Grafts): 2.7 Interventional Cardiology PA Adult Progress Note    CC: CP    Subjective Assessment: Pt seen and examined at bedside today am. Pt. comfortable at bedside; denies any CP, SOB, dizziness, palpitation, N/V. Pt reports feeling very well and asking him if he can be discharged and have further w/u done as o/p.    ROS neg except as per subjective HPI.   	  MEDICATIONS:  amLODIPine   Tablet 5 milliGRAM(s) Oral daily  metoprolol tartrate 12.5 milliGRAM(s) Oral two times a day  pantoprazole    Tablet 40 milliGRAM(s) Oral before breakfast  atorvastatin 40 milliGRAM(s) Oral at bedtime  dextrose 50% Injectable 25 Gram(s) IV Push once  dextrose 50% Injectable 12.5 Gram(s) IV Push once  dextrose 50% Injectable 25 Gram(s) IV Push once  dextrose Oral Gel 15 Gram(s) Oral once PRN  glucagon  Injectable 1 milliGRAM(s) IntraMuscular once  insulin glargine Injectable (LANTUS) 16 Unit(s) SubCutaneous at bedtime  insulin lispro (ADMELOG) corrective regimen sliding scale   SubCutaneous three times a day before meals  insulin lispro (ADMELOG) corrective regimen sliding scale   SubCutaneous at bedtime  levothyroxine 50 MICROGram(s) Oral daily  aspirin enteric coated 81 milliGRAM(s) Oral daily  calcitriol   Capsule 0.25 MICROGram(s) Oral daily  dextrose 5%. 1000 milliLiter(s) IV Continuous <Continuous>  dextrose 5%. 1000 milliLiter(s) IV Continuous <Continuous>  ferrous    sulfate 325 milliGRAM(s) Oral daily  heparin   Injectable 5000 Unit(s) SubCutaneous every 8 hours  sodium bicarbonate 650 milliGRAM(s) Oral two times a day      	    [PHYSICAL EXAM:  TELEMETRY:  T(C): 36.5 (05-29-22 @ 06:53), Max: 36.8 (05-28-22 @ 16:41)  HR: 71 (05-29-22 @ 00:30) (70 - 94)  BP: 120/61 (05-29-22 @ 00:30) (120/61 - 167/77)  RR: 16 (05-29-22 @ 00:30) (16 - 18)  SpO2: 99% (05-29-22 @ 00:30) (98% - 100%)  Wt(kg): --  I&O's Summary    28 May 2022 07:01  -  29 May 2022 07:00  --------------------------------------------------------  IN: 180 mL / OUT: 300 mL / NET: -120 mL      Height (cm): 170.2 (05-28 @ 13:50)  Weight (kg): 72.6 (05-28 @ 13:50)  BMI (kg/m2): 25.1 (05-28 @ 13:50)  BSA (m2): 1.84 (05-28 @ 13:50)  Jewell:  Central/PICC/Mid Line:                                             Gen: NAD  Neck: no JVD  CV: RRR; s1 and s2 positive, no murmurs appreciated  Pulm: CTA B/L; no w/r/r  GI: soft; NT/ND, + BS X 4  extremities: no clubbing, cyanosis and edema noted b/l  Neuro: AO X 3; no focal deficits appreciated  	    ECG:  	  RADIOLOGY:   DIAGNOSTIC TESTING:  [ ] Echocardiogram:  [ ]  Catheterization:  [ ] Stress Test:    [ ] BRETT  OTHER: 	    LABS:	 	  CARDIAC MARKERS:                                  11.0   5.84  )-----------( 243      ( 29 May 2022 05:47 )             33.6     05-29    142  |  104  |  27<H>  ----------------------------<  69<L>  3.7   |  28  |  3.80<H>    Ca    8.2<L>      29 May 2022 05:47  Phos  4.8     05-29  Mg     1.9     05-29    TPro  6.4  /  Alb  3.6  /  TBili  0.6  /  DBili  x   /  AST  11  /  ALT  8<L>  /  AlkPhos  87  05-29    proBNP:   Lipid Profile:   HgA1c:   TSH: Thyroid Stimulating Hormone, Serum: 4.080 uIU/mL (05-29 @ 05:47)        ASSESSMENT/PLAN: 	        DVT ppx:  Dispo:

## 2023-10-02 ENCOUNTER — RX RENEWAL (OUTPATIENT)
Age: 73
End: 2023-10-02

## 2023-10-13 NOTE — ASSESSMENT
[FreeTextEntry1] : 68yo [place]ien M with HTN, BPH with urinary retention, long standing DMII + retinopathy and CKD IV with non-nephrotic proteinuria:\par \par # CKD IV w hyperkalemia and non nephrotic proteinuria -  likely 2/2 diabetic nephropathy\par - reviewed August labs w patient, Cr 2.7 egfr 23, stable at baseline 2.4-2.8 egfr low 20's \par - hyperkalemia normalized on Kayexelate TIW. \par - fair diabetic control  7.7%, + retinopathy, on lantus and tradjenta \par - HTN not at goal, increase amlodipine to 7.5mg daily\par -- serologic proteinuric GN w/u negative. \par - Reviewed renal sono, 10.5/10.7cm normal looking kidneys, mildly enlarged prostate with 170cc PVR - referred to urologist, may benefit from starting tamsulosin, no active symptoms right now. Sono couldn't rule out L ANDRE however given his well controlled BP and stable creatinine as well as lack of atrophy seen in L kidney, risks of further imaging with MRIgad or CT contrast outweigh benefits as we would opt for medical management with ANDRE blockade which he's already optimized on.\par On ergo weekly for secondary hyperparathyroidism \par \par # Anemia - Fe deficiency but can't tolerate Fe tablets. Mild and stable will cont to monitor. \par \par RTC in 3 months for f/u\par  I will SWITCH the dose or number of times a day I take the medications listed below when I get home from the hospital:  None

## 2023-10-30 ENCOUNTER — RX RENEWAL (OUTPATIENT)
Age: 73
End: 2023-10-30

## 2023-11-05 ENCOUNTER — RX RENEWAL (OUTPATIENT)
Age: 73
End: 2023-11-05

## 2023-11-06 ENCOUNTER — RX RENEWAL (OUTPATIENT)
Age: 73
End: 2023-11-06

## 2023-12-06 ENCOUNTER — RX RENEWAL (OUTPATIENT)
Age: 73
End: 2023-12-06

## 2023-12-22 ENCOUNTER — NON-APPOINTMENT (OUTPATIENT)
Age: 73
End: 2023-12-22

## 2023-12-22 ENCOUNTER — APPOINTMENT (OUTPATIENT)
Dept: HEART AND VASCULAR | Facility: CLINIC | Age: 73
End: 2023-12-22
Payer: MEDICARE

## 2023-12-22 VITALS
BODY MASS INDEX: 22.76 KG/M2 | OXYGEN SATURATION: 98 % | HEART RATE: 72 BPM | HEIGHT: 67 IN | SYSTOLIC BLOOD PRESSURE: 134 MMHG | DIASTOLIC BLOOD PRESSURE: 70 MMHG | WEIGHT: 145 LBS | TEMPERATURE: 97.9 F

## 2023-12-22 DIAGNOSIS — I51.89 OTHER ILL-DEFINED HEART DISEASES: ICD-10-CM

## 2023-12-22 PROCEDURE — 93000 ELECTROCARDIOGRAM COMPLETE: CPT

## 2023-12-22 PROCEDURE — 99214 OFFICE O/P EST MOD 30 MIN: CPT | Mod: 25

## 2023-12-31 PROBLEM — I51.89 DIASTOLIC DYSFUNCTION: Status: ACTIVE | Noted: 2022-02-21

## 2023-12-31 NOTE — PHYSICAL EXAM
[Well Developed] : well developed [Well Nourished] : well nourished [No Acute Distress] : no acute distress [Normal Conjunctiva] : normal conjunctiva [Normal Venous Pressure] : normal venous pressure [Normal S1, S2] : normal S1, S2 [No Murmur] : no murmur [No Rub] : no rub [No Gallop] : no gallop [Clear Lung Fields] : clear lung fields [Good Air Entry] : good air entry [No Respiratory Distress] : no respiratory distress  [Soft] : abdomen soft [Non Tender] : non-tender [No Masses/organomegaly] : no masses/organomegaly [Normal Bowel Sounds] : normal bowel sounds [Normal Gait] : normal gait [No Cyanosis] : no cyanosis [No Edema] : no edema [No Clubbing] : no clubbing [No Varicosities] : no varicosities [No Rash] : no rash [No Skin Lesions] : no skin lesions [Moves all extremities] : moves all extremities [No Focal Deficits] : no focal deficits [Normal Speech] : normal speech [Alert and Oriented] : alert and oriented [Normal memory] : normal memory [de-identified] : +carotid bruit

## 2023-12-31 NOTE — ASSESSMENT
[FreeTextEntry1] : # CAD- stable  - MPI on 2/25/22 c/w a small area of mild ischemia - s/p LHC on 6/22/22 w/ RAMANA-pLAD - cont OMT for now including ASA/statin/BB/nitrate - pt denies cp/sob  # HTN - stable - BP today 134/70 mmHg - home BP cuff not correlating with office BPs - advised to return current BP monitor and exchange for a new one  # HLD - stable - cont atorva 20 qd - cont to monitor  # HFpEF - euvolemic now - repeat TTE by Dr. Pillai at [facility] prior to his renal transplant - pt states "everything was normal" - cont GDMT  # DM - a1c last 6.6% - cont current insulin regimen  # COBB - now resolved - will cont to monitor  # carotid bruit - carotid US c/w L pICA mild stenosis, R pICA no stenosis - will cont to monitor - cont ASA and statin  CKD - last creatinine 1.3 1 wk ago - will cont to follow    RTC 4 months

## 2023-12-31 NOTE — HISTORY OF PRESENT ILLNESS
[FreeTextEntry1] : 73M w/ CAD (s/p RAMANA to pLAD - 6/22/22), HTN, HLD, DM2, and a PMHx of ESRD (s/p renal transplant, 2023) and HFpEF who presents for cardiac f/u.  12/22/23 OV: pt returns today for 4mth f/u, states he has been doing well. No c/o chest pain, COBB or palpitations. Compliant with all medications.  8/11/23 OV: pt returns today for BP cuff calibration and med rec. No new complaints. 7/28/23 OV: pt returns today s/p kidney transplant. No new symptoms.  12/30/22 OV: no new complaints. States he had his TTE done at [facility] and was told "everything was normal".  10/14/22 OV: no new complaints. Thinks he may be scheduled for his renal transplant in January 2023. 7/8/22 OV: s/p RAMANA-pLAD 6/22/22. Patient reports he is feeling well and has no complaints. Still awaiting news about his renal transplant.  6/10/22 OV: s/p hospitalization for chest pain, refused LHC due to fear of needing HD after. Patient now agreeable to proceed with LHC. 3/4/22 TH: no new complaints, no worsening of COBB. MPI results from 2/22 reviewed, + small area of ischemia. Refusing LHC stating that his sx have resolved. 2/11/22 OV:  endorses COBB, denies chest pain. No associated nausea, vomiting or diaphoresis. Compliant w/ all medications.  12/22/23 ECG: NSR 67 bpm, NS TWC 7/28/23 ECG: NSR at 61 bpm, early repol 12/30/22 ECG: NSR at 79 bpm, nl; axis, LAE, NSTWC 10/14/22 ECG: NSR at 77 bpm, nl axis, LAE, NSTWC  7/8/22 ECG: NSR at 73 bpm, nl axis  6/10/22 ECG: NSR at 75 bpm, nl axis  2/11/22 ECG: NSR at 89 bpm, LAD

## 2024-01-04 ENCOUNTER — RX RENEWAL (OUTPATIENT)
Age: 74
End: 2024-01-04

## 2024-01-14 NOTE — PATIENT PROFILE ADULT - NSPROMUTPARTICIPCAREFT_GEN_A_NUR
S: Patient resting in bed complains of minimal incisional pain no radicular pain.  Patient states she would like to go home today.  O: Vital signs stable afebrile  Motor 5 out of 5 in lower extremities bilaterally  Wound clean dry intact  Minimal output  Assessment and plan  Patient status post lumbar fusion postop day 5  Discontinue drain discontinue antibiotics  Discharge patient home  Return to clinic in 3 weeks  Diet regular  Activity ad rodger.  Rx [place], will not prescribe tizanidine since this decreases patient's blood pressure per primary service at recommendation  Patient will follow-up in our office   none

## 2024-01-28 ENCOUNTER — RX RENEWAL (OUTPATIENT)
Age: 74
End: 2024-01-28

## 2024-01-29 ENCOUNTER — RX RENEWAL (OUTPATIENT)
Age: 74
End: 2024-01-29

## 2024-01-29 RX ORDER — ATORVASTATIN CALCIUM 20 MG/1
20 TABLET, FILM COATED ORAL
Qty: 90 | Refills: 0 | Status: ACTIVE | COMMUNITY
Start: 2018-09-17 | End: 1900-01-01

## 2024-02-20 ENCOUNTER — RX RENEWAL (OUTPATIENT)
Age: 74
End: 2024-02-20

## 2024-03-03 ENCOUNTER — RX RENEWAL (OUTPATIENT)
Age: 74
End: 2024-03-03

## 2024-03-10 ENCOUNTER — RX RENEWAL (OUTPATIENT)
Age: 74
End: 2024-03-10

## 2024-03-11 ENCOUNTER — RX RENEWAL (OUTPATIENT)
Age: 74
End: 2024-03-11

## 2024-03-13 LAB
ALBUMIN SERPL ELPH-MCNC: 4.3 G/DL
ANION GAP SERPL CALC-SCNC: 10 MMOL/L
APPEARANCE: CLEAR
BACTERIA: NEGATIVE /HPF
BASOPHILS # BLD AUTO: 0.02 K/UL
BASOPHILS NFR BLD AUTO: 0.3 %
BILIRUBIN URINE: NEGATIVE
BLOOD URINE: NEGATIVE
BUN SERPL-MCNC: 20 MG/DL
CALCIUM SERPL-MCNC: 9.4 MG/DL
CALCIUM SERPL-MCNC: 9.4 MG/DL
CAST: 0 /LPF
CHLORIDE SERPL-SCNC: 98 MMOL/L
CO2 SERPL-SCNC: 27 MMOL/L
COLOR: YELLOW
CREAT SERPL-MCNC: 1.39 MG/DL
CREAT SPEC-SCNC: 78 MG/DL
CYSTATIN C SERPL-MCNC: 1.76 MG/L
EGFR: 53 ML/MIN/1.73M2
EOSINOPHIL # BLD AUTO: 0.36 K/UL
EOSINOPHIL NFR BLD AUTO: 5.7 %
EPITHELIAL CELLS: 0 /HPF
ESTIMATED AVERAGE GLUCOSE: 166 MG/DL
GFR/BSA.PRED SERPLBLD CYS-BASED-ARV: 35 ML/MIN/1.73M2
GLUCOSE QUALITATIVE U: NEGATIVE MG/DL
GLUCOSE SERPL-MCNC: 214 MG/DL
HBA1C MFR BLD HPLC: 7.4 %
HCT VFR BLD CALC: 46.9 %
HGB BLD-MCNC: 15.5 G/DL
IMM GRANULOCYTES NFR BLD AUTO: 0.3 %
KETONES URINE: NEGATIVE MG/DL
LEUKOCYTE ESTERASE URINE: ABNORMAL
LYMPHOCYTES # BLD AUTO: 1.17 K/UL
LYMPHOCYTES NFR BLD AUTO: 18.5 %
MAN DIFF?: NORMAL
MCHC RBC-ENTMCNC: 30 PG
MCHC RBC-ENTMCNC: 33 GM/DL
MCV RBC AUTO: 90.9 FL
MICROALBUMIN 24H UR DL<=1MG/L-MCNC: 4.3 MG/DL
MICROALBUMIN/CREAT 24H UR-RTO: 55 MG/G
MICROSCOPIC-UA: NORMAL
MONOCYTES # BLD AUTO: 0.56 K/UL
MONOCYTES NFR BLD AUTO: 8.8 %
NEUTROPHILS # BLD AUTO: 4.2 K/UL
NEUTROPHILS NFR BLD AUTO: 66.4 %
NITRITE URINE: NEGATIVE
PARATHYROID HORMONE INTACT: 70 PG/ML
PH URINE: 6
PHOSPHATE SERPL-MCNC: 4.1 MG/DL
PLATELET # BLD AUTO: 209 K/UL
POTASSIUM SERPL-SCNC: 5.1 MMOL/L
PROTEIN URINE: NORMAL MG/DL
RBC # BLD: 5.16 M/UL
RBC # FLD: 13 %
RED BLOOD CELLS URINE: 0 /HPF
SODIUM SERPL-SCNC: 135 MMOL/L
SPECIFIC GRAVITY URINE: 1.02
TACROLIMUS SERPL-MCNC: 7.3 NG/ML
UROBILINOGEN URINE: 0.2 MG/DL
WBC # FLD AUTO: 6.33 K/UL
WHITE BLOOD CELLS URINE: 6 /HPF

## 2024-03-15 ENCOUNTER — APPOINTMENT (OUTPATIENT)
Dept: NEPHROLOGY | Facility: CLINIC | Age: 74
End: 2024-03-15
Payer: MEDICARE

## 2024-03-15 VITALS — SYSTOLIC BLOOD PRESSURE: 110 MMHG | HEART RATE: 70 BPM | DIASTOLIC BLOOD PRESSURE: 70 MMHG | RESPIRATION RATE: 12 BRPM

## 2024-03-15 DIAGNOSIS — D84.821 IMMUNODEFICIENCY DUE TO DRUGS: ICD-10-CM

## 2024-03-15 DIAGNOSIS — N18.5 CHRONIC KIDNEY DISEASE, STAGE 5: ICD-10-CM

## 2024-03-15 DIAGNOSIS — Z79.899 IMMUNODEFICIENCY DUE TO DRUGS: ICD-10-CM

## 2024-03-15 PROCEDURE — 99215 OFFICE O/P EST HI 40 MIN: CPT

## 2024-03-15 PROCEDURE — G2211 COMPLEX E/M VISIT ADD ON: CPT

## 2024-03-21 PROBLEM — N18.5 CKD (CHRONIC KIDNEY DISEASE) STAGE 5, GFR LESS THAN 15 ML/MIN: Status: ACTIVE | Noted: 2023-03-10

## 2024-03-21 PROBLEM — D84.821 IMMUNOCOMPROMISED STATE DUE TO DRUG THERAPY: Status: ACTIVE | Noted: 2023-06-28

## 2024-03-21 NOTE — HISTORY OF PRESENT ILLNESS
[FreeTextEntry1] : 74yo with h/o acute hyponatremia related to chlorthalidone, HTN, BPH with urinary retention, long standing uncontrolled DMII + retinopathy here for f/u of CKD V now s/p DDRT March 20 2023 at [facility]   BID, tacro 2 &1, aspirin 81, atorvastatin 40, B12 clopidogrel 75 docusate 100 three capsules, ferrous gluconate 324, levtothryoxine 75, lokelma 10, magnesium 400, nifedipine 30, bactrim 400-80, tradjenta 5, omeprazole 40   No anemia, will stop iron supplement  Social: born in [place]. No tobacco/drug use  Planning on going to [place] for pilgrimmage   All other ROS negative

## 2024-03-21 NOTE — PHYSICAL EXAM
[General Appearance - In No Acute Distress] : in no acute distress [General Appearance - Alert] : alert [Sclera] : the sclera and conjunctiva were normal [PERRL With Normal Accommodation] : pupils were equal in size, round, and reactive to light [Outer Ear] : the ears and nose were normal in appearance [Extraocular Movements] : extraocular movements were intact [Oropharynx] : the oropharynx was normal [Neck Appearance] : the appearance of the neck was normal [Neck Cervical Mass (___cm)] : no neck mass was observed [Jugular Venous Distention Increased] : there was no jugular-venous distention [Thyroid Diffuse Enlargement] : the thyroid was not enlarged [Thyroid Nodule] : there were no palpable thyroid nodules [Auscultation Breath Sounds / Voice Sounds] : lungs were clear to auscultation bilaterally [Heart Rate And Rhythm] : heart rate was normal and rhythm regular [Heart Sounds] : normal S1 and S2 [Heart Sounds Gallop] : no gallops [Murmurs] : no murmurs [Heart Sounds Pericardial Friction Rub] : no pericardial rub [Edema] : there was no peripheral edema [Veins - Varicosity Changes] : there were no varicosital changes [Bowel Sounds] : normal bowel sounds [Abdomen Soft] : soft [Abdomen Tenderness] : non-tender [Abdomen Mass (___ Cm)] : no abdominal mass palpated [No CVA Tenderness] : no ~M costovertebral angle tenderness [No Spinal Tenderness] : no spinal tenderness [Abnormal Walk] : normal gait [Involuntary Movements] : no involuntary movements were seen [Skin Color & Pigmentation] : normal skin color and pigmentation [Skin Turgor] : normal skin turgor [Cranial Nerves] : cranial nerves 2-12 were intact [] : no rash [Affect] : the affect was normal [No Focal Deficits] : no focal deficits [Mood] : the mood was normal

## 2024-03-21 NOTE — ASSESSMENT
[FreeTextEntry1] : 75yo with h/o acute hyponatremia related to chlorthalidone, HTN, BPH with urinary retention, long standing uncontrolled DMII + retinopathy here for f/u of CKD V now s/p DDRT March 20 2023 at [facility]  ESKD s/p Transplant, now with CKD 2-3 - Reviewed labs in detail with patient, no signs of acute kidney failure, on appropriate tacrolimus dose and level, off of ppx meds.  DM - reviewed A1C, likely worsened 2/2 tacrolimus, discussed treatment and diet at length, will monitor for now - Given asymptomatic bacteriuria would defer SGLT-2 at this time  HTN - at goal, continue current meds for now  RTC in 3-6 months with labs prior, continue to follow closely with transplant clinic.

## 2024-04-21 ENCOUNTER — RX RENEWAL (OUTPATIENT)
Age: 74
End: 2024-04-21

## 2024-04-23 ENCOUNTER — APPOINTMENT (OUTPATIENT)
Dept: INTERNAL MEDICINE | Facility: CLINIC | Age: 74
End: 2024-04-23
Payer: MEDICARE

## 2024-04-23 ENCOUNTER — NON-APPOINTMENT (OUTPATIENT)
Age: 74
End: 2024-04-23

## 2024-04-23 VITALS
BODY MASS INDEX: 23.23 KG/M2 | SYSTOLIC BLOOD PRESSURE: 144 MMHG | DIASTOLIC BLOOD PRESSURE: 78 MMHG | WEIGHT: 148 LBS | HEIGHT: 67 IN | TEMPERATURE: 98 F | HEART RATE: 69 BPM | OXYGEN SATURATION: 97 %

## 2024-04-23 DIAGNOSIS — E03.9 HYPOTHYROIDISM, UNSPECIFIED: ICD-10-CM

## 2024-04-23 PROCEDURE — 99214 OFFICE O/P EST MOD 30 MIN: CPT

## 2024-04-23 PROCEDURE — G2211 COMPLEX E/M VISIT ADD ON: CPT

## 2024-04-23 RX ORDER — INSULIN LISPRO 100 [IU]/ML
100 INJECTION, SOLUTION INTRAVENOUS; SUBCUTANEOUS
Refills: 0 | Status: ACTIVE | COMMUNITY
Start: 2024-04-23

## 2024-04-23 RX ORDER — LINAGLIPTIN 5 MG/1
5 TABLET, FILM COATED ORAL
Qty: 90 | Refills: 0 | Status: ACTIVE | COMMUNITY
Start: 2019-05-06 | End: 1900-01-01

## 2024-04-23 RX ORDER — INSULIN GLARGINE 100 [IU]/ML
100 INJECTION, SOLUTION SUBCUTANEOUS
Qty: 30 | Refills: 0 | Status: ACTIVE | COMMUNITY
Start: 2018-11-07 | End: 1900-01-01

## 2024-04-23 NOTE — PLAN
[FreeTextEntry1] :    ====== chart reviewed in detail since last visit ==========  [] ask cards if holding plavix- hethinks not taking   [] f/u renal transplant [] letter for jury duty- retina issue [] f/u renal transplant, cards, [] f/u renal for CKD [] bring us med list to review  [] f/u renal closely, endo, cards [] see optho, renal [] diabetes - needs to get better- he'll try to exercise more- hold off on adjusting med for now. change humalog ot before lunch instead of before dinner as post lunch sugar always higher  [] see derm for skin cancer screening

## 2024-04-23 NOTE — HISTORY OF PRESENT ILLNESS
[FreeTextEntry1] : diabetes [de-identified] :  73 yo M w/ h/o controlled HTN, stable BPH, stable Diabetes, stable stage 4 CKD, CAD here for diabetes  overall feels well. not been exercising as much

## 2024-04-25 PROBLEM — I25.10 CAD (CORONARY ARTERY DISEASE): Status: ACTIVE | Noted: 2022-05-13

## 2024-04-25 PROBLEM — I10 BENIGN ESSENTIAL HYPERTENSION: Status: ACTIVE | Noted: 2019-03-12

## 2024-04-25 PROBLEM — R09.89 BILATERAL CAROTID BRUITS: Status: ACTIVE | Noted: 2023-07-28

## 2024-04-25 PROBLEM — E78.5 HLD (HYPERLIPIDEMIA): Status: ACTIVE | Noted: 2019-03-12

## 2024-04-26 ENCOUNTER — APPOINTMENT (OUTPATIENT)
Dept: HEART AND VASCULAR | Facility: CLINIC | Age: 74
End: 2024-04-26
Payer: MEDICARE

## 2024-04-26 ENCOUNTER — NON-APPOINTMENT (OUTPATIENT)
Age: 74
End: 2024-04-26

## 2024-04-26 VITALS
TEMPERATURE: 97.8 F | WEIGHT: 145 LBS | OXYGEN SATURATION: 97 % | SYSTOLIC BLOOD PRESSURE: 108 MMHG | DIASTOLIC BLOOD PRESSURE: 54 MMHG | BODY MASS INDEX: 22.76 KG/M2 | HEIGHT: 67 IN | HEART RATE: 75 BPM

## 2024-04-26 DIAGNOSIS — E78.5 HYPERLIPIDEMIA, UNSPECIFIED: ICD-10-CM

## 2024-04-26 DIAGNOSIS — I10 ESSENTIAL (PRIMARY) HYPERTENSION: ICD-10-CM

## 2024-04-26 DIAGNOSIS — I25.10 ATHEROSCLEROTIC HEART DISEASE OF NATIVE CORONARY ARTERY W/OUT ANGINA PECTORIS: ICD-10-CM

## 2024-04-26 DIAGNOSIS — I50.30 UNSPECIFIED DIASTOLIC (CONGESTIVE) HEART FAILURE: ICD-10-CM

## 2024-04-26 DIAGNOSIS — R09.89 OTHER SPECIFIED SYMPTOMS AND SIGNS INVOLVING THE CIRCULATORY AND RESPIRATORY SYSTEMS: ICD-10-CM

## 2024-04-26 PROCEDURE — G2211 COMPLEX E/M VISIT ADD ON: CPT

## 2024-04-26 PROCEDURE — 99214 OFFICE O/P EST MOD 30 MIN: CPT

## 2024-04-26 PROCEDURE — 93000 ELECTROCARDIOGRAM COMPLETE: CPT

## 2024-04-30 LAB
ALBUMIN SERPL ELPH-MCNC: 4.3 G/DL
ALP BLD-CCNC: 65 U/L
ALT SERPL-CCNC: 15 U/L
ANION GAP SERPL CALC-SCNC: 11 MMOL/L
APO LP(A) SERPL-MCNC: 65.8 NMOL/L
AST SERPL-CCNC: 20 U/L
BASOPHILS # BLD AUTO: 0.01 K/UL
BASOPHILS NFR BLD AUTO: 0.3 %
BILIRUB SERPL-MCNC: 0.4 MG/DL
BUN SERPL-MCNC: 13 MG/DL
CALCIUM SERPL-MCNC: 9 MG/DL
CHLORIDE SERPL-SCNC: 101 MMOL/L
CHOLEST SERPL-MCNC: 127 MG/DL
CO2 SERPL-SCNC: 24 MMOL/L
CREAT SERPL-MCNC: 1.3 MG/DL
EGFR: 57 ML/MIN/1.73M2
EOSINOPHIL # BLD AUTO: 0.19 K/UL
EOSINOPHIL NFR BLD AUTO: 5.8 %
ESTIMATED AVERAGE GLUCOSE: 171 MG/DL
GLUCOSE SERPL-MCNC: 124 MG/DL
HBA1C MFR BLD HPLC: 7.6 %
HCT VFR BLD CALC: 45.7 %
HDLC SERPL-MCNC: 31 MG/DL
HGB BLD-MCNC: 15 G/DL
IMM GRANULOCYTES NFR BLD AUTO: 0.6 %
LDLC SERPL CALC-MCNC: 53 MG/DL
LYMPHOCYTES # BLD AUTO: 0.88 K/UL
LYMPHOCYTES NFR BLD AUTO: 27.1 %
MAN DIFF?: NORMAL
MCHC RBC-ENTMCNC: 30.1 PG
MCHC RBC-ENTMCNC: 32.8 GM/DL
MCV RBC AUTO: 91.8 FL
MONOCYTES # BLD AUTO: 0.4 K/UL
MONOCYTES NFR BLD AUTO: 12.3 %
NEUTROPHILS # BLD AUTO: 1.75 K/UL
NEUTROPHILS NFR BLD AUTO: 53.9 %
NONHDLC SERPL-MCNC: 96 MG/DL
PLATELET # BLD AUTO: 168 K/UL
POTASSIUM SERPL-SCNC: 5.2 MMOL/L
PROT SERPL-MCNC: 6.7 G/DL
RBC # BLD: 4.98 M/UL
RBC # FLD: 12.6 %
SODIUM SERPL-SCNC: 136 MMOL/L
TRIGL SERPL-MCNC: 275 MG/DL
TSH SERPL-ACNC: 4.37 UIU/ML
WBC # FLD AUTO: 3.25 K/UL

## 2024-05-07 PROBLEM — I50.30 (HFPEF) HEART FAILURE WITH PRESERVED EJECTION FRACTION: Status: ACTIVE | Noted: 2024-05-07

## 2024-05-07 NOTE — HISTORY OF PRESENT ILLNESS
[FreeTextEntry1] : 74M w/ CAD (s/p RAMANA to pLAD - 6/22/22), HTN, HLD, DM2, and a PMHx of ESRD (s/p renal transplant, 2023) and HFpEF who presents for cardiac f/u.  4/26/24 OV: pt returns today for f/u. Overall feeling well, no new complaints. Denies any c/o chest pain, palpitations or COBB. Compliant w/ all medications. No c/o abnormal bleeding.  12/22/23 OV: pt returns today for f/u  8/11/23 OV: pt returns today for BP cuff calibration and med rec. No new complaints. 7/28/23 OV: pt returns today s/p kidney transplant. No new symptoms.  12/30/22 OV: no new complaints. States he had his TTE done at [facility] and was told "everything was normal".  10/14/22 OV: no new complaints. Thinks he may be scheduled for his renal transplant in January 2023. 7/8/22 OV: s/p RAMANA-pLAD 6/22/22. Patient reports he is feeling well and has no complaints. Still awaiting news about his renal transplant.  6/10/22 OV: s/p hospitalization for chest pain, refused LHC due to fear of needing HD after. Patient now agreeable to proceed with LHC. 3/4/22 TH: no new complaints, no worsening of COBB. MPI results from 2/22 reviewed, + small area of ischemia. Refusing LHC stating that his sx have resolved. 2/11/22 OV:  endorses COBB, denies chest pain. No associated nausea, vomiting or diaphoresis. Compliant w/ all medications.  4/26/24 ECG: NSR at 63 bpm, NSTWC 12/22/23 ECG: NSR 67 bpm, NS TWC 7/28/23 ECG: NSR at 61 bpm, early repol 12/30/22 ECG: NSR at 79 bpm, nl; axis, LAE, NSTWC 10/14/22 ECG: NSR at 77 bpm, nl axis, LAE, NSTWC  7/8/22 ECG: NSR at 73 bpm, nl axis  6/10/22 ECG: NSR at 75 bpm, nl axis  2/11/22 ECG: NSR at 89 bpm, LAD

## 2024-05-07 NOTE — ASSESSMENT
[FreeTextEntry1] : # CAD - MPI on 2/25/22 c/w a small area of mild ischemia  - s/p LHC on 6/22/22 w/ RAMANA-pLAD - cont OMT for now including ASA/statin/BB/nitrate - pt denies cp/sob - check labs - check TTE 8/2024  # HFpEF - euvolemic now - repeat TTE by Dr. Pillai at [facility] prior to his renal transplant - pt states "everything was normal" - will bring in med list at next OV - check TTE 8/2024  # HTN - stable  - /54 - pt will bring in med list at next OV  # HLD - stable - cont atorva 20 qd - cont to monitor - check labs  # DM - a1c now 6.6% - cont current insulin regimen - check labs  # carotid bruit - carotid US c/w L pICA mild stenosis, R pICA no stenosis - will cont to monitor - cont ASA and statin  RTC August 2024

## 2024-05-23 NOTE — ED ADULT TRIAGE NOTE - NS ED TRIAGE EKG
EKG completed
[FreeTextEntry1] : Mrs. Tobias presents for annual physical examination.  She is feeling well.  She does have a history of what appears to be gouty arthritis of the right toe which is intermittent.  She occasionally will have pain in the left toe.  She has not undergone treatment.  1.  Comprehensive blood work was reviewed with patient.  Uric acid level is 5.  Total protein is 6.4. 2.  Patient will be given a prescription for prednisone 40 mg daily x 5 days.  If she does get another episode toe pain she will start prednisone therapy and will then be referred for rheumatology evaluation. 3.  Patient is on hormone replacement therapy. 4.  Patient has a history of colonic polyps, however, colonoscopy in 1/21 was normal. #5.  Follow-up in 6 months.

## 2024-06-03 ENCOUNTER — RX RENEWAL (OUTPATIENT)
Age: 74
End: 2024-06-03

## 2024-06-03 RX ORDER — LEVOTHYROXINE SODIUM 0.05 MG/1
50 TABLET ORAL
Qty: 30 | Refills: 0 | Status: ACTIVE | COMMUNITY
Start: 2023-01-30 | End: 1900-01-01

## 2024-06-18 ENCOUNTER — RX RENEWAL (OUTPATIENT)
Age: 74
End: 2024-06-18

## 2024-06-18 RX ORDER — CLOPIDOGREL BISULFATE 75 MG/1
75 TABLET, FILM COATED ORAL
Qty: 90 | Refills: 0 | Status: ACTIVE | COMMUNITY
Start: 2022-06-23 | End: 1900-01-01

## 2024-07-02 ENCOUNTER — APPOINTMENT (OUTPATIENT)
Dept: INTERNAL MEDICINE | Facility: CLINIC | Age: 74
End: 2024-07-02
Payer: MEDICARE

## 2024-07-02 VITALS
WEIGHT: 150 LBS | SYSTOLIC BLOOD PRESSURE: 145 MMHG | DIASTOLIC BLOOD PRESSURE: 77 MMHG | HEART RATE: 62 BPM | TEMPERATURE: 97.6 F | OXYGEN SATURATION: 99 % | BODY MASS INDEX: 23.54 KG/M2 | HEIGHT: 67 IN

## 2024-07-02 DIAGNOSIS — M25.569 PAIN IN UNSPECIFIED KNEE: ICD-10-CM

## 2024-07-02 DIAGNOSIS — Z79.4 TYPE 2 DIABETES MELLITUS W/OUT COMPLICATIONS: ICD-10-CM

## 2024-07-02 DIAGNOSIS — E11.9 TYPE 2 DIABETES MELLITUS W/OUT COMPLICATIONS: ICD-10-CM

## 2024-07-02 DIAGNOSIS — R26.89 OTHER ABNORMALITIES OF GAIT AND MOBILITY: ICD-10-CM

## 2024-07-02 DIAGNOSIS — Z94.0 KIDNEY TRANSPLANT STATUS: ICD-10-CM

## 2024-07-02 PROCEDURE — G2211 COMPLEX E/M VISIT ADD ON: CPT

## 2024-07-02 PROCEDURE — 36415 COLL VENOUS BLD VENIPUNCTURE: CPT

## 2024-07-02 PROCEDURE — 99215 OFFICE O/P EST HI 40 MIN: CPT

## 2024-07-02 RX ORDER — BLOOD-GLUCOSE METER
W/DEVICE EACH MISCELLANEOUS
Qty: 1 | Refills: 0 | Status: ACTIVE | COMMUNITY
Start: 2024-07-02 | End: 1900-01-01

## 2024-07-03 LAB
ALBUMIN SERPL ELPH-MCNC: 4.1 G/DL
ALP BLD-CCNC: 56 U/L
ALT SERPL-CCNC: 14 U/L
ANION GAP SERPL CALC-SCNC: 10 MMOL/L
AST SERPL-CCNC: 22 U/L
BILIRUB SERPL-MCNC: 0.6 MG/DL
BUN SERPL-MCNC: 17 MG/DL
CALCIUM SERPL-MCNC: 9.3 MG/DL
CHLORIDE SERPL-SCNC: 101 MMOL/L
CO2 SERPL-SCNC: 26 MMOL/L
CREAT SERPL-MCNC: 1.25 MG/DL
EGFR: 60 ML/MIN/1.73M2
GLUCOSE SERPL-MCNC: 249 MG/DL
POTASSIUM SERPL-SCNC: 5.2 MMOL/L
PROT SERPL-MCNC: 6.8 G/DL
SODIUM SERPL-SCNC: 138 MMOL/L

## 2024-07-03 RX ORDER — LANCETS 30 GAUGE
EACH MISCELLANEOUS
Qty: 360 | Refills: 2 | Status: ACTIVE | COMMUNITY
Start: 2024-07-02 | End: 1900-01-01

## 2024-07-24 ENCOUNTER — RX RENEWAL (OUTPATIENT)
Age: 74
End: 2024-07-24

## 2024-08-12 ENCOUNTER — RX RENEWAL (OUTPATIENT)
Age: 74
End: 2024-08-12

## 2024-08-26 NOTE — ED ADULT NURSE NOTE - CHIEF COMPLAINT
Detail Level: Detailed Detail Level: Generalized The patient is a 70y Male complaining of wound check.

## 2024-08-27 ENCOUNTER — RX RENEWAL (OUTPATIENT)
Age: 74
End: 2024-08-27

## 2024-08-30 ENCOUNTER — APPOINTMENT (OUTPATIENT)
Dept: HEART AND VASCULAR | Facility: CLINIC | Age: 74
End: 2024-08-30
Payer: MEDICARE

## 2024-08-30 DIAGNOSIS — I25.10 ATHEROSCLEROTIC HEART DISEASE OF NATIVE CORONARY ARTERY W/OUT ANGINA PECTORIS: ICD-10-CM

## 2024-08-30 PROCEDURE — 93306 TTE W/DOPPLER COMPLETE: CPT

## 2024-09-05 DIAGNOSIS — E03.9 HYPOTHYROIDISM, UNSPECIFIED: ICD-10-CM

## 2024-09-05 DIAGNOSIS — Z94.0 KIDNEY TRANSPLANT STATUS: ICD-10-CM

## 2024-09-06 ENCOUNTER — APPOINTMENT (OUTPATIENT)
Dept: NEPHROLOGY | Facility: CLINIC | Age: 74
End: 2024-09-06
Payer: MEDICARE

## 2024-09-06 VITALS — DIASTOLIC BLOOD PRESSURE: 73 MMHG | SYSTOLIC BLOOD PRESSURE: 152 MMHG | RESPIRATION RATE: 12 BRPM | HEART RATE: 60 BPM

## 2024-09-06 DIAGNOSIS — D84.821 IMMUNODEFICIENCY DUE TO DRUGS: ICD-10-CM

## 2024-09-06 DIAGNOSIS — N18.5 CHRONIC KIDNEY DISEASE, STAGE 5: ICD-10-CM

## 2024-09-06 DIAGNOSIS — N25.81 SECONDARY HYPERPARATHYROIDISM OF RENAL ORIGIN: ICD-10-CM

## 2024-09-06 DIAGNOSIS — E11.9 TYPE 2 DIABETES MELLITUS W/OUT COMPLICATIONS: ICD-10-CM

## 2024-09-06 DIAGNOSIS — R80.9 PROTEINURIA, UNSPECIFIED: ICD-10-CM

## 2024-09-06 DIAGNOSIS — Z79.899 IMMUNODEFICIENCY DUE TO DRUGS: ICD-10-CM

## 2024-09-06 DIAGNOSIS — E87.5 HYPERKALEMIA: ICD-10-CM

## 2024-09-06 DIAGNOSIS — Z79.4 TYPE 2 DIABETES MELLITUS W/OUT COMPLICATIONS: ICD-10-CM

## 2024-09-06 DIAGNOSIS — I50.30 UNSPECIFIED DIASTOLIC (CONGESTIVE) HEART FAILURE: ICD-10-CM

## 2024-09-06 LAB
ALBUMIN SERPL ELPH-MCNC: 4.4 G/DL
ALP BLD-CCNC: 63 U/L
ALT SERPL-CCNC: 12 U/L
ANION GAP SERPL CALC-SCNC: 14 MMOL/L
APPEARANCE: CLEAR
AST SERPL-CCNC: 18 U/L
BACTERIA: NEGATIVE /HPF
BASOPHILS # BLD AUTO: 0.02 K/UL
BASOPHILS NFR BLD AUTO: 0.4 %
BILIRUB SERPL-MCNC: 0.6 MG/DL
BILIRUBIN URINE: NEGATIVE
BLOOD URINE: NEGATIVE
BUN SERPL-MCNC: 18 MG/DL
CALCIUM SERPL-MCNC: 9.2 MG/DL
CAST: 0 /LPF
CD16+CD56+ CELLS # BLD: 75 CELLS/UL
CD16+CD56+ CELLS NFR BLD: 7 %
CD19 CELLS NFR BLD: 97 CELLS/UL
CD3 CELLS # BLD: 893 CELLS/UL
CD3 CELLS NFR BLD: 81 %
CD3+CD4+ CELLS # BLD: 189 CELLS/UL
CD3+CD4+ CELLS NFR BLD: 17 %
CD3+CD4+ CELLS/CD3+CD8+ CLL SPEC: 0.28 RATIO
CD3+CD8+ CELLS # SPEC: 686 CELLS/UL
CD3+CD8+ CELLS NFR BLD: 62 %
CELLS.CD3-CD19+/CELLS IN BLOOD: 9 %
CHLORIDE SERPL-SCNC: 101 MMOL/L
CO2 SERPL-SCNC: 24 MMOL/L
COLOR: YELLOW
CREAT SERPL-MCNC: 1.27 MG/DL
CREAT SPEC-SCNC: 78 MG/DL
CREAT SPEC-SCNC: 78 MG/DL
CREAT/PROT UR: 0.2 RATIO
CYSTATIN C SERPL-MCNC: 1.53 MG/L
EGFR: 59 ML/MIN/1.73M2
EOSINOPHIL # BLD AUTO: 0.25 K/UL
EOSINOPHIL NFR BLD AUTO: 5.3 %
EPITHELIAL CELLS: 0 /HPF
ESTIMATED AVERAGE GLUCOSE: 157 MG/DL
FERRITIN SERPL-MCNC: 145 NG/ML
GFR/BSA.PRED SERPLBLD CYS-BASED-ARV: 42 ML/MIN/1.73M2
GLUCOSE QUALITATIVE U: NEGATIVE MG/DL
GLUCOSE SERPL-MCNC: 144 MG/DL
HBA1C MFR BLD HPLC: 7.1 %
HCT VFR BLD CALC: 49.2 %
HGB BLD-MCNC: 15.6 G/DL
IMM GRANULOCYTES NFR BLD AUTO: 0.2 %
IRON SATN MFR SERPL: 19 %
IRON SERPL-MCNC: 51 UG/DL
KETONES URINE: NEGATIVE MG/DL
LEUKOCYTE ESTERASE URINE: ABNORMAL
LYMPHOCYTES # BLD AUTO: 1.13 K/UL
LYMPHOCYTES NFR BLD AUTO: 23.9 %
MAN DIFF?: NORMAL
MCHC RBC-ENTMCNC: 30 PG
MCHC RBC-ENTMCNC: 31.7 GM/DL
MCV RBC AUTO: 94.6 FL
MICROALBUMIN 24H UR DL<=1MG/L-MCNC: 3 MG/DL
MICROALBUMIN/CREAT 24H UR-RTO: 39 MG/G
MICROSCOPIC-UA: NORMAL
MONOCYTES # BLD AUTO: 0.7 K/UL
MONOCYTES NFR BLD AUTO: 14.8 %
NEUTROPHILS # BLD AUTO: 2.62 K/UL
NEUTROPHILS NFR BLD AUTO: 55.4 %
NITRITE URINE: NEGATIVE
NT-PROBNP SERPL-MCNC: 137 PG/ML
PH URINE: 6.5
PLATELET # BLD AUTO: 198 K/UL
POTASSIUM SERPL-SCNC: 5.2 MMOL/L
PROT SERPL-MCNC: 6.9 G/DL
PROT UR-MCNC: 18 MG/DL
PROTEIN URINE: 30 MG/DL
RBC # BLD: 5.2 M/UL
RBC # FLD: 12.6 %
RED BLOOD CELLS URINE: 2 /HPF
SODIUM ?TM SUB UR QN: 57 MMOL/L
SODIUM SERPL-SCNC: 139 MMOL/L
SPECIFIC GRAVITY URINE: 1.02
TACROLIMUS SERPL-MCNC: 12.3 NG/ML
TIBC SERPL-MCNC: 266 UG/DL
TSH SERPL-ACNC: 2.64 UIU/ML
UIBC SERPL-MCNC: 216 UG/DL
UROBILINOGEN URINE: 0.2 MG/DL
WBC # FLD AUTO: 4.73 K/UL
WHITE BLOOD CELLS URINE: 0 /HPF

## 2024-09-06 PROCEDURE — G2211 COMPLEX E/M VISIT ADD ON: CPT

## 2024-09-06 PROCEDURE — 99215 OFFICE O/P EST HI 40 MIN: CPT

## 2024-09-09 LAB
BACTERIA UR CULT: NORMAL
BKV DNA SPEC QL NAA+PROBE: NOT DETECTED IU/ML
BKV DNA UR QL NAA+PROBE: NEGATIVE IU/ML
LOG 10 BK QUANTITATION PCR (URINE): NORMAL

## 2024-09-10 NOTE — PHYSICAL EXAM
[General Appearance - Alert] : alert [General Appearance - In No Acute Distress] : in no acute distress [Sclera] : the sclera and conjunctiva were normal [PERRL With Normal Accommodation] : pupils were equal in size, round, and reactive to light [Extraocular Movements] : extraocular movements were intact [Outer Ear] : the ears and nose were normal in appearance [Oropharynx] : the oropharynx was normal [Neck Appearance] : the appearance of the neck was normal [Neck Cervical Mass (___cm)] : no neck mass was observed [Jugular Venous Distention Increased] : there was no jugular-venous distention [Thyroid Diffuse Enlargement] : the thyroid was not enlarged [Thyroid Nodule] : there were no palpable thyroid nodules [Auscultation Breath Sounds / Voice Sounds] : lungs were clear to auscultation bilaterally [Heart Rate And Rhythm] : heart rate was normal and rhythm regular [Heart Sounds] : normal S1 and S2 [Heart Sounds Gallop] : no gallops [Murmurs] : no murmurs [Heart Sounds Pericardial Friction Rub] : no pericardial rub [Edema] : there was no peripheral edema [Veins - Varicosity Changes] : there were no varicosital changes [Bowel Sounds] : normal bowel sounds [Abdomen Soft] : soft [Abdomen Tenderness] : non-tender [Abdomen Mass (___ Cm)] : no abdominal mass palpated [No CVA Tenderness] : no ~M costovertebral angle tenderness [No Spinal Tenderness] : no spinal tenderness [Abnormal Walk] : normal gait [Involuntary Movements] : no involuntary movements were seen [Skin Color & Pigmentation] : normal skin color and pigmentation [Skin Turgor] : normal skin turgor [] : no rash [Cranial Nerves] : cranial nerves 2-12 were intact [No Focal Deficits] : no focal deficits [Affect] : the affect was normal [Mood] : the mood was normal

## 2024-09-10 NOTE — ASSESSMENT
[FreeTextEntry1] : 73yo with h/o acute hyponatremia related to chlorthalidone, HTN, BPH with urinary retention, long standing uncontrolled DMII + retinopathy here for f/u of CKD V now s/p DDRT March 20 2023 at [facility]  ESKD s/p Transplant, now with CKD 2-3 - Reviewed labs in detail with patient, no signs of acute kidney failure, on appropriate tacrolimus dose and level, off of ppx meds.  DM - reviewed A1C, likely worsened 2/2 tacrolimus, discussed treatment and diet at length, will continue with endocrinology - Given asymptomatic bacteriuria would defer SGLT-2 at this time  HTN - close to goal continue current meds for now Discussed how to take BP appropriately at home. Advised not to smoke, drink caffeinated beverages or exercise within 30 minutes before measuring BP. Make sure cuff fits arm, as small cuffs can artificially raise BP. Make sure bladder is empty. With the cuff on your bare arm, sit in an upright position with back supported, feet flat on the floor and arm supported at heart level. Make sure the bottom of the cuff is directly above the bend of the elbow. Relax for about five minutes before taking a measurement. Resist the urge to talk or look at a cellphone. Wait one minute and retake BP, consider 3rd if elevated. Average the three readings and record in log book, bring to each check up.  Bring device at next visit to ensure accuracy   RTC in 3 months with labs prior discussed lab timing with tacro dosing, Patient agreeable with positive teachback

## 2024-09-10 NOTE — HISTORY OF PRESENT ILLNESS
[FreeTextEntry1] : 73 yo with h/o acute hyponatremia related to chlorthalidone, HTN, BPH with urinary retention, long standing uncontrolled DMII + retinopathy here for f/u of CKD V now s/p DDRT March 20 2023 at [facility]   BID, tacro 2 &1, aspirin 81, atorvastatin 40, B12 clopidogrel 75 docusate 100 three capsules, ferrous gluconate 324, levtothryoxine 75, lokelma 10, magnesium 400, nifedipine 30, bactrim 400-80, tradjenta 5, omeprazole 40, insulin, metformin  No anemia, will stop iron supplement experiencing good post-transplant progress. However, his tacrolimus level was found slightly elevated, probably due to a blood test taken after the first dose. Aside from this, all other medical indications show no abnormalities. Dental health needs attention to prevent bacterial infection due to chipped teeth, encouraged to get done ASAP - no nephrologic contraindication to procedure The patient will continue with his current medication regimen, except for holding Mycophenolate 1 day before and 1 day after dental procedures. The healthcare provider requested early morning blood samples before taking the first dose of medication for more accurate Tacrolimus levels. The patient is also advised to continue with measures to manage diabetes.  Social: born in [place]. No tobacco/drug use  All other ROS negative

## 2024-09-25 NOTE — ED ADULT NURSE NOTE - PAIN: PRESENCE, MLM
Zolpidem 5 mg   Filled on 8/25/2024  # 30/30  PDMP checked with no red flags  Medicine Refill Request    Last Office Visit: 9/17/2024   Last Consult Visit: 8/22/2024  Last Telemedicine Visit: 6/29/2024 Lucia Guerra CRNP    Next Appointment: 2/25/2025   complains of pain/discomfort

## 2024-10-19 ENCOUNTER — RX RENEWAL (OUTPATIENT)
Age: 74
End: 2024-10-19

## 2024-10-22 ENCOUNTER — RX RENEWAL (OUTPATIENT)
Age: 74
End: 2024-10-22

## 2024-11-01 ENCOUNTER — APPOINTMENT (OUTPATIENT)
Dept: HEART AND VASCULAR | Facility: CLINIC | Age: 74
End: 2024-11-01

## 2024-11-11 ENCOUNTER — RX RENEWAL (OUTPATIENT)
Age: 74
End: 2024-11-11

## 2024-11-15 ENCOUNTER — NON-APPOINTMENT (OUTPATIENT)
Age: 74
End: 2024-11-15

## 2024-11-15 ENCOUNTER — APPOINTMENT (OUTPATIENT)
Dept: HEART AND VASCULAR | Facility: CLINIC | Age: 74
End: 2024-11-15

## 2024-11-15 VITALS
SYSTOLIC BLOOD PRESSURE: 150 MMHG | DIASTOLIC BLOOD PRESSURE: 65 MMHG | OXYGEN SATURATION: 97 % | HEIGHT: 67 IN | BODY MASS INDEX: 22.29 KG/M2 | WEIGHT: 142.04 LBS | HEART RATE: 60 BPM | TEMPERATURE: 97.8 F

## 2024-11-15 DIAGNOSIS — I25.10 ATHEROSCLEROTIC HEART DISEASE OF NATIVE CORONARY ARTERY W/OUT ANGINA PECTORIS: ICD-10-CM

## 2024-11-15 DIAGNOSIS — I50.30 UNSPECIFIED DIASTOLIC (CONGESTIVE) HEART FAILURE: ICD-10-CM

## 2024-11-15 DIAGNOSIS — E78.5 HYPERLIPIDEMIA, UNSPECIFIED: ICD-10-CM

## 2024-11-15 DIAGNOSIS — Z79.4 TYPE 2 DIABETES MELLITUS W/OUT COMPLICATIONS: ICD-10-CM

## 2024-11-15 DIAGNOSIS — I10 ESSENTIAL (PRIMARY) HYPERTENSION: ICD-10-CM

## 2024-11-15 DIAGNOSIS — E11.9 TYPE 2 DIABETES MELLITUS W/OUT COMPLICATIONS: ICD-10-CM

## 2024-11-15 PROCEDURE — 93000 ELECTROCARDIOGRAM COMPLETE: CPT

## 2024-11-15 PROCEDURE — 99214 OFFICE O/P EST MOD 30 MIN: CPT

## 2024-11-15 PROCEDURE — G2211 COMPLEX E/M VISIT ADD ON: CPT

## 2024-11-15 RX ORDER — NIFEDIPINE 60 MG/1
60 TABLET, EXTENDED RELEASE ORAL DAILY
Qty: 90 | Refills: 1 | Status: ACTIVE | COMMUNITY
Start: 2024-11-15 | End: 1900-01-01

## 2024-11-20 ENCOUNTER — RX RENEWAL (OUTPATIENT)
Age: 74
End: 2024-11-20

## 2024-12-06 ENCOUNTER — APPOINTMENT (OUTPATIENT)
Dept: NEPHROLOGY | Facility: CLINIC | Age: 74
End: 2024-12-06
Payer: MEDICARE

## 2024-12-06 DIAGNOSIS — D84.821 IMMUNODEFICIENCY DUE TO DRUGS: ICD-10-CM

## 2024-12-06 DIAGNOSIS — R80.9 PROTEINURIA, UNSPECIFIED: ICD-10-CM

## 2024-12-06 DIAGNOSIS — Z79.899 IMMUNODEFICIENCY DUE TO DRUGS: ICD-10-CM

## 2024-12-06 DIAGNOSIS — I50.30 UNSPECIFIED DIASTOLIC (CONGESTIVE) HEART FAILURE: ICD-10-CM

## 2024-12-06 DIAGNOSIS — E87.5 HYPERKALEMIA: ICD-10-CM

## 2024-12-06 DIAGNOSIS — Z94.0 KIDNEY TRANSPLANT STATUS: ICD-10-CM

## 2024-12-06 DIAGNOSIS — E83.51 HYPOCALCEMIA: ICD-10-CM

## 2024-12-06 DIAGNOSIS — N40.0 BENIGN PROSTATIC HYPERPLASIA WITHOUT LOWER URINARY TRACT SYMPMS: ICD-10-CM

## 2024-12-06 PROCEDURE — 93308 TTE F-UP OR LMTD: CPT | Mod: 59

## 2024-12-06 PROCEDURE — 99215 OFFICE O/P EST HI 40 MIN: CPT

## 2024-12-06 PROCEDURE — G2211 COMPLEX E/M VISIT ADD ON: CPT

## 2024-12-06 PROCEDURE — 76775 US EXAM ABDO BACK WALL LIM: CPT | Mod: 59

## 2024-12-06 RX ORDER — IRBESARTAN 150 MG/1
150 TABLET ORAL DAILY
Qty: 90 | Refills: 1 | Status: ACTIVE | COMMUNITY
Start: 2024-12-06 | End: 1900-01-01

## 2024-12-12 VITALS — DIASTOLIC BLOOD PRESSURE: 70 MMHG | SYSTOLIC BLOOD PRESSURE: 150 MMHG | HEART RATE: 60 BPM | RESPIRATION RATE: 12 BRPM

## 2024-12-27 ENCOUNTER — RX RENEWAL (OUTPATIENT)
Age: 74
End: 2024-12-27

## 2024-12-31 ENCOUNTER — RX RENEWAL (OUTPATIENT)
Age: 74
End: 2024-12-31

## 2025-01-02 NOTE — ED PROCEDURE NOTE - PROCEDURE DATE TIME, MLM
01/02/25  Grover Larkin  [id number]  [place] [address]      Pre Procedure CT chest  Date:  1/21/25  Check in: 11:00am    Location:  1st floor outpatient Imaging    Once you are done with your CT chest please head up to the 2nd floor to check in for your procedure.       Robotic assisted Bronchoscopy Under Anesthesia  Date of Procedure:   1/21/25  Check in:   11:30am     (this is 2 hours prior to the start of your procedure)    Location: In the Main Atrium take the gold glass elevators up to the 2nd floor, Take a left off the elevators, then a quick right and go down the peterson to  Surgery/Pulmonary Registration    PATIENT INSTRUCTIONS     1. Please do not take Sildenafil (Viagra) for 3 days prior to the procedure.     2. MEDICATIONS:  Morning of the procedure you can TAKE the following medications with a sips of water:  atenolol (TENORMIN)   albuterol 108 (90 Base) MCG/ACT inhaler  fluticasone-salmeterol (Advair Diskus)    Morning of the procedure please HOLD the following medications:   hydroCHLOROthiazide   lisinopril (ZESTRIL)      4. Morning of the procedure: NO EATING FOOD AFTER: ___5:30am_- including NO gum, candy or mints.         5. Morning of the procedure you may have clear liquids up until 9:30am(4 hours prior to the procedure)   Clear Liquids:   Water, sprite/7up, ginger ale, apple juice  black coffee (NO creamer, NO Sugar)    6. Do not use tobacco products or alcohol for at least 24 hours before your procedure.    7. Due to the sedatives used for the procedure, you will be unable to drive yourself home or go back to work for the remainder of the day.   You must make arrangements for a responsible adult to drive you home and stay with you for the first 24 hrs after surgery.   For your own safety, our policies do not allow you to leave alone in public transportation, including a taxi or rideshares such as Uber.   If you are using a transportation company you still need to have another  responsible adult present with you other than the  to take you home.   Your surgery may be rescheduled if these arrangements have not been made.     8. Results are finalized in 3 business days. Our Interventional Pulmonary Team will call you with the results.    Bathing/Showering   Morning of the procedure  You're able to wear Deodorant  Wear comfortable clothing.  If you have partials/dentures you will have to remove them for the procedure.   DO NOT use any lotions, creams, perfumes/cologne or make-up the morning of your procedure.    DO NOT wear contact lenses.   DO NOT wear jewelry (remove all body piercings).     Additional Information   You will receive a phone call from a hospital pre-admission nurse to review your health history.  If the Diet Instructions are not followed your procedure will be canceled.     Insurance     CONTACT your insurance company regarding your policy, deductible, out of pocket costs, as well as covered services for your planned upcoming procedure/surgery. The authorization staff at [facility] will contact your insurance for any pre-certification required. Pre certification serves as a notification is never a guarantee of payment.   UPDATE us as soon as possible if you have a change in insurance from now until your surgical date. If we do not have the correct information, we will not be able to verify if pre-certification is required for your surgery.   CALL [facility] Billing at [phone] for an estimated cost for your surgery.  CALL [facility] Billing at [phone] for an estimated cost of anesthesia.  Procedure Codes: Procedure Codes: 21337, 28419, 76980, 92329, 44837, 77168, 22573, 90437, 75122, 07275, 78487, 88363, 35249, 30778  DX: Lung nodules [R91.8]   Mediastinal lymphadenopathy [R59.0]     Do you have Questions?     Please call  Interventional Pulmonary Clinic at [phone]  OR  BARBARA Torres Interventional Pulmonary Nurse at [phone].     Sincerely,     22-Feb-2020 16:03 John  [facility]  [address]  [place] [address]        Bronchoscopy  Endoscopic Diagnosis of Chest, Lung Problems   You’ve been told you need an endoscopic procedure to diagnose a problem in your chest or lung. This procedure lets your healthcare provider see the airway of your lungs and take a tissue sample (biopsy) or treat a lung condition, if needed.      With bronchoscopy, a flexible scope allows the healthcare provider to view and biopsy the airway.   Bronchoscopy   A bronchoscopy lets the healthcare provider look right into the airways in your lungs. This is done using a bronchoscope. A bronchoscope is a thin, flexible, hollow, lighted tube that lets the provider see inside the lung. Tools can be passed down the middle of the scope.   Transbronchial biopsy  This procedure is used mainly to take samples of tissue near the airway. It is done using a bronchoscope and tiny forceps. The forceps are passed through the scope into the airway, and a sample is taken.   Endobronchial ultrasound   This is a type of bronchoscopy that looks at the lungs and the space between the lungs (mediastinum). This is done using a flexible bronchoscope and ultrasound. An ultrasound makes images using sound waves. These images guide the healthcare provider and lets him or her see through the airway walls.   Getting ready for the procedure  Before your procedure, do the following:  Follow any directions you are given about not eating or drinking before the procedure. Also follow any directions for not smoking.  Tell your healthcare provider about all the medicines you take. This includes over-the-counter and prescription medicines, vitamins, herbs, and other supplements. You may need to stop taking some of them before the procedure, especially aspirin, warfarin, or other blood thinners. Ask your provider what medicines to stop taking before the procedure.  Talk with your provider about any  allergies and health problems you have.  Tell your provider if you are pregnant or think you may be pregnant.  Tell your provider if you are breastfeeding.  During the procedure  You will get medicine through an IV (intravenous) line to help you relax and sleep during the procedure. You may also be given local anesthesia (numbing medicine) with a needle. Then a special spray is used to numb your throat and nose or mouth. This is to help keep you comfortable and prevent coughing during the procedure.   After the procedure  You are sent to the recovery room until the sedation wears off. This takes about  1 to 2 hours. Once you are fully awake, you can go home. You will need an adult family member or friend to drive you home. Your throat will be sore for a day or two. At first, there may be a small amount of blood in your sputum. This is normal. It should go away after the second day.   Risks and possible complications  Possible risks include:  Bleeding  Infection  Injury to vocal cords  Collapsed lung (pneumothorax)  When to call your healthcare provider  Call your healthcare provider if you have any of the following:  Large amounts of blood in sputum  Blood in sputum after 2 days  Shortness of breath  Chest pain  Fever of  100.4ºF ( 38°C) or higher, or as directed by your provider  Hoarseness that won’t go away  Symptoms that get worse, or new symptoms  [employer] last reviewed this educational content on 10/1/2019  © 5590-8652 [employer]. [address] [place] [address]. All rights reserved. This information is not intended as a substitute for professional medical care. Always follow your healthcare professional's instructions.          Sincerely,     Melissa Adam RN    [facility]  [address] [address]  Phone: [phone]  Fax: [phone]

## 2025-01-06 ENCOUNTER — APPOINTMENT (OUTPATIENT)
Dept: NEPHROLOGY | Facility: CLINIC | Age: 75
End: 2025-01-06
Payer: MEDICARE

## 2025-01-06 DIAGNOSIS — I50.30 UNSPECIFIED DIASTOLIC (CONGESTIVE) HEART FAILURE: ICD-10-CM

## 2025-01-06 DIAGNOSIS — I10 ESSENTIAL (PRIMARY) HYPERTENSION: ICD-10-CM

## 2025-01-06 DIAGNOSIS — E87.5 HYPERKALEMIA: ICD-10-CM

## 2025-01-06 DIAGNOSIS — Z79.899 IMMUNODEFICIENCY DUE TO DRUGS: ICD-10-CM

## 2025-01-06 DIAGNOSIS — N25.81 SECONDARY HYPERPARATHYROIDISM OF RENAL ORIGIN: ICD-10-CM

## 2025-01-06 DIAGNOSIS — D64.9 ANEMIA, UNSPECIFIED: ICD-10-CM

## 2025-01-06 DIAGNOSIS — E11.9 TYPE 2 DIABETES MELLITUS W/OUT COMPLICATIONS: ICD-10-CM

## 2025-01-06 DIAGNOSIS — Z94.0 KIDNEY TRANSPLANT STATUS: ICD-10-CM

## 2025-01-06 DIAGNOSIS — D84.821 IMMUNODEFICIENCY DUE TO DRUGS: ICD-10-CM

## 2025-01-06 DIAGNOSIS — N18.5 CHRONIC KIDNEY DISEASE, STAGE 5: ICD-10-CM

## 2025-01-06 DIAGNOSIS — Z79.4 TYPE 2 DIABETES MELLITUS W/OUT COMPLICATIONS: ICD-10-CM

## 2025-01-06 PROCEDURE — 99215 OFFICE O/P EST HI 40 MIN: CPT

## 2025-01-06 PROCEDURE — G2211 COMPLEX E/M VISIT ADD ON: CPT

## 2025-01-06 RX ORDER — SODIUM ZIRCONIUM CYCLOSILICATE 10 G/10G
10 POWDER, FOR SUSPENSION ORAL DAILY
Qty: 90 | Refills: 1 | Status: ACTIVE | COMMUNITY
Start: 2025-01-06 | End: 1900-01-01

## 2025-01-28 ENCOUNTER — APPOINTMENT (OUTPATIENT)
Dept: INTERNAL MEDICINE | Facility: CLINIC | Age: 75
End: 2025-01-28
Payer: MEDICARE

## 2025-01-28 VITALS
HEART RATE: 74 BPM | WEIGHT: 140 LBS | TEMPERATURE: 97 F | HEIGHT: 67 IN | OXYGEN SATURATION: 98 % | SYSTOLIC BLOOD PRESSURE: 134 MMHG | DIASTOLIC BLOOD PRESSURE: 70 MMHG | BODY MASS INDEX: 21.97 KG/M2

## 2025-01-28 DIAGNOSIS — E11.9 TYPE 2 DIABETES MELLITUS W/OUT COMPLICATIONS: ICD-10-CM

## 2025-01-28 DIAGNOSIS — Z79.4 TYPE 2 DIABETES MELLITUS W/OUT COMPLICATIONS: ICD-10-CM

## 2025-01-28 DIAGNOSIS — I25.10 ATHEROSCLEROTIC HEART DISEASE OF NATIVE CORONARY ARTERY W/OUT ANGINA PECTORIS: ICD-10-CM

## 2025-01-28 DIAGNOSIS — K22.10 ULCER OF ESOPHAGUS W/OUT BLEEDING: ICD-10-CM

## 2025-01-28 PROCEDURE — 99214 OFFICE O/P EST MOD 30 MIN: CPT

## 2025-01-28 PROCEDURE — G2211 COMPLEX E/M VISIT ADD ON: CPT

## 2025-02-14 ENCOUNTER — APPOINTMENT (OUTPATIENT)
Dept: INTERNAL MEDICINE | Facility: CLINIC | Age: 75
End: 2025-02-14
Payer: MEDICARE

## 2025-02-14 VITALS
HEART RATE: 70 BPM | BODY MASS INDEX: 22.44 KG/M2 | TEMPERATURE: 97 F | WEIGHT: 143 LBS | SYSTOLIC BLOOD PRESSURE: 146 MMHG | OXYGEN SATURATION: 99 % | DIASTOLIC BLOOD PRESSURE: 73 MMHG | HEIGHT: 67 IN

## 2025-02-14 DIAGNOSIS — Z79.4 TYPE 2 DIABETES MELLITUS W/OUT COMPLICATIONS: ICD-10-CM

## 2025-02-14 DIAGNOSIS — E11.9 TYPE 2 DIABETES MELLITUS W/OUT COMPLICATIONS: ICD-10-CM

## 2025-02-14 DIAGNOSIS — Z94.0 KIDNEY TRANSPLANT STATUS: ICD-10-CM

## 2025-02-14 PROCEDURE — 99214 OFFICE O/P EST MOD 30 MIN: CPT

## 2025-02-14 PROCEDURE — G2211 COMPLEX E/M VISIT ADD ON: CPT

## 2025-02-14 RX ORDER — TACROLIMUS 1 MG/1
1 CAPSULE ORAL
Refills: 0 | Status: ACTIVE | COMMUNITY
Start: 2025-02-14

## 2025-02-14 RX ORDER — MYCOPHENOLATE MOFETIL 500 MG/1
500 TABLET ORAL
Refills: 0 | Status: ACTIVE | COMMUNITY
Start: 2025-02-14

## 2025-02-20 ENCOUNTER — NON-APPOINTMENT (OUTPATIENT)
Age: 75
End: 2025-02-20

## 2025-02-24 ENCOUNTER — RX RENEWAL (OUTPATIENT)
Age: 75
End: 2025-02-24

## 2025-03-14 ENCOUNTER — APPOINTMENT (OUTPATIENT)
Dept: HEART AND VASCULAR | Facility: CLINIC | Age: 75
End: 2025-03-14

## 2025-03-14 ENCOUNTER — NON-APPOINTMENT (OUTPATIENT)
Age: 75
End: 2025-03-14

## 2025-03-14 VITALS
OXYGEN SATURATION: 98 % | BODY MASS INDEX: 22.13 KG/M2 | SYSTOLIC BLOOD PRESSURE: 136 MMHG | TEMPERATURE: 97.3 F | HEIGHT: 67 IN | WEIGHT: 141 LBS | HEART RATE: 80 BPM | DIASTOLIC BLOOD PRESSURE: 58 MMHG

## 2025-03-14 VITALS — SYSTOLIC BLOOD PRESSURE: 130 MMHG | DIASTOLIC BLOOD PRESSURE: 60 MMHG

## 2025-03-14 PROCEDURE — 99214 OFFICE O/P EST MOD 30 MIN: CPT

## 2025-03-14 PROCEDURE — G2211 COMPLEX E/M VISIT ADD ON: CPT

## 2025-03-14 PROCEDURE — 93000 ELECTROCARDIOGRAM COMPLETE: CPT

## 2025-03-17 ENCOUNTER — APPOINTMENT (OUTPATIENT)
Dept: HEART AND VASCULAR | Facility: CLINIC | Age: 75
End: 2025-03-17
Payer: MEDICARE

## 2025-03-17 DIAGNOSIS — Z79.4 TYPE 2 DIABETES MELLITUS W/OUT COMPLICATIONS: ICD-10-CM

## 2025-03-17 DIAGNOSIS — E11.9 TYPE 2 DIABETES MELLITUS W/OUT COMPLICATIONS: ICD-10-CM

## 2025-03-17 DIAGNOSIS — I25.10 ATHEROSCLEROTIC HEART DISEASE OF NATIVE CORONARY ARTERY W/OUT ANGINA PECTORIS: ICD-10-CM

## 2025-03-17 DIAGNOSIS — R09.89 OTHER SPECIFIED SYMPTOMS AND SIGNS INVOLVING THE CIRCULATORY AND RESPIRATORY SYSTEMS: ICD-10-CM

## 2025-03-17 DIAGNOSIS — I50.30 UNSPECIFIED DIASTOLIC (CONGESTIVE) HEART FAILURE: ICD-10-CM

## 2025-03-17 DIAGNOSIS — E78.5 HYPERLIPIDEMIA, UNSPECIFIED: ICD-10-CM

## 2025-03-17 DIAGNOSIS — N18.5 CHRONIC KIDNEY DISEASE, STAGE 5: ICD-10-CM

## 2025-03-17 DIAGNOSIS — I10 ESSENTIAL (PRIMARY) HYPERTENSION: ICD-10-CM

## 2025-03-17 LAB
ALBUMIN SERPL ELPH-MCNC: 4.1 G/DL
ALP BLD-CCNC: 60 U/L
ALT SERPL-CCNC: 14 U/L
ANION GAP SERPL CALC-SCNC: 19 MMOL/L
AST SERPL-CCNC: 15 U/L
BASOPHILS # BLD AUTO: 0.02 K/UL
BASOPHILS NFR BLD AUTO: 0.4 %
BILIRUB SERPL-MCNC: 0.6 MG/DL
BUN SERPL-MCNC: 21 MG/DL
CALCIUM SERPL-MCNC: 9.3 MG/DL
CHLORIDE SERPL-SCNC: 101 MMOL/L
CHOLEST SERPL-MCNC: 123 MG/DL
CO2 SERPL-SCNC: 18 MMOL/L
CREAT SERPL-MCNC: 1.31 MG/DL
EGFRCR SERPLBLD CKD-EPI 2021: 56 ML/MIN/1.73M2
EOSINOPHIL # BLD AUTO: 0.2 K/UL
EOSINOPHIL NFR BLD AUTO: 4.1 %
ESTIMATED AVERAGE GLUCOSE: 171 MG/DL
GLUCOSE SERPL-MCNC: 245 MG/DL
HBA1C MFR BLD HPLC: 7.6 %
HCT VFR BLD CALC: 46.7 %
HDLC SERPL-MCNC: 35 MG/DL
HGB BLD-MCNC: 14.8 G/DL
IMM GRANULOCYTES NFR BLD AUTO: 0.2 %
LDLC SERPL CALC-MCNC: 57 MG/DL
LYMPHOCYTES # BLD AUTO: 1.11 K/UL
LYMPHOCYTES NFR BLD AUTO: 22.6 %
MAN DIFF?: NORMAL
MCHC RBC-ENTMCNC: 29.7 PG
MCHC RBC-ENTMCNC: 31.7 G/DL
MCV RBC AUTO: 93.8 FL
MONOCYTES # BLD AUTO: 0.6 K/UL
MONOCYTES NFR BLD AUTO: 12.2 %
NEUTROPHILS # BLD AUTO: 2.97 K/UL
NEUTROPHILS NFR BLD AUTO: 60.5 %
NONHDLC SERPL-MCNC: 89 MG/DL
PLATELET # BLD AUTO: 205 K/UL
POTASSIUM SERPL-SCNC: 5.2 MMOL/L
PROT SERPL-MCNC: 7 G/DL
RBC # BLD: 4.98 M/UL
RBC # FLD: 12.8 %
SODIUM SERPL-SCNC: 139 MMOL/L
TRIGL SERPL-MCNC: 190 MG/DL
TSH SERPL-ACNC: 1.65 UIU/ML
WBC # FLD AUTO: 4.91 K/UL

## 2025-03-17 PROCEDURE — G2211 COMPLEX E/M VISIT ADD ON: CPT | Mod: 93

## 2025-03-17 PROCEDURE — 99213 OFFICE O/P EST LOW 20 MIN: CPT | Mod: 93

## 2025-04-14 DIAGNOSIS — D64.9 ANEMIA, UNSPECIFIED: ICD-10-CM

## 2025-04-14 DIAGNOSIS — Z79.4 TYPE 2 DIABETES MELLITUS W/OUT COMPLICATIONS: ICD-10-CM

## 2025-04-14 DIAGNOSIS — E11.9 TYPE 2 DIABETES MELLITUS W/OUT COMPLICATIONS: ICD-10-CM

## 2025-04-14 DIAGNOSIS — I50.30 UNSPECIFIED DIASTOLIC (CONGESTIVE) HEART FAILURE: ICD-10-CM

## 2025-04-14 DIAGNOSIS — N18.5 CHRONIC KIDNEY DISEASE, STAGE 5: ICD-10-CM

## 2025-04-24 ENCOUNTER — RX RENEWAL (OUTPATIENT)
Age: 75
End: 2025-04-24

## 2025-05-02 ENCOUNTER — APPOINTMENT (OUTPATIENT)
Dept: INTERNAL MEDICINE | Facility: CLINIC | Age: 75
End: 2025-05-02
Payer: MEDICARE

## 2025-05-02 VITALS
BODY MASS INDEX: 22.13 KG/M2 | DIASTOLIC BLOOD PRESSURE: 72 MMHG | HEART RATE: 62 BPM | OXYGEN SATURATION: 99 % | TEMPERATURE: 97.4 F | HEIGHT: 67 IN | WEIGHT: 141 LBS | SYSTOLIC BLOOD PRESSURE: 144 MMHG

## 2025-05-02 DIAGNOSIS — I10 ESSENTIAL (PRIMARY) HYPERTENSION: ICD-10-CM

## 2025-05-02 DIAGNOSIS — I25.10 ATHEROSCLEROTIC HEART DISEASE OF NATIVE CORONARY ARTERY W/OUT ANGINA PECTORIS: ICD-10-CM

## 2025-05-02 DIAGNOSIS — Z79.4 TYPE 2 DIABETES MELLITUS W/OUT COMPLICATIONS: ICD-10-CM

## 2025-05-02 DIAGNOSIS — E11.9 TYPE 2 DIABETES MELLITUS W/OUT COMPLICATIONS: ICD-10-CM

## 2025-05-02 DIAGNOSIS — E03.9 HYPOTHYROIDISM, UNSPECIFIED: ICD-10-CM

## 2025-05-02 PROCEDURE — G2211 COMPLEX E/M VISIT ADD ON: CPT

## 2025-05-02 PROCEDURE — 99215 OFFICE O/P EST HI 40 MIN: CPT

## 2025-05-11 ENCOUNTER — INPATIENT (INPATIENT)
Facility: HOSPITAL | Age: 75
LOS: 3 days | Discharge: HOME | DRG: 871 | End: 2025-05-15
Attending: STUDENT IN AN ORGANIZED HEALTH CARE EDUCATION/TRAINING PROGRAM | Admitting: INTERNAL MEDICINE
Payer: MEDICARE

## 2025-05-11 VITALS
HEART RATE: 105 BPM | OXYGEN SATURATION: 99 % | DIASTOLIC BLOOD PRESSURE: 109 MMHG | SYSTOLIC BLOOD PRESSURE: 194 MMHG | WEIGHT: 154.98 LBS | RESPIRATION RATE: 18 BRPM

## 2025-05-11 LAB
ADD ON TEST-SPECIMEN IN LAB: SIGNIFICANT CHANGE UP
ALBUMIN SERPL ELPH-MCNC: 4.3 G/DL — SIGNIFICANT CHANGE UP (ref 3.3–5)
ALP SERPL-CCNC: 70 U/L — SIGNIFICANT CHANGE UP (ref 40–120)
ALT FLD-CCNC: 29 U/L — SIGNIFICANT CHANGE UP (ref 10–45)
ANION GAP SERPL CALC-SCNC: 21 MMOL/L — HIGH (ref 5–17)
APTT BLD: 35.8 SEC — SIGNIFICANT CHANGE UP (ref 26.1–36.8)
AST SERPL-CCNC: 40 U/L — SIGNIFICANT CHANGE UP (ref 10–40)
BASOPHILS # BLD AUTO: 0 K/UL — SIGNIFICANT CHANGE UP (ref 0–0.2)
BASOPHILS NFR BLD AUTO: 0 % — SIGNIFICANT CHANGE UP (ref 0–2)
BILIRUB SERPL-MCNC: 0.6 MG/DL — SIGNIFICANT CHANGE UP (ref 0.2–1.2)
BUN SERPL-MCNC: 25 MG/DL — HIGH (ref 7–23)
CALCIUM SERPL-MCNC: 9.2 MG/DL — SIGNIFICANT CHANGE UP (ref 8.4–10.5)
CHLORIDE SERPL-SCNC: 101 MMOL/L — SIGNIFICANT CHANGE UP (ref 96–108)
CK MB CFR SERPL CALC: 4.1 NG/ML — SIGNIFICANT CHANGE UP (ref 0–6.7)
CK SERPL-CCNC: 189 U/L — SIGNIFICANT CHANGE UP (ref 30–200)
CO2 SERPL-SCNC: 17 MMOL/L — LOW (ref 22–31)
CREAT SERPL-MCNC: 1.34 MG/DL — HIGH (ref 0.5–1.3)
EGFR: 55 ML/MIN/1.73M2 — LOW
EGFR: 55 ML/MIN/1.73M2 — LOW
EOSINOPHIL # BLD AUTO: 0.29 K/UL — SIGNIFICANT CHANGE UP (ref 0–0.5)
EOSINOPHIL NFR BLD AUTO: 2.6 % — SIGNIFICANT CHANGE UP (ref 0–6)
GAS PNL BLDV: SIGNIFICANT CHANGE UP
GLUCOSE SERPL-MCNC: 249 MG/DL — HIGH (ref 70–99)
HCT VFR BLD CALC: 50.2 % — HIGH (ref 39–50)
HGB BLD-MCNC: 16 G/DL — SIGNIFICANT CHANGE UP (ref 13–17)
INR BLD: 1.04 — SIGNIFICANT CHANGE UP (ref 0.85–1.16)
LACTATE SERPL-SCNC: 2 MMOL/L — SIGNIFICANT CHANGE UP (ref 0.5–2)
LYMPHOCYTES # BLD AUTO: 55.3 % — HIGH (ref 13–44)
LYMPHOCYTES # BLD AUTO: 6.18 K/UL — HIGH (ref 1–3.3)
MCHC RBC-ENTMCNC: 30.6 PG — SIGNIFICANT CHANGE UP (ref 27–34)
MCHC RBC-ENTMCNC: 31.9 G/DL — LOW (ref 32–36)
MCV RBC AUTO: 96 FL — SIGNIFICANT CHANGE UP (ref 80–100)
MONOCYTES # BLD AUTO: 0.58 K/UL — SIGNIFICANT CHANGE UP (ref 0–0.9)
MONOCYTES NFR BLD AUTO: 5.2 % — SIGNIFICANT CHANGE UP (ref 2–14)
NEUTROPHILS # BLD AUTO: 4.02 K/UL — SIGNIFICANT CHANGE UP (ref 1.8–7.4)
NEUTROPHILS NFR BLD AUTO: 36 % — LOW (ref 43–77)
PLATELET # BLD AUTO: 198 K/UL — SIGNIFICANT CHANGE UP (ref 150–400)
POTASSIUM SERPL-MCNC: 4.2 MMOL/L — SIGNIFICANT CHANGE UP (ref 3.5–5.3)
POTASSIUM SERPL-SCNC: 4.2 MMOL/L — SIGNIFICANT CHANGE UP (ref 3.5–5.3)
PROT SERPL-MCNC: 7.7 G/DL — SIGNIFICANT CHANGE UP (ref 6–8.3)
PROTHROM AB SERPL-ACNC: 11.9 SEC — SIGNIFICANT CHANGE UP (ref 9.9–13.4)
RBC # BLD: 5.23 M/UL — SIGNIFICANT CHANGE UP (ref 4.2–5.8)
RBC # FLD: 12.1 % — SIGNIFICANT CHANGE UP (ref 10.3–14.5)
SODIUM SERPL-SCNC: 139 MMOL/L — SIGNIFICANT CHANGE UP (ref 135–145)
TROPONIN T, HIGH SENSITIVITY RESULT: 30 NG/L — SIGNIFICANT CHANGE UP (ref 0–51)
WBC # BLD: 11.18 K/UL — HIGH (ref 3.8–10.5)
WBC # FLD AUTO: 11.18 K/UL — HIGH (ref 3.8–10.5)

## 2025-05-11 PROCEDURE — 76775 US EXAM ABDO BACK WALL LIM: CPT | Mod: 26

## 2025-05-11 PROCEDURE — 93010 ELECTROCARDIOGRAM REPORT: CPT

## 2025-05-11 PROCEDURE — 70450 CT HEAD/BRAIN W/O DYE: CPT | Mod: 26,XU

## 2025-05-11 PROCEDURE — 99291 CRITICAL CARE FIRST HOUR: CPT | Mod: 25

## 2025-05-11 PROCEDURE — 0042T: CPT

## 2025-05-11 PROCEDURE — 31500 INSERT EMERGENCY AIRWAY: CPT

## 2025-05-11 PROCEDURE — 99291 CRITICAL CARE FIRST HOUR: CPT | Mod: GC

## 2025-05-11 PROCEDURE — 70496 CT ANGIOGRAPHY HEAD: CPT | Mod: 26

## 2025-05-11 PROCEDURE — 76604 US EXAM CHEST: CPT | Mod: 26

## 2025-05-11 PROCEDURE — 71045 X-RAY EXAM CHEST 1 VIEW: CPT | Mod: 26

## 2025-05-11 PROCEDURE — 70498 CT ANGIOGRAPHY NECK: CPT | Mod: 26

## 2025-05-11 PROCEDURE — 93308 TTE F-UP OR LMTD: CPT | Mod: 26

## 2025-05-11 RX ORDER — LORAZEPAM 4 MG/ML
4 VIAL (ML) INJECTION ONCE
Refills: 0 | Status: DISCONTINUED | OUTPATIENT
Start: 2025-05-11 | End: 2025-05-11

## 2025-05-11 RX ORDER — FENTANYL CITRATE-0.9 % NACL/PF 100MCG/2ML
100 SYRINGE (ML) INTRAVENOUS ONCE
Refills: 0 | Status: DISCONTINUED | OUTPATIENT
Start: 2025-05-11 | End: 2025-05-11

## 2025-05-11 RX ORDER — CEFTRIAXONE 500 MG/1
2000 INJECTION, POWDER, FOR SOLUTION INTRAMUSCULAR; INTRAVENOUS EVERY 24 HOURS
Refills: 0 | Status: DISCONTINUED | OUTPATIENT
Start: 2025-05-11 | End: 2025-05-12

## 2025-05-11 RX ORDER — AZITHROMYCIN 250 MG
500 CAPSULE ORAL ONCE
Refills: 0 | Status: COMPLETED | OUTPATIENT
Start: 2025-05-11 | End: 2025-05-11

## 2025-05-11 RX ORDER — SUCCINYLCHOLINE CHLORIDE 20 MG/ML
100 VIAL (ML) INJECTION ONCE
Refills: 0 | Status: COMPLETED | OUTPATIENT
Start: 2025-05-11 | End: 2025-05-11

## 2025-05-11 RX ORDER — AZITHROMYCIN 250 MG
500 CAPSULE ORAL EVERY 24 HOURS
Refills: 0 | Status: DISCONTINUED | OUTPATIENT
Start: 2025-05-12 | End: 2025-05-13

## 2025-05-11 RX ORDER — FENTANYL CITRATE-0.9 % NACL/PF 100MCG/2ML
0.5 SYRINGE (ML) INTRAVENOUS
Qty: 2500 | Refills: 0 | Status: DISCONTINUED | OUTPATIENT
Start: 2025-05-11 | End: 2025-05-13

## 2025-05-11 RX ORDER — AZITHROMYCIN 250 MG
CAPSULE ORAL
Refills: 0 | Status: DISCONTINUED | OUTPATIENT
Start: 2025-05-11 | End: 2025-05-13

## 2025-05-11 RX ORDER — ETOMIDATE 2 MG/ML
20 SYRINGE (ML) INTRAVENOUS ONCE
Refills: 0 | Status: COMPLETED | OUTPATIENT
Start: 2025-05-11 | End: 2025-05-11

## 2025-05-11 RX ADMIN — Medication 100 MICROGRAM(S): at 20:45

## 2025-05-11 RX ADMIN — Medication 100 MILLIGRAM(S): at 20:20

## 2025-05-11 RX ADMIN — Medication 1000 MILLILITER(S): at 21:23

## 2025-05-11 RX ADMIN — Medication 4 MILLIGRAM(S): at 20:15

## 2025-05-11 RX ADMIN — Medication 3.52 MICROGRAM(S)/KG/HR: at 21:23

## 2025-05-11 RX ADMIN — Medication 100 MICROGRAM(S): at 20:40

## 2025-05-11 RX ADMIN — Medication 20 MILLIGRAM(S): at 20:19

## 2025-05-11 NOTE — CONSULT NOTE ADULT - ASSESSMENT
75y Male with PMHx of DM, kidney transplant (2 years ago) presents to [facility] ED due to "choking" at dinner. LKW 5/11 at 1945. Family at bedside state that they were at dinner, when suddenly the pt began to "choke" and became extremely agitated. EMS was immediately called and sBP at the scene was in the 160s. When pt arrived to the ED sBP was 210 and ED provider noted that pt had a R gaze preference and "tense" b/l UE with not being able to push down pt's arms. Stroke code was then called. Prior to Stroke ACP arriving, Ativan 4mg IVP was given and R gaze preference and tense b/l UE resolved. Due to pt's O2 sat in the 80s, decision was made to intubate. While being intubated a large piece of food was removed by the ED provider. CT imaging was delayed due to intubation and having to stabilize pt. Stroke code CTH showed no acute infarction or hemorrhage. CTA H/N showed no significant stenosis or occlusion. CTP showed extensive elevated Tmax in both cerebral and cerebellar hemispheres, not in particular vascular distribution and could be related to artifact from motion. No evidence of core infarct. NIHSS 29 but poor exam due to Fentanyl drip in place. BP after neurological assessment was 194/109 mmHg. No acute intervention was offered due to low suspicion of LVO with no significant stenosis or occlusion seen on CT imaging.    Impression: ?Aspiration that caused hypoxia and may have led him to seize.     - Recommend Epilepsy consult   - Recommend EEG placement.     Case discussed with Stroke Neurology Attending, Dr. Melina Bowers.  75y Male with PMHx of HTN, HLD, IDDM2, HFpEF, CKD 2-3 (s/p Renal transplant in 2023), CAD s/p PCI w/ RAMANA-pLAD (6/2022), urinary retention, retinopathy and hypothyroidism presents to [facility] ED due to choking at dinner. LKW 5/11 at 1945. Family at bedside state that they were at dinner, when suddenly the pt began to choke and became extremely agitated. EMS was immediately called and sBP at the scene was in the 160s. When pt arrived to the ED sBP was 210 and ED provider noted that pt had a R gaze preference and "tense" b/l UE with not being able to push down pt's arms. Stroke code was then called. Prior to Stroke ACP arriving, Ativan 4mg IVP was given and R gaze preference and tense b/l UE resolved. Due to pt's O2 sat in the 80s, decision was made to intubate. While being intubated a large piece of food was removed by the ED provider. CT imaging was delayed due to intubation and having to stabilize pt. Stroke code CTH showed no acute infarction or hemorrhage. CTA H/N showed no significant stenosis or occlusion. CTP showed extensive elevated Tmax in both cerebral and cerebellar hemispheres, not in particular vascular distribution and could be related to artifact from motion. No evidence of core infarct. NIHSS 29 but poor exam due to Fentanyl drip in place. BP after neurological assessment was 194/109 mmHg. No acute intervention was offered due to low suspicion of LVO with no significant stenosis or occlusion seen on CT imaging.     Impression: ?Aspiration that caused hypoxia and may have led him to seize.     - Recommend Epilepsy consult   - Recommend EEG placement.     Case discussed with Stroke Neurology Attending, Dr. Melina Bowers.

## 2025-05-11 NOTE — CONSULT NOTE ADULT - SUBJECTIVE AND OBJECTIVE BOX
==========ICU Consult ==========    Consult reason:    Patient is a 75y M w/ PMHx of HTN, HLD, IDDM2, HFpEF, CKD 2-3 (s/p Renal transplant in 2023), CAD s/p PCI w/ RAMANA-pLAD (6/2022), urinary retention, retinopathy and hypothyroidism presents to the ED after a witnessed choking episode requiring resuscitation by EMS. Per son (Gus), pt was in usual state of health and was having dinner at a restaurant when he acutely began choking on a piece of tofu. Pt was unable to clear his airway and EMS was called and patient was brought to the ED. Per son no associated prodromal events or symptoms and denies any changes in mentation, chest pain, shortness of breath, seizure like activity preceding the event. In the ED, patient was intubated for airway protection and a large piece of tofu was extracted from the oropharynx. Per    HPI:        PAST MEDICAL & SURGICAL HISTORY:  Diabetes      Hypertension      Hyperlipidemia      CKD (chronic kidney disease), stage IV      BPH (benign prostatic hyperplasia)      Congestive heart failure (CHF)      CAD (coronary artery disease)      No significant past surgical history          PAST MEDICAL & SURGICAL HISTORY:  Diabetes    Hypertension    Hyperlipidemia    CKD (chronic kidney disease), stage IV    BPH (benign prostatic hyperplasia)    Congestive heart failure (CHF)    CAD (coronary artery disease)    No significant past surgical history          Allergies    chlorthalidone (Other)    Intolerances      Home Medications:  calcitriol 0.25 mcg oral capsule: 1 cap(s) orally once a day (22 Jun 2022 10:44)  ferrous gluconate 240 mg (27 mg elemental iron) oral tablet: 1 tab(s) orally 2 times a day (22 Jun 2022 10:44)  insulin glargine: 20- 24 unit(s) subcutaneous once a day (at bedtime) (22 Jun 2022 10:44)  levothyroxine 50 mcg (0.05 mg) oral tablet: 1 tab(s) orally once a day (22 Jun 2022 10:44)  omeprazole 40 mg oral delayed release capsule: 1 cap(s) orally once a day (22 Jun 2022 10:44)  sodium bicarbonate 650 mg oral tablet: 1 tab(s) orally 2 times a day (22 Jun 2022 10:44)  torsemide 20 mg oral tablet: 1 tab(s) orally 2 times a day (22 Jun 2022 10:44)  Tradjenta 5 mg oral tablet: 1 tab(s) orally once a day (22 Jun 2022 10:44)        PAST MEDICAL & SURGICAL HISTORY:  Diabetes      Hypertension      Hyperlipidemia      CKD (chronic kidney disease), stage IV      BPH (benign prostatic hyperplasia)      Congestive heart failure (CHF)      CAD (coronary artery disease)      No significant past surgical history          FAMILY HISTORY:  No pertinent family history in first degree relatives    :      ROS:  See HPI     ICU Vital Signs Last 24 Hrs  T(C): --  T(F): --  HR: 73 (11 May 2025 20:45) (73 - 105)  BP: 113/73 (11 May 2025 20:45) (113/73 - 194/109)  BP(mean): --  ABP: --  ABP(mean): --  RR: 18 (11 May 2025 20:45) (18 - 18)  SpO2: 100% (11 May 2025 20:45) (99% - 100%)        PHYSICAL EXAM  General: NAD  Head: NC/AT; MMM; PERRL; EOMI;  Neck: Supple; no JVD  Respiratory: CTAB; no wheezes/rales/rhonchi  Cardiovascular: Regular rhythm/rate; S1/S2+, no murmurs, rubs gallops   Gastrointestinal: Soft; NTND; bowel sounds normal and present  Extremities: WWP; no edema/cyanosis  Neurological: A&Ox3, CNII-XII grossly intact; no obvious focal deficits  Skin: Clean and intact. Good skin turgor. Without open wounds and sores          LABS:                          16.0   11.18 )-----------( 198      ( 11 May 2025 20:28 )             50.2                                               05-11    139  |  101  |  25[H]  ----------------------------<  249[H]  4.2   |  17[L]  |  1.34[H]    Ca    9.2      11 May 2025 20:28    TPro  7.7  /  Alb  4.3  /  TBili  0.6  /  DBili  x   /  AST  40  /  ALT  29  /  AlkPhos  70  05-11      PT/INR - ( 11 May 2025 20:28 )   PT: 11.9 sec;   INR: 1.04          PTT - ( 11 May 2025 20:28 )  PTT:35.8 sec                                       Urinalysis Basic - ( 11 May 2025 20:28 )    Color: x / Appearance: x / SG: x / pH: x  Gluc: 249 mg/dL / Ketone: x  / Bili: x / Urobili: x   Blood: x / Protein: x / Nitrite: x   Leuk Esterase: x / RBC: x / WBC x   Sq Epi: x / Non Sq Epi: x / Bacteria: x                                                  LIVER FUNCTIONS - ( 11 May 2025 20:28 )  Alb: 4.3 g/dL / Pro: 7.7 g/dL / ALK PHOS: 70 U/L / ALT: 29 U/L / AST: 40 U/L / GGT: x                                                                                                                                       MEDICATIONS  (STANDING):  chlorhexidine 2% Cloths 1 Application(s) Topical <User Schedule>  fentaNYL   Infusion. 0.5 MICROgram(s)/kG/Hr (3.52 mL/Hr) IV Continuous <Continuous>    MEDICATIONS  (PRN):         ==========ICU Consult ==========        Patient is a 75y M w/ PMHx of HTN, HLD, IDDM2, HFpEF, CKD 2-3 (s/p Renal transplant in 2023), CAD s/p PCI w/ RAMANA-pLAD (6/2022), urinary retention, retinopathy and hypothyroidism presents to the ED after a witnessed choking episode requiring resuscitation by EMS. Per son (Gus), pt was in usual state of health and was having dinner at a restaurant when he acutely began choking on a piece of tofu. Pt was unable to clear his airway and EMS was called and patient was brought to the ED. Per son no associated prodromal events or symptoms and denies any changes in mentation, chest pain, shortness of breath, seizure like activity preceding the event. In the ED, patient was intubated for airway protection and a large piece of tofu was extracted from the oropharynx. Per ED attending c/f deviated gaze and stroke code called. Possible seizure like activity and ativan was given.           PAST MEDICAL & SURGICAL HISTORY:  Diabetes      Hypertension      Hyperlipidemia      CKD (chronic kidney disease), stage IV      BPH (benign prostatic hyperplasia)      Congestive heart failure (CHF)      CAD (coronary artery disease)      No significant past surgical history          PAST MEDICAL & SURGICAL HISTORY:  Diabetes    Hypertension    Hyperlipidemia    CKD (chronic kidney disease), stage IV    BPH (benign prostatic hyperplasia)    Congestive heart failure (CHF)    CAD (coronary artery disease)    No significant past surgical history          Allergies    chlorthalidone (Other)    Intolerances      Home Medications:  calcitriol 0.25 mcg oral capsule: 1 cap(s) orally once a day (22 Jun 2022 10:44)  ferrous gluconate 240 mg (27 mg elemental iron) oral tablet: 1 tab(s) orally 2 times a day (22 Jun 2022 10:44)  insulin glargine: 20- 24 unit(s) subcutaneous once a day (at bedtime) (22 Jun 2022 10:44)  levothyroxine 50 mcg (0.05 mg) oral tablet: 1 tab(s) orally once a day (22 Jun 2022 10:44)  omeprazole 40 mg oral delayed release capsule: 1 cap(s) orally once a day (22 Jun 2022 10:44)  sodium bicarbonate 650 mg oral tablet: 1 tab(s) orally 2 times a day (22 Jun 2022 10:44)  torsemide 20 mg oral tablet: 1 tab(s) orally 2 times a day (22 Jun 2022 10:44)  Tradjenta 5 mg oral tablet: 1 tab(s) orally once a day (22 Jun 2022 10:44)        PAST MEDICAL & SURGICAL HISTORY:  Diabetes      Hypertension      Hyperlipidemia      CKD (chronic kidney disease), stage IV      BPH (benign prostatic hyperplasia)      Congestive heart failure (CHF)      CAD (coronary artery disease)      No significant past surgical history          FAMILY HISTORY:  No pertinent family history in first degree relatives    :      ROS:  See HPI     ICU Vital Signs Last 24 Hrs  T(C): --  T(F): --  HR: 73 (11 May 2025 20:45) (73 - 105)  BP: 113/73 (11 May 2025 20:45) (113/73 - 194/109)  BP(mean): --  ABP: --  ABP(mean): --  RR: 18 (11 May 2025 20:45) (18 - 18)  SpO2: 100% (11 May 2025 20:45) (99% - 100%)        PHYSICAL EXAM  General: intubated and sedated  Head: pinpoint pupils, no gaze deviation, YISEL  Neck: Supple; no JVD  Respiratory: CTAB; no wheezes/rales/rhonchi  Cardiovascular: Regular rhythm/rate; S1/S2+, no murmurs, rubs gallops   Gastrointestinal: Soft; NTND; bowel sounds normal and present  Extremities: WWP; no edema/cyanosis  Neurological: intubated and sedated. RAAS -5  Skin: Clean and intact. Good skin turgor. Without open wounds and sores          LABS:                          16.0   11.18 )-----------( 198      ( 11 May 2025 20:28 )             50.2                                               05-11    139  |  101  |  25[H]  ----------------------------<  249[H]  4.2   |  17[L]  |  1.34[H]    Ca    9.2      11 May 2025 20:28    TPro  7.7  /  Alb  4.3  /  TBili  0.6  /  DBili  x   /  AST  40  /  ALT  29  /  AlkPhos  70  05-11      PT/INR - ( 11 May 2025 20:28 )   PT: 11.9 sec;   INR: 1.04          PTT - ( 11 May 2025 20:28 )  PTT:35.8 sec                                       Urinalysis Basic - ( 11 May 2025 20:28 )    Color: x / Appearance: x / SG: x / pH: x  Gluc: 249 mg/dL / Ketone: x  / Bili: x / Urobili: x   Blood: x / Protein: x / Nitrite: x   Leuk Esterase: x / RBC: x / WBC x   Sq Epi: x / Non Sq Epi: x / Bacteria: x                                                  LIVER FUNCTIONS - ( 11 May 2025 20:28 )  Alb: 4.3 g/dL / Pro: 7.7 g/dL / ALK PHOS: 70 U/L / ALT: 29 U/L / AST: 40 U/L / GGT: x                                                                                                                                       MEDICATIONS  (STANDING):  chlorhexidine 2% Cloths 1 Application(s) Topical <User Schedule>  fentaNYL   Infusion. 0.5 MICROgram(s)/kG/Hr (3.52 mL/Hr) IV Continuous <Continuous>    MEDICATIONS  (PRN):

## 2025-05-11 NOTE — ED ADULT NURSE NOTE - CAS TRG GEN SKIN COLOR
Attending Supervisory Note/Shared Visit    I have reviewed the mid-levels findings and agree. We have discussed the case and reviewed the diagnostic data together. I am in agreement with the disposition and plan.   Suggestion no driving until cleared by Doron Reaves MD  Attending Emergency Physician        Michael Ramos MD  02/15/20 2016
Normal for race

## 2025-05-11 NOTE — ED ADULT TRIAGE NOTE - CHIEF COMPLAINT QUOTE
call 911 from restaurant for choking, on a way to ED pt become unresponsive with high BP, notification call 911 from restaurant for choking, on a way to ED pt become unresponsive with high BP, notification, hx kidney transplant, HTN, diabetic

## 2025-05-11 NOTE — ED ADULT NURSE NOTE - CHIEF COMPLAINT QUOTE
call 911 from restaurant for choking, on a way to ED pt become unresponsive with high BP, notification, hx kidney transplant, HTN, diabetic

## 2025-05-11 NOTE — ED ADULT NURSE NOTE - NS TRANSFER PATIENT BELONGINGS
given to son and son in law (cell phone, clothing, and shoes)/Jewelry/Money (specify)/Other belongings/Clothing

## 2025-05-11 NOTE — CONSULT NOTE ADULT - SUBJECTIVE AND OBJECTIVE BOX
**STROKE CODE CONSULT NOTE**    Last known well time/Time of onset of symptoms: 5/11 at 1945    HPI: 75y Male with PMHx of DM, kidney transplant (2 years ago) presents to [facility] ED due to "choking" at dinner. LKW 5/11 at 1945. Family at bedside state that they were at dinner, when suddenly the pt began to "choke" and became extremely agitated. EMS was immediately called and sBP at the scene was in the 160s. When pt arrived to the ED sBP was 210 and ED provider noted that pt had a R gaze preference and "tense" b/l UE with not being able to push down pt's arms. Stroke code was then called. Prior to Stroke ACP arriving, Ativan 4mg IVP was given and R gaze preference and tense b/l UE resolved. Due to pt's O2 sat in the 80s, decision was made to intubate. While being intubated a large piece of food was removed by the ED provider. CT imaging was delayed due to intubation and having to stabilize pt. Stroke code CTH showed no acute infarction or hemorrhage. CTA H/N showed no significant stenosis or occlusion. CTP showed extensive elevated Tmax in both cerebral and cerebellar hemispheres, not in particular vascular distribution and could be related to artifact from motion. No evidence of core infarct. NIHSS 29 but poor exam due to Fentanyl drip in place. BP after neurological assessment was 194/109 mmHg. No acute intervention was offered due to low suspicion of LVO with no significant stenosis or occlusion seen on CT imaging.     T(C): --  HR: 73 (05-11-25 @ 20:45) (73 - 105)  BP: 113/73 (05-11-25 @ 20:45) (113/73 - 194/109)  RR: 18 (05-11-25 @ 20:45) (18 - 18)  SpO2: 100% (05-11-25 @ 20:45) (99% - 100%)    PAST MEDICAL & SURGICAL HISTORY:  Diabetes      Hypertension      Hyperlipidemia      CKD (chronic kidney disease), stage IV      BPH (benign prostatic hyperplasia)      Congestive heart failure (CHF)      CAD (coronary artery disease)      No significant past surgical history          FAMILY HISTORY:  No pertinent family history in first degree relatives            ROS: Unable to assess due to pt being sedated on Fentanyl drip     MEDICATIONS  (STANDING):  chlorhexidine 2% Cloths 1 Application(s) Topical <User Schedule>  fentaNYL   Infusion. 0.5 MICROgram(s)/kG/Hr (3.52 mL/Hr) IV Continuous <Continuous>    MEDICATIONS  (PRN):    Allergies    chlorthalidone (Other)    Intolerances      Vital Signs Last 24 Hrs  T(C): --  T(F): --  HR: 73 (11 May 2025 20:45) (73 - 105)  BP: 113/73 (11 May 2025 20:45) (113/73 - 194/109)  BP(mean): --  RR: 18 (11 May 2025 20:45) (18 - 18)  SpO2: 100% (11 May 2025 20:45) (99% - 100%)        Physical exam:  Constitutional: Sedated on fentanyl drip     Neurologic:  -Mental status: Comatose, does not open eyes to voice or noxious stimuli, does not follow commands or track, mute  -Cranial nerves:   II: No BTT b/l   III, IV, VI: pinpoint pupil b/l   VII: difficult to assess 2/2 ETT in place   IX, X: Cough and gag intact as per ED provider when pt was being intubated   Motor/Sensation: Moved all 4 extremities spontaneously but does not respond to noxious stimuli       Fingerstick Blood Glucose: CAPILLARY BLOOD GLUCOSE      POCT Blood Glucose.: 228 mg/dL (11 May 2025 20:19)    LABS:                        16.0   11.18 )-----------( 198      ( 11 May 2025 20:28 )             50.2     05-11    139  |  101  |  25[H]  ----------------------------<  249[H]  4.2   |  17[L]  |  1.34[H]    Ca    9.2      11 May 2025 20:28    TPro  7.7  /  Alb  4.3  /  TBili  0.6  /  DBili  x   /  AST  40  /  ALT  29  /  AlkPhos  70  05-11    PT/INR - ( 11 May 2025 20:28 )   PT: 11.9 sec;   INR: 1.04          PTT - ( 11 May 2025 20:28 )  PTT:35.8 sec      Urinalysis Basic - ( 11 May 2025 20:28 )    Color: x / Appearance: x / SG: x / pH: x  Gluc: 249 mg/dL / Ketone: x  / Bili: x / Urobili: x   Blood: x / Protein: x / Nitrite: x   Leuk Esterase: x / RBC: x / WBC x   Sq Epi: x / Non Sq Epi: x / Bacteria: x        RADIOLOGY & ADDITIONAL STUDIES:  CTH: No acute intracranial hemorrhage, vasogenic edema, extra-axial collection   or hydrocephalus.    CT PERFUSION: Extensive elevated Tmax in both cerebral and cerebellar   hemispheres, not in particular vascular distribution and could be related   to artifact from motion. No evidence of core infarct.    CTA NECK:  No evidence of significant stenosis or occlusion.  Nonspecific groundglass attenuation and partially visualized cystic   consolidation in the lower lobes. Please correlate clinicallyfor   pneumonia.  Likely sebaceous cyst midline upper neck. This could be further evaluated   with nonemergent ultrasound as clinically warranted.    CTA HEAD: No large vessel occlusion, significant stenosis or vascular   abnormality identified.        **STROKE CODE CONSULT NOTE**    Last known well time/Time of onset of symptoms: 5/11 at 1945    HPI: 75y Male with PMHx of HTN, HLD, IDDM2, HFpEF, CKD 2-3 (s/p Renal transplant in 2023), CAD s/p PCI w/ RAMANA-pLAD (6/2022), urinary retention, retinopathy and hypothyroidism presents to [facility] ED due to choking at dinner. LKW 5/11 at 1945. Family at bedside state that they were at dinner, when suddenly the pt began to choke and became extremely agitated. EMS was immediately called and sBP at the scene was in the 160s. When pt arrived to the ED sBP was 210 and ED provider noted that pt had a R gaze preference and "tense" b/l UE with not being able to push down pt's arms. Stroke code was then called. Prior to Stroke ACP arriving, Ativan 4mg IVP was given and R gaze preference and tense b/l UE resolved. Due to pt's O2 sat in the 80s, decision was made to intubate. While being intubated a large piece of food was removed by the ED provider. CT imaging was delayed due to intubation and having to stabilize pt. Stroke code CTH showed no acute infarction or hemorrhage. CTA H/N showed no significant stenosis or occlusion. CTP showed extensive elevated Tmax in both cerebral and cerebellar hemispheres, not in particular vascular distribution and could be related to artifact from motion. No evidence of core infarct. NIHSS 29 but poor exam due to Fentanyl drip in place. BP after neurological assessment was 194/109 mmHg. No acute intervention was offered due to low suspicion of LVO with no significant stenosis or occlusion seen on CT imaging.     T(C): --  HR: 73 (05-11-25 @ 20:45) (73 - 105)  BP: 113/73 (05-11-25 @ 20:45) (113/73 - 194/109)  RR: 18 (05-11-25 @ 20:45) (18 - 18)  SpO2: 100% (05-11-25 @ 20:45) (99% - 100%)    PAST MEDICAL & SURGICAL HISTORY:  Diabetes      Hypertension      Hyperlipidemia      CKD (chronic kidney disease), stage IV      BPH (benign prostatic hyperplasia)      Congestive heart failure (CHF)      CAD (coronary artery disease)      No significant past surgical history          FAMILY HISTORY:  No pertinent family history in first degree relatives            ROS: Unable to assess due to pt being sedated on Fentanyl drip     MEDICATIONS  (STANDING):  chlorhexidine 2% Cloths 1 Application(s) Topical <User Schedule>  fentaNYL   Infusion. 0.5 MICROgram(s)/kG/Hr (3.52 mL/Hr) IV Continuous <Continuous>    MEDICATIONS  (PRN):    Allergies    chlorthalidone (Other)    Intolerances      Vital Signs Last 24 Hrs  T(C): --  T(F): --  HR: 73 (11 May 2025 20:45) (73 - 105)  BP: 113/73 (11 May 2025 20:45) (113/73 - 194/109)  BP(mean): --  RR: 18 (11 May 2025 20:45) (18 - 18)  SpO2: 100% (11 May 2025 20:45) (99% - 100%)        Physical exam:  Constitutional: Sedated on fentanyl drip     Neurologic:  -Mental status: Comatose, does not open eyes to voice or noxious stimuli, does not follow commands or track, mute  -Cranial nerves:   II: No BTT b/l   III, IV, VI: pinpoint pupil b/l   VII: difficult to assess 2/2 ETT in place   IX, X: Cough and gag intact as per ED provider when pt was being intubated   Motor/Sensation: Moved all 4 extremities spontaneously but does not respond to noxious stimuli       Fingerstick Blood Glucose: CAPILLARY BLOOD GLUCOSE      POCT Blood Glucose.: 228 mg/dL (11 May 2025 20:19)    LABS:                        16.0   11.18 )-----------( 198      ( 11 May 2025 20:28 )             50.2     05-11    139  |  101  |  25[H]  ----------------------------<  249[H]  4.2   |  17[L]  |  1.34[H]    Ca    9.2      11 May 2025 20:28    TPro  7.7  /  Alb  4.3  /  TBili  0.6  /  DBili  x   /  AST  40  /  ALT  29  /  AlkPhos  70  05-11    PT/INR - ( 11 May 2025 20:28 )   PT: 11.9 sec;   INR: 1.04          PTT - ( 11 May 2025 20:28 )  PTT:35.8 sec      Urinalysis Basic - ( 11 May 2025 20:28 )    Color: x / Appearance: x / SG: x / pH: x  Gluc: 249 mg/dL / Ketone: x  / Bili: x / Urobili: x   Blood: x / Protein: x / Nitrite: x   Leuk Esterase: x / RBC: x / WBC x   Sq Epi: x / Non Sq Epi: x / Bacteria: x        RADIOLOGY & ADDITIONAL STUDIES:  CTH: No acute intracranial hemorrhage, vasogenic edema, extra-axial collection   or hydrocephalus.    CT PERFUSION: Extensive elevated Tmax in both cerebral and cerebellar   hemispheres, not in particular vascular distribution and could be related   to artifact from motion. No evidence of core infarct.    CTA NECK:  No evidence of significant stenosis or occlusion.  Nonspecific groundglass attenuation and partially visualized cystic   consolidation in the lower lobes. Please correlate clinicallyfor   pneumonia.  Likely sebaceous cyst midline upper neck. This could be further evaluated   with nonemergent ultrasound as clinically warranted.    CTA HEAD: No large vessel occlusion, significant stenosis or vascular   abnormality identified.

## 2025-05-11 NOTE — ED ADULT NURSE NOTE - OBJECTIVE STATEMENT
Pt BIBEMS being bagged. Per EMS, pt was at dinner with family and became unresponsive after trying to reach inside mouth to remove a piece of food. Pt being bagged on 15L O2 by EMS, SpO2 100%, , /109 on arrival. Pt unresponsive to pain, R gaze noted with BL pinpoint pupils. Pt's BL upper extremities noted to be contracted and tense, IV access obtained. 4mg Ativan IVP at 2015, which resolved gaze and BL upper extremity tension. This allowed MD and PA to visualize airway, large piece of tofu noted to be present in airway, which was removed by MD Newberry.   RSI initiated- 20 mg etomidate IVP administered at 2019, and 100 mg succinylcholine administered at 2020. ET tube placed by FINESSE Dennis at 2022, 24 at the lip.  Ventilator settings: 100% FiO2, 8 RR, 500 tidal volume, and PEEP of 6.    Pt seen immediately as an upgrade by this RN, MD Newberry, and FINESSE Dennis. ET tube placement confirmed by Xray and BL breath sounds auscultated by this RN at 2023.   Pt undressed and placed in hospital gown. Stroke cide activated, FINESSE Guy from stroke team a the bedside NIH score 29. Pt brought to CT to rule out bleed and stroke, per radiologist no evidence if bleed, stroke effect on head CT,   Per pt's son at the bedside, pt received a kidney transplant 2 yy ago. Son reports PMHx of DM controlled by insulin.  Condom cath placed by CATHLEEN Bryant.

## 2025-05-11 NOTE — ED ADULT NURSE NOTE - NSFALLRISKINTERV_ED_ALL_ED
Assistance OOB with selected safe patient handling equipment if applicable/Communicate fall risk and risk factors to all staff, patient, and family/Encourage patient to sit up slowly, dangle for a short time, stand at bedside before walking/Provide visual cue: yellow wristband, yellow gown, etc/Reinforce activity limits and safety measures with patient and family/Review medications for side effects contributing to fall risk/Toileting schedule using arm’s reach rule for commode and bathroom/Call bell, personal items and telephone in reach/Instruct patient to call for assistance before getting out of bed/chair/stretcher/Non-slip footwear applied when patient is off stretcher/[place] to call system/Physically safe environment - no spills, clutter or unnecessary equipment/Purposeful Proactive Rounding/Room/bathroom lighting operational, light cord in reach

## 2025-05-11 NOTE — ED PROVIDER NOTE - CLINICAL SUMMARY MEDICAL DECISION MAKING FREE TEXT BOX
Triage VS- /109,   A/P - choking episode and respiratory failure 2/2 food bolus, however also w/ hypertensive, tense/high muscle tone and gaze preference- ?consider seizure 2/2 hypoxia from choking vs ?seizure from brain bleed given very hypertensive and was reported agitated and confused by EMS    Pt was given 4mg IV Ativan w/ improvement in gaze- now midline, also w/ softening of muscle tone able to move patient's arms freely. Called stroke code and gave etomidate and succinylcholine for intubation - during intubation removed large food bolus.    CT imaging negative, consulted epilepsy for eval of seizure and EEG, consulted ICU for admission, updated family at bedside

## 2025-05-11 NOTE — ED PROVIDER NOTE - OBJECTIVE STATEMENT
75yM w/ DM HTN HLD s/p kidney transplant 2 years ago, CAD stent BIBEMS for respiratory distress. Per family pt was in usual state of health at dinner and had a witnessed choking episode w/ piece of ?tofu ?mung bean, they saw pt stick his hand in his mouth and they attempted to do Heimlich w/o success. Per EMS on their arrival pt was agitated and combative, became unresponsive. They began bagging pt prior to arrival.  In the ED, pt noted to be very tense, arms folded in front of him and RN and myself unable to straighten them for IV insertion. Pt eyes were closed but when I opened them, saw pt had gaze preference to upper right corner. Mouth/jaw also very tense and tight, unable to open it to evaluate for food bolus

## 2025-05-11 NOTE — CONSULT NOTE ADULT - ASSESSMENT
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     Neuro  # c/f seizure like activity vs acute stroke  Pt reportedly with deviated gaze. Stroke w/u neg for any acute stroke or path. C/f seizure like activity, s/p 2mg of ativan. currently on fentanyl. no hx of epilepsy or seizures in past  - obtain vEEG f/u results  - obtain tacrolimus levels r/o toxicity    Pulm  # AHRF   # Aspiration episode  Pt at baseline good general state of health. Had episode of choking on piece of tofu. Brought into ED via EMS, intubated for airway protection, tofu was extracted. CT with b/l multifocal PNA  - CAP coverage as below  - obtain probnp, diuresis if elevated with 40mg x1 of lasix IV  - obtain ABG    ID  # CAP  c/f CAP and/or aspiration PNA given CT findings of b/l multifocal pna  - CTX and Azithro  - obtain MRSA swab, urine leg and strep, obtain procal       Dispo:  Medical ICU, case discussed with Dr. Juan

## 2025-05-11 NOTE — ED PROVIDER NOTE - CARE PLAN
Principal Discharge DX:	Acute respiratory failure with hypoxia  Secondary Diagnosis:	Seizure-like activity   1

## 2025-05-12 ENCOUNTER — RESULT REVIEW (OUTPATIENT)
Age: 75
End: 2025-05-12

## 2025-05-12 LAB
ADD ON TEST-SPECIMEN IN LAB: SIGNIFICANT CHANGE UP
ALBUMIN SERPL ELPH-MCNC: 3.5 G/DL — SIGNIFICANT CHANGE UP (ref 3.3–5)
ALP SERPL-CCNC: 46 U/L — SIGNIFICANT CHANGE UP (ref 40–120)
ALT FLD-CCNC: 20 U/L — SIGNIFICANT CHANGE UP (ref 10–45)
ANION GAP SERPL CALC-SCNC: 10 MMOL/L — SIGNIFICANT CHANGE UP (ref 5–17)
APPEARANCE UR: CLEAR — SIGNIFICANT CHANGE UP
AST SERPL-CCNC: 24 U/L — SIGNIFICANT CHANGE UP (ref 10–40)
BASE EXCESS BLDA CALC-SCNC: -3.3 MMOL/L — LOW (ref -2–3)
BASE EXCESS BLDA CALC-SCNC: -3.6 MMOL/L — LOW (ref -2–3)
BASOPHILS # BLD AUTO: 0.02 K/UL — SIGNIFICANT CHANGE UP (ref 0–0.2)
BASOPHILS NFR BLD AUTO: 0.2 % — SIGNIFICANT CHANGE UP (ref 0–2)
BILIRUB SERPL-MCNC: 0.7 MG/DL — SIGNIFICANT CHANGE UP (ref 0.2–1.2)
BILIRUB UR-MCNC: NEGATIVE — SIGNIFICANT CHANGE UP
BLD GP AB SCN SERPL QL: NEGATIVE — SIGNIFICANT CHANGE UP
BUN SERPL-MCNC: 24 MG/DL — HIGH (ref 7–23)
CALCIUM SERPL-MCNC: 8.1 MG/DL — LOW (ref 8.4–10.5)
CHLORIDE SERPL-SCNC: 103 MMOL/L — SIGNIFICANT CHANGE UP (ref 96–108)
CO2 BLDA-SCNC: 23 MMOL/L — SIGNIFICANT CHANGE UP (ref 19–24)
CO2 BLDA-SCNC: 25 MMOL/L — HIGH (ref 19–24)
CO2 SERPL-SCNC: 21 MMOL/L — LOW (ref 22–31)
COLOR SPEC: YELLOW — SIGNIFICANT CHANGE UP
CREAT SERPL-MCNC: 1.32 MG/DL — HIGH (ref 0.5–1.3)
DIFF PNL FLD: ABNORMAL
EGFR: 56 ML/MIN/1.73M2 — LOW
EGFR: 56 ML/MIN/1.73M2 — LOW
EOSINOPHIL # BLD AUTO: 0.02 K/UL — SIGNIFICANT CHANGE UP (ref 0–0.5)
EOSINOPHIL NFR BLD AUTO: 0.2 % — SIGNIFICANT CHANGE UP (ref 0–6)
GAS PNL BLDA: SIGNIFICANT CHANGE UP
GAS PNL BLDA: SIGNIFICANT CHANGE UP
GLUCOSE SERPL-MCNC: 217 MG/DL — HIGH (ref 70–99)
GLUCOSE UR QL: NEGATIVE MG/DL — SIGNIFICANT CHANGE UP
HCO3 BLDA-SCNC: 22 MMOL/L — SIGNIFICANT CHANGE UP (ref 21–28)
HCO3 BLDA-SCNC: 23 MMOL/L — SIGNIFICANT CHANGE UP (ref 21–28)
HCT VFR BLD CALC: 41.4 % — SIGNIFICANT CHANGE UP (ref 39–50)
HGB BLD-MCNC: 13.8 G/DL — SIGNIFICANT CHANGE UP (ref 13–17)
HOROWITZ INDEX BLDA+IHG-RTO: 30 — SIGNIFICANT CHANGE UP
IMM GRANULOCYTES NFR BLD AUTO: 0.7 % — SIGNIFICANT CHANGE UP (ref 0–0.9)
KETONES UR-MCNC: NEGATIVE MG/DL — SIGNIFICANT CHANGE UP
LACTATE SERPL-SCNC: 1.4 MMOL/L — SIGNIFICANT CHANGE UP (ref 0.5–2)
LACTATE SERPL-SCNC: 2.2 MMOL/L — HIGH (ref 0.5–2)
LEGIONELLA AG UR QL: NEGATIVE — SIGNIFICANT CHANGE UP
LEUKOCYTE ESTERASE UR-ACNC: NEGATIVE — SIGNIFICANT CHANGE UP
LYMPHOCYTES # BLD AUTO: 0.68 K/UL — LOW (ref 1–3.3)
LYMPHOCYTES # BLD AUTO: 6.2 % — LOW (ref 13–44)
MAGNESIUM SERPL-MCNC: 1.4 MG/DL — LOW (ref 1.6–2.6)
MCHC RBC-ENTMCNC: 30.9 PG — SIGNIFICANT CHANGE UP (ref 27–34)
MCHC RBC-ENTMCNC: 33.3 G/DL — SIGNIFICANT CHANGE UP (ref 32–36)
MCV RBC AUTO: 92.8 FL — SIGNIFICANT CHANGE UP (ref 80–100)
MONOCYTES # BLD AUTO: 0.94 K/UL — HIGH (ref 0–0.9)
MONOCYTES NFR BLD AUTO: 8.6 % — SIGNIFICANT CHANGE UP (ref 2–14)
MRSA PCR RESULT.: NEGATIVE — SIGNIFICANT CHANGE UP
NEUTROPHILS # BLD AUTO: 9.2 K/UL — HIGH (ref 1.8–7.4)
NEUTROPHILS NFR BLD AUTO: 84.1 % — HIGH (ref 43–77)
NITRITE UR-MCNC: NEGATIVE — SIGNIFICANT CHANGE UP
NRBC BLD AUTO-RTO: 0 /100 WBCS — SIGNIFICANT CHANGE UP (ref 0–0)
PCO2 BLDA: 38 MMHG — SIGNIFICANT CHANGE UP (ref 35–48)
PCO2 BLDA: 46 MMHG — SIGNIFICANT CHANGE UP (ref 35–48)
PH BLDA: 7.31 — LOW (ref 7.35–7.45)
PH BLDA: 7.36 — SIGNIFICANT CHANGE UP (ref 7.35–7.45)
PH UR: 7.5 — SIGNIFICANT CHANGE UP (ref 5–8)
PHOSPHATE SERPL-MCNC: 2 MG/DL — LOW (ref 2.5–4.5)
PLATELET # BLD AUTO: 147 K/UL — LOW (ref 150–400)
PO2 BLDA: 105 MMHG — SIGNIFICANT CHANGE UP (ref 83–108)
PO2 BLDA: 99 MMHG — SIGNIFICANT CHANGE UP (ref 83–108)
POTASSIUM SERPL-MCNC: 5 MMOL/L — SIGNIFICANT CHANGE UP (ref 3.5–5.3)
POTASSIUM SERPL-SCNC: 5 MMOL/L — SIGNIFICANT CHANGE UP (ref 3.5–5.3)
PROCALCITONIN SERPL-MCNC: 1.87 NG/ML — HIGH (ref 0.02–0.1)
PROT SERPL-MCNC: 5.8 G/DL — LOW (ref 6–8.3)
PROT UR-MCNC: NEGATIVE MG/DL — SIGNIFICANT CHANGE UP
RAPID RVP RESULT: SIGNIFICANT CHANGE UP
RBC # BLD: 4.46 M/UL — SIGNIFICANT CHANGE UP (ref 4.2–5.8)
RBC # FLD: 12.3 % — SIGNIFICANT CHANGE UP (ref 10.3–14.5)
RH IG SCN BLD-IMP: POSITIVE — SIGNIFICANT CHANGE UP
S AUREUS DNA NOSE QL NAA+PROBE: POSITIVE
S PNEUM AG UR QL: NEGATIVE — SIGNIFICANT CHANGE UP
SAO2 % BLDA: 96.1 % — SIGNIFICANT CHANGE UP (ref 94–98)
SAO2 % BLDA: 97.1 % — SIGNIFICANT CHANGE UP (ref 94–98)
SARS-COV-2 RNA SPEC QL NAA+PROBE: SIGNIFICANT CHANGE UP
SODIUM SERPL-SCNC: 134 MMOL/L — LOW (ref 135–145)
SP GR SPEC: 1.02 — SIGNIFICANT CHANGE UP (ref 1–1.03)
TACROLIMUS SERPL-MCNC: 6.4 NG/ML — SIGNIFICANT CHANGE UP
UROBILINOGEN FLD QL: 0.2 MG/DL — SIGNIFICANT CHANGE UP (ref 0.2–1)
WBC # BLD: 10.94 K/UL — HIGH (ref 3.8–10.5)
WBC # FLD AUTO: 10.94 K/UL — HIGH (ref 3.8–10.5)

## 2025-05-12 PROCEDURE — 71045 X-RAY EXAM CHEST 1 VIEW: CPT | Mod: 26

## 2025-05-12 PROCEDURE — 99233 SBSQ HOSP IP/OBS HIGH 50: CPT

## 2025-05-12 PROCEDURE — 95718 EEG PHYS/QHP 2-12 HR W/VEEG: CPT

## 2025-05-12 PROCEDURE — 71250 CT THORAX DX C-: CPT | Mod: 26

## 2025-05-12 PROCEDURE — 99223 1ST HOSP IP/OBS HIGH 75: CPT | Mod: 25

## 2025-05-12 PROCEDURE — ZZZZZ: CPT

## 2025-05-12 PROCEDURE — 31624 DX BRONCHOSCOPE/LAVAGE: CPT

## 2025-05-12 PROCEDURE — 93306 TTE W/DOPPLER COMPLETE: CPT | Mod: 26

## 2025-05-12 PROCEDURE — 99222 1ST HOSP IP/OBS MODERATE 55: CPT

## 2025-05-12 RX ORDER — MIDAZOLAM IN 0.9 % SOD.CHLORID 1 MG/ML
4 PLASTIC BAG, INJECTION (ML) INTRAVENOUS ONCE
Refills: 0 | Status: DISCONTINUED | OUTPATIENT
Start: 2025-05-12 | End: 2025-05-12

## 2025-05-12 RX ORDER — DEXTROSE 50 % IN WATER 50 %
15 SYRINGE (ML) INTRAVENOUS ONCE
Refills: 0 | Status: DISCONTINUED | OUTPATIENT
Start: 2025-05-12 | End: 2025-05-15

## 2025-05-12 RX ORDER — HYDROCORTISONE 20 MG
50 TABLET ORAL EVERY 6 HOURS
Refills: 0 | Status: DISCONTINUED | OUTPATIENT
Start: 2025-05-12 | End: 2025-05-14

## 2025-05-12 RX ORDER — SODIUM CHLORIDE 9 G/1000ML
500 INJECTION, SOLUTION INTRAVENOUS ONCE
Refills: 0 | Status: COMPLETED | OUTPATIENT
Start: 2025-05-12 | End: 2025-05-12

## 2025-05-12 RX ORDER — GLUCAGON 3 MG/1
1 POWDER NASAL ONCE
Refills: 0 | Status: DISCONTINUED | OUTPATIENT
Start: 2025-05-12 | End: 2025-05-15

## 2025-05-12 RX ORDER — ACETAMINOPHEN 500 MG/5ML
1000 LIQUID (ML) ORAL ONCE
Refills: 0 | Status: COMPLETED | OUTPATIENT
Start: 2025-05-12 | End: 2025-05-12

## 2025-05-12 RX ORDER — SODIUM CHLORIDE 9 G/1000ML
1000 INJECTION, SOLUTION INTRAVENOUS
Refills: 0 | Status: DISCONTINUED | OUTPATIENT
Start: 2025-05-12 | End: 2025-05-15

## 2025-05-12 RX ORDER — IPRATROPIUM BROMIDE AND ALBUTEROL SULFATE .5; 2.5 MG/3ML; MG/3ML
3 SOLUTION RESPIRATORY (INHALATION) EVERY 4 HOURS
Refills: 0 | Status: DISCONTINUED | OUTPATIENT
Start: 2025-05-12 | End: 2025-05-14

## 2025-05-12 RX ORDER — IRBESARTAN 75 MG/1
1 TABLET ORAL
Refills: 0 | DISCHARGE

## 2025-05-12 RX ORDER — SOD PHOS DI, MONO/K PHOS MONO 250 MG
2 TABLET ORAL ONCE
Refills: 0 | Status: COMPLETED | OUTPATIENT
Start: 2025-05-12 | End: 2025-05-12

## 2025-05-12 RX ORDER — HEPARIN SODIUM 1000 [USP'U]/ML
5000 INJECTION INTRAVENOUS; SUBCUTANEOUS EVERY 8 HOURS
Refills: 0 | Status: DISCONTINUED | OUTPATIENT
Start: 2025-05-12 | End: 2025-05-15

## 2025-05-12 RX ORDER — INSULIN LISPRO 100 U/ML
INJECTION, SOLUTION INTRAVENOUS; SUBCUTANEOUS EVERY 6 HOURS
Refills: 0 | Status: DISCONTINUED | OUTPATIENT
Start: 2025-05-12 | End: 2025-05-15

## 2025-05-12 RX ORDER — POLYETHYLENE GLYCOL 3350 17 G/17G
17 POWDER, FOR SOLUTION ORAL DAILY
Refills: 0 | Status: DISCONTINUED | OUTPATIENT
Start: 2025-05-12 | End: 2025-05-15

## 2025-05-12 RX ORDER — DEXTROSE 50 % IN WATER 50 %
25 SYRINGE (ML) INTRAVENOUS ONCE
Refills: 0 | Status: DISCONTINUED | OUTPATIENT
Start: 2025-05-12 | End: 2025-05-15

## 2025-05-12 RX ORDER — CLOPIDOGREL BISULFATE 75 MG/1
75 TABLET, FILM COATED ORAL DAILY
Refills: 0 | Status: DISCONTINUED | OUTPATIENT
Start: 2025-05-12 | End: 2025-05-12

## 2025-05-12 RX ORDER — NOREPINEPHRINE BITARTRATE 8 MG
0.05 SOLUTION INTRAVENOUS
Qty: 8 | Refills: 0 | Status: DISCONTINUED | OUTPATIENT
Start: 2025-05-12 | End: 2025-05-13

## 2025-05-12 RX ORDER — PIPERACILLIN-TAZO-DEXTROSE,ISO 2.25G/50ML
4.5 IV SOLUTION, PIGGYBACK PREMIX FROZEN(ML) INTRAVENOUS ONCE
Refills: 0 | Status: COMPLETED | OUTPATIENT
Start: 2025-05-12 | End: 2025-05-12

## 2025-05-12 RX ORDER — ATORVASTATIN CALCIUM 80 MG/1
1 TABLET, FILM COATED ORAL
Refills: 0 | DISCHARGE

## 2025-05-12 RX ORDER — MIDAZOLAM IN 0.9 % SOD.CHLORID 1 MG/ML
10 PLASTIC BAG, INJECTION (ML) INTRAVENOUS ONCE
Refills: 0 | Status: DISCONTINUED | OUTPATIENT
Start: 2025-05-12 | End: 2025-05-12

## 2025-05-12 RX ORDER — LEVOTHYROXINE SODIUM 300 MCG
37.5 TABLET ORAL
Refills: 0 | Status: DISCONTINUED | OUTPATIENT
Start: 2025-05-13 | End: 2025-05-14

## 2025-05-12 RX ORDER — PROPOFOL 10 MG/ML
10 INJECTION, EMULSION INTRAVENOUS
Qty: 500 | Refills: 0 | Status: DISCONTINUED | OUTPATIENT
Start: 2025-05-12 | End: 2025-05-12

## 2025-05-12 RX ORDER — ASPIRIN 325 MG
1 TABLET ORAL
Refills: 0 | DISCHARGE

## 2025-05-12 RX ORDER — CLOPIDOGREL BISULFATE 75 MG/1
75 TABLET, FILM COATED ORAL EVERY 24 HOURS
Refills: 0 | Status: DISCONTINUED | OUTPATIENT
Start: 2025-05-12 | End: 2025-05-15

## 2025-05-12 RX ORDER — VANCOMYCIN HCL IN 5 % DEXTROSE 1.5G/250ML
1000 PLASTIC BAG, INJECTION (ML) INTRAVENOUS EVERY 24 HOURS
Refills: 0 | Status: DISCONTINUED | OUTPATIENT
Start: 2025-05-12 | End: 2025-05-13

## 2025-05-12 RX ORDER — SENNA 187 MG
2 TABLET ORAL AT BEDTIME
Refills: 0 | Status: DISCONTINUED | OUTPATIENT
Start: 2025-05-12 | End: 2025-05-15

## 2025-05-12 RX ORDER — PIPERACILLIN-TAZO-DEXTROSE,ISO 2.25G/50ML
4.5 IV SOLUTION, PIGGYBACK PREMIX FROZEN(ML) INTRAVENOUS EVERY 8 HOURS
Refills: 0 | Status: DISCONTINUED | OUTPATIENT
Start: 2025-05-12 | End: 2025-05-13

## 2025-05-12 RX ORDER — ATORVASTATIN CALCIUM 80 MG/1
40 TABLET, FILM COATED ORAL AT BEDTIME
Refills: 0 | Status: DISCONTINUED | OUTPATIENT
Start: 2025-05-12 | End: 2025-05-15

## 2025-05-12 RX ORDER — CALCIUM CARBONATE 750 MG/1
1 TABLET ORAL DAILY
Refills: 0 | Status: DISCONTINUED | OUTPATIENT
Start: 2025-05-12 | End: 2025-05-15

## 2025-05-12 RX ORDER — TACROLIMUS 0.5 MG/1
1 CAPSULE ORAL
Refills: 0 | DISCHARGE

## 2025-05-12 RX ORDER — LEVETIRACETAM 10 MG/ML
500 INJECTION, SOLUTION INTRAVENOUS EVERY 12 HOURS
Refills: 0 | Status: DISCONTINUED | OUTPATIENT
Start: 2025-05-12 | End: 2025-05-13

## 2025-05-12 RX ORDER — MIDAZOLAM IN 0.9 % SOD.CHLORID 1 MG/ML
2 PLASTIC BAG, INJECTION (ML) INTRAVENOUS ONCE
Refills: 0 | Status: DISCONTINUED | OUTPATIENT
Start: 2025-05-12 | End: 2025-05-12

## 2025-05-12 RX ORDER — INSULIN GLARGINE-YFGN 100 [IU]/ML
22 INJECTION, SOLUTION SUBCUTANEOUS
Refills: 0 | DISCHARGE

## 2025-05-12 RX ORDER — DEXTROSE 50 % IN WATER 50 %
12.5 SYRINGE (ML) INTRAVENOUS ONCE
Refills: 0 | Status: DISCONTINUED | OUTPATIENT
Start: 2025-05-12 | End: 2025-05-15

## 2025-05-12 RX ORDER — MAGNESIUM SULFATE 500 MG/ML
4 SYRINGE (ML) INJECTION ONCE
Refills: 0 | Status: COMPLETED | OUTPATIENT
Start: 2025-05-12 | End: 2025-05-12

## 2025-05-12 RX ORDER — LEVOTHYROXINE SODIUM 300 MCG
1 TABLET ORAL
Refills: 0 | DISCHARGE

## 2025-05-12 RX ORDER — ASPIRIN 325 MG
0 TABLET ORAL
Refills: 0 | DISCHARGE

## 2025-05-12 RX ORDER — PROPOFOL 10 MG/ML
10 INJECTION, EMULSION INTRAVENOUS
Qty: 1000 | Refills: 0 | Status: DISCONTINUED | OUTPATIENT
Start: 2025-05-12 | End: 2025-05-13

## 2025-05-12 RX ORDER — TACROLIMUS 0.5 MG/1
0.6 CAPSULE ORAL EVERY 24 HOURS
Refills: 0 | Status: DISCONTINUED | OUTPATIENT
Start: 2025-05-12 | End: 2025-05-13

## 2025-05-12 RX ORDER — INSULIN ASPART 100 [IU]/ML
2 INJECTION, SOLUTION INTRAVENOUS; SUBCUTANEOUS
Refills: 0 | DISCHARGE

## 2025-05-12 RX ORDER — SODIUM ZIRCONIUM CYCLOSILICATE 5 G/5G
10 POWDER, FOR SUSPENSION ORAL DAILY
Refills: 0 | Status: DISCONTINUED | OUTPATIENT
Start: 2025-05-12 | End: 2025-05-14

## 2025-05-12 RX ORDER — ASPIRIN 325 MG
81 TABLET ORAL DAILY
Refills: 0 | Status: DISCONTINUED | OUTPATIENT
Start: 2025-05-12 | End: 2025-05-15

## 2025-05-12 RX ADMIN — Medication 4 MILLIGRAM(S): at 16:29

## 2025-05-12 RX ADMIN — POLYETHYLENE GLYCOL 3350 17 GRAM(S): 17 POWDER, FOR SOLUTION ORAL at 12:54

## 2025-05-12 RX ADMIN — Medication 4 MILLILITER(S): at 17:36

## 2025-05-12 RX ADMIN — Medication 40 MILLIGRAM(S): at 12:54

## 2025-05-12 RX ADMIN — HEPARIN SODIUM 5000 UNIT(S): 1000 INJECTION INTRAVENOUS; SUBCUTANEOUS at 14:51

## 2025-05-12 RX ADMIN — Medication 3.52 MICROGRAM(S)/KG/HR: at 16:01

## 2025-05-12 RX ADMIN — Medication 25 GRAM(S): at 08:14

## 2025-05-12 RX ADMIN — Medication 400 MILLIGRAM(S): at 09:14

## 2025-05-12 RX ADMIN — Medication 4 MILLILITER(S): at 13:41

## 2025-05-12 RX ADMIN — Medication 2 PACKET(S): at 12:54

## 2025-05-12 RX ADMIN — HEPARIN SODIUM 5000 UNIT(S): 1000 INJECTION INTRAVENOUS; SUBCUTANEOUS at 06:35

## 2025-05-12 RX ADMIN — Medication 2 TABLET(S): at 21:39

## 2025-05-12 RX ADMIN — ATORVASTATIN CALCIUM 40 MILLIGRAM(S): 80 TABLET, FILM COATED ORAL at 21:39

## 2025-05-12 RX ADMIN — Medication 255 MILLIGRAM(S): at 01:57

## 2025-05-12 RX ADMIN — Medication 81 MILLIGRAM(S): at 12:54

## 2025-05-12 RX ADMIN — Medication 2 MILLIGRAM(S): at 16:28

## 2025-05-12 RX ADMIN — LEVETIRACETAM 500 MILLIGRAM(S): 10 INJECTION, SOLUTION INTRAVENOUS at 11:15

## 2025-05-12 RX ADMIN — Medication 25 GRAM(S): at 11:15

## 2025-05-12 RX ADMIN — IPRATROPIUM BROMIDE AND ALBUTEROL SULFATE 3 MILLILITER(S): .5; 2.5 SOLUTION RESPIRATORY (INHALATION) at 17:36

## 2025-05-12 RX ADMIN — SODIUM CHLORIDE 500 MILLILITER(S): 9 INJECTION, SOLUTION INTRAVENOUS at 03:37

## 2025-05-12 RX ADMIN — IPRATROPIUM BROMIDE AND ALBUTEROL SULFATE 3 MILLILITER(S): .5; 2.5 SOLUTION RESPIRATORY (INHALATION) at 21:37

## 2025-05-12 RX ADMIN — CALCIUM CARBONATE 1 TABLET(S): 750 TABLET ORAL at 12:54

## 2025-05-12 RX ADMIN — Medication 50 MILLIGRAM(S): at 06:35

## 2025-05-12 RX ADMIN — PROPOFOL 4.22 MICROGRAM(S)/KG/MIN: 10 INJECTION, EMULSION INTRAVENOUS at 15:14

## 2025-05-12 RX ADMIN — INSULIN LISPRO 2: 100 INJECTION, SOLUTION INTRAVENOUS; SUBCUTANEOUS at 19:49

## 2025-05-12 RX ADMIN — Medication 1000 MILLIGRAM(S): at 10:00

## 2025-05-12 RX ADMIN — Medication 50 MILLIGRAM(S): at 12:53

## 2025-05-12 RX ADMIN — SODIUM CHLORIDE 500 MILLILITER(S): 9 INJECTION, SOLUTION INTRAVENOUS at 08:15

## 2025-05-12 RX ADMIN — Medication 50 MILLIGRAM(S): at 19:18

## 2025-05-12 RX ADMIN — Medication 4 MILLILITER(S): at 21:37

## 2025-05-12 RX ADMIN — HEPARIN SODIUM 5000 UNIT(S): 1000 INJECTION INTRAVENOUS; SUBCUTANEOUS at 21:39

## 2025-05-12 RX ADMIN — Medication 4 MILLILITER(S): at 10:46

## 2025-05-12 RX ADMIN — IPRATROPIUM BROMIDE AND ALBUTEROL SULFATE 3 MILLILITER(S): .5; 2.5 SOLUTION RESPIRATORY (INHALATION) at 13:41

## 2025-05-12 RX ADMIN — Medication 1 APPLICATION(S): at 07:42

## 2025-05-12 RX ADMIN — Medication 25 GRAM(S): at 21:40

## 2025-05-12 RX ADMIN — SODIUM ZIRCONIUM CYCLOSILICATE 10 GRAM(S): 5 POWDER, FOR SUSPENSION ORAL at 12:54

## 2025-05-12 RX ADMIN — Medication 250 MILLIGRAM(S): at 04:44

## 2025-05-12 RX ADMIN — Medication 25 GRAM(S): at 14:51

## 2025-05-12 RX ADMIN — NOREPINEPHRINE BITARTRATE 6.59 MICROGRAM(S)/KG/MIN: 8 SOLUTION at 01:58

## 2025-05-12 RX ADMIN — CLOPIDOGREL BISULFATE 75 MILLIGRAM(S): 75 TABLET, FILM COATED ORAL at 19:18

## 2025-05-12 RX ADMIN — TACROLIMUS 6.26 MILLIGRAM(S): 0.5 CAPSULE ORAL at 21:40

## 2025-05-12 RX ADMIN — IPRATROPIUM BROMIDE AND ALBUTEROL SULFATE 3 MILLILITER(S): .5; 2.5 SOLUTION RESPIRATORY (INHALATION) at 10:45

## 2025-05-12 RX ADMIN — Medication 200 GRAM(S): at 03:55

## 2025-05-12 RX ADMIN — LEVETIRACETAM 500 MILLIGRAM(S): 10 INJECTION, SOLUTION INTRAVENOUS at 21:40

## 2025-05-12 RX ADMIN — CEFTRIAXONE 100 MILLIGRAM(S): 500 INJECTION, POWDER, FOR SOLUTION INTRAMUSCULAR; INTRAVENOUS at 00:44

## 2025-05-12 NOTE — PROCEDURE NOTE - NSUS ED ADDITIONAL DETAIL1 FT
normal right kidney, poor visualization of the left kidney, could not visualize transplant, bladder empty with Jewell balloon

## 2025-05-12 NOTE — DIETITIAN INITIAL EVALUATION ADULT - PERTINENT LABORATORY DATA
05-12    134[L]  |  103  |  24[H]  ----------------------------<  217[H]  5.0   |  21[L]  |  1.32[H]    Ca    8.1[L]      12 May 2025 09:05  Phos  2.0     05-12  Mg     1.4     05-12    TPro  5.8[L]  /  Alb  3.5  /  TBili  0.7  /  DBili  x   /  AST  24  /  ALT  20  /  AlkPhos  46  05-12  POCT Blood Glucose.: 250 mg/dL (05-12-25 @ 06:03)

## 2025-05-12 NOTE — DIETITIAN INITIAL EVALUATION ADULT - OTHER CALCULATIONS
Needs determined using ideal body weight 67.3 kg (104%IBW) adjusted for critical condition and intubation.

## 2025-05-12 NOTE — H&P ADULT - ASSESSMENT
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     NEURO:  # c/f seizure like activity vs acute stroke  Pt reportedly with deviated gaze. Stroke w/u neg for any acute stroke or path. C/f seizure like activity, s/p 2mg of ativan. currently on fentanyl. no hx of epilepsy or seizures in past  - obtain vEEG f/u results  - obtain tacrolimus levels r/o toxicity  - neurology following, appreciate recs       - VEEG       - [facility] W/WO (specify epilepsy protocol)       - start keppra 500mg twice daily now       - ativan 2mg PRN for seizures > 2mins       - per [facility] law cannot drive for 1 year after last seizure    CARDIOVASCULAR:  #Septic Shock  Patient with shock, likely septic in the setting of pneumonia. Cardiac enzymes WNL. EKG sinus tachycardia  - c/w levo gtt, titrate as needed       - MAP goal >65    #Hx of HTN  Patient takes nifedipine 60mg QD, irbesartan 150mg.   - holding home BP medications iso shock    #CAD s/p PCI w/ RAMANA-pLAD (6/2022)  Patient on aspirin 81mg QD, Plavix 75mg QD, atorvastatin 40mg  - continue with home medications    PULMONARY:  #AHRF  # Aspiration episode  Pt at baseline good general state of health. Had episode of choking on piece of tofu. Brought into ED via EMS, intubated for airway protection, tofu was extracted. CT with bilateral ground glass opacities and infiltrates suggestive of PNA.   - PNA coverage as below  - continue with invasive ventilation as needed    RENAL:  #CKD  #S/p Renal Transplant 2023  Patient with a hx of CKD, s/p transplant in 2023. Creatinine 1.34 on presentation, GFR 55, likely at baseline. Patient takes Lokelma 10mg QD, calcium carbonate 1250mg BID, mycophenolate mofetil 500mg QD, tacrolimus 1mg QD  - hold tacrolimus until tacro iso active infection  - hold mycophenolate mofetil iso active infection   - c/w Lokelma  - c/w calcium carbonate    GI/:  #Urinary Retention   Patient with urinary retention and distended bladder on physical exam s/p intubation. Rothman placed in ED on 5/11.   - c/w rothman  - strict Is & Os    Diet: NPO for now  GI PPX: pantoprazole 40mg QD (therapeutic interchange for home omeprazole 40mg)  Bowel regimen: senna and miralax once NG tube placed  - needs NG tube placed    HEME:  #Leukocytosis  Patient with leukocytosis in the setting of pneumonia     ID:  #PNA  #Immunocompromised  Patient with AHRF in the setting of witnessed aspiration event. Given CT findings of ground glass opacities and bilateral consolidations, concern for underlying pneumonia. MRSA, RVP, urine strep and legionella negative.   - c/w Zosyn 4.5mg   - c/w vancomycin 1g Q24H   - c/w azithromycin 500mg for 3 days  - hold tacrolimus  - hold mycophenolate for now     ENDOCRINE:  MARLO    MSK:  MARLO    PPX:  Fluids: s/p 2.5 L  Electrolytes: replete as needed  Nutrition: Diet, NPO (05-12-25 @ 05:18) [Active]  PPI ppx: pantoprazole 40mg QD (therapeutic interchange for home omeprazole 40mg)  Dvt ppx: heparin 5000 units subQ TID  Activity: bedrest  Dispo: MICU Patient is a 75y M w/ PMHx of HTN, HLD, IDDM2, HFpEF, CKD 2-3 (s/p Renal transplant in 2023), CAD s/p PCI w/ RAMANA-pLAD (6/2022), urinary retention, retinopathy and hypothyroidism presents to the ED after a witnessed choking episode requiring resuscitation by EMS.     NEURO:  # c/f seizure like activity vs acute stroke  Pt reportedly with deviated gaze. Stroke w/u neg for any acute stroke or path. C/f seizure like activity, s/p 2mg of ativan. currently on fentanyl. no hx of epilepsy or seizures in past  - obtain vEEG f/u results  - obtain tacrolimus levels r/o toxicity  - neurology following, appreciate recs       - VEEG       - [facility] W/WO (specify epilepsy protocol)       - start keppra 500mg twice daily now       - ativan 2mg PRN for seizures > 2mins       - per [facility] law cannot drive for 1 year after last seizure    CARDIOVASCULAR:  #Septic Shock  Patient with shock, likely septic in the setting of pneumonia. Cardiac enzymes WNL. EKG sinus tachycardia  - c/w levo gtt, titrate as needed       - MAP goal >65    #Hx of HTN  Patient takes nifedipine 60mg QD, irbesartan 150mg.   - holding home BP medications iso shock    #CAD s/p PCI w/ RAMANA-pLAD (6/2022)  Patient on aspirin 81mg QD, Plavix 75mg QD, atorvastatin 40mg  - continue with home medications    PULMONARY:  #AHRF  # Aspiration episode  Pt at baseline good general state of health. Had episode of choking on piece of tofu. Brought into ED via EMS, intubated for airway protection, tofu was extracted. CT with bilateral ground glass opacities and infiltrates suggestive of PNA.   - PNA coverage as below  - continue with invasive ventilation as needed  - hydrocortisone 50mg Q6H      RENAL:  #CKD  #S/p Renal Transplant 2023  Patient with a hx of CKD, s/p transplant in 2023. Creatinine 1.34 on presentation, GFR 55, likely at baseline. Patient takes Lokelma 10mg QD, calcium carbonate 1250mg BID, mycophenolate mofetil 500mg QD, tacrolimus 1mg QD  - hold tacrolimus until tacro iso active infection  - hold mycophenolate mofetil iso active infection   - c/w Lokelma  - c/w calcium carbonate  - nephrology consult in AM    GI/:  #Urinary Retention   Patient with urinary retention and distended bladder on physical exam s/p intubation. Rothman placed in ED on 5/11.   - c/w rothman  - strict Is & Os    Diet: NPO for now  GI PPX: pantoprazole 40mg QD (therapeutic interchange for home omeprazole 40mg)  Bowel regimen: senna and miralax once NG tube placed  - needs NG tube placed    HEME:  #Leukocytosis  Patient with leukocytosis in the setting of pneumonia     ID:  #PNA  #Immunocompromised  Patient with AHRF in the setting of witnessed aspiration event. Given CT findings of ground glass opacities and bilateral consolidations, concern for underlying pneumonia. MRSA, RVP, urine strep and legionella negative.   - c/w Zosyn 4.5mg   - c/w vancomycin 1g Q24H   - c/w azithromycin 500mg for 3 days  - hold tacrolimus  - hold mycophenolate for now     ENDOCRINE:  MARLO    MSK:  MARLO    PPX:  Fluids: s/p 2.5 L  Electrolytes: replete as needed  Nutrition: Diet, NPO (05-12-25 @ 05:18) [Active]  PPI ppx: pantoprazole 40mg QD (therapeutic interchange for home omeprazole 40mg)  Dvt ppx: heparin 5000 units subQ TID  Activity: bedrest  Dispo: MICU Patient is a 75y M w/ PMHx of HTN, HLD, IDDM2, HFpEF, CKD 2-3 (s/p Renal transplant in 2023), CAD s/p PCI w/ RAMANA-pLAD (6/2022), urinary retention, retinopathy and hypothyroidism presents to the ED after a witnessed choking episode requiring resuscitation by EMS.     NEURO:  # c/f seizure like activity vs acute stroke  Pt reportedly with deviated gaze. Stroke w/u neg for any acute stroke or path. C/f seizure like activity, s/p 2mg of ativan. currently on fentanyl. no hx of epilepsy or seizures in past  - obtain vEEG f/u results  - obtain tacrolimus levels r/o toxicity  - neurology following, appreciate recs       - VEEG       - [facility] W/WO (specify epilepsy protocol)       - start keppra 500mg twice daily now       - ativan 2mg PRN for seizures > 2mins       - per [facility] law cannot drive for 1 year after last seizure    CARDIOVASCULAR:  #Septic Shock  Patient with shock, likely septic in the setting of pneumonia. Cardiac enzymes WNL. EKG sinus tachycardia  - c/w levo gtt, titrate as needed       - MAP goal >65    #Hx of HTN  Patient takes nifedipine 60mg QD, irbesartan 150mg.   - holding home BP medications iso shock    #CAD s/p PCI w/ RAMANA-pLAD (6/2022)  Patient on aspirin 81mg QD, Plavix 75mg QD, atorvastatin 40mg  - continue with home medications    #HFpEF  - f/u formal TTE     PULMONARY:  #AHRF  # Aspiration episode  Pt at baseline good general state of health. Had episode of choking on piece of tofu. Brought into ED via EMS, intubated for airway protection, tofu was extracted. CT with bilateral ground glass opacities and infiltrates suggestive of PNA.   - PNA coverage as below  - continue with invasive ventilation as needed  - hydrocortisone 50mg Q6H      RENAL:  #CKD  #S/p Renal Transplant 2023  Patient with a hx of CKD, s/p transplant in 2023. Creatinine 1.34 on presentation, GFR 55, likely at baseline. Patient takes Lokelma 10mg QD, calcium carbonate 1250mg BID, mycophenolate mofetil 500mg QD, tacrolimus 1mg QD  - hold tacrolimus until tacro iso active infection  - hold mycophenolate mofetil iso active infection   - c/w Lokelma  - c/w calcium carbonate  - nephrology consult in AM    GI/:  #Urinary Retention   Patient with urinary retention and distended bladder on physical exam s/p intubation. Rothman placed in ED on 5/11.   - c/w rothman  - strict Is & Os    Diet: NPO for now  GI PPX: pantoprazole 40mg QD (therapeutic interchange for home omeprazole 40mg)  Bowel regimen: senna and miralax once NG tube placed  - needs NG tube placed    HEME:  #Leukocytosis  Patient with leukocytosis in the setting of pneumonia     ID:  #PNA  #Immunocompromised  Patient with AHRF in the setting of witnessed aspiration event. Given CT findings of ground glass opacities and bilateral consolidations, concern for underlying pneumonia. MRSA, RVP, urine strep and legionella negative.   - c/w Zosyn 4.5mg   - c/w vancomycin 1g Q24H   - c/w azithromycin 500mg for 3 days  - hold tacrolimus  - hold mycophenolate for now     ENDOCRINE:  MARLO    MSK:  MARLO    PPX:  Fluids: s/p 2.5 L  Electrolytes: replete as needed  Nutrition: Diet, NPO (05-12-25 @ 05:18) [Active]  PPI ppx: pantoprazole 40mg QD (therapeutic interchange for home omeprazole 40mg)  Dvt ppx: heparin 5000 units subQ TID  Activity: bedrest  Dispo: MICU

## 2025-05-12 NOTE — DIETITIAN INITIAL EVALUATION ADULT - PERTINENT MEDS FT
MEDICATIONS  (STANDING):  albuterol/ipratropium for Nebulization 3 milliLiter(s) Nebulizer every 4 hours  aspirin  chewable 81 milliGRAM(s) Oral daily  atorvastatin 40 milliGRAM(s) Oral at bedtime  azithromycin  IVPB 500 milliGRAM(s) IV Intermittent every 24 hours  azithromycin  IVPB      calcium carbonate   1250 mG (OsCal) 1 Tablet(s) Oral daily  chlorhexidine 2% Cloths 1 Application(s) Topical <User Schedule>  fentaNYL   Infusion. 0.5 MICROgram(s)/kG/Hr (3.52 mL/Hr) IV Continuous <Continuous>  heparin   Injectable 5000 Unit(s) SubCutaneous every 8 hours  hydrocortisone sodium succinate Injectable 50 milliGRAM(s) IV Push every 6 hours  levETIRAcetam   Injectable 500 milliGRAM(s) IV Push every 12 hours  norepinephrine Infusion 0.05 MICROgram(s)/kG/Min (6.59 mL/Hr) IV Continuous <Continuous>  pantoprazole   Suspension 40 milliGRAM(s) Oral daily  piperacillin/tazobactam IVPB.. 4.5 Gram(s) IV Intermittent every 8 hours  polyethylene glycol 3350 17 Gram(s) Oral daily  propofol Infusion 10 MICROgram(s)/kG/Min (4.22 mL/Hr) IV Continuous <Continuous>  senna 2 Tablet(s) Oral at bedtime  sodium chloride 3%  Inhalation 4 milliLiter(s) Inhalation every 4 hours  sodium zirconium cyclosilicate 10 Gram(s) Oral daily  tacrolimus  IVPB 0.6 milliGRAM(s) IV Intermittent every 24 hours  vancomycin  IVPB 1000 milliGRAM(s) IV Intermittent every 24 hours    MEDICATIONS  (PRN):

## 2025-05-12 NOTE — CONSULT NOTE ADULT - ASSESSMENT
75y year old Male with a PMHx significant for HTN, HLD, IDDM2, HFpEF, CKD 2-3 (s/p Renal transplant in ), CAD s/p PCI w/ RAMANA-pLAD (2022), urinary retention, retinopathy and hypothyroidism presents to the ED after a witnessed choking episode requiring resuscitation by EMS. Pulm consulted for concern for opportunistic infection in the setting of immunosuppression with tacrolimus, septic shock, b/l lower lobe infiltrates.     #Aspiration PNA  #Opportunistic infection    Data review:  Tmax 101.9  Norepinephrine at .02  Vent settings: 450/12/6/30%  WBC 10, procal 1.87  Tacro level: 6.4 (WNL)  Lactate 2.2 -> 1.7  AB.36/38/99  CT chest: b/l lower lobe consolidations, peripheral subpleural reticulation (DIXIE)    Suspect that pt had acute hypoxic respiratory failure 2/2 aspiration PNA. He seems to be improving in terms of pressor requirements and O2 requirements, but given his immunosuppression and dense bilateral consolidations bronchoscopy with BAL while pt is intubated could help rule out an opportunistic infection. Pt is now s/p bronchoscopy with RLL BAL and improving on abx. Follow up BAL studies including PJP PCR, fungitell, galactomannan, bacterial/fungal/AFB cultures, cell count.    Recommendations:  -f/u BAL cultures and cell count  -f/u BAL PJP PCR, fungitell, galactomannan, RVP  -f/u serum fungitell, galactomannan  -c/w abx    Pulmonary will sign off at this time. Please call back with any further questions or change in clinical status.    Case s/e/d with Dr. Benoit

## 2025-05-12 NOTE — CONSULT NOTE ADULT - SUBJECTIVE AND OBJECTIVE BOX
PULMONARY SERVICE INITIAL CONSULT NOTE    HPI:  JAYCOB RUANO is a 75y year old Male with a PMHx significant for HTN, HLD, IDDM2, HFpEF, CKD 2-3 (s/p Renal transplant in 2023), CAD s/p PCI w/ RAMANA-pLAD (6/2022), urinary retention, retinopathy and hypothyroidism presents to the ED after a witnessed choking episode requiring resuscitation by EMS. Per son (Gus), pt was in usual state of health and was having dinner at a restaurant when he acutely began choking on a piece of tofu. Pt was unable to clear his airway and EMS was called and patient was brought to the ED. Per son no associated prodromal events or symptoms and denies any changes in mentation, chest pain, shortness of breath, seizure like activity preceding the event. In the ED, patient was intubated for airway protection and a large piece of tofu was extracted from the oropharynx. Per ED attending c/f deviated gaze and stroke code called. Possible seizure like activity and ativan was given.     Pulm consulted for concern for opportunistic infection in the setting of immunosuppression with tacrolimus, septic shock, b/l lower lobe infiltrates.     ED COURSE  Vitals: T 98.1, , /109, intubated RR 18 99% SpO2  Labs: WBC 11.18, HCO3 17, anion gap 21, creatinine 1.34  EKG: sinus tachycardia   Imaging: CT head negative for acute stroke; CXR with no pulmonary edema or consolidations  Interventions: etomidate 20mg, fentanyl 100mg, fentanyl gtt, 2L LR, lorazepam 4mg, succinylcholine 100mg, CTX 2g       (12 May 2025 04:28)      REVIEW OF SYSTEMS:  Constitutional: No fever, weight loss or fatigue  Eyes: No eye pain, visual disturbances, or discharge  ENMT:  No difficulty hearing, tinnitus, vertigo; No sinus or throat pain  Neck: No pain, stiffness or neck swelling  Respiratory: see HPI  Cardiovascular: No chest pain, palpitations, dizziness or leg swelling  Gastrointestinal: No abdominal or epigastric pain. No nausea, vomiting or hematemesis; No diarrhea or constipation. No melena or hematochezia.  Genitourinary: No dysuria, frequency, hematuria or incontinence  Neurological: No headaches, memory loss, loss of strength, numbness or tremors  Skin: No itching, burning, rashes or lesions   Lymph Nodes: No enlarged glands  Endocrine: No heat or cold intolerance; No hair loss  Musculoskeletal: No joint pain or swelling; No muscle, back or extremity pain  Psychiatric: No depression, anxiety, mood swings or difficulty sleeping  Heme/Lymph: No easy bruising or bleeding gums  Allergy and Immunologic: No hives or eczema    PAST MEDICAL & SURGICAL HISTORY:  Diabetes      Hypertension      Hyperlipidemia      CKD (chronic kidney disease), stage IV      BPH (benign prostatic hyperplasia)      Congestive heart failure (CHF)      CAD (coronary artery disease)      No significant past surgical history          FAMILY HISTORY:  No pertinent family history in first degree relatives        SOCIAL HISTORY:  Smoking Status: [ ] Current, [ ] Former, [ ] Never  Pack Years:    MEDICATIONS:  Pulmonary:  albuterol/ipratropium for Nebulization 3 milliLiter(s) Nebulizer every 4 hours  sodium chloride 3%  Inhalation 4 milliLiter(s) Inhalation every 4 hours    Antimicrobials:  azithromycin  IVPB 500 milliGRAM(s) IV Intermittent every 24 hours  azithromycin  IVPB      piperacillin/tazobactam IVPB.. 4.5 Gram(s) IV Intermittent every 8 hours  vancomycin  IVPB 1000 milliGRAM(s) IV Intermittent every 24 hours    Anticoagulants:  aspirin  chewable 81 milliGRAM(s) Oral daily  heparin   Injectable 5000 Unit(s) SubCutaneous every 8 hours    Onc:    GI/:  pantoprazole   Suspension 40 milliGRAM(s) Oral daily  polyethylene glycol 3350 17 Gram(s) Oral daily  senna 2 Tablet(s) Oral at bedtime    Endocrine:  atorvastatin 40 milliGRAM(s) Oral at bedtime  hydrocortisone sodium succinate Injectable 50 milliGRAM(s) IV Push every 6 hours    Cardiac:  norepinephrine Infusion 0.05 MICROgram(s)/kG/Min IV Continuous <Continuous>    Other Medications:  calcium carbonate   1250 mG (OsCal) 1 Tablet(s) Oral daily  chlorhexidine 2% Cloths 1 Application(s) Topical <User Schedule>  fentaNYL   Infusion. 0.5 MICROgram(s)/kG/Hr IV Continuous <Continuous>  levETIRAcetam   Injectable 500 milliGRAM(s) IV Push every 12 hours  propofol Infusion 10 MICROgram(s)/kG/Min IV Continuous <Continuous>  sodium zirconium cyclosilicate 10 Gram(s) Oral daily  tacrolimus  IVPB 0.6 milliGRAM(s) IV Intermittent every 24 hours      Allergies    chlorthalidone (Other)    Intolerances        Vital Signs Last 24 Hrs  T(C): 36.8 (12 May 2025 13:19), Max: 38.8 (12 May 2025 09:00)  T(F): 98.2 (12 May 2025 13:19), Max: 101.9 (12 May 2025 09:00)  HR: 53 (12 May 2025 16:00) (20 - 105)  BP: 111/57 (12 May 2025 16:00) (79/44 - 194/109)  BP(mean): 81 (12 May 2025 16:00) (56 - 119)  RR: 12 (12 May 2025 16:00) (12 - 19)  SpO2: 99% (12 May 2025 16:00) (98% - 100%)    Parameters below as of 12 May 2025 16:00  Patient On (Oxygen Delivery Method): ventilator    O2 Concentration (%): 30    05-11 @ 07:01  -  05-12 @ 07:00  --------------------------------------------------------  IN: 1767.6 mL / OUT: 410 mL / NET: 1357.6 mL    05-12 @ 07:01  -  05-12 @ 17:08  --------------------------------------------------------  IN: 479 mL / OUT: 365 mL / NET: 114 mL      Mode: AC/ CMV (Assist Control/ Continuous Mandatory Ventilation)  RR (machine): 12  TV (machine): 450  FiO2: 30  PEEP: 6  ITime: 1  MAP: 9  PIP: 20      PHYSICAL EXAM:  Constitutional: well-appearing  Head: NC/AT  EENT: PERRL, anicteric sclera; oropharynx clear, MMM  Neck: supple, no appreciable JVD  Respiratory: CTA B/L; no W/R/R  Cardiovascular: +S1/S2, RRR  Gastrointestinal: soft, NT/ND  Extremities: WWP; no edema, clubbing or cyanosis  Vascular: 2+ radial pulses B/L  Neurological: awake and alert; BAUGH    LABS:  ABG - ( 12 May 2025 05:58 )  pH, Arterial: 7.36  pH, Blood: x     /  pCO2: 38    /  pO2: 99    / HCO3: 22    / Base Excess: -3.6  /  SaO2: 96.1                CBC Full  -  ( 12 May 2025 09:05 )  WBC Count : 10.94 K/uL  RBC Count : 4.46 M/uL  Hemoglobin : 13.8 g/dL  Hematocrit : 41.4 %  Platelet Count - Automated : 147 K/uL  Mean Cell Volume : 92.8 fl  Mean Cell Hemoglobin : 30.9 pg  Mean Cell Hemoglobin Concentration : 33.3 g/dL  Auto Neutrophil # : x  Auto Lymphocyte # : x  Auto Monocyte # : x  Auto Eosinophil # : x  Auto Basophil # : x  Auto Neutrophil % : x  Auto Lymphocyte % : x  Auto Monocyte % : x  Auto Eosinophil % : x  Auto Basophil % : x    05-12    134[L]  |  103  |  24[H]  ----------------------------<  217[H]  5.0   |  21[L]  |  1.32[H]    Ca    8.1[L]      12 May 2025 09:05  Phos  2.0     05-12  Mg     1.4     05-12    TPro  5.8[L]  /  Alb  3.5  /  TBili  0.7  /  DBili  x   /  AST  24  /  ALT  20  /  AlkPhos  46  05-12    PT/INR - ( 11 May 2025 20:28 )   PT: 11.9 sec;   INR: 1.04          PTT - ( 11 May 2025 20:28 )  PTT:35.8 sec      Urinalysis Basic - ( 12 May 2025 09:05 )    Color: x / Appearance: x / SG: x / pH: x  Gluc: 217 mg/dL / Ketone: x  / Bili: x / Urobili: x   Blood: x / Protein: x / Nitrite: x   Leuk Esterase: x / RBC: x / WBC x   Sq Epi: x / Non Sq Epi: x / Bacteria: x                RADIOLOGY & ADDITIONAL STUDIES:

## 2025-05-12 NOTE — H&P ADULT - NSHPPHYSICALEXAM_GEN_ALL_CORE
General: intubated and sedated  Head: pinpoint pupils, no gaze deviation, YISEL  Neck: Supple; no JVD  Respiratory: CTAB; no wheezes/rales/rhonchi  Cardiovascular: Regular rhythm/rate; S1/S2+, no murmurs, rubs gallops   Gastrointestinal: Soft; NTND; bowel sounds normal and present  Extremities: WWP; no edema/cyanosis  Neurological: intubated and sedated. RAAS -5  Skin: Clean and intact. Good skin turgor. Without open wounds and sores

## 2025-05-12 NOTE — H&P ADULT - HISTORY OF PRESENT ILLNESS
JAYCOB RUANO is a 75y year old Male with a PMHx significant for HTN, HLD, IDDM2, HFpEF, CKD 2-3 (s/p Renal transplant in 2023), CAD s/p PCI w/ RAMANA-pLAD (6/2022), urinary retention, retinopathy and hypothyroidism presents to the ED after a witnessed choking episode requiring resuscitation by EMS. Per son (Gus), pt was in usual state of health and was having dinner at a restaurant when he acutely began choking on a piece of tofu. Pt was unable to clear his airway and EMS was called and patient was brought to the ED. Per son no associated prodromal events or symptoms and denies any changes in mentation, chest pain, shortness of breath, seizure like activity preceding the event. In the ED, patient was intubated for airway protection and a large piece of tofu was extracted from the oropharynx. Per ED attending c/f deviated gaze and stroke code called. Possible seizure like activity and ativan was given.     ED COURSE  Vitals: T 98.1, , /109, intubated RR 18 99% SpO2  Labs: WBC 11.18, HCO3 17, anion gap 21, creatinine 1.34  EKG: sinus tachycardia   Imaging: CT head negative for acute stroke; CXR with no pulmonary edema or consolidations  Interventions: etomidate 20mg, fentanyl 100mg, fentanyl gtt, 2L LR, lorazepam 4mg, succinylcholine 100mg, CTX 2g

## 2025-05-12 NOTE — CONSULT NOTE ADULT - ATTENDING COMMENTS
Patient with arrest secondary to aspiration of food. Was able to dislodge but CT imaging reviewed and concern for opportunistic infection in the setting of his transplant medication. Given this will plan for bronchoscopy with BAL. Patient is on blood thinner so adds some risk. All other labs and imaging reviewed and favor overall that this is bacterial/aspiration process but will rule out fungal and other opportunistic causes. Risk and benefit of procedure discussed with family.
75 M w/ retinopathy, HFpEF, HTN, CAD s/p PCI w/ RAMANA-pLAD (6/2022), HLD, IDDM2, hypothyroidism, CKD 2-3 (s/p Renal transplant in 2023), and urinary retention presented after a witnessed episode of choking requiring resuscitation by EMS. Large piece of tofu extracted from pharynx during intubation in ED. CT angio neck/head without perfusion problems but showed BL GGOs and infiltrates in the lungs. Physical exam as above. He had seizure like activity in ED. Found to have urine retention and Jewell was placed.   1. BL pneumonia  2. Aspiration of foreign body  3. Septic shock  4. Acute respiratory failure with hypoxia  5. Renal transplant  6. Immunocompromised state  7. Urine retention  8. Seizure?  - Zosyn/vanco/azithro  - iv fluids  - tacro level  - EEG  - Keppra  - Jewell cath

## 2025-05-12 NOTE — PROCEDURE NOTE - NSBRONCHPROCDETAILS_GEN_A_CORE_FT
Bronchoscope inserted through ETT. Airway evaluation revealed Sharp Latoya. ELZA and LLL evaluation revealed some subtle mucopurulent secretions with blood (no active bleeding) on LC2. RUL, RML, RLL revealed thin mucopurulent secretions especially RLL. BAL performed in RLL (RB7) with 80cc instilled and 15cc returned. ETT noted to be in good position. Bronchoscope then withdrawn from ETT. Minimal bleeding noted.    Facundo Sanderson MD  Pulmonary/Critical Care Fellow PGY-4  Pulmonary Pager # [phone]

## 2025-05-12 NOTE — PROCEDURE NOTE - NSPROCNAME_GEN_A_CORE
Point of Care Ultrasound Renal
Point of Care Ultrasound Lung
Point of Care Ultrasound Cardiac
Bronchoscopy

## 2025-05-12 NOTE — CONSULT NOTE ADULT - ASSESSMENT
72yoM w/ DM, HTN, HLD, CHF, BPH, CKD stage V (s/p  donor renal transplant 3/20/2023 at [place]), CAD (s/p RAMANA to pLAD - 22), HFpEF (EF 67% 2024) BIBEMS for choking. Epilepsy consulted for evaluation of possible seizures. Considering bilateral arm and face stiffness ~30min after the choking event improved with ativan suspect patient may have had tonic seizure secondary to hypoxic injury secondary to choking. May also consider tacrolimus induced neurotoxicity however patient has been on this medication for 2 years and the dose may have been reduced during this time.     RECOMMENDATIONS  - VEEG  - [facility] W/WO (specify epilepsy protocol)  - start keppra 500mg twice daily now  - ativan 2mg PRN for seizures > 2mins  - keep Mg >2 and K >4  - seizure/fall/aspiration precautions  - per [facility] law cannot drive for 1 year after last seizure    Case discussed with neurology attending Dr. Jasmine   72yoM w/ DM, HTN, HLD, CHF, BPH, CKD stage V (s/p  donor renal transplant 3/20/2023 at [place]), CAD (s/p RAMANA to pLAD - 22), HFpEF (EF 67% 2024) BIBEMS for choking. Epilepsy consulted for evaluation of possible seizures. Considering bilateral arm and face stiffness ~30min after the choking event improved with ativan suspect patient may have had tonic seizure secondary to hypoxic injury secondary to choking. May also consider tacrolimus induced neurotoxicity however patient has been on this medication for 2 years and the dose may have been reduced during this time.     RECOMMENDATIONS  - VEEG  - [facility] W/WO (specify epilepsy protocol)  - start keppra 500mg twice daily now  - ativan 2mg PRN for seizures > 2mins  - keep Mg >2 and K >4  - seizure/fall/aspiration precautions  - per [facility] law cannot drive for 1 year after last seizure    Case discussed with neurology attending Dr. Jasmine  - general neurology to follow in AM for now pending EEG read 72yoM w/ DM, HTN, HLD, CHF, BPH, CKD stage V (s/p  donor renal transplant 3/20/2023 at [place]), CAD (s/p RAMANA to pLAD - 22), HFpEF (EF 67% 2024) BIBEMS for choking. Epilepsy consulted for evaluation of possible seizures. Considering bilateral arm and face stiffness ~30min after the choking event improved with ativan suspect patient may have had tonic seizure secondary to hypoxic injury secondary to choking. May also consider tacrolimus induced neurotoxicity however patient has been on this medication for 2 years and the dose may have been reduced during this time.     RECOMMENDATIONS  - VEEG  - [facility] W/WO (specify epilepsy protocol)  - start keppra 500mg twice daily now  - ativan 2mg PRN for seizures > 2mins  - keep Mg >2 and K >4  - seizure/fall/aspiration precautions  - per [facility] law cannot drive for 1 year after last seizure    Case discussed with neurology attending Dr. Jasmine 72yoM w/ DM, HTN, HLD, CHF, BPH, CKD stage V (s/p  donor renal transplant 3/20/2023 at [place]), CAD (s/p RAMANA to pLAD - 22), HFpEF (EF 67% 2024) BIBEMS for choking. Epilepsy consulted for evaluation of possible seizures. Considering bilateral arm and face stiffness ~30min after the choking event improved with ativan suspect patient may have had tonic seizure secondary to hypoxic injury secondary to choking. May also consider tacrolimus induced neurotoxicity however patient has been on this medication for 2 years and the dose may have been reduced during this time.     RECOMMENDATIONS  - VEEG  - MRI Brain Noncon  - start keppra 500mg twice daily now  - ativan 2mg PRN for seizures > 2mins  - keep Mg >2 and K >4  - seizure/fall/aspiration precautions  - per [facility] law cannot drive for 1 year after last seizure    Case discussed with neurology attending Dr. Jasmine 72yoM w/ DM, HTN, HLD, CHF, BPH, CKD stage V (s/p  donor renal transplant 3/20/2023 at [place]), CAD (s/p RAMANA to pLAD - 22), HFpEF (EF 67% 2024) BIBEMS for choking. Epilepsy consulted for evaluation of possible seizures. Considering bilateral arm and face stiffness ~30min after the choking event improved with ativan suspect patient may have had tonic seizure secondary to hypoxic injury secondary to choking. May also consider tacrolimus induced neurotoxicity however patient has been on this medication for 2 years and the dose may have been reduced during this time.     RECOMMENDATIONS  - VEEG  - Obtain MRI Brain Noncon  - Obtain Neuron-specific enolase (NSE) 24h, 48h, 72h post event  - start Keppra 500mg twice daily now  - ativan 2mg PRN for seizures > 2mins  - keep Mg >2 and K >4  - seizure/fall/aspiration precautions  - per [facility] law cannot drive for 1 year after last seizure    Case discussed with neurology attending Dr. Jasmine
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
## 2025-05-12 NOTE — DIETITIAN INITIAL EVALUATION ADULT - OTHER INFO
75y M w/ PMHx of HTN, HLD, IDDM2, HFpEF, CKD 2-3 (s/p Renal transplant in 2023), CAD s/p PCI w/ RAMANA-pLAD (6/2022), urinary retention, retinopathy and hypothyroidism presents to the ED after a witnessed choking episode requiring resuscitation by EMS.     Pt care discussed in interdisciplinary care team rounds. Rx and labs reviewed. Pt received intubated and sedated with vent set to VC-AC, MAP 81, fentanyl gtt, norepinephrine gtt, and no propofol at time of assessment. Pt managed in MICU status post choking episode requiring resuscitation. No plans for nutrition at this time; see recs below. Mg and Phos low today; monitor for s/sx refeeding syndrome when initiating nutrition. No other reports GI distress or further nutritional concerns at this time. RDN to remain available, see recommendations below.     Pain: No non-verbal indicators   GI: Abdomen non-distended/non-tender, +BS x4, last bowel movement PTA   Skin: Warm/Dry/Intact, +1 bilateral leg

## 2025-05-12 NOTE — DIETITIAN INITIAL EVALUATION ADULT - ENTER FROM (CAL/KG)
Post-Op Assessment Note    CV Status:  Stable  Pain Score: 0    Pain management: adequate       Mental Status:  Sleepy and arousable   Hydration Status:  Stable   PONV Controlled:  None   Airway Patency:  Patent  Two or more mitigation strategies used for obstructive sleep apnea   Post Op Vitals Reviewed: Yes    No anethesia notable event occurred.    Staff: CRNA   Comments: spontaneously breathing, ventilating, vss, fully endorsed to recovery w/o AC          Last Filed PACU Vitals:  Vitals Value Taken Time   Temp     Pulse 78 03/11/25 0800   BP 94/54 03/11/25 0800   Resp 16 03/11/25 0800   SpO2 98 % 03/11/25 0800       Modified Amy:     Vitals Value Taken Time   Activity 2 03/11/25 0800   Respiration 2 03/11/25 0800   Circulation 2 03/11/25 0800   Consciousness 1 03/11/25 0800   Oxygen Saturation 2 03/11/25 0800     Modified Amy Score: 9             25

## 2025-05-12 NOTE — H&P ADULT - NSHPLABSRESULTS_GEN_ALL_CORE
LABS:                        16.0   11.18 )-----------( 198      ( 11 May 2025 20:28 )             50.2         139  |  101  |  25[H]  ----------------------------<  249[H]  4.2   |  17[L]  |  1.34[H]    Ca    9.2      11 May 2025 20:28    TPro  7.7  /  Alb  4.3  /  TBili  0.6  /  DBili  x   /  AST  40  /  ALT  29  /  AlkPhos  70  05-11    PT/INR - ( 11 May 2025 20:28 )   PT: 11.9 sec;   INR: 1.04          PTT - ( 11 May 2025 20:28 )  PTT:35.8 sec  Urinalysis Basic - ( 12 May 2025 00:35 )    Color: Yellow / Appearance: Clear / S.022 / pH: x  Gluc: x / Ketone: Negative mg/dL  / Bili: Negative / Urobili: 0.2 mg/dL   Blood: x / Protein: Negative mg/dL / Nitrite: Negative   Leuk Esterase: Negative / RBC: 18 /HPF / WBC 0 /HPF   Sq Epi: x / Non Sq Epi: 0 /HPF / Bacteria: Negative /HPF      ABG - ( 12 May 2025 01:20 )  pH, Arterial: 7.31  pH, Blood: x     /  pCO2: 46    /  pO2: 105   / HCO3: 23    / Base Excess: -3.3  /  SaO2: 97.1              Lactate, Blood: 2.0 mmol/L (25 @ 22:38)      CARDS:  CARDIAC MARKERS ( 11 May 2025 22:38 )  x     / x     / x     / x     / 4.1 ng/mL        MICRO:    Urinalysis with Rflx Culture (collected 12 May 2025 00:35)        RADIOLOGY:

## 2025-05-12 NOTE — PROGRESS NOTE ADULT - SUBJECTIVE AND OBJECTIVE BOX
Internal Medicine Progress Note  Brandy Delvalle, PGY-5 [phone] or teams      HOSPITAL COURSE:  75y M w/ PMHx of HTN, HLD, IDDM2, HFpEF, CKD 2-3 (s/p Renal transplant in 2023), CAD s/p PCI w/ RAMANA-pLAD (6/2022), urinary retention, retinopathy and hypothyroidism presents to the ED after witnessed choking episode admitted to MICU for septic shock and acute hypoxic respiratory failure 2/2 aspiration pna vs opportunistic infection due to immunocompromised state. In the ER, patient intubated for airway protection and large piece of tofu was extracted from oropharynx. Course complicated by deviated gaze and witnessed seizure-like (bilateral arm/face stiffness) movements, imaging negative for stroke but likely 2/2 to hypoxic injury iso aspiration. Started on keppra 500mg BID, vEEG. Nephrology consulted due to hx of renal transplant 2023 (follows Dr. Javad steelept), and recommended to restart tacrolimus but hold mycophenolate. NG tube placed. CT chest with bilateral moderate sized consolidations and interstitial lung abnormality.  Pending MRI head (epilepsy protocol). Pending bronchoscopy to obtain BAL and evaluate for opportunistic infections such as CMV and PJP.      24 Hour Events:   - Admitted to MICU    SUBJECTIVE:  JAYCOB RUANO resting in bed this morning. Patient has been here for 1d  Opens eyes to verbal stimuli. Gaze deviated upward. Not following simple commands    ALLERGIES:  chlorthalidone (Other)      MEDICATIONS:  STANDING MEDICATIONS  albuterol/ipratropium for Nebulization 3 milliLiter(s) Nebulizer every 4 hours  aspirin  chewable 81 milliGRAM(s) Oral daily  atorvastatin 40 milliGRAM(s) Oral at bedtime  azithromycin  IVPB 500 milliGRAM(s) IV Intermittent every 24 hours  azithromycin  IVPB      calcium carbonate   1250 mG (OsCal) 1 Tablet(s) Oral daily  chlorhexidine 2% Cloths 1 Application(s) Topical <User Schedule>  fentaNYL   Infusion. 0.5 MICROgram(s)/kG/Hr IV Continuous <Continuous>  heparin   Injectable 5000 Unit(s) SubCutaneous every 8 hours  hydrocortisone sodium succinate Injectable 50 milliGRAM(s) IV Push every 6 hours  levETIRAcetam   Injectable 500 milliGRAM(s) IV Push every 12 hours  midazolam Injectable 10 milliGRAM(s) IV Push once  norepinephrine Infusion 0.05 MICROgram(s)/kG/Min IV Continuous <Continuous>  pantoprazole   Suspension 40 milliGRAM(s) Oral daily  piperacillin/tazobactam IVPB.. 4.5 Gram(s) IV Intermittent every 8 hours  polyethylene glycol 3350 17 Gram(s) Oral daily  senna 2 Tablet(s) Oral at bedtime  sodium chloride 3%  Inhalation 4 milliLiter(s) Inhalation every 4 hours  sodium zirconium cyclosilicate 10 Gram(s) Oral daily  tacrolimus  IVPB 0.6 milliGRAM(s) IV Intermittent every 24 hours  vancomycin  IVPB 1000 milliGRAM(s) IV Intermittent every 24 hours    PRN MEDICATIONS    VITALS:   Vital Signs Last 24 Hrs  T(C): 36.8 (12 May 2025 13:19), Max: 38.8 (12 May 2025 09:00)  T(F): 98.2 (12 May 2025 13:19), Max: 101.9 (12 May 2025 09:00)  HR: 60 (12 May 2025 14:00) (20 - 105)  BP: 102/55 (12 May 2025 13:00) (79/44 - 194/109)  BP(mean): 75 (12 May 2025 13:00) (56 - 119)  RR: 13 (12 May 2025 14:00) (12 - 19)  SpO2: 100% (12 May 2025 14:00) (98% - 100%)    Parameters below as of 12 May 2025 14:00  Patient On (Oxygen Delivery Method): ventilator    O2 Concentration (%): 30    PHYSICAL EXAM  General: in no acute distress    HEENT: NC/AT, sclera anicteric, conjunctiva clear, mucus membranes dry, no JVD  Lungs: bilateral crackles  Heart: regular rate and rhythm, normal S1 S2, no murmurs/rubs/gallops    Abdomen: soft, non-tender, non-distended, bowel sounds present   Extremities: atraumatic, no lower extremity edema, clubbing or cyanosis, cool to the touch, distal pulses 2+ at DP and radial bilaterally    Skin: normal skin texture and turgor without rashes or lesions, no diaphoresis   Neuro: A&Ox0, responding to verbal and painful stimuli.    LABS:                        13.8   10.94 )-----------( 147      ( 12 May 2025 09:05 )             41.4     05-12    134[L]  |  103  |  24[H]  ----------------------------<  217[H]  5.0   |  21[L]  |  1.32[H]    Ca    8.1[L]      12 May 2025 09:05  Phos  2.0     05-12  Mg     1.4     05-12    TPro  5.8[L]  /  Alb  3.5  /  TBili  0.7  /  DBili  x   /  AST  24  /  ALT  20  /  AlkPhos  46  05-12    PT/INR - ( 11 May 2025 20:28 )   PT: 11.9 sec;   INR: 1.04          PTT - ( 11 May 2025 20:28 )  PTT:35.8 sec  Urinalysis Basic - ( 12 May 2025 09:05 )    Color: x / Appearance: x / SG: x / pH: x  Gluc: 217 mg/dL / Ketone: x  / Bili: x / Urobili: x   Blood: x / Protein: x / Nitrite: x   Leuk Esterase: x / RBC: x / WBC x   Sq Epi: x / Non Sq Epi: x / Bacteria: x      ABG - ( 12 May 2025 05:58 )  pH, Arterial: 7.36  pH, Blood: x     /  pCO2: 38    /  pO2: 99    / HCO3: 22    / Base Excess: -3.6  /  SaO2: 96.1              Lactate, Blood: 1.4 mmol/L (05-12-25 @ 09:05)  Lactate, Blood: 2.2 mmol/L *H* (05-12-25 @ 06:05)  Lactate, Blood: 2.0 mmol/L (05-11-25 @ 22:38)    MICRO:    Urinalysis with Rflx Culture (collected 12 May 2025 00:35)      CARDS:  CARDIAC MARKERS ( 11 May 2025 22:38 )  x     / x     / x     / x     / 4.1 ng/mL        RADIOLOGY: Reviewed      Lines:  Central line:              Date placed:             Indication:   Intravenous Access:   NG tube:   Jewell Catheter:   Indwelling Urethral Catheter:     Connect To:  Straight Drainage/[place]    Indication:  Urinary Retention / Obstruction (05-12-25 @ 05:45) (not performed) [Active]      Neuron Specific Enolase: 15:00 (05-12-25 @ 11:14)  Lactate, Blood: STAT (05-12-25 @ 08:22)  Lactate, Blood: STAT  Addl Info: Draw repeat Lactate, STAT (05-12-25 @ 08:07)  Phosphorus: STAT (05-12-25 @ 05:16)  Magnesium: STAT (05-12-25 @ 05:16)  Comprehensive Metabolic Panel: STAT (05-12-25 @ 05:16)  Complete Blood Count + Automated Diff: STAT (05-12-25 @ 05:16)     Internal Medicine Progress Note  Brandy Delvalle, PGY-4 [phone] or teams      HOSPITAL COURSE:  75y M w/ PMHx of HTN, HLD, IDDM2, HFpEF, CKD 2-3 (s/p Renal transplant in 2023), CAD s/p PCI w/ RAMANA-pLAD (6/2022), urinary retention, retinopathy and hypothyroidism presents to the ED after witnessed choking episode admitted to MICU for septic shock and acute hypoxic respiratory failure 2/2 aspiration pna vs opportunistic infection due to immunocompromised state. In the ER, patient intubated for airway protection and large piece of tofu was extracted from oropharynx. Course complicated by deviated gaze and witnessed seizure-like (bilateral arm/face stiffness) movements sp ativan, imaging negative for stroke but likely 2/2 to hypoxic injury iso aspiration. Started on keppra 500mg BID, vEEG. Nephrology consulted due to hx of renal transplant 2023 (follows Dr. Farnsworth outpt), and recommended to restart tacrolimus but hold mycophenolate. NG tube placed. CT chest with bilateral moderate sized consolidations and interstitial lung abnormality.  Pending MRI head (per epilepsy). Pending bronchoscopy to obtain BAL and evaluate for opportunistic infections such as CMV and PJP.      24 Hour Events:   - Admitted to MICU    SUBJECTIVE:  JAYCOB RUANO resting in bed this morning. Patient has been here for 1d  Opens eyes to verbal stimuli. Gaze deviated upward. Not following simple commands    ALLERGIES:  chlorthalidone (Other)      MEDICATIONS:  STANDING MEDICATIONS  albuterol/ipratropium for Nebulization 3 milliLiter(s) Nebulizer every 4 hours  aspirin  chewable 81 milliGRAM(s) Oral daily  atorvastatin 40 milliGRAM(s) Oral at bedtime  azithromycin  IVPB 500 milliGRAM(s) IV Intermittent every 24 hours  azithromycin  IVPB      calcium carbonate   1250 mG (OsCal) 1 Tablet(s) Oral daily  chlorhexidine 2% Cloths 1 Application(s) Topical <User Schedule>  fentaNYL   Infusion. 0.5 MICROgram(s)/kG/Hr IV Continuous <Continuous>  heparin   Injectable 5000 Unit(s) SubCutaneous every 8 hours  hydrocortisone sodium succinate Injectable 50 milliGRAM(s) IV Push every 6 hours  levETIRAcetam   Injectable 500 milliGRAM(s) IV Push every 12 hours  midazolam Injectable 10 milliGRAM(s) IV Push once  norepinephrine Infusion 0.05 MICROgram(s)/kG/Min IV Continuous <Continuous>  pantoprazole   Suspension 40 milliGRAM(s) Oral daily  piperacillin/tazobactam IVPB.. 4.5 Gram(s) IV Intermittent every 8 hours  polyethylene glycol 3350 17 Gram(s) Oral daily  senna 2 Tablet(s) Oral at bedtime  sodium chloride 3%  Inhalation 4 milliLiter(s) Inhalation every 4 hours  sodium zirconium cyclosilicate 10 Gram(s) Oral daily  tacrolimus  IVPB 0.6 milliGRAM(s) IV Intermittent every 24 hours  vancomycin  IVPB 1000 milliGRAM(s) IV Intermittent every 24 hours    PRN MEDICATIONS    VITALS:   Vital Signs Last 24 Hrs  T(C): 36.8 (12 May 2025 13:19), Max: 38.8 (12 May 2025 09:00)  T(F): 98.2 (12 May 2025 13:19), Max: 101.9 (12 May 2025 09:00)  HR: 60 (12 May 2025 14:00) (20 - 105)  BP: 102/55 (12 May 2025 13:00) (79/44 - 194/109)  BP(mean): 75 (12 May 2025 13:00) (56 - 119)  RR: 13 (12 May 2025 14:00) (12 - 19)  SpO2: 100% (12 May 2025 14:00) (98% - 100%)    Parameters below as of 12 May 2025 14:00  Patient On (Oxygen Delivery Method): ventilator    O2 Concentration (%): 30    PHYSICAL EXAM  General: in no acute distress    HEENT: NC/AT, sclera anicteric, conjunctiva clear, mucus membranes dry, no JVD  Lungs: bilateral crackles  Heart: regular rate and rhythm, normal S1 S2, no murmurs/rubs/gallops    Abdomen: soft, non-tender, non-distended, bowel sounds present   Extremities: atraumatic, no lower extremity edema, clubbing or cyanosis, cool to the touch, distal pulses 2+ at DP and radial bilaterally    Skin: normal skin texture and turgor without rashes or lesions, no diaphoresis   Neuro: A&Ox0, responding to verbal and painful stimuli.    LABS:                        13.8   10.94 )-----------( 147      ( 12 May 2025 09:05 )             41.4     05-12    134[L]  |  103  |  24[H]  ----------------------------<  217[H]  5.0   |  21[L]  |  1.32[H]    Ca    8.1[L]      12 May 2025 09:05  Phos  2.0     05-12  Mg     1.4     05-12    TPro  5.8[L]  /  Alb  3.5  /  TBili  0.7  /  DBili  x   /  AST  24  /  ALT  20  /  AlkPhos  46  05-12    PT/INR - ( 11 May 2025 20:28 )   PT: 11.9 sec;   INR: 1.04          PTT - ( 11 May 2025 20:28 )  PTT:35.8 sec  Urinalysis Basic - ( 12 May 2025 09:05 )    Color: x / Appearance: x / SG: x / pH: x  Gluc: 217 mg/dL / Ketone: x  / Bili: x / Urobili: x   Blood: x / Protein: x / Nitrite: x   Leuk Esterase: x / RBC: x / WBC x   Sq Epi: x / Non Sq Epi: x / Bacteria: x      ABG - ( 12 May 2025 05:58 )  pH, Arterial: 7.36  pH, Blood: x     /  pCO2: 38    /  pO2: 99    / HCO3: 22    / Base Excess: -3.6  /  SaO2: 96.1              Lactate, Blood: 1.4 mmol/L (05-12-25 @ 09:05)  Lactate, Blood: 2.2 mmol/L *H* (05-12-25 @ 06:05)  Lactate, Blood: 2.0 mmol/L (05-11-25 @ 22:38)    MICRO:    Urinalysis with Rflx Culture (collected 12 May 2025 00:35)      CARDS:  CARDIAC MARKERS ( 11 May 2025 22:38 )  x     / x     / x     / x     / 4.1 ng/mL        RADIOLOGY: Reviewed      Lines:  Central line:              Date placed:             Indication:   Intravenous Access:   NG tube:   Jewell Catheter:   Indwelling Urethral Catheter:     Connect To:  Straight Drainage/[place]    Indication:  Urinary Retention / Obstruction (05-12-25 @ 05:45) (not performed) [Active]      Neuron Specific Enolase: 15:00 (05-12-25 @ 11:14)  Lactate, Blood: STAT (05-12-25 @ 08:22)  Lactate, Blood: STAT  Addl Info: Draw repeat Lactate, STAT (05-12-25 @ 08:07)  Phosphorus: STAT (05-12-25 @ 05:16)  Magnesium: STAT (05-12-25 @ 05:16)  Comprehensive Metabolic Panel: STAT (05-12-25 @ 05:16)  Complete Blood Count + Automated Diff: STAT (05-12-25 @ 05:16)

## 2025-05-12 NOTE — DIETITIAN INITIAL EVALUATION ADULT - ADD RECOMMEND
-Initiate nutrition within 24-48hrs as medically able    *Recommend Glucerna 1.5 with goal rate of 72 ml/hr from 3429-5813 to provide 1296 ml tube feed, 1944 calories, 108 gProt., and 983 ml free water. This is 22.5 nonprotein calories and 1.6 gProt. per kg ideal body weight 67.3 kg.   -Monitor pressor and propofol demands   *Goal MAP >65 and lactate <2.0 for generally safe provision of nutrition   -Maintain aspiration precautions at all times  -Align nutrition with goals of care at all times  -Weekly wts  -Monitor chemistry, GI function, and skin integrity

## 2025-05-12 NOTE — PROGRESS NOTE ADULT - ASSESSMENT
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     NEURO:  # c/f seizure like activity vs acute stroke  Pt reportedly with deviated gaze. Stroke w/u neg for any acute stroke or path. C/f seizure like activity, s/p 2mg of ativan. currently on fentanyl. no hx of epilepsy or seizures in past  5/12 vEEG with mild excess intermittent slowing and poor organization of the background, indicators of moderate diffuse or multifocal cerebral dysfunction   Tacrolimus level 6.4  - neurology following, appreciate recs       - [facility] W/WO (specify epilepsy protocol)       - start keppra 500mg twice daily       - ativan 2mg PRN for seizures > 2mins       - per [facility] law cannot drive for 1 year after last seizure    CARDIOVASCULAR:  #Septic Shock  Patient with shock, likely septic in the setting of pneumonia. Cardiac enzymes WNL. EKG sinus tachycardia  - c/w levo gtt, titrate as needed       - MAP goal >65    #Hx of HTN  Patient takes nifedipine 60mg QD, irbesartan 150mg.   - holding home BP medications iso shock    #CAD s/p PCI w/ RAMANA-pLAD (6/2022)  Patient on aspirin 81mg QD, Plavix 75mg QD, atorvastatin 40mg  Outpatient cardiologist - Dr. Sarah   - although >1 year from stent, per Dr. Sarah, pt still on DAPT since he is deemed high risk  - continue with home medications    #hx of HFpEF  5/12 TTE EF 68%, fibrocalcific aortic valve sclerosis      PULMONARY:  #AHRF  # Aspiration episode  Pt at baseline good general state of health. Had episode of choking on piece of tofu. Brought into ED via EMS, intubated for airway protection, tofu was extracted. CT with bilateral ground glass opacities and infiltrates suggestive of PNA.   - PNA coverage as below  - continue with invasive ventilation as needed  - hydrocortisone 50mg Q6H      RENAL:  #CKD  #S/p Renal Transplant 2023  Patient with a hx of CKD, s/p transplant in 2023. Creatinine 1.34 on presentation, GFR 55, likely at baseline. Patient takes Lokelma 10mg QD, calcium carbonate 1250mg BID, mycophenolate mofetil 500mg BID, tacrolimus (prograf) 1mg BID  Outpatient nephrologist - Dr. Farnsworth  - resume tacrolimus but PO to IV conversion (1/3 of PO dose)  - hold mycophenolate mofetil iso active infection   - c/w Lokelma  - c/w calcium carbonate  - nephrology following, appreciate excellent recs    GI/:  #Urinary Retention   Patient with urinary retention and distended bladder on physical exam s/p intubation. Rothman placed in ED on 5/11.   - c/w rothman  - strict Is & Os    Diet: NPO for now  GI PPX: pantoprazole 40mg QD (therapeutic interchange for home omeprazole 40mg)  Bowel regimen: senna and miralax once NG tube placed  - 5/12 NG tube placed    HEME:  #Leukocytosis  Patient with leukocytosis in the setting of pneumonia     ID:  #PNA  #Immunocompromised  Patient with AHRF in the setting of witnessed aspiration event. Given CT findings of ground glass opacities and bilateral consolidations, concern for underlying pneumonia. MRSA, RVP, urine strep and legionella negative.   - c/w Zosyn 4.5mg   - c/w vancomycin 1g Q24H   - c/w azithromycin 500mg for 3 days  - hold mycophenolate for now     ENDOCRINE:  #hypothyroidism  home levothyroxine 75mcg  - c/w IV conversion -> IV 56mcg    MSK:  MARLO    PPX:  Fluids: s/p 2.5 L  Electrolytes: replete as needed  Nutrition: Diet, NPO (05-12-25 @ 05:18) [Active]  PPI ppx: pantoprazole 40mg QD (therapeutic interchange for home omeprazole 40mg)  Dvt ppx: heparin 5000 units subQ TID  Activity: bedrest  Dispo: MICU Patient is a 75y M w/ PMHx of HTN, HLD, IDDM2, HFpEF, CKD 2-3 (s/p Renal transplant in 2023), CAD s/p PCI w/ RAMANA-pLAD (6/2022), urinary retention, retinopathy and hypothyroidism presents to the ED after a witnessed choking episode requiring resuscitation by EMS.     NEURO:  # c/f seizure like activity vs acute stroke  Pt reportedly with deviated gaze. Stroke w/u neg for any acute stroke or path. C/f seizure like activity, s/p 2mg of ativan. currently on fentanyl. no hx of epilepsy or seizures in past  5/12 vEEG with mild excess intermittent slowing and poor organization of the background, indicators of moderate diffuse or multifocal cerebral dysfunction   Tacrolimus level 6.4  - neurology following, appreciate recs       - [facility] W/WO (specify epilepsy protocol)       - start keppra 500mg twice daily       - ativan 2mg PRN for seizures > 2mins       - per [facility] law cannot drive for 1 year after last seizure    CARDIOVASCULAR:  #Septic Shock  Patient with shock, likely septic in the setting of pneumonia. Cardiac enzymes WNL. EKG sinus tachycardia  - c/w levo gtt, titrate as needed       - MAP goal >65    #Hx of HTN  Patient takes nifedipine 60mg QD, irbesartan 150mg.   - holding home BP medications iso shock    #CAD s/p PCI w/ RAMANA-pLAD (6/2022)  Patient on aspirin 81mg QD, Plavix 75mg QD, atorvastatin 40mg  Outpatient cardiologist - Dr. Sarah   - although >1 year from stent, per Dr. Sarah, pt still on DAPT since he is deemed high risk  - continue with home medications    #hx of HFpEF  5/12 TTE EF 68%, fibrocalcific aortic valve sclerosis      PULMONARY:  #AHRF  # Aspiration episode  Pt at baseline good general state of health. Had episode of choking on piece of tofu. Brought into ED via EMS, intubated for airway protection, tofu was extracted. CT with bilateral ground glass opacities and infiltrates suggestive of PNA.   - PNA coverage as below  - continue with invasive ventilation as needed  - hydrocortisone 50mg Q6H      RENAL:  #CKD  #S/p Renal Transplant 2023  Patient with a hx of CKD, s/p transplant in 2023. Creatinine 1.34 on presentation, GFR 55, likely at baseline. Patient takes Lokelma 10mg QD, calcium carbonate 1250mg BID, mycophenolate mofetil 500mg BID, tacrolimus (prograf) 1mg BID  Outpatient nephrologist - Dr. Farnsworth  - resume tacrolimus but PO to IV conversion (1/3 of PO dose)  - hold mycophenolate mofetil iso active infection   - c/w Lokelma  - c/w calcium carbonate  - nephrology following, appreciate excellent recs    GI/:  #Urinary Retention   Patient with urinary retention and distended bladder on physical exam s/p intubation. Rothman placed in ED on 5/11.   - c/w rothman  - strict Is & Os    Diet: NPO for now  GI PPX: pantoprazole 40mg QD (therapeutic interchange for home omeprazole 40mg)  Bowel regimen: senna and miralax once NG tube placed  - 5/12 NG tube placed    HEME:  #Leukocytosis  Patient with leukocytosis in the setting of pneumonia     ID:  #PNA  #Immunocompromised  Patient with AHRF in the setting of witnessed aspiration event. Given CT findings of ground glass opacities and bilateral consolidations, concern for underlying pneumonia. MRSA, RVP, urine strep and legionella negative.   - c/w Zosyn 4.5mg   - c/w vancomycin 1g Q24H   - c/w azithromycin 500mg for 3 days  - hold mycophenolate for now     ENDOCRINE:  #hypothyroidism  home levothyroxine 50mcg  - c/w IV conversion -> IV 28mcg (Levothyroxine IV to PO = 0.75 : 1)    MSK:  MARLO    PPX:  Fluids: s/p 2.5 L  Electrolytes: replete as needed  Nutrition: Diet, NPO (05-12-25 @ 05:18) [Active]  PPI ppx: pantoprazole 40mg QD (therapeutic interchange for home omeprazole 40mg)  Dvt ppx: heparin 5000 units subQ TID  Activity: bedrest  Dispo: MICU Patient is a 75y M w/ PMHx of HTN, HLD, IDDM2, HFpEF, CKD 2-3 (s/p Renal transplant in 2023), CAD s/p PCI w/ RAMANA-pLAD (6/2022), urinary retention, retinopathy and hypothyroidism presents to the ED after a witnessed choking episode requiring resuscitation by EMS.     NEURO:  # c/f seizure like activity vs acute stroke  Pt reportedly with deviated gaze. Stroke w/u neg for any acute stroke or path. C/f seizure like activity, s/p 2mg of ativan. currently on fentanyl. no hx of epilepsy or seizures in past  5/12 vEEG with mild excess intermittent slowing and poor organization of the background, indicators of moderate diffuse or multifocal cerebral dysfunction   Tacrolimus level 6.4  - neurology following, appreciate recs       - [facility] W/WO (specify epilepsy protocol)       - start keppra 500mg twice daily       - ativan 2mg PRN for seizures > 2mins       - per [facility] law cannot drive for 1 year after last seizure    CARDIOVASCULAR:  #Septic Shock  Patient with shock, likely septic in the setting of pneumonia. Cardiac enzymes WNL. EKG sinus tachycardia  - c/w levo gtt, titrate as needed       - MAP goal >65    #Hx of HTN  Patient takes nifedipine 60mg QD, irbesartan 150mg.   - holding home BP medications iso shock    #CAD s/p PCI w/ RAMANA-pLAD (6/2022)  Patient on aspirin 81mg QD, Plavix 75mg QD, atorvastatin 40mg  Outpatient cardiologist - Dr. Sarah   - although >1 year from stent, per Dr. Sarah, pt still on DAPT since he is deemed high risk  - continue with home medications    #hx of HFpEF  5/12 TTE EF 68%, fibrocalcific aortic valve sclerosis      PULMONARY:  #AHRF  # Aspiration episode  Pt at baseline good general state of health. Had episode of choking on piece of tofu. Brought into ED via EMS, intubated for airway protection, tofu was extracted. CT with bilateral ground glass opacities and infiltrates suggestive of PNA.   - PNA coverage as below  - continue with invasive ventilation as needed  - hydrocortisone 50mg Q6H      RENAL:  #CKD  #S/p Renal Transplant 2023  Patient with a hx of CKD, s/p transplant in 2023. Creatinine 1.34 on presentation, GFR 55, likely at baseline. Patient takes Lokelma 10mg QD, calcium carbonate 1250mg BID, mycophenolate mofetil 500mg BID, tacrolimus (prograf) 1mg BID  Outpatient nephrologist - Dr. Farnsworth  - resume tacrolimus but PO to IV conversion (1/3 of PO dose)  - hold mycophenolate mofetil iso active infection   - c/w Lokelma  - c/w calcium carbonate  - nephrology following, appreciate excellent recs    GI/:  #Urinary Retention   Patient with urinary retention and distended bladder on physical exam s/p intubation. Rothman placed in ED on 5/11.   - c/w rothman  - strict Is & Os    Diet: NPO for now  GI PPX: pantoprazole 40mg QD (therapeutic interchange for home omeprazole 40mg)  Bowel regimen: senna and miralax once NG tube placed  - 5/12 NG tube placed    HEME:  #Leukocytosis  Patient with leukocytosis in the setting of pneumonia     ID:  #PNA  #Immunocompromised  Patient with AHRF in the setting of witnessed aspiration event. Given CT findings of ground glass opacities and bilateral consolidations, concern for underlying pneumonia. MRSA, RVP, urine strep and legionella negative.   - c/w Zosyn 4.5mg   - c/w vancomycin 1g Q24H   - c/w azithromycin 500mg for 3 days  - hold mycophenolate for now     ENDOCRINE:  #hypothyroidism  home levothyroxine 50mcg  - c/w IV conversion -> IV 37.5mcg (Levothyroxine IV to PO = 0.75 : 1)    MSK:  MARLO    PPX:  Fluids: s/p 2.5 L  Electrolytes: replete as needed  Nutrition: Diet, NPO (05-12-25 @ 05:18) [Active]  PPI ppx: pantoprazole 40mg QD (therapeutic interchange for home omeprazole 40mg)  Dvt ppx: heparin 5000 units subQ TID  Activity: bedrest  Dispo: MICU Patient is a 75y M w/ PMHx of HTN, HLD, IDDM2, HFpEF, CKD 2-3 (s/p Renal transplant in 2023), CAD s/p PCI w/ RAMANA-pLAD (6/2022), urinary retention, retinopathy and hypothyroidism presents to the ED after a witnessed choking episode requiring resuscitation by EMS.     NEURO:  # c/f seizure like activity vs acute stroke  Pt reportedly with deviated gaze. Stroke w/u neg for any acute stroke or path. C/f seizure like activity, s/p 2mg of ativan. currently on fentanyl. no hx of epilepsy or seizures in past  5/12 vEEG with mild excess intermittent slowing and poor organization of the background, indicators of moderate diffuse or multifocal cerebral dysfunction   Tacrolimus level 6.4  - neurology following, appreciate recs       - [facility] W/WO (specify epilepsy protocol)       - start keppra 500mg twice daily       - ativan 2mg PRN for seizures > 2mins       - per [facility] law cannot drive for 1 year after last seizure    CARDIOVASCULAR:  #Septic Shock  Patient with shock, likely septic in the setting of pneumonia. Cardiac enzymes WNL. EKG sinus tachycardia  - c/w levo gtt, titrate as needed, lower levo as SVR increases       - MAP goal >65    #Hx of HTN  Patient takes nifedipine 60mg QD, irbesartan 150mg.   - holding home BP medications iso shock    #CAD s/p PCI w/ RAMANA-pLAD (6/2022)  Patient on aspirin 81mg QD, Plavix 75mg QD, atorvastatin 40mg  Outpatient cardiologist - Dr. Sarah   - although >1 year from stent, per Dr. Sarah, pt still on DAPT since he is deemed high risk  - continue with home medications    #hx of HFpEF  5/12 TTE EF 68%, fibrocalcific aortic valve sclerosis      PULMONARY:  #AHRF  # Aspiration episode  Pt at baseline good general state of health. Had episode of choking on piece of tofu. Brought into ED via EMS, intubated for airway protection, tofu was extracted. CT with bilateral ground glass opacities and infiltrates suggestive of PNA.   - PNA coverage as below  - continue with invasive ventilation as needed  - hydrocortisone 50mg Q6H      RENAL:  #CKD  #S/p Renal Transplant 2023  Patient with a hx of CKD, s/p transplant in 2023. Creatinine 1.34 on presentation, GFR 55, likely at baseline. Patient takes Lokelma 10mg QD, calcium carbonate 1250mg BID, mycophenolate mofetil 500mg BID, tacrolimus (prograf) 1mg BID  Outpatient nephrologist - Dr. Farnsworth  - resume tacrolimus but PO to IV conversion (1/3 of PO dose)  - hold mycophenolate mofetil iso active infection   - c/w Lokelma  - c/w calcium carbonate  - nephrology following, appreciate excellent recs    GI/:  #Urinary Retention   Patient with urinary retention and distended bladder on physical exam s/p intubation. Rothman placed in ED on 5/11.   - c/w rothman  - strict Is & Os    Diet: NPO for now  GI PPX: pantoprazole 40mg QD (therapeutic interchange for home omeprazole 40mg)  Bowel regimen: senna and miralax once NG tube placed  - 5/12 NG tube placed    HEME:  #Leukocytosis  Patient with leukocytosis in the setting of pneumonia     ID:  #PNA  #Immunocompromised  Patient with AHRF in the setting of witnessed aspiration event. Given CT findings of ground glass opacities and bilateral consolidations, concern for underlying pneumonia. MRSA, RVP, urine strep and legionella negative.   - c/w Zosyn 4.5mg   - c/w vancomycin 1g Q24H   - c/w azithromycin 500mg for 3 days  - hold mycophenolate for now     ENDOCRINE:  #hypothyroidism  home levothyroxine 50mcg  - c/w IV conversion -> IV 37.5mcg (Levothyroxine IV to PO = 0.75 : 1)    MSK:  MARLO    PPX:  Fluids: s/p 2.5 L  Electrolytes: replete as needed  Nutrition: Diet, NPO (05-12-25 @ 05:18) [Active]  PPI ppx: pantoprazole 40mg QD (therapeutic interchange for home omeprazole 40mg)  Dvt ppx: heparin 5000 units subQ TID  Activity: bedrest  Dispo: MICU

## 2025-05-12 NOTE — PROGRESS NOTE ADULT - ASSESSMENT
76 yo M w/ PMH of CKDV s/p DDRT 3/2023, HTN, HLD, DM2, CAD s/p PCI, HFpEF, hypothyroidism, urinary retention presenting after witnessed choking episode requiring resuscitation. In ED patient intubated, found to have gaze deviation and stroke code called. Now in ICU on pressor and abx for suspected PNA. Patient on tacro 2/1 and MMF 500mg BID per outpatient records. sCr appears near baseline ~1.3-1.4. UOP 410cc recorded overnight. Nephrology consulted for management of immunosuppression.    S/p DDRT 3/2023  - Allograft function appears at baseline, monitor with labs at least daily  - Tacrolimus level 6.4 this morning, unknown when patient took last dose but assume likely yesterday morning.  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 In ED patient intubated, found to have gaze deviation and stroke code called. Now in ICU on pressor and abx for suspected PNA. Patient on tacro 2/1 and MMF 500mg BID per outpatient records. sCr appears near baseline ~1.3-1.4. UOP 410cc recorded overnight. Nephrology consulted for management of immunosuppression.    S/p DDRT 3/2023  - Allograft function appears at baseline, monitor with labs at least daily  - Tacrolimus level 6.4 this morning, unknown when patient took last dose but assume likely yesterday morning.  - As patient is unable to take po meds now, would start IV tacrolimus (conversion is total daily oral dose x 0.25) and monitor tacro level daily  - Can hold MMF in setting of infection  - Will attempt to reach transplant team  - Strict I&O  - Maintain SBP >110 as able  - Check allograft US   - Avoid macrolides as they can increase tacro levels due to drug interactions    Discussed with primary team

## 2025-05-12 NOTE — PROGRESS NOTE ADULT - SUBJECTIVE AND OBJECTIVE BOX
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 In ED patient intubated, found to have gaze deviation and stroke code called. Now in ICU on pressor and abx for suspected PNA. Patient on tacro 2/1 and MMF 500mg BID per outpatient records. sCr appears near baseline ~1.3-1.4. UOP 410cc recorded overnight. Nephrology consulted for management of immunosuppression.    REVIEW OF SYSTEMS:   Intubated, unable to obtain    PAST MEDICAL AND SURGICAL HISTORY:   PAST MEDICAL & SURGICAL HISTORY:  Diabetes      Hypertension      Hyperlipidemia      CKD (chronic kidney disease), stage IV      BPH (benign prostatic hyperplasia)      Congestive heart failure (CHF)      CAD (coronary artery disease)      No significant past surgical history          FAMILY HISTORY:  FAMILY HISTORY:  No pertinent family history in first degree relatives      Otherwise non-contributory to current admission    SOCIAL HISTORY:  Tobacco use: Denies  EtOH use: Denies  Illicit drug use: Denies    HOME MEDICATIONS:      ACTIVE MEDICATIONS:  MEDICATIONS  (STANDING):  albuterol/ipratropium for Nebulization 3 milliLiter(s) Nebulizer every 4 hours  aspirin  chewable 81 milliGRAM(s) Oral daily  atorvastatin 40 milliGRAM(s) Oral at bedtime  azithromycin  IVPB 500 milliGRAM(s) IV Intermittent every 24 hours  azithromycin  IVPB      calcium carbonate   1250 mG (OsCal) 1 Tablet(s) Oral daily  chlorhexidine 2% Cloths 1 Application(s) Topical <User Schedule>  fentaNYL   Infusion. 0.5 MICROgram(s)/kG/Hr (3.52 mL/Hr) IV Continuous <Continuous>  heparin   Injectable 5000 Unit(s) SubCutaneous every 8 hours  hydrocortisone sodium succinate Injectable 50 milliGRAM(s) IV Push every 6 hours  levETIRAcetam   Injectable 500 milliGRAM(s) IV Push every 12 hours  midazolam Injectable 10 milliGRAM(s) IV Push once  norepinephrine Infusion 0.05 MICROgram(s)/kG/Min (6.59 mL/Hr) IV Continuous <Continuous>  pantoprazole   Suspension 40 milliGRAM(s) Oral daily  piperacillin/tazobactam IVPB.. 4.5 Gram(s) IV Intermittent every 8 hours  polyethylene glycol 3350 17 Gram(s) Oral daily  propofol Infusion 10 MICROgram(s)/kG/Min (4.22 mL/Hr) IV Continuous <Continuous>  senna 2 Tablet(s) Oral at bedtime  sodium chloride 3%  Inhalation 4 milliLiter(s) Inhalation every 4 hours  sodium zirconium cyclosilicate 10 Gram(s) Oral daily  tacrolimus  IVPB 0.6 milliGRAM(s) IV Intermittent every 24 hours  vancomycin  IVPB 1000 milliGRAM(s) IV Intermittent every 24 hours    MEDICATIONS  (PRN):      ALLERGIES:  Allergies    chlorthalidone (Other)    Intolerances        VITAL SIGNS:  Vital Signs Last 24 Hrs  T(C): 36.8 (12 May 2025 13:19), Max: 38.8 (12 May 2025 09:00)  T(F): 98.2 (12 May 2025 13:19), Max: 101.9 (12 May 2025 09:00)  HR: 60 (12 May 2025 14:00) (20 - 105)  BP: 102/55 (12 May 2025 13:00) (79/44 - 194/109)  BP(mean): 75 (12 May 2025 13:00) (56 - 119)  RR: 13 (12 May 2025 14:00) (12 - 19)  SpO2: 100% (12 May 2025 14:00) (98% - 100%)    Parameters below as of 12 May 2025 14:00  Patient On (Oxygen Delivery Method): ventilator    O2 Concentration (%): 30    05-11-25 @ 07:01  -  05-12-25 @ 07:00  --------------------------------------------------------  IN:    FentaNYL: 75.6 mL    IV PiggyBack: 100 mL    IV PiggyBack: 550 mL    Lactated Ringers Bolus: 1000 mL    Norepinephrine: 42 mL  Total IN: 1767.6 mL    OUT:    Ureteral Catheter (mL): 410 mL  Total OUT: 410 mL    Total NET: 1357.6 mL      05-12-25 @ 07:01  -  05-12-25 @ 15:02  --------------------------------------------------------  IN:    FentaNYL: 7 mL    IV PiggyBack: 100 mL    Norepinephrine: 6 mL  Total IN: 113 mL    OUT:    Ureteral Catheter (mL): 115 mL  Total OUT: 115 mL    Total NET: -2 mL          PHYSICAL EXAM:  Constitutional: Intubated, sedated  Head: NCAT, ETT in place  Respiratory: Mechanically ventilated, no respiratory distress  Cardiac: Regular rate  Gastrointestinal: abdomen non-distended  Genitourinary: rothman in place  Extremities: No LE edema  Dermatologic: no rash on exposed skin  Neurologic: sedated    LABS:                        13.8   10.94 )-----------( 147      ( 12 May 2025 09:05 )             41.4     05-12    134[L]  |  103  |  24[H]  ----------------------------<  217[H]  5.0   |  21[L]  |  1.32[H]    Ca    8.1[L]      12 May 2025 09:05  Phos  2.0     05-12  Mg     1.4     05-12    TPro  5.8[L]  /  Alb  3.5  /  TBili  0.7  /  DBili  x   /  AST  24  /  ALT  20  /  AlkPhos  46  05-12    PT/INR - ( 11 May 2025 20:28 )   PT: 11.9 sec;   INR: 1.04          PTT - ( 11 May 2025 20:28 )  PTT:35.8 sec  Urinalysis Basic - ( 12 May 2025 09:05 )    Color: x / Appearance: x / SG: x / pH: x  Gluc: 217 mg/dL / Ketone: x  / Bili: x / Urobili: x   Blood: x / Protein: x / Nitrite: x   Leuk Esterase: x / RBC: x / WBC x   Sq Epi: x / Non Sq Epi: x / Bacteria: x      CARDIAC MARKERS ( 11 May 2025 22:38 )  x     / x     / x     / x     / 4.1 ng/mL      CAPILLARY BLOOD GLUCOSE      POCT Blood Glucose.: 250 mg/dL (12 May 2025 06:03)  POCT Blood Glucose.: 228 mg/dL (11 May 2025 20:19)          RADIOLOGY & ADDITIONAL TESTS: Reviewed.

## 2025-05-12 NOTE — EEG REPORT - NS EEG TEXT BOX
[facility] Department of Neurology  Inpatient Continuous video-Electroencephalogram  [address], [address] [place] [id number], T: [phone]    Patient Name:	JAYCOB RUANO    :	1950  MRN:	[id number]    Study Start Date/Time:	2025, 3:03:34 AM  Study End Date/Time:    Referred by:  Karson Sterling MD    Brief Clinical History:  JAYCOB RUANO is a 75 year old Male with history of sudden loss of consciousness and body stiffening; study performed to investigate for seizures or markers of epilepsy.   Technologist notes: -  Diagnosis Code:  R56.9 convulsions/seizure    Pertinent Medication:  n/a    Acquisition Details:  Electroencephalography was acquired using a minimum of 21 channels on an [employer] system v 9.3.1 with electrode placement according to the standard International 10-20 system following ACNS (American Clinical Neurophysiology Society) guidelines.  Anterior temporal T1 and T2 electrodes were utilized whenever possible.  The XLTEK automated spike & seizure detections were all reviewed in detail, in addition to the entire raw EEG.    Findings:  Day 1:  2025, 3:03:34 AM to next morning at 07:00 AM   Background:  continuous, with predominantly theta > delta frequencies.  Generalized Slowing:  None  Symmetry/Focality: No persistent asymmetries of voltage or frequency.     Voltage:  Normal (20+ uV)  Organization:  Appropriate anterior-posterior gradient  Posterior Dominant Rhythm:  No clear PDR     Sleep:  Absent.  Variability:   Yes		Reactivity:  Unclear    Spontaneous Activity:  No epileptiform discharges   Events:  •	No electrographic seizures or significant clinical events occurred during this study.  Provocations:  •	Hyperventilation: was not performed.  •	Photic stimulation: was not performed.  Daily Summary:    •	There was mild excess intermittent slowing and poor organization of the background, indicators of moderate diffuse or multifocal cerebral dysfunction.  •	There were no findings of active epilepsy, however this alone does not rule out the diagnosis.         Sigifredo Jasmine MD  Attending Neurologist, [facility]       [facility] Department of Neurology  Inpatient Continuous video-Electroencephalogram  [address], [address] [place] [id number], T: [phone]    Patient Name:	JAYCOB RUANO    :	1950  MRN:	[id number]    Study Start Date/Time:	2025, 3:03:34 AM  Study End Date/Time:    Referred by:  Karson Sterling MD    Brief Clinical History:  JAYCOB RUANO is a 75 year old Male with history of sudden loss of consciousness and body stiffening; study performed to investigate for seizures or markers of epilepsy.   Technologist notes: -  Diagnosis Code:  R56.9 convulsions/seizure    Pertinent Medication:  n/a    Acquisition Details:  Electroencephalography was acquired using a minimum of 21 channels on an [employer] system v 9.3.1 with electrode placement according to the standard International 10-20 system following ACNS (American Clinical Neurophysiology Society) guidelines.  Anterior temporal T1 and T2 electrodes were utilized whenever possible.  The XLTEK automated spike & seizure detections were all reviewed in detail, in addition to the entire raw EEG.    Findings:  Day 1:  2025, 3:03:34 AM to 07:00 AM   Background:  continuous, with predominantly theta > delta frequencies.  Generalized Slowing:  None  Symmetry/Focality: No persistent asymmetries of voltage or frequency.     Voltage:  Normal (20+ uV)  Organization:  Appropriate anterior-posterior gradient  Posterior Dominant Rhythm:  No clear PDR     Sleep:  Absent.  Variability:   Yes		Reactivity:  Unclear    Spontaneous Activity:  No epileptiform discharges   Events:  •	No electrographic seizures or significant clinical events occurred during this study.  Provocations:  •	Hyperventilation: was not performed.  •	Photic stimulation: was not performed.  Daily Summary:    •	There was mild excess intermittent slowing and poor organization of the background, indicators of moderate diffuse or multifocal cerebral dysfunction.  •	There were no findings of active epilepsy, however this alone does not rule out the diagnosis.         Sigifredo Jasmine MD  Attending Neurologist, [facility]

## 2025-05-13 LAB
ADD ON TEST-SPECIMEN IN LAB: SIGNIFICANT CHANGE UP
ANION GAP SERPL CALC-SCNC: 9 MMOL/L — SIGNIFICANT CHANGE UP (ref 5–17)
ANION GAP SERPL CALC-SCNC: 9 MMOL/L — SIGNIFICANT CHANGE UP (ref 5–17)
BASOPHILS # BLD AUTO: 0 K/UL — SIGNIFICANT CHANGE UP (ref 0–0.2)
BASOPHILS NFR BLD AUTO: 0 % — SIGNIFICANT CHANGE UP (ref 0–2)
BUN SERPL-MCNC: 24 MG/DL — HIGH (ref 7–23)
BUN SERPL-MCNC: 25 MG/DL — HIGH (ref 7–23)
CALCIUM SERPL-MCNC: 8.3 MG/DL — LOW (ref 8.4–10.5)
CALCIUM SERPL-MCNC: 8.6 MG/DL — SIGNIFICANT CHANGE UP (ref 8.4–10.5)
CHLORIDE SERPL-SCNC: 101 MMOL/L — SIGNIFICANT CHANGE UP (ref 96–108)
CHLORIDE SERPL-SCNC: 105 MMOL/L — SIGNIFICANT CHANGE UP (ref 96–108)
CO2 SERPL-SCNC: 21 MMOL/L — LOW (ref 22–31)
CO2 SERPL-SCNC: 24 MMOL/L — SIGNIFICANT CHANGE UP (ref 22–31)
CREAT SERPL-MCNC: 1.21 MG/DL — SIGNIFICANT CHANGE UP (ref 0.5–1.3)
CREAT SERPL-MCNC: 1.36 MG/DL — HIGH (ref 0.5–1.3)
EGFR: 54 ML/MIN/1.73M2 — LOW
EGFR: 54 ML/MIN/1.73M2 — LOW
EGFR: 62 ML/MIN/1.73M2 — SIGNIFICANT CHANGE UP
EGFR: 62 ML/MIN/1.73M2 — SIGNIFICANT CHANGE UP
EOSINOPHIL # BLD AUTO: 0 K/UL — SIGNIFICANT CHANGE UP (ref 0–0.5)
EOSINOPHIL NFR BLD AUTO: 0 % — SIGNIFICANT CHANGE UP (ref 0–6)
GLUCOSE SERPL-MCNC: 241 MG/DL — HIGH (ref 70–99)
GLUCOSE SERPL-MCNC: 272 MG/DL — HIGH (ref 70–99)
HCT VFR BLD CALC: 40.3 % — SIGNIFICANT CHANGE UP (ref 39–50)
HGB BLD-MCNC: 13.3 G/DL — SIGNIFICANT CHANGE UP (ref 13–17)
LDH SERPL L TO P-CCNC: 200 U/L — SIGNIFICANT CHANGE UP (ref 50–242)
LYMPHOCYTES # BLD AUTO: 0.33 K/UL — LOW (ref 1–3.3)
LYMPHOCYTES # BLD AUTO: 3.5 % — LOW (ref 13–44)
MAGNESIUM SERPL-MCNC: 2.6 MG/DL — SIGNIFICANT CHANGE UP (ref 1.6–2.6)
MAGNESIUM SERPL-MCNC: 2.6 MG/DL — SIGNIFICANT CHANGE UP (ref 1.6–2.6)
MCHC RBC-ENTMCNC: 30.7 PG — SIGNIFICANT CHANGE UP (ref 27–34)
MCHC RBC-ENTMCNC: 33 G/DL — SIGNIFICANT CHANGE UP (ref 32–36)
MCV RBC AUTO: 93.1 FL — SIGNIFICANT CHANGE UP (ref 80–100)
MONOCYTES # BLD AUTO: 0.41 K/UL — SIGNIFICANT CHANGE UP (ref 0–0.9)
MONOCYTES NFR BLD AUTO: 4.4 % — SIGNIFICANT CHANGE UP (ref 2–14)
NEUTROPHILS # BLD AUTO: 8.62 K/UL — HIGH (ref 1.8–7.4)
NEUTROPHILS NFR BLD AUTO: 91.2 % — HIGH (ref 43–77)
PHOSPHATE SERPL-MCNC: 3.7 MG/DL — SIGNIFICANT CHANGE UP (ref 2.5–4.5)
PHOSPHATE SERPL-MCNC: 3.8 MG/DL — SIGNIFICANT CHANGE UP (ref 2.5–4.5)
PLATELET # BLD AUTO: 128 K/UL — LOW (ref 150–400)
POTASSIUM SERPL-MCNC: 4.6 MMOL/L — SIGNIFICANT CHANGE UP (ref 3.5–5.3)
POTASSIUM SERPL-MCNC: 5 MMOL/L — SIGNIFICANT CHANGE UP (ref 3.5–5.3)
POTASSIUM SERPL-SCNC: 4.6 MMOL/L — SIGNIFICANT CHANGE UP (ref 3.5–5.3)
POTASSIUM SERPL-SCNC: 5 MMOL/L — SIGNIFICANT CHANGE UP (ref 3.5–5.3)
RBC # BLD: 4.33 M/UL — SIGNIFICANT CHANGE UP (ref 4.2–5.8)
RBC # FLD: 12.5 % — SIGNIFICANT CHANGE UP (ref 10.3–14.5)
SODIUM SERPL-SCNC: 134 MMOL/L — LOW (ref 135–145)
SODIUM SERPL-SCNC: 135 MMOL/L — SIGNIFICANT CHANGE UP (ref 135–145)
TACROLIMUS SERPL-MCNC: 4.7 NG/ML — SIGNIFICANT CHANGE UP
WBC # BLD: 9.36 K/UL — SIGNIFICANT CHANGE UP (ref 3.8–10.5)
WBC # FLD AUTO: 9.36 K/UL — SIGNIFICANT CHANGE UP (ref 3.8–10.5)

## 2025-05-13 PROCEDURE — 70450 CT HEAD/BRAIN W/O DYE: CPT | Mod: 26

## 2025-05-13 PROCEDURE — 99233 SBSQ HOSP IP/OBS HIGH 50: CPT

## 2025-05-13 PROCEDURE — 95720 EEG PHY/QHP EA INCR W/VEEG: CPT

## 2025-05-13 PROCEDURE — 99233 SBSQ HOSP IP/OBS HIGH 50: CPT | Mod: GC

## 2025-05-13 PROCEDURE — 76776 US EXAM K TRANSPL W/DOPPLER: CPT | Mod: 26,RT

## 2025-05-13 RX ORDER — CEFTRIAXONE 500 MG/1
2000 INJECTION, POWDER, FOR SOLUTION INTRAMUSCULAR; INTRAVENOUS EVERY 24 HOURS
Refills: 0 | Status: DISCONTINUED | OUTPATIENT
Start: 2025-05-13 | End: 2025-05-15

## 2025-05-13 RX ORDER — TACROLIMUS 0.5 MG/1
0.6 CAPSULE ORAL EVERY 24 HOURS
Refills: 0 | Status: DISCONTINUED | OUTPATIENT
Start: 2025-05-13 | End: 2025-05-14

## 2025-05-13 RX ORDER — TACROLIMUS 0.5 MG/1
1 CAPSULE ORAL
Refills: 0 | Status: DISCONTINUED | OUTPATIENT
Start: 2025-05-13 | End: 2025-05-13

## 2025-05-13 RX ORDER — FENTANYL CITRATE-0.9 % NACL/PF 100MCG/2ML
0.5 SYRINGE (ML) INTRAVENOUS
Qty: 2500 | Refills: 0 | Status: DISCONTINUED | OUTPATIENT
Start: 2025-05-13 | End: 2025-05-13

## 2025-05-13 RX ORDER — DEXMEDETOMIDINE HYDROCHLORIDE IN SODIUM CHLORIDE 4 UG/ML
0.05 INJECTION INTRAVENOUS
Qty: 400 | Refills: 0 | Status: DISCONTINUED | OUTPATIENT
Start: 2025-05-13 | End: 2025-05-13

## 2025-05-13 RX ORDER — PROPOFOL 10 MG/ML
10 INJECTION, EMULSION INTRAVENOUS
Qty: 1000 | Refills: 0 | Status: DISCONTINUED | OUTPATIENT
Start: 2025-05-13 | End: 2025-05-13

## 2025-05-13 RX ADMIN — Medication 1 APPLICATION(S): at 06:31

## 2025-05-13 RX ADMIN — Medication 37.5 MICROGRAM(S): at 09:06

## 2025-05-13 RX ADMIN — IPRATROPIUM BROMIDE AND ALBUTEROL SULFATE 3 MILLILITER(S): .5; 2.5 SOLUTION RESPIRATORY (INHALATION) at 01:16

## 2025-05-13 RX ADMIN — Medication 2 TABLET(S): at 21:41

## 2025-05-13 RX ADMIN — Medication 100 MILLILITER(S): at 18:16

## 2025-05-13 RX ADMIN — HEPARIN SODIUM 5000 UNIT(S): 1000 INJECTION INTRAVENOUS; SUBCUTANEOUS at 13:23

## 2025-05-13 RX ADMIN — Medication 255 MILLIGRAM(S): at 00:08

## 2025-05-13 RX ADMIN — SODIUM ZIRCONIUM CYCLOSILICATE 10 GRAM(S): 5 POWDER, FOR SUSPENSION ORAL at 11:35

## 2025-05-13 RX ADMIN — CLOPIDOGREL BISULFATE 75 MILLIGRAM(S): 75 TABLET, FILM COATED ORAL at 18:01

## 2025-05-13 RX ADMIN — ATORVASTATIN CALCIUM 40 MILLIGRAM(S): 80 TABLET, FILM COATED ORAL at 21:41

## 2025-05-13 RX ADMIN — IPRATROPIUM BROMIDE AND ALBUTEROL SULFATE 3 MILLILITER(S): .5; 2.5 SOLUTION RESPIRATORY (INHALATION) at 05:44

## 2025-05-13 RX ADMIN — IPRATROPIUM BROMIDE AND ALBUTEROL SULFATE 3 MILLILITER(S): .5; 2.5 SOLUTION RESPIRATORY (INHALATION) at 21:28

## 2025-05-13 RX ADMIN — CALCIUM CARBONATE 1 TABLET(S): 750 TABLET ORAL at 11:34

## 2025-05-13 RX ADMIN — Medication 25 GRAM(S): at 06:32

## 2025-05-13 RX ADMIN — Medication 40 MILLIGRAM(S): at 11:34

## 2025-05-13 RX ADMIN — INSULIN LISPRO 2: 100 INJECTION, SOLUTION INTRAVENOUS; SUBCUTANEOUS at 00:09

## 2025-05-13 RX ADMIN — IPRATROPIUM BROMIDE AND ALBUTEROL SULFATE 3 MILLILITER(S): .5; 2.5 SOLUTION RESPIRATORY (INHALATION) at 10:33

## 2025-05-13 RX ADMIN — Medication 4 MILLILITER(S): at 05:44

## 2025-05-13 RX ADMIN — Medication 50 MILLIGRAM(S): at 11:34

## 2025-05-13 RX ADMIN — Medication 250 MILLIGRAM(S): at 03:28

## 2025-05-13 RX ADMIN — Medication 4 MILLILITER(S): at 01:16

## 2025-05-13 RX ADMIN — PROPOFOL 4.22 MICROGRAM(S)/KG/MIN: 10 INJECTION, EMULSION INTRAVENOUS at 04:41

## 2025-05-13 RX ADMIN — Medication 4 MILLILITER(S): at 13:27

## 2025-05-13 RX ADMIN — CEFTRIAXONE 100 MILLIGRAM(S): 500 INJECTION, POWDER, FOR SOLUTION INTRAMUSCULAR; INTRAVENOUS at 13:23

## 2025-05-13 RX ADMIN — Medication 4 MILLILITER(S): at 10:33

## 2025-05-13 RX ADMIN — IPRATROPIUM BROMIDE AND ALBUTEROL SULFATE 3 MILLILITER(S): .5; 2.5 SOLUTION RESPIRATORY (INHALATION) at 18:12

## 2025-05-13 RX ADMIN — HEPARIN SODIUM 5000 UNIT(S): 1000 INJECTION INTRAVENOUS; SUBCUTANEOUS at 06:31

## 2025-05-13 RX ADMIN — Medication 4 MILLILITER(S): at 18:12

## 2025-05-13 RX ADMIN — Medication 81 MILLIGRAM(S): at 11:34

## 2025-05-13 RX ADMIN — TACROLIMUS 6.26 MILLIGRAM(S): 0.5 CAPSULE ORAL at 20:06

## 2025-05-13 RX ADMIN — HEPARIN SODIUM 5000 UNIT(S): 1000 INJECTION INTRAVENOUS; SUBCUTANEOUS at 21:41

## 2025-05-13 RX ADMIN — DEXMEDETOMIDINE HYDROCHLORIDE IN SODIUM CHLORIDE 0.88 MICROGRAM(S)/KG/HR: 4 INJECTION INTRAVENOUS at 10:05

## 2025-05-13 RX ADMIN — Medication 50 MILLIGRAM(S): at 00:08

## 2025-05-13 RX ADMIN — Medication 4 MILLILITER(S): at 21:28

## 2025-05-13 RX ADMIN — POLYETHYLENE GLYCOL 3350 17 GRAM(S): 17 POWDER, FOR SOLUTION ORAL at 11:35

## 2025-05-13 RX ADMIN — Medication 50 MILLIGRAM(S): at 23:59

## 2025-05-13 RX ADMIN — INSULIN LISPRO 2: 100 INJECTION, SOLUTION INTRAVENOUS; SUBCUTANEOUS at 18:01

## 2025-05-13 RX ADMIN — Medication 50 MILLIGRAM(S): at 18:01

## 2025-05-13 RX ADMIN — LEVETIRACETAM 500 MILLIGRAM(S): 10 INJECTION, SOLUTION INTRAVENOUS at 09:34

## 2025-05-13 RX ADMIN — Medication 50 MILLIGRAM(S): at 06:31

## 2025-05-13 RX ADMIN — IPRATROPIUM BROMIDE AND ALBUTEROL SULFATE 3 MILLILITER(S): .5; 2.5 SOLUTION RESPIRATORY (INHALATION) at 13:23

## 2025-05-13 RX ADMIN — INSULIN LISPRO 2: 100 INJECTION, SOLUTION INTRAVENOUS; SUBCUTANEOUS at 11:36

## 2025-05-13 RX ADMIN — INSULIN LISPRO 2: 100 INJECTION, SOLUTION INTRAVENOUS; SUBCUTANEOUS at 06:31

## 2025-05-13 NOTE — AIRWAY REMOVAL NOTE  ADULT & PEDS - RESPIRATORY RHYTHM/PATTERN
no shortness of breath/rate regular/pattern regular/unlabored
rate regular/depth regular/pattern regular/unlabored

## 2025-05-13 NOTE — PROGRESS NOTE ADULT - SUBJECTIVE AND OBJECTIVE BOX
Neurology Progress Note    Interval History:  Patient denied history of dysphagia and weakness of his body.       PAST MEDICAL & SURGICAL HISTORY:  Diabetes    Hypertension    Hyperlipidemia    CKD (chronic kidney disease), stage IV    BPH (benign prostatic hyperplasia)    Congestive heart failure (CHF)    CAD (coronary artery disease)    No significant past surgical history            Medications:  albuterol/ipratropium for Nebulization 3 milliLiter(s) Nebulizer every 4 hours  aspirin  chewable 81 milliGRAM(s) Oral daily  atorvastatin 40 milliGRAM(s) Oral at bedtime  calcium carbonate   1250 mG (OsCal) 1 Tablet(s) Oral daily  cefTRIAXone   IVPB 2000 milliGRAM(s) IV Intermittent every 24 hours  chlorhexidine 2% Cloths 1 Application(s) Topical <User Schedule>  clopidogrel Tablet 75 milliGRAM(s) Oral every 24 hours  dextrose 5%. 1000 milliLiter(s) IV Continuous <Continuous>  dextrose 5%. 1000 milliLiter(s) IV Continuous <Continuous>  dextrose 50% Injectable 25 Gram(s) IV Push once  dextrose 50% Injectable 12.5 Gram(s) IV Push once  dextrose 50% Injectable 25 Gram(s) IV Push once  dextrose Oral Gel 15 Gram(s) Oral once PRN  fentaNYL   Infusion. 0.5 MICROgram(s)/kG/Hr IV Continuous <Continuous>  glucagon  Injectable 1 milliGRAM(s) IntraMuscular once  heparin   Injectable 5000 Unit(s) SubCutaneous every 8 hours  hydrocortisone sodium succinate Injectable 50 milliGRAM(s) IV Push every 6 hours  insulin lispro (ADMELOG) corrective regimen sliding scale   SubCutaneous every 6 hours  levETIRAcetam   Injectable 500 milliGRAM(s) IV Push every 12 hours  levothyroxine Injectable 37.5 MICROGram(s) IV Push <User Schedule>  pantoprazole   Suspension 40 milliGRAM(s) Oral daily  polyethylene glycol 3350 17 Gram(s) Oral daily  propofol Infusion 10 MICROgram(s)/kG/Min IV Continuous <Continuous>  senna 2 Tablet(s) Oral at bedtime  sodium chloride 3%  Inhalation 4 milliLiter(s) Inhalation every 4 hours  sodium zirconium cyclosilicate 10 Gram(s) Oral daily  tacrolimus 1 milliGRAM(s) Oral <User Schedule>      Vital Signs Last 24 Hrs  T(C): 36.4 (13 May 2025 17:11), Max: 36.4 (13 May 2025 17:11)  T(F): 97.5 (13 May 2025 17:11), Max: 97.5 (13 May 2025 17:11)  HR: 82 (13 May 2025 16:00) (46 - 90)  BP: 156/70 (13 May 2025 16:00) (93/54 - 156/70)  BP(mean): 101 (13 May 2025 16:00) (69 - 101)  RR: 12 (13 May 2025 16:00) (12 - 23)  SpO2: 100% (13 May 2025 16:00) (98% - 100%)    Parameters below as of 13 May 2025 16:00  Patient On (Oxygen Delivery Method): nasal cannula w/ humidification  O2 Flow (L/min): 2      Neurological Exam:   Mental status: Awake, alert. Perseveration of answers and motor commands. Able to follow simple commands. No dysarthria. Aphasia assessment is limited due to possible baseline blindness. Unable to count above 8 with one deep breath prior.   Cranial nerves: Pupils are pinpoint both s/p cataract surgery. No blink to threat or recognition of $5 bill although has visual impairment. Unable to look down with possible bilateral ptosis of upper and lower lid (upper lid covers top of iris, sclera is seen above lower lid). Horizontal smile and difficulty with cheek puff. Tongue strength OK.   Motor:  MRC grading 5/5 b/l UE/LE.   strength 5/5.  Normal tone and bulk.  No abnormal movements. No fatigability   Sensation: Intact to light touch  Coordination: No tremor with extension. limited exam due to vision  Reflexes: hyporeflexix  Gait: deferred    Labs:  CBC Full  -  ( 13 May 2025 05:30 )  WBC Count : 9.36 K/uL  RBC Count : 4.33 M/uL  Hemoglobin : 13.3 g/dL  Hematocrit : 40.3 %  Platelet Count - Automated : 128 K/uL  Mean Cell Volume : 93.1 fl  Mean Cell Hemoglobin : 30.7 pg  Mean Cell Hemoglobin Concentration : 33.0 g/dL  Auto Neutrophil # : x  Auto Lymphocyte # : x  Auto Monocyte # : x  Auto Eosinophil # : x  Auto Basophil # : x  Auto Neutrophil % : x  Auto Lymphocyte % : x  Auto Monocyte % : x  Auto Eosinophil % : x  Auto Basophil % : x    05-13    134[L]  |  101  |  24[H]  ----------------------------<  272[H]  5.0   |  24  |  1.36[H]    Ca    8.6      13 May 2025 05:30  Phos  3.8     05-13  Mg     2.6     05-13    TPro  5.8[L]  /  Alb  3.5  /  TBili  0.7  /  DBili  x   /  AST  24  /  ALT  20  /  AlkPhos  46  05-12    LIVER FUNCTIONS - ( 12 May 2025 09:05 )  Alb: 3.5 g/dL / Pro: 5.8 g/dL / ALK PHOS: 46 U/L / ALT: 20 U/L / AST: 24 U/L / GGT: x           PT/INR - ( 11 May 2025 20:28 )   PT: 11.9 sec;   INR: 1.04          PTT - ( 11 May 2025 20:28 )  PTT:35.8 sec  Urinalysis Basic - ( 13 May 2025 05:30 )    Color: x / Appearance: x / SG: x / pH: x  Gluc: 272 mg/dL / Ketone: x  / Bili: x / Urobili: x   Blood: x / Protein: x / Nitrite: x   Leuk Esterase: x / RBC: x / WBC x   Sq Epi: x / Non Sq Epi: x / Bacteria: x       Neurology Progress Note    Interval History:  Patient denied history of dysphagia and weakness of his body.       PAST MEDICAL & SURGICAL HISTORY:  Diabetes    Hypertension    Hyperlipidemia    CKD (chronic kidney disease), stage IV    BPH (benign prostatic hyperplasia)    Congestive heart failure (CHF)    CAD (coronary artery disease)    No significant past surgical history            Medications:  albuterol/ipratropium for Nebulization 3 milliLiter(s) Nebulizer every 4 hours  aspirin  chewable 81 milliGRAM(s) Oral daily  atorvastatin 40 milliGRAM(s) Oral at bedtime  calcium carbonate   1250 mG (OsCal) 1 Tablet(s) Oral daily  cefTRIAXone   IVPB 2000 milliGRAM(s) IV Intermittent every 24 hours  chlorhexidine 2% Cloths 1 Application(s) Topical <User Schedule>  clopidogrel Tablet 75 milliGRAM(s) Oral every 24 hours  dextrose 5%. 1000 milliLiter(s) IV Continuous <Continuous>  dextrose 5%. 1000 milliLiter(s) IV Continuous <Continuous>  dextrose 50% Injectable 25 Gram(s) IV Push once  dextrose 50% Injectable 12.5 Gram(s) IV Push once  dextrose 50% Injectable 25 Gram(s) IV Push once  dextrose Oral Gel 15 Gram(s) Oral once PRN  fentaNYL   Infusion. 0.5 MICROgram(s)/kG/Hr IV Continuous <Continuous>  glucagon  Injectable 1 milliGRAM(s) IntraMuscular once  heparin   Injectable 5000 Unit(s) SubCutaneous every 8 hours  hydrocortisone sodium succinate Injectable 50 milliGRAM(s) IV Push every 6 hours  insulin lispro (ADMELOG) corrective regimen sliding scale   SubCutaneous every 6 hours  levETIRAcetam   Injectable 500 milliGRAM(s) IV Push every 12 hours  levothyroxine Injectable 37.5 MICROGram(s) IV Push <User Schedule>  pantoprazole   Suspension 40 milliGRAM(s) Oral daily  polyethylene glycol 3350 17 Gram(s) Oral daily  propofol Infusion 10 MICROgram(s)/kG/Min IV Continuous <Continuous>  senna 2 Tablet(s) Oral at bedtime  sodium chloride 3%  Inhalation 4 milliLiter(s) Inhalation every 4 hours  sodium zirconium cyclosilicate 10 Gram(s) Oral daily  tacrolimus 1 milliGRAM(s) Oral <User Schedule>      Vital Signs Last 24 Hrs  T(C): 36.4 (13 May 2025 17:11), Max: 36.4 (13 May 2025 17:11)  T(F): 97.5 (13 May 2025 17:11), Max: 97.5 (13 May 2025 17:11)  HR: 82 (13 May 2025 16:00) (46 - 90)  BP: 156/70 (13 May 2025 16:00) (93/54 - 156/70)  BP(mean): 101 (13 May 2025 16:00) (69 - 101)  RR: 12 (13 May 2025 16:00) (12 - 23)  SpO2: 100% (13 May 2025 16:00) (98% - 100%)    Parameters below as of 13 May 2025 16:00  Patient On (Oxygen Delivery Method): nasal cannula w/ humidification  O2 Flow (L/min): 2      Neurological Exam:   Mental status: Awake, alert. Perseveration of answers and motor commands. Able to follow simple commands. No dysarthria. Aphasia assessment is limited due to possible baseline blindness. Unable to count above 8 with one deep breath prior.   Cranial nerves: Pupils are pinpoint both s/p cataract surgery. No blink to threat or recognition of $5 bill although has visual impairment. Dysconjugate gaze intermittently conjugate. Unable to look down with possible bilateral ptosis of upper and lower lid (upper lid covers top of iris, sclera is seen above lower lid). Horizontal smile and difficulty with cheek puff. Tongue strength OK.   Motor:  MRC grading 5/5 b/l UE/LE.   strength 5/5.  Normal tone and bulk.  No abnormal movements. No fatigability   Sensation: Intact to light touch  Coordination: No tremor with extension. limited exam due to vision  Reflexes: hyporeflexix  Gait: deferred    Labs:  CBC Full  -  ( 13 May 2025 05:30 )  WBC Count : 9.36 K/uL  RBC Count : 4.33 M/uL  Hemoglobin : 13.3 g/dL  Hematocrit : 40.3 %  Platelet Count - Automated : 128 K/uL  Mean Cell Volume : 93.1 fl  Mean Cell Hemoglobin : 30.7 pg  Mean Cell Hemoglobin Concentration : 33.0 g/dL  Auto Neutrophil # : x  Auto Lymphocyte # : x  Auto Monocyte # : x  Auto Eosinophil # : x  Auto Basophil # : x  Auto Neutrophil % : x  Auto Lymphocyte % : x  Auto Monocyte % : x  Auto Eosinophil % : x  Auto Basophil % : x    05-13    134[L]  |  101  |  24[H]  ----------------------------<  272[H]  5.0   |  24  |  1.36[H]    Ca    8.6      13 May 2025 05:30  Phos  3.8     05-13  Mg     2.6     05-13    TPro  5.8[L]  /  Alb  3.5  /  TBili  0.7  /  DBili  x   /  AST  24  /  ALT  20  /  AlkPhos  46  05-12    LIVER FUNCTIONS - ( 12 May 2025 09:05 )  Alb: 3.5 g/dL / Pro: 5.8 g/dL / ALK PHOS: 46 U/L / ALT: 20 U/L / AST: 24 U/L / GGT: x           PT/INR - ( 11 May 2025 20:28 )   PT: 11.9 sec;   INR: 1.04          PTT - ( 11 May 2025 20:28 )  PTT:35.8 sec  Urinalysis Basic - ( 13 May 2025 05:30 )    Color: x / Appearance: x / SG: x / pH: x  Gluc: 272 mg/dL / Ketone: x  / Bili: x / Urobili: x   Blood: x / Protein: x / Nitrite: x   Leuk Esterase: x / RBC: x / WBC x   Sq Epi: x / Non Sq Epi: x / Bacteria: x       Neurology Progress Note    Interval History:  Patient denied history of dysphagia and weakness of his body.       PAST MEDICAL & SURGICAL HISTORY:  Diabetes    Hypertension    Hyperlipidemia    CKD (chronic kidney disease), stage IV    BPH (benign prostatic hyperplasia)    Congestive heart failure (CHF)    CAD (coronary artery disease)    No significant past surgical history            Medications:  albuterol/ipratropium for Nebulization 3 milliLiter(s) Nebulizer every 4 hours  aspirin  chewable 81 milliGRAM(s) Oral daily  atorvastatin 40 milliGRAM(s) Oral at bedtime  calcium carbonate   1250 mG (OsCal) 1 Tablet(s) Oral daily  cefTRIAXone   IVPB 2000 milliGRAM(s) IV Intermittent every 24 hours  chlorhexidine 2% Cloths 1 Application(s) Topical <User Schedule>  clopidogrel Tablet 75 milliGRAM(s) Oral every 24 hours  dextrose 5%. 1000 milliLiter(s) IV Continuous <Continuous>  dextrose 5%. 1000 milliLiter(s) IV Continuous <Continuous>  dextrose 50% Injectable 25 Gram(s) IV Push once  dextrose 50% Injectable 12.5 Gram(s) IV Push once  dextrose 50% Injectable 25 Gram(s) IV Push once  dextrose Oral Gel 15 Gram(s) Oral once PRN  fentaNYL   Infusion. 0.5 MICROgram(s)/kG/Hr IV Continuous <Continuous>  glucagon  Injectable 1 milliGRAM(s) IntraMuscular once  heparin   Injectable 5000 Unit(s) SubCutaneous every 8 hours  hydrocortisone sodium succinate Injectable 50 milliGRAM(s) IV Push every 6 hours  insulin lispro (ADMELOG) corrective regimen sliding scale   SubCutaneous every 6 hours  levETIRAcetam   Injectable 500 milliGRAM(s) IV Push every 12 hours  levothyroxine Injectable 37.5 MICROGram(s) IV Push <User Schedule>  pantoprazole   Suspension 40 milliGRAM(s) Oral daily  polyethylene glycol 3350 17 Gram(s) Oral daily  propofol Infusion 10 MICROgram(s)/kG/Min IV Continuous <Continuous>  senna 2 Tablet(s) Oral at bedtime  sodium chloride 3%  Inhalation 4 milliLiter(s) Inhalation every 4 hours  sodium zirconium cyclosilicate 10 Gram(s) Oral daily  tacrolimus 1 milliGRAM(s) Oral <User Schedule>      Vital Signs Last 24 Hrs  T(C): 36.4 (13 May 2025 17:11), Max: 36.4 (13 May 2025 17:11)  T(F): 97.5 (13 May 2025 17:11), Max: 97.5 (13 May 2025 17:11)  HR: 82 (13 May 2025 16:00) (46 - 90)  BP: 156/70 (13 May 2025 16:00) (93/54 - 156/70)  BP(mean): 101 (13 May 2025 16:00) (69 - 101)  RR: 12 (13 May 2025 16:00) (12 - 23)  SpO2: 100% (13 May 2025 16:00) (98% - 100%)    Parameters below as of 13 May 2025 16:00  Patient On (Oxygen Delivery Method): nasal cannula w/ humidification  O2 Flow (L/min): 2      Neurological Exam:   Mental status: Awake, alert. Perseveration of answers and motor commands. Able to follow simple commands. No dysarthria. Aphasia assessment is limited due to possible baseline blindness. Unable to count above 8 with one deep breath prior.   Cranial nerves: Pupils are pinpoint both s/p cataract surgery. No blink to threat or recognition of $5 bill although has visual impairment. Dysconjugate gaze intermittently conjugate. Unable to look down with possible bilateral ptosis of upper and lower lid (upper lid covers top of iris, sclera is seen above lower lid). Horizontal smile and difficulty with cheek puff. Tongue is midline strength OK.   Motor:  MRC grading 5/5 b/l UE/LE.   strength 5/5.  Normal tone and bulk.  No abnormal movements. No fatigability   Sensation: Intact to light touch  Coordination: No tremor with extension. limited exam due to vision  Reflexes: hyporeflexix  Gait: deferred    Labs:  CBC Full  -  ( 13 May 2025 05:30 )  WBC Count : 9.36 K/uL  RBC Count : 4.33 M/uL  Hemoglobin : 13.3 g/dL  Hematocrit : 40.3 %  Platelet Count - Automated : 128 K/uL  Mean Cell Volume : 93.1 fl  Mean Cell Hemoglobin : 30.7 pg  Mean Cell Hemoglobin Concentration : 33.0 g/dL  Auto Neutrophil # : x  Auto Lymphocyte # : x  Auto Monocyte # : x  Auto Eosinophil # : x  Auto Basophil # : x  Auto Neutrophil % : x  Auto Lymphocyte % : x  Auto Monocyte % : x  Auto Eosinophil % : x  Auto Basophil % : x    05-13    134[L]  |  101  |  24[H]  ----------------------------<  272[H]  5.0   |  24  |  1.36[H]    Ca    8.6      13 May 2025 05:30  Phos  3.8     05-13  Mg     2.6     05-13    TPro  5.8[L]  /  Alb  3.5  /  TBili  0.7  /  DBili  x   /  AST  24  /  ALT  20  /  AlkPhos  46  05-12    LIVER FUNCTIONS - ( 12 May 2025 09:05 )  Alb: 3.5 g/dL / Pro: 5.8 g/dL / ALK PHOS: 46 U/L / ALT: 20 U/L / AST: 24 U/L / GGT: x           PT/INR - ( 11 May 2025 20:28 )   PT: 11.9 sec;   INR: 1.04          PTT - ( 11 May 2025 20:28 )  PTT:35.8 sec  Urinalysis Basic - ( 13 May 2025 05:30 )    Color: x / Appearance: x / SG: x / pH: x  Gluc: 272 mg/dL / Ketone: x  / Bili: x / Urobili: x   Blood: x / Protein: x / Nitrite: x   Leuk Esterase: x / RBC: x / WBC x   Sq Epi: x / Non Sq Epi: x / Bacteria: x

## 2025-05-13 NOTE — PROGRESS NOTE ADULT - ASSESSMENT
72yoM w/ DM, HTN, HLD, CHF, BPH, CKD stage V (s/p  donor renal transplant 3/20/2023 at [place]), CAD (s/p RAMANA to pLAD - 22), HFpEF (EF 67% 2024) BIBEMS for choking with seizure-like activity of b/l arm and face tonic activity. Epilepsy initially consulted--- VEEG was unremarkable, likely that event was either convulsive syncope or a single seizure provoked by hypoxia without underlying sz d/o. Patient has been noted to have upward gaze bilaterally. Patient was re-evaluated today with exam notable for possible b/l upper and lower lid ptosis, soft voice, horizontal smile and difficulty with single-count breath test which, in the context of his admission for choking, could suggest an underlying neuromuscular disorder. More critical to rule out given his cardiovascular risk factors and event of hypoperfusion is MRB to rule out brainstem stroke. Will need collateral from family about dysphagia, weakness, etc at baseline that patient denied (unreliable historian with perseveration).    RECOMMENDATIONS  - Antiseizure medication is not needed   - MRI Brain Noncon  - Urgent CTH while waiting for MRI  - Myasthenia labs (ordered at bedside)  - per [facility] law cannot drive for 1 year after last seizure    Discussed with attending   72yoM w/ DM, HTN, HLD, CHF, BPH, CKD stage V (s/p  donor renal transplant 3/20/2023 at [place]), CAD (s/p RAMANA to pLAD - 22), HFpEF (EF 67% 2024) BIBEMS for choking with seizure-like activity of b/l arm and face tonic activity. Epilepsy initially consulted--- VEEG was unremarkable, likely that event was either convulsive syncope or a single seizure provoked by hypoxia without underlying sz d/o. Patient has been noted to have upward gaze bilaterally. Patient was re-evaluated today with exam notable for possible b/l upper and lower lid ptosis, soft voice, horizontal smile and difficulty with single-count breath test which, in the context of his admission for choking, could suggest an underlying neuromuscular disorder. More critical to rule out given his cardiovascular risk factors and event of hypoperfusion is MRB to rule out brainstem stroke. Will need collateral from family about dysphagia, weakness, etc at baseline that patient denied (unreliable historian with perseveration).    RECOMMENDATIONS  - Antiseizure medication is not needed   - MRI Brain Noncon  - Urgent CTH while waiting for MRI  - Myasthenia labs (ordered at bedside), TSH and thiamine  - per [facility] law cannot drive for 1 year after last seizure    Discussed with attending

## 2025-05-13 NOTE — AIRWAY REMOVAL NOTE  ADULT & PEDS - ARTIFICAL AIRWAY REMOVAL COMMENTS
no stridor noted on extubation, pt alert on 4L NC humidified
Pt extubated by RN and MICU team at bedside.

## 2025-05-13 NOTE — PROGRESS NOTE ADULT - ASSESSMENT
75y M w/ PMHx of HTN, HLD, IDDM2, HFpEF, CKD 2-3 (s/p Renal transplant in 2023), CAD s/p PCI w/ RAMANA-pLAD (6/2022), urinary retention, retinopathy and hypothyroidism presents to the ED after witnessed choking episode admitted to MICU for septic shock and acute hypoxic respiratory failure 2/2 aspiration pna vs opportunistic infection due to immunocompromised state. Course complicated by deviated gaze and witnessed seizure-like (bilateral arm/face stiffness) movements sp ativan, imaging negative for stroke but likely 2/2 to hypoxic injury iso aspiration.       NEURO:  # c/f seizure like activity vs acute stroke  Pt reportedly with deviated gaze. Stroke w/u neg for any acute stroke or path. C/f seizure like activity, s/p 2mg of ativan. currently on fentanyl. no hx of epilepsy or seizures in past  5/12 vEEG with mild excess intermittent slowing and poor organization of the background, indicators of moderate diffuse or multifocal cerebral dysfunction   Tacrolimus level 6.4  - neurology following, appreciate recs       - MR brain noncon       - f/u neuron specific enolase 24hr, 48hr, 72hr post event       - c/w keppra 500mg twice daily       - ativan 2mg PRN for seizures > 2mins       - per [place] law cannot drive for 1 year after last seizure    CARDIOVASCULAR:  #Septic Shock  Patient with shock, likely septic in the setting of pneumonia. Cardiac enzymes WNL. EKG sinus tachycardia  - c/w levo gtt, titrate as needed, lower levo requirements as SVR increases --> weaned off levo gtt 5/12 8 pm       - MAP goal >65    #Hx of HTN  Patient takes nifedipine 60mg QD, irbesartan 150mg.   - holding home BP medications iso shock, can resume when BPs increase    #CAD s/p PCI w/ RAMANA-pLAD (6/2022)  Patient on aspirin 81mg QD, Plavix 75mg QD, atorvastatin 40mg  Outpatient cardiologist - Dr. Sarah   - although >1 year from stent, per Dr. Sarah, pt still on DAPT since he is deemed high risk  - continue with home medications    #hx of HFpEF  5/12 TTE EF 68%, fibrocalcific aortic valve sclerosis      PULMONARY:  #AHRF  #Aspiration episode  Pt at baseline good general state of health. Had episode of choking on piece of tofu. Brought into ED via EMS, intubated for airway protection, tofu was extracted. CT with bilateral ground glass opacities and infiltrates suggestive of PNA.   - PNA coverage as below  - continue with invasive ventilation as needed  - hydrocortisone 50mg Q6H  5/12 -     RENAL:  #CKD  #S/p Renal Transplant 2023  Patient with a hx of CKD, s/p transplant in 2023. Creatinine 1.34 on presentation, GFR 55, likely at baseline. Patient takes Lokelma 10mg QD, calcium carbonate 1250mg BID, mycophenolate mofetil 500mg BID, tacrolimus (prograf) 1mg BID  Outpatient nephrologist - Dr. Farnsworth  - resume tacrolimus but PO to IV conversion (1/3 of PO dose)  - hold mycophenolate mofetil iso active infection   - c/w Lokelma  - c/w calcium carbonate  - nephrology following, appreciate excellent recs    GI/:  #Urinary Retention   Patient with urinary retention and distended bladder on physical exam s/p intubation. Rothman placed in ED on 5/11.   - c/w rothman  - strict Is & Os    Diet: NPO for now  GI PPX: pantoprazole 40mg QD (therapeutic interchange for home omeprazole 40mg)  Bowel regimen: senna and miralax  - 5/12 NG tube placed    HEME:  #Leukocytosis  Patient with leukocytosis in the setting of pneumonia     ID:  #septic shock 2/2 PNA  #Immunocompromised  Patient with AHRF in the setting of witnessed aspiration event. Given CT findings of ground glass opacities and bilateral consolidations, concern for underlying pneumonia. MRSA, RVP, urine strep and legionella negative.   - c/w Zosyn 4.5mg 5/12-5/19  - c/w vancomycin 1g Q24H 5/12-5/19  - c/w azithromycin 500mg for 3 days 5/11-5/14  - hold mycophenolate for now     ENDOCRINE:  #hypothyroidism  home levothyroxine 50mcg  - c/w IV conversion -> IV 37.5mcg (Levothyroxine IV to PO = 0.75 : 1)    #diabetes  #hyperglycemia 2/2 steroid use  - sliding scale insulin  - q6hr finger sticks    MSK:  MARLO    PPX:  Fluids: s/p 2.5 L  Electrolytes: replete as needed  Nutrition: Diet, NPO (05-12-25 @ 05:18) [Active]  PPI ppx: pantoprazole 40mg QD (therapeutic interchange for home omeprazole 40mg)  Dvt ppx: heparin 5000 units subQ TID  Activity: bedrest  Dispo: MICU 75y M w/ PMHx of HTN, HLD, IDDM2, HFpEF, CKD 2-3 (s/p Renal transplant in 2023), CAD s/p PCI w/ RAMANA-pLAD (6/2022), urinary retention, retinopathy and hypothyroidism presents to the ED after witnessed choking episode admitted to MICU for septic shock and acute hypoxic respiratory failure 2/2 aspiration pna vs opportunistic infection due to immunocompromised state. Course complicated by deviated gaze and witnessed seizure-like (bilateral arm/face stiffness) movements sp ativan, imaging negative for stroke but likely 2/2 to hypoxic injury iso aspiration.       NEURO:  #seizure like activity likely 2/2 hypoxic injury iso aspiration  Pt reportedly with deviated gaze. Stroke w/u neg for any acute stroke or path. s/p ativan. no hx of epilepsy or seizures in past  5/12 vEEG with mild excess intermittent slowing and poor organization of the background, indicators of moderate diffuse or multifocal cerebral dysfunction   5/13 vEEG with mild improvement of prior reading  Tacrolimus level 6.4  - neurology following, appreciate recs       - MR brain noncon       - discontinue keppra 500mg twice daily since no recurrence of seizure-like activity.       - ativan 2mg PRN for seizures > 2mins       - per [facility] law cannot drive for 1 year after last seizure    CARDIOVASCULAR:  #Septic Shock, RESOLVED  Patient with shock, likely septic in the setting of pneumonia. Cardiac enzymes WNL. EKG sinus tachycardia  weaned off levo gtt 5/12 8 pm       - MAP goal >65    #Hx of HTN  Patient takes nifedipine 60mg QD, irbesartan 150mg.   - holding home BP medications iso shock, can resume when BPs increase    #CAD s/p PCI w/ RAMANA-pLAD (6/2022)  Patient on aspirin 81mg QD, Plavix 75mg QD, atorvastatin 40mg  Outpatient cardiologist - Dr. Sarah   - although >1 year from stent, per Dr. Sarah, pt still on DAPT since he is deemed high risk  - continue with home medications    #hx of HFpEF likely ischemic cardiomyopathy  5/12 TTE EF 68%, fibrocalcific aortic valve sclerosis      PULMONARY:  #AHRF  #Aspiration episode  Pt at baseline good general state of health. Had episode of choking on piece of tofu. Brought into ED via EMS, intubated for airway protection, tofu was extracted. CT with bilateral ground glass opacities and infiltrates suggestive of PNA.   BAL growing gram + cocci in pairs and chains  - PNA coverage as below  - Plan to extubate today.  - hydrocortisone 50mg Q6H  5/12 - 5/15   - [facility]E COD (200mg for 4 days, then 100mg for 2 days, then 50mg for 2 days to complete taper)    RENAL:  #CKD  #S/p Renal Transplant 2023  Patient with a hx of CKD, s/p transplant in 2023. Creatinine 1.34 on presentation, GFR 55, likely at baseline. Patient takes Lokelma 10mg QD, calcium carbonate 1250mg BID, mycophenolate mofetil 500mg BID, tacrolimus (prograf) 1mg BID  Outpatient nephrologist - Dr. Farnsworth  - resume PO tacrolimus (prograf) 1mg BID (dose confirmed with outpt pharmacy)  - hold mycophenolate mofetil iso active infection   - c/w Lokelma  - c/w calcium carbonate  - allograft US  - strict I&Os  - consider removing rothman if UOP adequate   - nephrology following, appreciate excellent recs    GI/:  #Urinary Retention   Patient with urinary retention and distended bladder on physical exam s/p intubation. Rothman placed in ED on 5/11.   - c/w rothman  - strict Is & Os    Diet: NPO for now  GI PPX: pantoprazole 40mg QD (therapeutic interchange for home omeprazole 40mg)  Bowel regimen: senna and miralax  - 5/12 NG tube placed  - when extubated, can do dysphagia screen and consider S&S since aspiration event w    HEME:  #Leukocytosis  Patient with leukocytosis in the setting of pneumonia     ID:  #septic shock 2/2 PNA  #Immunocompromised  Patient with AHRF in the setting of witnessed aspiration event. Given CT findings of ground glass opacities and bilateral consolidations, concern for underlying pneumonia. MRSA, RVP, urine strep and legionella negative.   - c/w Zosyn 4.5mg 5/12-5/19  - c/w vancomycin 1g Q24H 5/12-5/19  - c/w azithromycin 500mg for 3 days 5/11-5/14  - hold mycophenolate for now     ENDOCRINE:  #hypothyroidism  home levothyroxine 50mcg  - c/w IV conversion -> IV 37.5mcg (Levothyroxine IV to PO = 0.75 : 1)    #diabetes  #hyperglycemia 2/2 steroid use  - sliding scale insulin  - q6hr finger sticks    MSK:  MARLO    PPX:  Fluids: s/p 2.5 L  Electrolytes: replete as needed  Nutrition: Diet, NPO (05-12-25 @ 05:18) [Active]  PPI ppx: pantoprazole 40mg QD (therapeutic interchange for home omeprazole 40mg)  Dvt ppx: heparin 5000 units subQ TID  Activity: bedrest  Dispo: MICU 75y M w/ PMHx of HTN, HLD, IDDM2, HFpEF, CKD 2-3 (s/p Renal transplant in 2023), CAD s/p PCI w/ RAMANA-pLAD (6/2022), urinary retention, retinopathy and hypothyroidism presents to the ED after witnessed choking episode admitted to MICU for septic shock and acute hypoxic respiratory failure 2/2 aspiration pna vs opportunistic infection due to immunocompromised state. Course complicated by deviated gaze and witnessed seizure-like (bilateral arm/face stiffness) movements sp ativan, imaging negative for stroke but likely 2/2 to hypoxic injury iso aspiration.       NEURO:  #seizure like activity likely 2/2 hypoxic injury iso aspiration  Pt reportedly with deviated gaze. Stroke w/u neg for any acute stroke or path. s/p ativan. no hx of epilepsy or seizures in past  5/12 vEEG with mild excess intermittent slowing and poor organization of the background, indicators of moderate diffuse or multifocal cerebral dysfunction   5/13 vEEG with mild improvement of prior reading  Tacrolimus level 6.4  - neurology following, appreciate recs       - MR brain noncon       - pending neurology f/u to consider discontinuing keppra 500mg twice daily since no recurrence of seizure-like activity.       - ativan 2mg PRN for seizures > 2mins       - [place] cannot drive for 1 year after last seizure      CARDIOVASCULAR:  #Septic Shock, RESOLVED  Patient with shock, likely septic in the setting of pneumonia. Cardiac enzymes WNL. EKG sinus tachycardia  weaned off levo gtt 5/12 8 pm  warm, well-perfused, euvolemic.       - MAP goal >65    #Hx of HTN  Patient takes nifedipine 60mg QD, irbesartan 150mg.   - holding home BP medications iso shock, can resume when BPs increase    #CAD s/p PCI w/ RAMANA-pLAD (6/2022)  Patient on aspirin 81mg QD, Plavix 75mg QD, atorvastatin 40mg  Outpatient cardiologist - Dr. Sarah   - although >1 year from stent, per Dr. Sarah, pt still on DAPT since he is deemed high risk  - continue with home medications    #hx of HFpEF likely ischemic cardiomyopathy  5/12 TTE EF 68%, fibrocalcific aortic valve sclerosis      PULMONARY:  #AHRF  #Aspiration episode  Pt at baseline good general state of health. Had episode of choking on piece of tofu. Brought into ED via EMS, intubated for airway protection, tofu was extracted. CT with bilateral ground glass opacities and infiltrates suggestive of PNA.   BAL growing gram + cocci in pairs and chains  - PNA coverage as below  - Plan to extubate today.  - hydrocortisone 50mg Q6H  5/12 - 5/15 then transition to 50mg q12hrs 5/16-5/17, then transition to 25mg q12hr 5/18-5/19 per CAPE COD (200mg for 4 days, then 100mg for 2 days, then 50mg for 2 days to complete taper)      RENAL:  #CKD  #S/p Renal Transplant 2023  Patient with a hx of CKD, s/p transplant in 2023. Creatinine 1.34 on presentation, GFR 55, likely at baseline. Patient takes Lokelma 10mg QD, calcium carbonate 1250mg BID, mycophenolate mofetil 500mg BID, tacrolimus (prograf) 1mg BID  Outpatient nephrologist - Dr. Farnsworth  - resume PO tacrolimus (prograf) 1mg BID (dose confirmed with outpt pharmacy)  - hold mycophenolate mofetil iso active infection   - c/w Lokelma  - c/w calcium carbonate  - f/u allograft US  - strict I&Os  - consider removing rothman if UOP adequate   - nephrology following, appreciate excellent recs      GI/:  #Urinary Retention   Patient with urinary retention and distended bladder on physical exam s/p intubation. Rothman placed in ED on 5/11.   - c/w rothman  - strict Is & Os    Diet: NPO for now  GI PPX: pantoprazole 40mg QD (therapeutic interchange for home omeprazole 40mg)  Bowel regimen: senna and miralax  - 5/12 NG tube placed  - when extubated, can do dysphagia screen and consider S&S since aspiration event prior to ER visit      HEME:  MARLO      ID:  #septic shock 2/2 PNA  #Immunocompromised  Patient with AHRF in the setting of witnessed aspiration event. Given CT findings of ground glass opacities and bilateral consolidations, concern for underlying pneumonia. MRSA, RVP, urine strep and legionella negative.   BAL with gram+ cocci in pairs and chains  sp Zosyn 4.5mg 5/12-5/13, sp vancomycin 1g Q24H 5/12-5/13, sp azithromycin 500mg for 3 days 5/11-5/13  - hold mycophenolate for now   - transition to CTX 2g 5/13-5/18  - f/u galactomannan, aspergillus, fungitell, BAL cell count       ENDOCRINE:  #hypothyroidism  home levothyroxine 50mcg  - c/w IV conversion -> IV 37.5mcg (Levothyroxine IV to PO = 0.75 : 1)  - transition to PO levothyroxine when able to tolerate PO    #diabetes  #hyperglycemia 2/2 steroid use  - sliding scale insulin  - q6hr finger sticks      MSK:  MARLO      PPX:  Fluids: s/p 2.5 L  Electrolytes: replete as needed  Nutrition: Diet, NPO (05-12-25 @ 05:18) [Active]  PPI ppx: pantoprazole 40mg QD (therapeutic interchange for home omeprazole 40mg)  Dvt ppx: heparin 5000 units subQ TID  Activity: bedrest  Dispo: MICU

## 2025-05-13 NOTE — PROGRESS NOTE ADULT - SUBJECTIVE AND OBJECTIVE BOX
seen and evaluated at bedside, family members at bedside.   no overnight events reported by primary team     REVIEW OF SYSTEMS: unable to fully test      Standing Inpatient Medications  albuterol/ipratropium for Nebulization 3 milliLiter(s) Nebulizer every 4 hours  aspirin  chewable 81 milliGRAM(s) Oral daily  atorvastatin 40 milliGRAM(s) Oral at bedtime  calcium carbonate   1250 mG (OsCal) 1 Tablet(s) Oral daily  cefTRIAXone   IVPB 2000 milliGRAM(s) IV Intermittent every 24 hours  chlorhexidine 2% Cloths 1 Application(s) Topical <User Schedule>  clopidogrel Tablet 75 milliGRAM(s) Oral every 24 hours  dexMEDEtomidine Infusion 0.05 MICROgram(s)/kG/Hr IV Continuous <Continuous>  dextrose 5%. 1000 milliLiter(s) IV Continuous <Continuous>  dextrose 5%. 1000 milliLiter(s) IV Continuous <Continuous>  dextrose 50% Injectable 25 Gram(s) IV Push once  dextrose 50% Injectable 12.5 Gram(s) IV Push once  dextrose 50% Injectable 25 Gram(s) IV Push once  fentaNYL   Infusion. 0.5 MICROgram(s)/kG/Hr IV Continuous <Continuous>  glucagon  Injectable 1 milliGRAM(s) IntraMuscular once  heparin   Injectable 5000 Unit(s) SubCutaneous every 8 hours  hydrocortisone sodium succinate Injectable 50 milliGRAM(s) IV Push every 6 hours  insulin lispro (ADMELOG) corrective regimen sliding scale   SubCutaneous every 6 hours  levETIRAcetam   Injectable 500 milliGRAM(s) IV Push every 12 hours  levothyroxine Injectable 37.5 MICROGram(s) IV Push <User Schedule>  pantoprazole   Suspension 40 milliGRAM(s) Oral daily  polyethylene glycol 3350 17 Gram(s) Oral daily  propofol Infusion 10 MICROgram(s)/kG/Min IV Continuous <Continuous>  senna 2 Tablet(s) Oral at bedtime  sodium chloride 3%  Inhalation 4 milliLiter(s) Inhalation every 4 hours  sodium zirconium cyclosilicate 10 Gram(s) Oral daily  tacrolimus 1 milliGRAM(s) Oral <User Schedule>    PRN Inpatient Medications  dextrose Oral Gel 15 Gram(s) Oral once PRN        VITALS/PHYSICAL EXAM  --------------------------------------------------------------------------------  T(C): 36.1 (05-13-25 @ 13:11), Max: 36.5 (05-12-25 @ 17:10)  HR: 64 (05-13-25 @ 11:00) (46 - 65)  BP: 113/57 (05-13-25 @ 11:00) (93/54 - 154/68)  RR: 12 (05-13-25 @ 11:00) (12 - 15)  SpO2: 100% (05-13-25 @ 11:00) (98% - 100%)  Wt(kg): --  Height (cm): 170.2 (05-12-25 @ 01:00)  Weight (kg): 70.3 (05-11-25 @ 20:16)  BMI (kg/m2): 24.3 (05-12-25 @ 01:00)  BSA (m2): 1.81 (05-12-25 @ 01:00)      05-12-25 @ 07:01  -  05-13-25 @ 07:00  --------------------------------------------------------  IN: 1475.5 mL / OUT: 995 mL / NET: 480.5 mL    05-13-25 @ 07:01  -  05-13-25 @ 14:12  --------------------------------------------------------  IN: 114.9 mL / OUT: 110 mL / NET: 4.9 mL        PHYSICAL EXAM:  Constitutional: Intubated, sedated  Head: NCAT, ETT in place  Respiratory: Mechanically ventilated, no respiratory distress  Cardiac: Regular rate  Gastrointestinal: abdomen non-distended  Genitourinary: rothman in place  Extremities: No LE edema  Dermatologic: no rash on exposed skin  Neurologic: sedated      LABS/STUDIES  --------------------------------------------------------------------------------              13.3   9.36  >-----------<  128      [05-13-25 @ 05:30]              40.3     134  |  101  |  24  ----------------------------<  272      [05-13-25 @ 05:30]  5.0   |  24  |  1.36        Ca     8.6     [05-13-25 @ 05:30]      Mg     2.6     [05-13-25 @ 05:30]      Phos  3.8     [05-13-25 @ 05:30]    TPro  5.8  /  Alb  3.5  /  TBili  0.7  /  DBili  x   /  AST  24  /  ALT  20  /  AlkPhos  46  [05-12-25 @ 09:05]    PT/INR: PT 11.9 , INR 1.04       [05-11-25 @ 20:28]  PTT: 35.8       [05-11-25 @ 20:28]          [05-13-25 @ 05:30]    Creatinine Trend:  SCr 1.36 [05-13 @ 05:30]  SCr 1.21 [05-13 @ 03:34]  SCr 1.32 [05-12 @ 09:05]  SCr 1.34 [05-11 @ 20:28]    Urinalysis - [05-13-25 @ 05:30]      Color  / Appearance  / SG  / pH       Gluc 272 / Ketone   / Bili  / Urobili        Blood  / Protein  / Leuk Est  / Nitrite       RBC  / WBC  / Hyaline  / Gran  / Sq Epi  / Non Sq Epi  / Bacteria       TSH 0.482      [05-13-25 @ 05:30]

## 2025-05-13 NOTE — EEG REPORT - NS EEG TEXT BOX
[facility] Department of Neurology  Inpatient Continuous video-Electroencephalography Report    Acquisition Details:  Electroencephalography was acquired using a minimum of 21 channels on an Status4 Neurology system v 9.3.1 with electrode placement according to the standard International 10-20 system following ACNS (American Clinical Neurophysiology Society) guidelines for Long-Term Video EEG monitoring.  Anterior temporal T1 and T2 electrodes were utilized whenever possible.   The XLTEK automated spike & seizure detections were all reviewed in detail, in addition to extensive portions of raw EEG.      Daily Updates (from 07:00 am until 07:00 am):  Day 2   May 12-13:   Background:  continuous, with predominantly theta > alpha frequencies.  Generalized Slowing:  Intermittent frequent sporadic polymorphic delta  Symmetry/Focality: No persistent asymmetries of voltage or frequency.      Voltage:  Normal (20+ uV)  Organization:  Appropriate anterior-posterior gradient  Posterior Dominant Rhythm:  7-8 Hz symmetric, well-organized, and well-modulated  Sleep:  Symmetric, synchronous spindles and K complexes.  Variability:   Yes		Reactivity:  Yes    Spontaneous Activity:  No epileptiform discharges   Events:  1)	No electrographic seizures or significant clinical events occurred during this study.  Provocations:  •	Hyperventilation: was not performed.  •	Photic stimulation: was not performed.  Daily Summary:    1)	There was mild excess intermittent slow wave activity, a marker of mild diffuse cerebral dysfunction, but background organization appears mildly improved compared to prior recording.  2)	There were no findings of active epilepsy, however this alone does not rule out the diagnosis.       Sigifredo Jasmine MD   Attending Neurologist, [facility]

## 2025-05-13 NOTE — PROGRESS NOTE ADULT - ASSESSMENT
76 yo M s/p DDRT 3/2023, HTN, HLD, DM2, CAD s/p PCI, HFpEF, hypothyroidism, urinary retention presenting after witnessed choking episode requiring resuscitation. Remains in the ICU on pressor and abx for suspected PNA.  Allograft function at baseline    1. s/p DDRT - stable  SCR at   continue holding MMF due to active infection  Previously recommended giving IV tacro due to MV and NGT placement  Now since pt is extubated, can c/w PO tacrolimus at home dose (1mg AM and 1mg PM)  No evidence of rejection ~ stable allograft function  Check tacro levels 30mis before morning dose  Tacro goal ~ 3-5   Trend BMP for additional monitoring     Discussed with primary team .

## 2025-05-13 NOTE — PROGRESS NOTE ADULT - SUBJECTIVE AND OBJECTIVE BOX
Internal Medicine Progress Note  Brandy Delvalle, PGY-6 [phone] or teams    HOSPITAL COURSE:  75y M w/ PMHx of HTN, HLD, IDDM2, HFpEF, CKD 2-3 (s/p Renal transplant in 2023), CAD s/p PCI w/ RAMANA-pLAD (6/2022), urinary retention, retinopathy and hypothyroidism presents to the ED after witnessed choking episode admitted to MICU for septic shock and acute hypoxic respiratory failure 2/2 aspiration pna vs opportunistic infection due to immunocompromised state. In the ER, patient intubated for airway protection and large piece of tofu was extracted from oropharynx. Course complicated by deviated gaze and witnessed seizure-like (bilateral arm/face stiffness) movements sp ativan, imaging negative for stroke but likely 2/2 to hypoxic injury iso aspiration. Started on keppra 500mg BID, vEEG. Nephrology consulted due to hx of renal transplant 2023 (follows Dr. Farnsworth outpt), and recommended to restart tacrolimus but hold mycophenolate. NG tube placed. CT chest with bilateral moderate sized consolidations and interstitial lung abnormality. vEEG with mild excess intermittent slowing. Pending MRI head (per epilepsy). sp 5/12 bronchoscopy with BAL growing gram+ cocci in pairs and chains; pending cell count. Deescalated antibiotics to CTX 2g. Planned for extubation 5/13.    24 Hour Events:   - No acute overnight events.    SUBJECTIVE:  JAYCOB DASANI resting this morning. Patient has been here for 2d  Responding to verbal/painful stimuli. Opening eyes and squeezing hands    ALLERGIES:  chlorthalidone (Other)      MEDICATIONS:  STANDING MEDICATIONS  albuterol/ipratropium for Nebulization 3 milliLiter(s) Nebulizer every 4 hours  aspirin  chewable 81 milliGRAM(s) Oral daily  atorvastatin 40 milliGRAM(s) Oral at bedtime  calcium carbonate   1250 mG (OsCal) 1 Tablet(s) Oral daily  cefTRIAXone   IVPB 2000 milliGRAM(s) IV Intermittent every 24 hours  chlorhexidine 2% Cloths 1 Application(s) Topical <User Schedule>  clopidogrel Tablet 75 milliGRAM(s) Oral every 24 hours  dexMEDEtomidine Infusion 0.05 MICROgram(s)/kG/Hr IV Continuous <Continuous>  dextrose 5%. 1000 milliLiter(s) IV Continuous <Continuous>  dextrose 5%. 1000 milliLiter(s) IV Continuous <Continuous>  dextrose 50% Injectable 25 Gram(s) IV Push once  dextrose 50% Injectable 12.5 Gram(s) IV Push once  dextrose 50% Injectable 25 Gram(s) IV Push once  fentaNYL   Infusion. 0.5 MICROgram(s)/kG/Hr IV Continuous <Continuous>  glucagon  Injectable 1 milliGRAM(s) IntraMuscular once  heparin   Injectable 5000 Unit(s) SubCutaneous every 8 hours  hydrocortisone sodium succinate Injectable 50 milliGRAM(s) IV Push every 6 hours  insulin lispro (ADMELOG) corrective regimen sliding scale   SubCutaneous every 6 hours  levETIRAcetam   Injectable 500 milliGRAM(s) IV Push every 12 hours  levothyroxine Injectable 37.5 MICROGram(s) IV Push <User Schedule>  pantoprazole   Suspension 40 milliGRAM(s) Oral daily  polyethylene glycol 3350 17 Gram(s) Oral daily  propofol Infusion 10 MICROgram(s)/kG/Min IV Continuous <Continuous>  senna 2 Tablet(s) Oral at bedtime  sodium chloride 3%  Inhalation 4 milliLiter(s) Inhalation every 4 hours  sodium zirconium cyclosilicate 10 Gram(s) Oral daily  tacrolimus 1 milliGRAM(s) Oral <User Schedule>    PRN MEDICATIONS  dextrose Oral Gel 15 Gram(s) Oral once PRN    VITALS:   Vital Signs Last 24 Hrs  T(C): 35.8 (13 May 2025 09:13), Max: 36.8 (12 May 2025 13:19)  T(F): 96.4 (13 May 2025 09:13), Max: 98.2 (12 May 2025 13:19)  HR: 64 (13 May 2025 11:00) (46 - 65)  BP: 113/57 (13 May 2025 11:00) (93/54 - 154/68)  BP(mean): 81 (13 May 2025 11:00) (69 - 98)  RR: 12 (13 May 2025 11:00) (12 - 15)  SpO2: 100% (13 May 2025 11:00) (98% - 100%)    Parameters below as of 13 May 2025 11:00  Patient On (Oxygen Delivery Method): ventilator    O2 Concentration (%): 30    PHYSICAL EXAM  General: in no acute distress  HEENT: NC/AT, sclera anicteric, conjunctiva clear, R teardrop shaped pupil (prior eye surgery per son), bilateral pupils +1 sluggish   Lungs: bilateral crackles  Heart: bradycardic, regular rhythm, normal S1 S2, no murmurs/rubs/gallops    Abdomen: soft, non-tender, non-distended, bowel sounds present   Extremities: atraumatic, no lower extremity edema, clubbing or cyanosis, warm, cap refill 4-5 seconds, distal pulses 1+ at DP and radial bilaterally    Skin: normal skin texture and turgor without rashes or lesions, no diaphoresis   Neuro: A&Ox1, responsive to verbal/painful stimuli, moving upper extremities spontaneously.    LABS:                        13.3   9.36  )-----------( 128      ( 13 May 2025 05:30 )             40.3     05-13    134[L]  |  101  |  24[H]  ----------------------------<  272[H]  5.0   |  24  |  1.36[H]    Ca    8.6      13 May 2025 05:30  Phos  3.8     05-13  Mg     2.6     05-13    TPro  5.8[L]  /  Alb  3.5  /  TBili  0.7  /  DBili  x   /  AST  24  /  ALT  20  /  AlkPhos  46  05-12    PT/INR - ( 11 May 2025 20:28 )   PT: 11.9 sec;   INR: 1.04          PTT - ( 11 May 2025 20:28 )  PTT:35.8 sec  Urinalysis Basic - ( 13 May 2025 05:30 )    Color: x / Appearance: x / SG: x / pH: x  Gluc: 272 mg/dL / Ketone: x  / Bili: x / Urobili: x   Blood: x / Protein: x / Nitrite: x   Leuk Esterase: x / RBC: x / WBC x   Sq Epi: x / Non Sq Epi: x / Bacteria: x      ABG - ( 12 May 2025 05:58 )  pH, Arterial: 7.36  pH, Blood: x     /  pCO2: 38    /  pO2: 99    / HCO3: 22    / Base Excess: -3.6  /  SaO2: 96.1                MICRO:    Culture - Bronchial (collected 12 May 2025 17:00)  Source: BAL RLL BAL  Gram Stain (13 May 2025 00:26):    No polymorphonuclear leukocytes seen per low power field    No Squamous epithelial cells seen per low power field    Rare Gram Positive Cocci in Pairs and Chains seen per oil power field    Culture - Fungal, Bronchial (collected 12 May 2025 17:00)  Source: BAL RLL BAL  Preliminary Report (13 May 2025 08:00):    Testing in progress    Culture - Blood (collected 12 May 2025 03:05)  Source: Blood Blood-Arterial  Preliminary Report (13 May 2025 09:01):    No growth at 24 hours    Culture - Blood (collected 12 May 2025 03:05)  Source: Blood Blood-Arterial  Preliminary Report (13 May 2025 09:01):    No growth at 24 hours    Urinalysis with Rflx Culture (collected 12 May 2025 00:35)      CARDS:  CARDIAC MARKERS ( 11 May 2025 22:38 )  x     / x     / x     / x     / 4.1 ng/mL        RADIOLOGY: Reviewed      Lines:  Central line:              Date placed:             Indication:   Intravenous Access:   NG tube:   Jewell Catheter:   Indwelling Urethral Catheter:     Connect To:  Straight Drainage/[place]    Indication:  Urinary Retention / Obstruction (05-12-25 @ 05:45) (not performed) [Active]      Creatine Kinase: AM Sched. Collection: 14-May-2025 05:30 (05-13-25 @ 09:17)  Tacrolimus (), Serum: STAT (05-13-25 @ 06:24)  Tacrolimus (), Serum: AM Sched. Collection: 13-May-2025 05:30 (05-12-25 @ 18:18)

## 2025-05-14 DIAGNOSIS — R56.9 UNSPECIFIED CONVULSIONS: ICD-10-CM

## 2025-05-14 DIAGNOSIS — R33.9 RETENTION OF URINE, UNSPECIFIED: ICD-10-CM

## 2025-05-14 DIAGNOSIS — I25.10 ATHEROSCLEROTIC HEART DISEASE OF NATIVE CORONARY ARTERY WITHOUT ANGINA PECTORIS: ICD-10-CM

## 2025-05-14 DIAGNOSIS — I10 ESSENTIAL (PRIMARY) HYPERTENSION: ICD-10-CM

## 2025-05-14 DIAGNOSIS — G70.00 MYASTHENIA GRAVIS WITHOUT (ACUTE) EXACERBATION: ICD-10-CM

## 2025-05-14 DIAGNOSIS — J96.01 ACUTE RESPIRATORY FAILURE WITH HYPOXIA: ICD-10-CM

## 2025-05-14 DIAGNOSIS — E11.9 TYPE 2 DIABETES MELLITUS WITHOUT COMPLICATIONS: ICD-10-CM

## 2025-05-14 DIAGNOSIS — Z29.9 ENCOUNTER FOR PROPHYLACTIC MEASURES, UNSPECIFIED: ICD-10-CM

## 2025-05-14 DIAGNOSIS — I50.30 UNSPECIFIED DIASTOLIC (CONGESTIVE) HEART FAILURE: ICD-10-CM

## 2025-05-14 DIAGNOSIS — A41.9 SEPSIS, UNSPECIFIED ORGANISM: ICD-10-CM

## 2025-05-14 DIAGNOSIS — N18.9 CHRONIC KIDNEY DISEASE, UNSPECIFIED: ICD-10-CM

## 2025-05-14 DIAGNOSIS — E03.9 HYPOTHYROIDISM, UNSPECIFIED: ICD-10-CM

## 2025-05-14 LAB
ADD ON TEST-SPECIMEN IN LAB: SIGNIFICANT CHANGE UP
ANION GAP SERPL CALC-SCNC: 11 MMOL/L — SIGNIFICANT CHANGE UP (ref 5–17)
BUN SERPL-MCNC: 21 MG/DL — SIGNIFICANT CHANGE UP (ref 7–23)
CALCIUM SERPL-MCNC: 8.4 MG/DL — SIGNIFICANT CHANGE UP (ref 8.4–10.5)
CHLORIDE SERPL-SCNC: 103 MMOL/L — SIGNIFICANT CHANGE UP (ref 96–108)
CK SERPL-CCNC: 177 U/L — SIGNIFICANT CHANGE UP (ref 30–200)
CO2 SERPL-SCNC: 21 MMOL/L — LOW (ref 22–31)
CREAT SERPL-MCNC: 1.05 MG/DL — SIGNIFICANT CHANGE UP (ref 0.5–1.3)
EGFR: 73 ML/MIN/1.73M2 — SIGNIFICANT CHANGE UP
EGFR: 73 ML/MIN/1.73M2 — SIGNIFICANT CHANGE UP
FUNGITELL: <31 PG/ML — SIGNIFICANT CHANGE UP
GLUCOSE SERPL-MCNC: 282 MG/DL — HIGH (ref 70–99)
HCT VFR BLD CALC: 35.7 % — LOW (ref 39–50)
HGB BLD-MCNC: 12.1 G/DL — LOW (ref 13–17)
MAGNESIUM SERPL-MCNC: 2.3 MG/DL — SIGNIFICANT CHANGE UP (ref 1.6–2.6)
MCHC RBC-ENTMCNC: 30.6 PG — SIGNIFICANT CHANGE UP (ref 27–34)
MCHC RBC-ENTMCNC: 33.9 G/DL — SIGNIFICANT CHANGE UP (ref 32–36)
MCV RBC AUTO: 90.4 FL — SIGNIFICANT CHANGE UP (ref 80–100)
NRBC BLD AUTO-RTO: 0 /100 WBCS — SIGNIFICANT CHANGE UP (ref 0–0)
PHOSPHATE SERPL-MCNC: 2.7 MG/DL — SIGNIFICANT CHANGE UP (ref 2.5–4.5)
PLATELET # BLD AUTO: 133 K/UL — LOW (ref 150–400)
POTASSIUM SERPL-MCNC: 3.7 MMOL/L — SIGNIFICANT CHANGE UP (ref 3.5–5.3)
POTASSIUM SERPL-SCNC: 3.7 MMOL/L — SIGNIFICANT CHANGE UP (ref 3.5–5.3)
RBC # BLD: 3.95 M/UL — LOW (ref 4.2–5.8)
RBC # FLD: 12.2 % — SIGNIFICANT CHANGE UP (ref 10.3–14.5)
SODIUM SERPL-SCNC: 135 MMOL/L — SIGNIFICANT CHANGE UP (ref 135–145)
TSH SERPL-MCNC: 0.91 UIU/ML — SIGNIFICANT CHANGE UP (ref 0.27–4.2)
WBC # BLD: 10.43 K/UL — SIGNIFICANT CHANGE UP (ref 3.8–10.5)
WBC # FLD AUTO: 10.43 K/UL — SIGNIFICANT CHANGE UP (ref 3.8–10.5)

## 2025-05-14 PROCEDURE — 99232 SBSQ HOSP IP/OBS MODERATE 35: CPT

## 2025-05-14 PROCEDURE — 99223 1ST HOSP IP/OBS HIGH 75: CPT

## 2025-05-14 PROCEDURE — 99233 SBSQ HOSP IP/OBS HIGH 50: CPT

## 2025-05-14 PROCEDURE — 99233 SBSQ HOSP IP/OBS HIGH 50: CPT | Mod: GC

## 2025-05-14 RX ORDER — TACROLIMUS 0.5 MG/1
1 CAPSULE ORAL
Refills: 0 | Status: DISCONTINUED | OUTPATIENT
Start: 2025-05-14 | End: 2025-05-15

## 2025-05-14 RX ORDER — METOPROLOL SUCCINATE 50 MG/1
1 TABLET, EXTENDED RELEASE ORAL
Refills: 0 | DISCHARGE

## 2025-05-14 RX ORDER — NIFEDIPINE 30 MG
1 TABLET, EXTENDED RELEASE 24 HR ORAL
Refills: 0 | DISCHARGE

## 2025-05-14 RX ORDER — INSULIN GLARGINE-YFGN 100 [IU]/ML
14 INJECTION, SOLUTION SUBCUTANEOUS AT BEDTIME
Refills: 0 | Status: DISCONTINUED | OUTPATIENT
Start: 2025-05-14 | End: 2025-05-15

## 2025-05-14 RX ORDER — LOSARTAN POTASSIUM 100 MG/1
50 TABLET, FILM COATED ORAL DAILY
Refills: 0 | Status: DISCONTINUED | OUTPATIENT
Start: 2025-05-14 | End: 2025-05-15

## 2025-05-14 RX ORDER — LEVOTHYROXINE SODIUM 300 MCG
50 TABLET ORAL DAILY
Refills: 0 | Status: DISCONTINUED | OUTPATIENT
Start: 2025-05-15 | End: 2025-05-15

## 2025-05-14 RX ORDER — METOPROLOL SUCCINATE 50 MG/1
1 TABLET, EXTENDED RELEASE ORAL
Refills: 0 | DISCHARGE
Start: 2025-05-14

## 2025-05-14 RX ADMIN — INSULIN LISPRO 2: 100 INJECTION, SOLUTION INTRAVENOUS; SUBCUTANEOUS at 11:12

## 2025-05-14 RX ADMIN — INSULIN LISPRO 2: 100 INJECTION, SOLUTION INTRAVENOUS; SUBCUTANEOUS at 00:00

## 2025-05-14 RX ADMIN — IPRATROPIUM BROMIDE AND ALBUTEROL SULFATE 3 MILLILITER(S): .5; 2.5 SOLUTION RESPIRATORY (INHALATION) at 09:12

## 2025-05-14 RX ADMIN — Medication 4 MILLILITER(S): at 09:12

## 2025-05-14 RX ADMIN — HEPARIN SODIUM 5000 UNIT(S): 1000 INJECTION INTRAVENOUS; SUBCUTANEOUS at 13:14

## 2025-05-14 RX ADMIN — CLOPIDOGREL BISULFATE 75 MILLIGRAM(S): 75 TABLET, FILM COATED ORAL at 18:04

## 2025-05-14 RX ADMIN — Medication 4 MILLILITER(S): at 05:39

## 2025-05-14 RX ADMIN — POLYETHYLENE GLYCOL 3350 17 GRAM(S): 17 POWDER, FOR SOLUTION ORAL at 12:36

## 2025-05-14 RX ADMIN — CALCIUM CARBONATE 1 TABLET(S): 750 TABLET ORAL at 12:37

## 2025-05-14 RX ADMIN — INSULIN GLARGINE-YFGN 14 UNIT(S): 100 INJECTION, SOLUTION SUBCUTANEOUS at 21:24

## 2025-05-14 RX ADMIN — HEPARIN SODIUM 5000 UNIT(S): 1000 INJECTION INTRAVENOUS; SUBCUTANEOUS at 05:28

## 2025-05-14 RX ADMIN — Medication 40 MILLIGRAM(S): at 12:36

## 2025-05-14 RX ADMIN — Medication 81 MILLIGRAM(S): at 12:36

## 2025-05-14 RX ADMIN — Medication 2 TABLET(S): at 21:24

## 2025-05-14 RX ADMIN — ATORVASTATIN CALCIUM 40 MILLIGRAM(S): 80 TABLET, FILM COATED ORAL at 21:24

## 2025-05-14 RX ADMIN — Medication 100 MILLIEQUIVALENT(S): at 07:14

## 2025-05-14 RX ADMIN — Medication 1 APPLICATION(S): at 05:57

## 2025-05-14 RX ADMIN — INSULIN LISPRO 3: 100 INJECTION, SOLUTION INTRAVENOUS; SUBCUTANEOUS at 05:28

## 2025-05-14 RX ADMIN — Medication 50 MILLIGRAM(S): at 05:28

## 2025-05-14 RX ADMIN — IPRATROPIUM BROMIDE AND ALBUTEROL SULFATE 3 MILLILITER(S): .5; 2.5 SOLUTION RESPIRATORY (INHALATION) at 05:39

## 2025-05-14 RX ADMIN — Medication 100 MILLIEQUIVALENT(S): at 08:58

## 2025-05-14 RX ADMIN — CEFTRIAXONE 100 MILLIGRAM(S): 500 INJECTION, POWDER, FOR SOLUTION INTRAMUSCULAR; INTRAVENOUS at 13:15

## 2025-05-14 RX ADMIN — HEPARIN SODIUM 5000 UNIT(S): 1000 INJECTION INTRAVENOUS; SUBCUTANEOUS at 21:25

## 2025-05-14 RX ADMIN — Medication 100 MILLIEQUIVALENT(S): at 09:44

## 2025-05-14 RX ADMIN — IPRATROPIUM BROMIDE AND ALBUTEROL SULFATE 3 MILLILITER(S): .5; 2.5 SOLUTION RESPIRATORY (INHALATION) at 01:15

## 2025-05-14 RX ADMIN — LOSARTAN POTASSIUM 50 MILLIGRAM(S): 100 TABLET, FILM COATED ORAL at 21:24

## 2025-05-14 RX ADMIN — TACROLIMUS 1 MILLIGRAM(S): 0.5 CAPSULE ORAL at 17:52

## 2025-05-14 RX ADMIN — Medication 37.5 MICROGRAM(S): at 07:14

## 2025-05-14 RX ADMIN — Medication 4 MILLILITER(S): at 01:15

## 2025-05-14 RX ADMIN — INSULIN LISPRO 1: 100 INJECTION, SOLUTION INTRAVENOUS; SUBCUTANEOUS at 23:46

## 2025-05-14 RX ADMIN — INSULIN LISPRO 2: 100 INJECTION, SOLUTION INTRAVENOUS; SUBCUTANEOUS at 17:52

## 2025-05-14 RX ADMIN — Medication 50 MILLIGRAM(S): at 11:13

## 2025-05-14 NOTE — PROGRESS NOTE ADULT - ASSESSMENT
74 yo M s/p DDRT 3/2023, HTN, HLD, DM2, CAD s/p PCI, HFpEF, hypothyroidism, urinary retention presenting after witnessed choking episode requiring resuscitation. Remains in the ICU on pressor and abx for suspected PNA.  Allograft function at baseline    1. s/p DDRT - stable  SCR at 1.05  continue holding MMF due to active infection  Since patient was cleared by speech and swallow, can transition to PO tacrolimus at home dose (1mg AM and 1mg PM) starting tomorrow morning  Needs to complete IV infusion today.   No evidence of rejection ~ stable allograft function  Check tacro levels 30mis before morning dose  Tacro goal ~ 3-5   Trend BMP for additional monitoring     Discussed with primary team .

## 2025-05-14 NOTE — PROGRESS NOTE ADULT - ASSESSMENT
75y male w/ pmhx/ochx of AMD, recent choking event requiring intubation (now extubated) consulted for upward deviation OU, found to have ***          Outpatient follow-up:   Patient should follow-up with his ophthalmologist ***      Viktoriya Trujillo MD  [facility]  [address] [place] [id number] [phone]     75y male w/ pmhx/ochx of AMD, recent choking event requiring intubation (now extubated) consulted for upward deviation of eye; Pt found to have no acute changes - unchanged sensory exotropia/hypertropia in s/o poor VA OS (long standing), AMD     No further mgmt inpatient needed from an ophthalmic perspective   Low c/f central process given ocular deviation likely sensory strabismus (XT.HT due to poor vision OS) -- full EOMs and lack of acute changes per pt all reassuring  OK to proceed w/ MRI if recommended by neuro  Ophthalmology will sign off for now        Outpatient follow-up:   Patient should follow-up with his ophthalmologist as previously scheduled       Viktoriya Trujillo MD  [facility]  [address]  [facility] [id number] [phone]     75y male w/ pmhx/ochx of AMD, recent choking event requiring intubation (now extubated) consulted for upward deviation of eye; Pt found to have no acute changes - unchanged sensory exotropia/hypertropia in s/o poor VA OS (long standing), AMD     No further mgmt inpatient needed from an ophthalmic perspective   Low c/f central process given ocular deviation likely sensory strabismus (XT.HT due to poor vision OS) -- full EOMs and lack of acute changes all reassuring  Significant retina pathology OD>>OS, w/ atrophy & fibrotic change -- all appear chronic. Should cont to f/up q3mo with outside ophthalmologist   OK to proceed w/ MRI if recommended by neuro  Ophthalmology will sign off for now        Outpatient follow-up:   Patient should follow-up with his ophthalmologist as previously scheduled       Viktoriya Trujillo MD  [facility]  [address]  [facility] [address] [phone]

## 2025-05-14 NOTE — PROGRESS NOTE ADULT - ASSESSMENT
75y M w/ PMHx of HTN, HLD, IDDM2, HFpEF, CKD 2-3 (s/p Renal transplant in 2023), CAD s/p PCI w/ RAMANA-pLAD (6/2022), urinary retention, retinopathy and hypothyroidism presents to the ED after witnessed choking episode admitted to MICU for septic shock and acute hypoxic respiratory failure 2/2 aspiration pna vs opportunistic infection due to immunocompromised state. Course complicated by deviated gaze and witnessed seizure-like (bilateral arm/face stiffness) movements sp ativan, imaging negative for stroke but likely 2/2 to hypoxic injury iso aspiration.

## 2025-05-14 NOTE — PROGRESS NOTE ADULT - SUBJECTIVE AND OBJECTIVE BOX
**********************TRANSFER FROM MICU TO F***********************    HOSPITAL COURSE: 75y M w/ PMHx of HTN, HLD, IDDM2, HFpEF, CKD 2-3 (s/p Renal transplant in 2023), CAD s/p PCI w/ RAMANA-pLAD (6/2022), urinary retention, retinopathy and hypothyroidism presents to the ED after witnessed choking episode admitted to MICU for septic shock and acute hypoxic respiratory failure 2/2 aspiration pna vs opportunistic infection due to immunocompromised state. In the ER, patient intubated for airway protection and large piece of tofu was extracted from oropharynx. Course complicated by deviated gaze and witnessed seizure-like (bilateral arm/face stiffness) movements sp ativan, imaging negative for stroke but likely 2/2 to hypoxic injury iso aspiration. Started on keppra 500mg BID, vEEG. Nephrology consulted due to hx of renal transplant 2023 (follows Dr. Farnsworth outpt), and recommended to restart tacrolimus but hold mycophenolate. NG tube placed (pulled 5/13) . CT chest with bilateral moderate sized consolidations and interstitial lung abnormality. vEEG with mild excess intermittent slowing. Pending MRI head (per epilepsy). sp 5/12 bronchoscopy with BAL growing gram+ cocci in pairs and chains; pending cell count. Deescalated antibiotics to CTX 2g. Extubated on 5/13. Pt is now pending optho consult, stable for stepdown to [facility].     INTERVAL/OVERNIGHT EVENTS: None    SUBJECTIVE:  Patient seen and examined at bedside, comfortable, NAD. Denied fever, chest pain, dyspnea, abdominal pain.     Vital Signs Last 12 Hrs  T(F): 98.5 (05-14-25 @ 17:40), Max: 99 (05-14-25 @ 13:16)  HR: 92 (05-14-25 @ 20:00) (76 - 94)  BP: 153/70 (05-14-25 @ 20:00) (148/67 - 174/72)  BP(mean): 100 (05-14-25 @ 20:00) (97 - 119)  RR: 22 (05-14-25 @ 20:00) (15 - 27)  SpO2: 94% (05-14-25 @ 20:00) (94% - 99%)  I&O's Summary    13 May 2025 07:01  -  14 May 2025 07:00  --------------------------------------------------------  IN: 1734.2 mL / OUT: 1705 mL / NET: 29.2 mL    14 May 2025 07:01  -  14 May 2025 20:33  --------------------------------------------------------  IN: 356.3 mL / OUT: 595 mL / NET: -238.7 mL    PHYSICAL EXAM:  General: NAD  HEENT: PERRL, EOM intact, sclera anicteric, MMM  Cardiovascular: RRR; no MRG;   Respiratory: CTAB; no WRR  GI/: soft; NTND; BS x4  Extremities: WWP; 2+ peripheral pulses bilaterally; no LE edema  Skin: normal color & turgor; no rash  Neurologic: aox3; no focal deficits    LABS:                        12.1   10.43 )-----------( 133      ( 14 May 2025 05:26 )             35.7     05-14    135  |  103  |  21  ----------------------------<  282[H]  3.7   |  21[L]  |  1.05    Ca    8.4      14 May 2025 05:26  Phos  2.7     05-14  Mg     2.3     05-14    Urinalysis Basic - ( 14 May 2025 05:26 )    Color: x / Appearance: x / SG: x / pH: x  Gluc: 282 mg/dL / Ketone: x  / Bili: x / Urobili: x   Blood: x / Protein: x / Nitrite: x   Leuk Esterase: x / RBC: x / WBC x   Sq Epi: x / Non Sq Epi: x / Bacteria: x    RADIOLOGY & ADDITIONAL TESTS:    MEDICATIONS  (STANDING):  aspirin  chewable 81 milliGRAM(s) Oral daily  atorvastatin 40 milliGRAM(s) Oral at bedtime  calcium carbonate   1250 mG (OsCal) 1 Tablet(s) Oral daily  cefTRIAXone   IVPB 2000 milliGRAM(s) IV Intermittent every 24 hours  chlorhexidine 2% Cloths 1 Application(s) Topical <User Schedule>  clopidogrel Tablet 75 milliGRAM(s) Oral every 24 hours  dextrose 5%. 1000 milliLiter(s) (100 mL/Hr) IV Continuous <Continuous>  dextrose 5%. 1000 milliLiter(s) (50 mL/Hr) IV Continuous <Continuous>  dextrose 50% Injectable 25 Gram(s) IV Push once  dextrose 50% Injectable 12.5 Gram(s) IV Push once  dextrose 50% Injectable 25 Gram(s) IV Push once  glucagon  Injectable 1 milliGRAM(s) IntraMuscular once  heparin   Injectable 5000 Unit(s) SubCutaneous every 8 hours  insulin glargine Injectable (LANTUS) 14 Unit(s) SubCutaneous at bedtime  insulin lispro (ADMELOG) corrective regimen sliding scale   SubCutaneous every 6 hours  losartan 50 milliGRAM(s) Oral daily  pantoprazole   Suspension 40 milliGRAM(s) Oral daily  polyethylene glycol 3350 17 Gram(s) Oral daily  senna 2 Tablet(s) Oral at bedtime  tacrolimus 1 milliGRAM(s) Oral <User Schedule>    MEDICATIONS  (PRN):  dextrose Oral Gel 15 Gram(s) Oral once PRN Blood Glucose LESS THAN 70 milliGRAM(s)/deciliter   **********************TRANSFER FROM MICU TO F***********************    HOSPITAL COURSE: 75y M w/ PMHx of HTN, HLD, IDDM2, HFpEF, CKD 2-3 (s/p Renal transplant in 2023), CAD s/p PCI w/ RAMANA-pLAD (6/2022), urinary retention, retinopathy and hypothyroidism presents to the ED after witnessed choking episode admitted to MICU for septic shock and acute hypoxic respiratory failure 2/2 aspiration pna vs opportunistic infection due to immunocompromised state. In the ER, patient intubated for airway protection and large piece of tofu was extracted from oropharynx. Course complicated by deviated gaze and witnessed seizure-like (bilateral arm/face stiffness) movements sp ativan, imaging negative for stroke but likely 2/2 to hypoxic injury iso aspiration. Started on keppra 500mg BID, vEEG. Nephrology consulted due to hx of renal transplant 2023 (follows Dr. Farnsworth outpt), and recommended to restart tacrolimus but hold mycophenolate. NG tube placed (pulled 5/13) . CT chest with bilateral moderate sized consolidations and interstitial lung abnormality. vEEG with mild excess intermittent slowing. Pending MRI head (per epilepsy). sp 5/12 bronchoscopy with BAL growing gram+ cocci in pairs and chains; pending cell count. Deescalated antibiotics to CTX 2g. Extubated on 5/13. Pt is now pending optho consult, stable for stepdown to F.     INTERVAL/OVERNIGHT EVENTS: None    SUBJECTIVE:  Patient seen and examined at bedside, comfortable, NAD. Denying any acute complaints at this time.    Vital Signs Last 12 Hrs  T(F): 98.5 (05-14-25 @ 17:40), Max: 99 (05-14-25 @ 13:16)  HR: 92 (05-14-25 @ 20:00) (76 - 94)  BP: 153/70 (05-14-25 @ 20:00) (148/67 - 174/72)  BP(mean): 100 (05-14-25 @ 20:00) (97 - 119)  RR: 22 (05-14-25 @ 20:00) (15 - 27)  SpO2: 94% (05-14-25 @ 20:00) (94% - 99%)  I&O's Summary    13 May 2025 07:01  -  14 May 2025 07:00  --------------------------------------------------------  IN: 1734.2 mL / OUT: 1705 mL / NET: 29.2 mL    14 May 2025 07:01  -  14 May 2025 20:33  --------------------------------------------------------  IN: 356.3 mL / OUT: 595 mL / NET: -238.7 mL    PHYSICAL EXAM:  General: NAD, lying comfortably in bed  HEENT: NC/AT, sclera anicteric, conjunctiva clear, R oddly shaped pupil but no issues tracking around the room  Cardiovascular: RRR; no MRG;   Respiratory: CTAB; no WRR  GI/: soft; NTND; BS x4  Extremities: WWP; 2+ peripheral pulses bilaterally; no LE edema  Skin: normal color & turgor; no rash noted  Neurologic: aox3; no focal deficits    LABS:                        12.1   10.43 )-----------( 133      ( 14 May 2025 05:26 )             35.7     05-14    135  |  103  |  21  ----------------------------<  282[H]  3.7   |  21[L]  |  1.05    Ca    8.4      14 May 2025 05:26  Phos  2.7     05-14  Mg     2.3     05-14    Urinalysis Basic - ( 14 May 2025 05:26 )    Color: x / Appearance: x / SG: x / pH: x  Gluc: 282 mg/dL / Ketone: x  / Bili: x / Urobili: x   Blood: x / Protein: x / Nitrite: x   Leuk Esterase: x / RBC: x / WBC x   Sq Epi: x / Non Sq Epi: x / Bacteria: x    RADIOLOGY & ADDITIONAL TESTS:    MEDICATIONS  (STANDING):  aspirin  chewable 81 milliGRAM(s) Oral daily  atorvastatin 40 milliGRAM(s) Oral at bedtime  calcium carbonate   1250 mG (OsCal) 1 Tablet(s) Oral daily  cefTRIAXone   IVPB 2000 milliGRAM(s) IV Intermittent every 24 hours  chlorhexidine 2% Cloths 1 Application(s) Topical <User Schedule>  clopidogrel Tablet 75 milliGRAM(s) Oral every 24 hours  dextrose 5%. 1000 milliLiter(s) (100 mL/Hr) IV Continuous <Continuous>  dextrose 5%. 1000 milliLiter(s) (50 mL/Hr) IV Continuous <Continuous>  dextrose 50% Injectable 25 Gram(s) IV Push once  dextrose 50% Injectable 12.5 Gram(s) IV Push once  dextrose 50% Injectable 25 Gram(s) IV Push once  glucagon  Injectable 1 milliGRAM(s) IntraMuscular once  heparin   Injectable 5000 Unit(s) SubCutaneous every 8 hours  insulin glargine Injectable (LANTUS) 14 Unit(s) SubCutaneous at bedtime  insulin lispro (ADMELOG) corrective regimen sliding scale   SubCutaneous every 6 hours  losartan 50 milliGRAM(s) Oral daily  pantoprazole   Suspension 40 milliGRAM(s) Oral daily  polyethylene glycol 3350 17 Gram(s) Oral daily  senna 2 Tablet(s) Oral at bedtime  tacrolimus 1 milliGRAM(s) Oral <User Schedule>    MEDICATIONS  (PRN):  dextrose Oral Gel 15 Gram(s) Oral once PRN Blood Glucose LESS THAN 70 milliGRAM(s)/deciliter     *** TRANSFER ACCEPTANCE MICU --> RMF ***    HOSPITAL COURSE: 75y M w/ PMHx of HTN, HLD, IDDM2, HFpEF, CKD 2-3 (s/p Renal transplant in 2023), CAD s/p PCI w/ RAMANA-pLAD (6/2022), urinary retention, retinopathy and hypothyroidism presents to the ED after witnessed choking episode admitted to MICU for septic shock and acute hypoxic respiratory failure 2/2 aspiration pna vs opportunistic infection due to immunocompromised state. In the ER, patient intubated for airway protection and large piece of tofu was extracted from oropharynx. Course complicated by deviated gaze and witnessed seizure-like (bilateral arm/face stiffness) movements sp ativan, imaging negative for stroke but likely 2/2 to hypoxic injury iso aspiration. Started on keppra 500mg BID, vEEG. Nephrology consulted due to hx of renal transplant 2023 (follows Dr. Javad carrillo), and recommended to restart tacrolimus but hold mycophenolate. NG tube placed (pulled 5/13) . CT chest with bilateral moderate sized consolidations and interstitial lung abnormality. vEEG with mild excess intermittent slowing. Pending MRI head (per epilepsy). sp 5/12 bronchoscopy with BAL growing gram+ cocci in pairs and chains; pending cell count. Deescalated antibiotics to CTX 2g. Extubated on 5/13. Pt is now pending optho consult, stable for stepdown to RMF.     INTERVAL/OVERNIGHT EVENTS: None    SUBJECTIVE:  Patient seen and examined at bedside, comfortable, NAD. Denying any acute complaints at this time.    Vital Signs Last 12 Hrs  T(F): 98.5 (05-14-25 @ 17:40), Max: 99 (05-14-25 @ 13:16)  HR: 92 (05-14-25 @ 20:00) (76 - 94)  BP: 153/70 (05-14-25 @ 20:00) (148/67 - 174/72)  BP(mean): 100 (05-14-25 @ 20:00) (97 - 119)  RR: 22 (05-14-25 @ 20:00) (15 - 27)  SpO2: 94% (05-14-25 @ 20:00) (94% - 99%)  I&O's Summary    13 May 2025 07:01  -  14 May 2025 07:00  --------------------------------------------------------  IN: 1734.2 mL / OUT: 1705 mL / NET: 29.2 mL    14 May 2025 07:01  -  14 May 2025 20:33  --------------------------------------------------------  IN: 356.3 mL / OUT: 595 mL / NET: -238.7 mL    PHYSICAL EXAM:  General: NAD, lying comfortably in bed  HEENT: NC/AT, sclera anicteric, conjunctiva clear, R oddly shaped pupil but no issues tracking around the room  Cardiovascular: RRR; no MRG;   Respiratory: CTAB; no WRR  GI/: soft; NTND; BS x4  Extremities: WWP; 2+ peripheral pulses bilaterally; no LE edema  Skin: normal color & turgor; no rash noted  Neurologic: aox3; no focal deficits    LABS:                        12.1   10.43 )-----------( 133      ( 14 May 2025 05:26 )             35.7     05-14    135  |  103  |  21  ----------------------------<  282[H]  3.7   |  21[L]  |  1.05    Ca    8.4      14 May 2025 05:26  Phos  2.7     05-14  Mg     2.3     05-14    Urinalysis Basic - ( 14 May 2025 05:26 )    Color: x / Appearance: x / SG: x / pH: x  Gluc: 282 mg/dL / Ketone: x  / Bili: x / Urobili: x   Blood: x / Protein: x / Nitrite: x   Leuk Esterase: x / RBC: x / WBC x   Sq Epi: x / Non Sq Epi: x / Bacteria: x    RADIOLOGY & ADDITIONAL TESTS:    MEDICATIONS  (STANDING):  aspirin  chewable 81 milliGRAM(s) Oral daily  atorvastatin 40 milliGRAM(s) Oral at bedtime  calcium carbonate   1250 mG (OsCal) 1 Tablet(s) Oral daily  cefTRIAXone   IVPB 2000 milliGRAM(s) IV Intermittent every 24 hours  chlorhexidine 2% Cloths 1 Application(s) Topical <User Schedule>  clopidogrel Tablet 75 milliGRAM(s) Oral every 24 hours  dextrose 5%. 1000 milliLiter(s) (100 mL/Hr) IV Continuous <Continuous>  dextrose 5%. 1000 milliLiter(s) (50 mL/Hr) IV Continuous <Continuous>  dextrose 50% Injectable 25 Gram(s) IV Push once  dextrose 50% Injectable 12.5 Gram(s) IV Push once  dextrose 50% Injectable 25 Gram(s) IV Push once  glucagon  Injectable 1 milliGRAM(s) IntraMuscular once  heparin   Injectable 5000 Unit(s) SubCutaneous every 8 hours  insulin glargine Injectable (LANTUS) 14 Unit(s) SubCutaneous at bedtime  insulin lispro (ADMELOG) corrective regimen sliding scale   SubCutaneous every 6 hours  losartan 50 milliGRAM(s) Oral daily  pantoprazole   Suspension 40 milliGRAM(s) Oral daily  polyethylene glycol 3350 17 Gram(s) Oral daily  senna 2 Tablet(s) Oral at bedtime  tacrolimus 1 milliGRAM(s) Oral <User Schedule>    MEDICATIONS  (PRN):  dextrose Oral Gel 15 Gram(s) Oral once PRN Blood Glucose LESS THAN 70 milliGRAM(s)/deciliter

## 2025-05-14 NOTE — PROGRESS NOTE ADULT - SUBJECTIVE AND OBJECTIVE BOX
Patient is a 75y old  Male who presents with a chief complaint of acute hypoxic respiratory failure (13 May 2025 17:17)      INTERVAL HPI/OVERNIGHT EVENTS:   No overnight events   Afebrile, hemodynamically stable     ICU Vital Signs Last 24 Hrs  T(C): 37 (14 May 2025 01:07), Max: 37 (13 May 2025 21:29)  T(F): 98.6 (14 May 2025 01:07), Max: 98.6 (13 May 2025 21:29)  HR: 91 (14 May 2025 04:00) (46 - 101)  BP: 144/65 (14 May 2025 04:00) (100/51 - 156/70)  BP(mean): 93 (14 May 2025 04:00) (74 - 101)  ABP: --  ABP(mean): --  RR: 15 (14 May 2025 04:00) (12 - 29)  SpO2: 98% (14 May 2025 04:00) (97% - 100%)    O2 Parameters below as of 14 May 2025 05:00  Patient On (Oxygen Delivery Method): room air          I&O's Summary    12 May 2025 07:01  -  13 May 2025 07:00  --------------------------------------------------------  IN: 1475.5 mL / OUT: 995 mL / NET: 480.5 mL    13 May 2025 07:01  -  14 May 2025 05:07  --------------------------------------------------------  IN: 1315.3 mL / OUT: 1160 mL / NET: 155.3 mL      Mode: CPAP with PS  FiO2: 30  PEEP: 6  PS: 5  ITime: 1  MAP: 4  PIP: 11      LABS:                        13.3   9.36  )-----------( 128      ( 13 May 2025 05:30 )             40.3     05-13    134[L]  |  101  |  24[H]  ----------------------------<  272[H]  5.0   |  24  |  1.36[H]    Ca    8.6      13 May 2025 05:30  Phos  3.8     05-13  Mg     2.6     05-13    TPro  5.8[L]  /  Alb  3.5  /  TBili  0.7  /  DBili  x   /  AST  24  /  ALT  20  /  AlkPhos  46  05-12      Urinalysis Basic - ( 13 May 2025 05:30 )    Color: x / Appearance: x / SG: x / pH: x  Gluc: 272 mg/dL / Ketone: x  / Bili: x / Urobili: x   Blood: x / Protein: x / Nitrite: x   Leuk Esterase: x / RBC: x / WBC x   Sq Epi: x / Non Sq Epi: x / Bacteria: x      CAPILLARY BLOOD GLUCOSE      POCT Blood Glucose.: 244 mg/dL (13 May 2025 23:06)  POCT Blood Glucose.: 220 mg/dL (13 May 2025 17:48)  POCT Blood Glucose.: 202 mg/dL (13 May 2025 10:56)  POCT Blood Glucose.: 212 mg/dL (13 May 2025 06:13)    ABG - ( 12 May 2025 05:58 )  pH, Arterial: 7.36  pH, Blood: x     /  pCO2: 38    /  pO2: 99    / HCO3: 22    / Base Excess: -3.6  /  SaO2: 96.1                RADIOLOGY & ADDITIONAL TESTS:    Consultant(s) Notes Reviewed:  [x ] YES  [ ] NO    MEDICATIONS  (STANDING):  albuterol/ipratropium for Nebulization 3 milliLiter(s) Nebulizer every 4 hours  aspirin  chewable 81 milliGRAM(s) Oral daily  atorvastatin 40 milliGRAM(s) Oral at bedtime  calcium carbonate   1250 mG (OsCal) 1 Tablet(s) Oral daily  cefTRIAXone   IVPB 2000 milliGRAM(s) IV Intermittent every 24 hours  chlorhexidine 2% Cloths 1 Application(s) Topical <User Schedule>  clopidogrel Tablet 75 milliGRAM(s) Oral every 24 hours  dextrose 5%. 1000 milliLiter(s) (50 mL/Hr) IV Continuous <Continuous>  dextrose 5%. 1000 milliLiter(s) (100 mL/Hr) IV Continuous <Continuous>  dextrose 50% Injectable 25 Gram(s) IV Push once  dextrose 50% Injectable 12.5 Gram(s) IV Push once  dextrose 50% Injectable 25 Gram(s) IV Push once  glucagon  Injectable 1 milliGRAM(s) IntraMuscular once  heparin   Injectable 5000 Unit(s) SubCutaneous every 8 hours  hydrocortisone sodium succinate Injectable 50 milliGRAM(s) IV Push every 6 hours  insulin lispro (ADMELOG) corrective regimen sliding scale   SubCutaneous every 6 hours  levothyroxine Injectable 37.5 MICROGram(s) IV Push <User Schedule>  pantoprazole   Suspension 40 milliGRAM(s) Oral daily  polyethylene glycol 3350 17 Gram(s) Oral daily  senna 2 Tablet(s) Oral at bedtime  sodium chloride 0.9%. 1000 milliLiter(s) (100 mL/Hr) IV Continuous <Continuous>  sodium chloride 3%  Inhalation 4 milliLiter(s) Inhalation every 4 hours  sodium zirconium cyclosilicate 10 Gram(s) Oral daily  tacrolimus  IVPB 0.6 milliGRAM(s) IV Intermittent every 24 hours    MEDICATIONS  (PRN):  dextrose Oral Gel 15 Gram(s) Oral once PRN Blood Glucose LESS THAN 70 milliGRAM(s)/deciliter      PHYSICAL EXAM:  GENERAL:   HEAD:  Atraumatic, Normocephalic  EYES: EOMI, PERRLA, conjunctiva and sclera clear  NECK: Supple, No JVD, Normal thyroid, no enlarged nodes  NERVOUS SYSTEM:  Alert & Awake.   CHEST/LUNG: B/L good air entry; No rales, rhonchi, or wheezing  HEART: S1S2 normal, no S3, Regular rate and rhythm; No murmurs  ABDOMEN: Soft, Nontender, Nondistended; Bowel sounds present  EXTREMITIES:  2+ Peripheral Pulses, No clubbing, cyanosis, or edema  LYMPH: No lymphadenopathy noted  SKIN: No rashes or lesions    Care Discussed with Consultants/Other Providers [ x] YES  [ ] NO ***Transfer from MICU to [facility]***    Hospital Course: 75y M w/ PMHx of HTN, HLD, IDDM2, HFpEF, CKD 2-3 (s/p Renal transplant in 2023), CAD s/p PCI w/ RAMANA-pLAD (6/2022), urinary retention, retinopathy and hypothyroidism presents to the ED after witnessed choking episode admitted to MICU for septic shock and acute hypoxic respiratory failure 2/2 aspiration pna vs opportunistic infection due to immunocompromised state. In the ER, patient intubated for airway protection and large piece of tofu was extracted from oropharynx. Course complicated by deviated gaze and witnessed seizure-like (bilateral arm/face stiffness) movements sp ativan, imaging negative for stroke but likely 2/2 to hypoxic injury iso aspiration. Started on keppra 500mg BID, vEEG. Nephrology consulted due to hx of renal transplant 2023 (follows Dr. Farnsworth outpt), and recommended to restart tacrolimus but hold mycophenolate. NG tube placed (pulled . CT chest with bilateral moderate sized consolidations and interstitial lung abnormality. vEEG with mild excess intermittent slowing. Pending MRI head (per epilepsy). sp 5/12 bronchoscopy with BAL growing gram+ cocci in pairs and chains; pending cell count. Deescalated antibiotics to CTX 2g. Planned for extubation 5/13.    Patient is a 75y old  Male who presents with a chief complaint of acute hypoxic respiratory failure (13 May 2025 17:17)      INTERVAL HPI/OVERNIGHT EVENTS:   No overnight events   Afebrile, hemodynamically stable     ICU Vital Signs Last 24 Hrs  T(C): 37 (14 May 2025 01:07), Max: 37 (13 May 2025 21:29)  T(F): 98.6 (14 May 2025 01:07), Max: 98.6 (13 May 2025 21:29)  HR: 91 (14 May 2025 04:00) (46 - 101)  BP: 144/65 (14 May 2025 04:00) (100/51 - 156/70)  BP(mean): 93 (14 May 2025 04:00) (74 - 101)  ABP: --  ABP(mean): --  RR: 15 (14 May 2025 04:00) (12 - 29)  SpO2: 98% (14 May 2025 04:00) (97% - 100%)    O2 Parameters below as of 14 May 2025 05:00  Patient On (Oxygen Delivery Method): room air          I&O's Summary    12 May 2025 07:01  -  13 May 2025 07:00  --------------------------------------------------------  IN: 1475.5 mL / OUT: 995 mL / NET: 480.5 mL    13 May 2025 07:01  -  14 May 2025 05:07  --------------------------------------------------------  IN: 1315.3 mL / OUT: 1160 mL / NET: 155.3 mL      Mode: CPAP with PS  FiO2: 30  PEEP: 6  PS: 5  ITime: 1  MAP: 4  PIP: 11      LABS:                        13.3   9.36  )-----------( 128      ( 13 May 2025 05:30 )             40.3     05-13    134[L]  |  101  |  24[H]  ----------------------------<  272[H]  5.0   |  24  |  1.36[H]    Ca    8.6      13 May 2025 05:30  Phos  3.8     05-13  Mg     2.6     05-13    TPro  5.8[L]  /  Alb  3.5  /  TBili  0.7  /  DBili  x   /  AST  24  /  ALT  20  /  AlkPhos  46  05-12      Urinalysis Basic - ( 13 May 2025 05:30 )    Color: x / Appearance: x / SG: x / pH: x  Gluc: 272 mg/dL / Ketone: x  / Bili: x / Urobili: x   Blood: x / Protein: x / Nitrite: x   Leuk Esterase: x / RBC: x / WBC x   Sq Epi: x / Non Sq Epi: x / Bacteria: x      CAPILLARY BLOOD GLUCOSE      POCT Blood Glucose.: 244 mg/dL (13 May 2025 23:06)  POCT Blood Glucose.: 220 mg/dL (13 May 2025 17:48)  POCT Blood Glucose.: 202 mg/dL (13 May 2025 10:56)  POCT Blood Glucose.: 212 mg/dL (13 May 2025 06:13)    ABG - ( 12 May 2025 05:58 )  pH, Arterial: 7.36  pH, Blood: x     /  pCO2: 38    /  pO2: 99    / HCO3: 22    / Base Excess: -3.6  /  SaO2: 96.1                RADIOLOGY & ADDITIONAL TESTS:    Consultant(s) Notes Reviewed:  [x ] YES  [ ] NO    MEDICATIONS  (STANDING):  albuterol/ipratropium for Nebulization 3 milliLiter(s) Nebulizer every 4 hours  aspirin  chewable 81 milliGRAM(s) Oral daily  atorvastatin 40 milliGRAM(s) Oral at bedtime  calcium carbonate   1250 mG (OsCal) 1 Tablet(s) Oral daily  cefTRIAXone   IVPB 2000 milliGRAM(s) IV Intermittent every 24 hours  chlorhexidine 2% Cloths 1 Application(s) Topical <User Schedule>  clopidogrel Tablet 75 milliGRAM(s) Oral every 24 hours  dextrose 5%. 1000 milliLiter(s) (50 mL/Hr) IV Continuous <Continuous>  dextrose 5%. 1000 milliLiter(s) (100 mL/Hr) IV Continuous <Continuous>  dextrose 50% Injectable 25 Gram(s) IV Push once  dextrose 50% Injectable 12.5 Gram(s) IV Push once  dextrose 50% Injectable 25 Gram(s) IV Push once  glucagon  Injectable 1 milliGRAM(s) IntraMuscular once  heparin   Injectable 5000 Unit(s) SubCutaneous every 8 hours  hydrocortisone sodium succinate Injectable 50 milliGRAM(s) IV Push every 6 hours  insulin lispro (ADMELOG) corrective regimen sliding scale   SubCutaneous every 6 hours  levothyroxine Injectable 37.5 MICROGram(s) IV Push <User Schedule>  pantoprazole   Suspension 40 milliGRAM(s) Oral daily  polyethylene glycol 3350 17 Gram(s) Oral daily  senna 2 Tablet(s) Oral at bedtime  sodium chloride 0.9%. 1000 milliLiter(s) (100 mL/Hr) IV Continuous <Continuous>  sodium chloride 3%  Inhalation 4 milliLiter(s) Inhalation every 4 hours  sodium zirconium cyclosilicate 10 Gram(s) Oral daily  tacrolimus  IVPB 0.6 milliGRAM(s) IV Intermittent every 24 hours    MEDICATIONS  (PRN):  dextrose Oral Gel 15 Gram(s) Oral once PRN Blood Glucose LESS THAN 70 milliGRAM(s)/deciliter      PHYSICAL EXAM:  GENERAL:   HEAD:  Atraumatic, Normocephalic  EYES: EOMI, PERRLA, conjunctiva and sclera clear  NECK: Supple, No JVD, Normal thyroid, no enlarged nodes  NERVOUS SYSTEM:  Alert & Awake.   CHEST/LUNG: B/L good air entry; No rales, rhonchi, or wheezing  HEART: S1S2 normal, no S3, Regular rate and rhythm; No murmurs  ABDOMEN: Soft, Nontender, Nondistended; Bowel sounds present  EXTREMITIES:  2+ Peripheral Pulses, No clubbing, cyanosis, or edema  LYMPH: No lymphadenopathy noted  SKIN: No rashes or lesions    Care Discussed with Consultants/Other Providers [ x] YES  [ ] NO ***Transfer from MICU to [facility]***    Hospital Course: 75y M w/ PMHx of HTN, HLD, IDDM2, HFpEF, CKD 2-3 (s/p Renal transplant in 2023), CAD s/p PCI w/ RAMANA-pLAD (6/2022), urinary retention, retinopathy and hypothyroidism presents to the ED after witnessed choking episode admitted to MICU for septic shock and acute hypoxic respiratory failure 2/2 aspiration pna vs opportunistic infection due to immunocompromised state. In the ER, patient intubated for airway protection and large piece of tofu was extracted from oropharynx. Course complicated by deviated gaze and witnessed seizure-like (bilateral arm/face stiffness) movements sp ativan, imaging negative for stroke but likely 2/2 to hypoxic injury iso aspiration. Started on keppra 500mg BID, vEEG. Nephrology consulted due to hx of renal transplant 2023 (follows Dr. Farnsworth outpt), and recommended to restart tacrolimus but hold mycophenolate. NG tube placed (pulled . CT chest with bilateral moderate sized consolidations and interstitial lung abnormality. vEEG with mild excess intermittent slowing. Pending MRI head (per epilepsy). sp 5/12 bronchoscopy with BAL growing gram+ cocci in pairs and chains; pending cell count. Deescalated antibiotics to CTX 2g. Planned for extubation 5/13.     Patient is a 75y old  Male who presents with a chief complaint of acute hypoxic respiratory failure (13 May 2025 17:17)      INTERVAL HPI/OVERNIGHT EVENTS:   No overnight events   Afebrile, hemodynamically stable     ICU Vital Signs Last 24 Hrs  T(C): 37 (14 May 2025 01:07), Max: 37 (13 May 2025 21:29)  T(F): 98.6 (14 May 2025 01:07), Max: 98.6 (13 May 2025 21:29)  HR: 91 (14 May 2025 04:00) (46 - 101)  BP: 144/65 (14 May 2025 04:00) (100/51 - 156/70)  BP(mean): 93 (14 May 2025 04:00) (74 - 101)  ABP: --  ABP(mean): --  RR: 15 (14 May 2025 04:00) (12 - 29)  SpO2: 98% (14 May 2025 04:00) (97% - 100%)    O2 Parameters below as of 14 May 2025 05:00  Patient On (Oxygen Delivery Method): room air          I&O's Summary    12 May 2025 07:01  -  13 May 2025 07:00  --------------------------------------------------------  IN: 1475.5 mL / OUT: 995 mL / NET: 480.5 mL    13 May 2025 07:01  -  14 May 2025 05:07  --------------------------------------------------------  IN: 1315.3 mL / OUT: 1160 mL / NET: 155.3 mL      Mode: CPAP with PS  FiO2: 30  PEEP: 6  PS: 5  ITime: 1  MAP: 4  PIP: 11      LABS:                        13.3   9.36  )-----------( 128      ( 13 May 2025 05:30 )             40.3     05-13    134[L]  |  101  |  24[H]  ----------------------------<  272[H]  5.0   |  24  |  1.36[H]    Ca    8.6      13 May 2025 05:30  Phos  3.8     05-13  Mg     2.6     05-13    TPro  5.8[L]  /  Alb  3.5  /  TBili  0.7  /  DBili  x   /  AST  24  /  ALT  20  /  AlkPhos  46  05-12      Urinalysis Basic - ( 13 May 2025 05:30 )    Color: x / Appearance: x / SG: x / pH: x  Gluc: 272 mg/dL / Ketone: x  / Bili: x / Urobili: x   Blood: x / Protein: x / Nitrite: x   Leuk Esterase: x / RBC: x / WBC x   Sq Epi: x / Non Sq Epi: x / Bacteria: x      CAPILLARY BLOOD GLUCOSE      POCT Blood Glucose.: 244 mg/dL (13 May 2025 23:06)  POCT Blood Glucose.: 220 mg/dL (13 May 2025 17:48)  POCT Blood Glucose.: 202 mg/dL (13 May 2025 10:56)  POCT Blood Glucose.: 212 mg/dL (13 May 2025 06:13)    ABG - ( 12 May 2025 05:58 )  pH, Arterial: 7.36  pH, Blood: x     /  pCO2: 38    /  pO2: 99    / HCO3: 22    / Base Excess: -3.6  /  SaO2: 96.1                RADIOLOGY & ADDITIONAL TESTS:    Consultant(s) Notes Reviewed:  [x ] YES  [ ] NO    MEDICATIONS  (STANDING):  albuterol/ipratropium for Nebulization 3 milliLiter(s) Nebulizer every 4 hours  aspirin  chewable 81 milliGRAM(s) Oral daily  atorvastatin 40 milliGRAM(s) Oral at bedtime  calcium carbonate   1250 mG (OsCal) 1 Tablet(s) Oral daily  cefTRIAXone   IVPB 2000 milliGRAM(s) IV Intermittent every 24 hours  chlorhexidine 2% Cloths 1 Application(s) Topical <User Schedule>  clopidogrel Tablet 75 milliGRAM(s) Oral every 24 hours  dextrose 5%. 1000 milliLiter(s) (50 mL/Hr) IV Continuous <Continuous>  dextrose 5%. 1000 milliLiter(s) (100 mL/Hr) IV Continuous <Continuous>  dextrose 50% Injectable 25 Gram(s) IV Push once  dextrose 50% Injectable 12.5 Gram(s) IV Push once  dextrose 50% Injectable 25 Gram(s) IV Push once  glucagon  Injectable 1 milliGRAM(s) IntraMuscular once  heparin   Injectable 5000 Unit(s) SubCutaneous every 8 hours  hydrocortisone sodium succinate Injectable 50 milliGRAM(s) IV Push every 6 hours  insulin lispro (ADMELOG) corrective regimen sliding scale   SubCutaneous every 6 hours  levothyroxine Injectable 37.5 MICROGram(s) IV Push <User Schedule>  pantoprazole   Suspension 40 milliGRAM(s) Oral daily  polyethylene glycol 3350 17 Gram(s) Oral daily  senna 2 Tablet(s) Oral at bedtime  sodium chloride 0.9%. 1000 milliLiter(s) (100 mL/Hr) IV Continuous <Continuous>  sodium chloride 3%  Inhalation 4 milliLiter(s) Inhalation every 4 hours  sodium zirconium cyclosilicate 10 Gram(s) Oral daily  tacrolimus  IVPB 0.6 milliGRAM(s) IV Intermittent every 24 hours    MEDICATIONS  (PRN):  dextrose Oral Gel 15 Gram(s) Oral once PRN Blood Glucose LESS THAN 70 milliGRAM(s)/deciliter      PHYSICAL EXAM:  GENERAL:   HEAD:  Atraumatic, Normocephalic  EYES: EOMI, PERRLA, conjunctiva and sclera clear  NECK: Supple, No JVD, Normal thyroid, no enlarged nodes  NERVOUS SYSTEM:  Alert & Awake.   CHEST/LUNG: B/L good air entry; No rales, rhonchi, or wheezing  HEART: S1S2 normal, no S3, Regular rate and rhythm; No murmurs  ABDOMEN: Soft, Nontender, Nondistended; Bowel sounds present  EXTREMITIES:  2+ Peripheral Pulses, No clubbing, cyanosis, or edema  LYMPH: No lymphadenopathy noted  SKIN: No rashes or lesions    Care Discussed with Consultants/Other Providers [ x] YES  [ ] NO ***Transfer from MICU to [facility]***    Hospital Course: 75y M w/ PMHx of HTN, HLD, IDDM2, HFpEF, CKD 2-3 (s/p Renal transplant in 2023), CAD s/p PCI w/ RAMANA-pLAD (6/2022), urinary retention, retinopathy and hypothyroidism presents to the ED after witnessed choking episode admitted to MICU for septic shock and acute hypoxic respiratory failure 2/2 aspiration pna vs opportunistic infection due to immunocompromised state. In the ER, patient intubated for airway protection and large piece of tofu was extracted from oropharynx. Course complicated by deviated gaze and witnessed seizure-like (bilateral arm/face stiffness) movements sp ativan, imaging negative for stroke but likely 2/2 to hypoxic injury iso aspiration. Started on keppra 500mg BID, vEEG. Nephrology consulted due to hx of renal transplant 2023 (follows Dr. Farnsworth outpt), and recommended to restart tacrolimus but hold mycophenolate. NG tube placed (pulled 5/13) . CT chest with bilateral moderate sized consolidations and interstitial lung abnormality. vEEG with mild excess intermittent slowing. Pending MRI head (per epilepsy). sp 5/12 bronchoscopy with BAL growing gram+ cocci in pairs and chains; pending cell count. Deescalated antibiotics to CTX 2g. Extubated on 5/13. Pt is now pending optho consult.     Patient is a 75y old  Male who presents with a chief complaint of acute hypoxic respiratory failure (13 May 2025 17:17)      INTERVAL HPI/OVERNIGHT EVENTS:   No overnight events   Afebrile, hemodynamically stable     SUBJECTIVE: Pt reports that he feels well this morning. He states that yesterday he felt groggy but today he feels much improved. He reports that he has chronic B/L visual loss and usually needs glasses in order to read. Otherwise, he denies chest pain, abdominal pain, nausea, vomiting, dizziness, LH, fevers, chills.     ICU Vital Signs Last 24 Hrs  T(C): 37 (14 May 2025 01:07), Max: 37 (13 May 2025 21:29)  T(F): 98.6 (14 May 2025 01:07), Max: 98.6 (13 May 2025 21:29)  HR: 91 (14 May 2025 04:00) (46 - 101)  BP: 144/65 (14 May 2025 04:00) (100/51 - 156/70)  BP(mean): 93 (14 May 2025 04:00) (74 - 101)  ABP: --  ABP(mean): --  RR: 15 (14 May 2025 04:00) (12 - 29)  SpO2: 98% (14 May 2025 04:00) (97% - 100%)    O2 Parameters below as of 14 May 2025 05:00  Patient On (Oxygen Delivery Method): room air          I&O's Summary    12 May 2025 07:01  -  13 May 2025 07:00  --------------------------------------------------------  IN: 1475.5 mL / OUT: 995 mL / NET: 480.5 mL    13 May 2025 07:01  -  14 May 2025 05:07  --------------------------------------------------------  IN: 1315.3 mL / OUT: 1160 mL / NET: 155.3 mL      LABS:                        13.3   9.36  )-----------( 128      ( 13 May 2025 05:30 )             40.3     05-13    134[L]  |  101  |  24[H]  ----------------------------<  272[H]  5.0   |  24  |  1.36[H]    Ca    8.6      13 May 2025 05:30  Phos  3.8     05-13  Mg     2.6     05-13    TPro  5.8[L]  /  Alb  3.5  /  TBili  0.7  /  DBili  x   /  AST  24  /  ALT  20  /  AlkPhos  46  05-12      Urinalysis Basic - ( 13 May 2025 05:30 )    Color: x / Appearance: x / SG: x / pH: x  Gluc: 272 mg/dL / Ketone: x  / Bili: x / Urobili: x   Blood: x / Protein: x / Nitrite: x   Leuk Esterase: x / RBC: x / WBC x   Sq Epi: x / Non Sq Epi: x / Bacteria: x      CAPILLARY BLOOD GLUCOSE      POCT Blood Glucose.: 244 mg/dL (13 May 2025 23:06)  POCT Blood Glucose.: 220 mg/dL (13 May 2025 17:48)  POCT Blood Glucose.: 202 mg/dL (13 May 2025 10:56)  POCT Blood Glucose.: 212 mg/dL (13 May 2025 06:13)    ABG - ( 12 May 2025 05:58 )  pH, Arterial: 7.36  pH, Blood: x     /  pCO2: 38    /  pO2: 99    / HCO3: 22    / Base Excess: -3.6  /  SaO2: 96.1                RADIOLOGY & ADDITIONAL TESTS:    Consultant(s) Notes Reviewed:  [x ] YES  [ ] NO    MEDICATIONS  (STANDING):  albuterol/ipratropium for Nebulization 3 milliLiter(s) Nebulizer every 4 hours  aspirin  chewable 81 milliGRAM(s) Oral daily  atorvastatin 40 milliGRAM(s) Oral at bedtime  calcium carbonate   1250 mG (OsCal) 1 Tablet(s) Oral daily  cefTRIAXone   IVPB 2000 milliGRAM(s) IV Intermittent every 24 hours  chlorhexidine 2% Cloths 1 Application(s) Topical <User Schedule>  clopidogrel Tablet 75 milliGRAM(s) Oral every 24 hours  dextrose 5%. 1000 milliLiter(s) (50 mL/Hr) IV Continuous <Continuous>  dextrose 5%. 1000 milliLiter(s) (100 mL/Hr) IV Continuous <Continuous>  dextrose 50% Injectable 25 Gram(s) IV Push once  dextrose 50% Injectable 12.5 Gram(s) IV Push once  dextrose 50% Injectable 25 Gram(s) IV Push once  glucagon  Injectable 1 milliGRAM(s) IntraMuscular once  heparin   Injectable 5000 Unit(s) SubCutaneous every 8 hours  hydrocortisone sodium succinate Injectable 50 milliGRAM(s) IV Push every 6 hours  insulin lispro (ADMELOG) corrective regimen sliding scale   SubCutaneous every 6 hours  levothyroxine Injectable 37.5 MICROGram(s) IV Push <User Schedule>  pantoprazole   Suspension 40 milliGRAM(s) Oral daily  polyethylene glycol 3350 17 Gram(s) Oral daily  senna 2 Tablet(s) Oral at bedtime  sodium chloride 0.9%. 1000 milliLiter(s) (100 mL/Hr) IV Continuous <Continuous>  sodium chloride 3%  Inhalation 4 milliLiter(s) Inhalation every 4 hours  sodium zirconium cyclosilicate 10 Gram(s) Oral daily  tacrolimus  IVPB 0.6 milliGRAM(s) IV Intermittent every 24 hours    MEDICATIONS  (PRN):  dextrose Oral Gel 15 Gram(s) Oral once PRN Blood Glucose LESS THAN 70 milliGRAM(s)/deciliter      PHYSICAL EXAM:  GENERAL: Resting comfortably, in no acute distress  HEAD:  Atraumatic, Normocephalic  EYES: EOMI, PERRLA, conjunctiva and sclera clear  NECK: Supple, No JVD, Normal thyroid, no enlarged nodes  NERVOUS SYSTEM:  Alert & Awake.   CHEST/LUNG: B/L good air entry; No rales, rhonchi, or wheezing  HEART: S1S2 normal, no S3, Regular rate and rhythm; No murmurs  ABDOMEN: Soft, Nontender, Nondistended; Bowel sounds present  EXTREMITIES:  2+ Peripheral Pulses, No clubbing, cyanosis, or edema  LYMPH: No lymphadenopathy noted  SKIN: No rashes or lesions    Care Discussed with Consultants/Other Providers [ x] YES  [ ] NO ***Transfer from MICU to [facility]***    Hospital Course: 75y M w/ PMHx of HTN, HLD, IDDM2, HFpEF, CKD 2-3 (s/p Renal transplant in 2023), CAD s/p PCI w/ RAMANA-pLAD (6/2022), urinary retention, retinopathy and hypothyroidism presents to the ED after witnessed choking episode admitted to MICU for septic shock and acute hypoxic respiratory failure 2/2 aspiration pna vs opportunistic infection due to immunocompromised state. In the ER, patient intubated for airway protection and large piece of tofu was extracted from oropharynx. Course complicated by deviated gaze and witnessed seizure-like (bilateral arm/face stiffness) movements sp ativan, imaging negative for stroke but likely 2/2 to hypoxic injury iso aspiration. Started on keppra 500mg BID, vEEG. Nephrology consulted due to hx of renal transplant 2023 (follows Dr. Farnsworth outpt), and recommended to restart tacrolimus but hold mycophenolate. NG tube placed (pulled 5/13) . CT chest with bilateral moderate sized consolidations and interstitial lung abnormality. vEEG with mild excess intermittent slowing. Pending MRI head (per epilepsy). sp 5/12 bronchoscopy with BAL growing gram+ cocci in pairs and chains; pending cell count. Deescalated antibiotics to CTX 2g. Extubated on 5/13. Pt is now pending optho consult, stable for stepdown to [facility].     Patient is a 75y old  Male who presents with a chief complaint of acute hypoxic respiratory failure (13 May 2025 17:17)      INTERVAL HPI/OVERNIGHT EVENTS:   No overnight events   Afebrile, hemodynamically stable     SUBJECTIVE: Pt reports that he feels well this morning. He states that yesterday he felt groggy but today he feels much improved. He reports that he has chronic B/L visual loss and usually needs glasses in order to read. Otherwise, he denies chest pain, abdominal pain, nausea, vomiting, dizziness, LH, fevers, chills.     ICU Vital Signs Last 24 Hrs  T(C): 37 (14 May 2025 01:07), Max: 37 (13 May 2025 21:29)  T(F): 98.6 (14 May 2025 01:07), Max: 98.6 (13 May 2025 21:29)  HR: 91 (14 May 2025 04:00) (46 - 101)  BP: 144/65 (14 May 2025 04:00) (100/51 - 156/70)  BP(mean): 93 (14 May 2025 04:00) (74 - 101)  ABP: --  ABP(mean): --  RR: 15 (14 May 2025 04:00) (12 - 29)  SpO2: 98% (14 May 2025 04:00) (97% - 100%)    O2 Parameters below as of 14 May 2025 05:00  Patient On (Oxygen Delivery Method): room air          I&O's Summary    12 May 2025 07:01  -  13 May 2025 07:00  --------------------------------------------------------  IN: 1475.5 mL / OUT: 995 mL / NET: 480.5 mL    13 May 2025 07:01  -  14 May 2025 05:07  --------------------------------------------------------  IN: 1315.3 mL / OUT: 1160 mL / NET: 155.3 mL      LABS:                        13.3   9.36  )-----------( 128      ( 13 May 2025 05:30 )             40.3     05-13    134[L]  |  101  |  24[H]  ----------------------------<  272[H]  5.0   |  24  |  1.36[H]    Ca    8.6      13 May 2025 05:30  Phos  3.8     05-13  Mg     2.6     05-13    TPro  5.8[L]  /  Alb  3.5  /  TBili  0.7  /  DBili  x   /  AST  24  /  ALT  20  /  AlkPhos  46  05-12      Urinalysis Basic - ( 13 May 2025 05:30 )    Color: x / Appearance: x / SG: x / pH: x  Gluc: 272 mg/dL / Ketone: x  / Bili: x / Urobili: x   Blood: x / Protein: x / Nitrite: x   Leuk Esterase: x / RBC: x / WBC x   Sq Epi: x / Non Sq Epi: x / Bacteria: x      CAPILLARY BLOOD GLUCOSE      POCT Blood Glucose.: 244 mg/dL (13 May 2025 23:06)  POCT Blood Glucose.: 220 mg/dL (13 May 2025 17:48)  POCT Blood Glucose.: 202 mg/dL (13 May 2025 10:56)  POCT Blood Glucose.: 212 mg/dL (13 May 2025 06:13)    ABG - ( 12 May 2025 05:58 )  pH, Arterial: 7.36  pH, Blood: x     /  pCO2: 38    /  pO2: 99    / HCO3: 22    / Base Excess: -3.6  /  SaO2: 96.1        RADIOLOGY & ADDITIONAL TESTS:    Consultant(s) Notes Reviewed:  [x ] YES  [ ] NO    MEDICATIONS  (STANDING):  albuterol/ipratropium for Nebulization 3 milliLiter(s) Nebulizer every 4 hours  aspirin  chewable 81 milliGRAM(s) Oral daily  atorvastatin 40 milliGRAM(s) Oral at bedtime  calcium carbonate   1250 mG (OsCal) 1 Tablet(s) Oral daily  cefTRIAXone   IVPB 2000 milliGRAM(s) IV Intermittent every 24 hours  chlorhexidine 2% Cloths 1 Application(s) Topical <User Schedule>  clopidogrel Tablet 75 milliGRAM(s) Oral every 24 hours  dextrose 5%. 1000 milliLiter(s) (50 mL/Hr) IV Continuous <Continuous>  dextrose 5%. 1000 milliLiter(s) (100 mL/Hr) IV Continuous <Continuous>  dextrose 50% Injectable 25 Gram(s) IV Push once  dextrose 50% Injectable 12.5 Gram(s) IV Push once  dextrose 50% Injectable 25 Gram(s) IV Push once  glucagon  Injectable 1 milliGRAM(s) IntraMuscular once  heparin   Injectable 5000 Unit(s) SubCutaneous every 8 hours  hydrocortisone sodium succinate Injectable 50 milliGRAM(s) IV Push every 6 hours  insulin lispro (ADMELOG) corrective regimen sliding scale   SubCutaneous every 6 hours  levothyroxine Injectable 37.5 MICROGram(s) IV Push <User Schedule>  pantoprazole   Suspension 40 milliGRAM(s) Oral daily  polyethylene glycol 3350 17 Gram(s) Oral daily  senna 2 Tablet(s) Oral at bedtime  sodium chloride 0.9%. 1000 milliLiter(s) (100 mL/Hr) IV Continuous <Continuous>  sodium chloride 3%  Inhalation 4 milliLiter(s) Inhalation every 4 hours  sodium zirconium cyclosilicate 10 Gram(s) Oral daily  tacrolimus  IVPB 0.6 milliGRAM(s) IV Intermittent every 24 hours    MEDICATIONS  (PRN):  dextrose Oral Gel 15 Gram(s) Oral once PRN Blood Glucose LESS THAN 70 milliGRAM(s)/deciliter      PHYSICAL EXAM:  General: Resting comfortably, in no acute distress  HEENT: NC/AT, sclera anicteric, conjunctiva clear, R teardrop shaped pupil (prior eye surgery per son), bilateral pupils +1 sluggish  Lungs: LCTAB, no wheezes/rhonchi, able to speak in full sentences  Heart: normal rate, regular rhythm, normal S1 S2, no murmurs/rubs/gallops    Abdomen: soft, non-tender, non-distended, bowel sounds present   Extremities: atraumatic, no lower extremity edema, clubbing or cyanosis, warm, cap refill <3 seconds, distal pulses 1+ at DP and radial bilaterally    Skin: normal skin texture and turgor without rashes or lesions, no diaphoresis   Neuro: A&Ox3, 5/5 strength B/L UE, LE, no focal deficits

## 2025-05-14 NOTE — SWALLOW BEDSIDE ASSESSMENT ADULT - SWALLOW EVAL: DIAGNOSIS
Functional oropharyngeal swallow per bedside eval. Adequate mastication of soft solids (per preference) and thin liquids with no overt s/s of aspiration. At this time pt appears safe for an Easy to Chew diet with standard eating precautions. Limit distractions/ avoid conversation during PO intake.

## 2025-05-14 NOTE — SWALLOW BEDSIDE ASSESSMENT ADULT - PHARYNGEAL PHASE
Laryngeal movement brisk per palpation. No cough, throat clear, or change in vocal quality across trials and consistencies.

## 2025-05-14 NOTE — PROGRESS NOTE ADULT - SUBJECTIVE AND OBJECTIVE BOX
[facility] DEPARTMENT OF OPHTHALMOLOGY  ------------------------------------------------------------------------------  Viktoriya Trujillo MD  ------------------------------------------------------------------------------  History:  Pt admitted after choking event, requiring intubation; now extubated.   Pt noted to have upward deviation of eyes OU. Pt notes dec VA OU.     POHx:   - AMD, followed by Dr. Greenwood. Baseline VA per chart review 20/50 OD, 20/70 OS  - CEIOL OU    MEDICATIONS  (STANDING):  aspirin  chewable 81 milliGRAM(s) Oral daily  atorvastatin 40 milliGRAM(s) Oral at bedtime  calcium carbonate   1250 mG (OsCal) 1 Tablet(s) Oral daily  cefTRIAXone   IVPB 2000 milliGRAM(s) IV Intermittent every 24 hours  chlorhexidine 2% Cloths 1 Application(s) Topical <User Schedule>  clopidogrel Tablet 75 milliGRAM(s) Oral every 24 hours  dextrose 5%. 1000 milliLiter(s) (100 mL/Hr) IV Continuous <Continuous>  dextrose 5%. 1000 milliLiter(s) (50 mL/Hr) IV Continuous <Continuous>  dextrose 50% Injectable 25 Gram(s) IV Push once  dextrose 50% Injectable 12.5 Gram(s) IV Push once  dextrose 50% Injectable 25 Gram(s) IV Push once  glucagon  Injectable 1 milliGRAM(s) IntraMuscular once  heparin   Injectable 5000 Unit(s) SubCutaneous every 8 hours  insulin glargine Injectable (LANTUS) 14 Unit(s) SubCutaneous at bedtime  insulin lispro (ADMELOG) corrective regimen sliding scale   SubCutaneous every 6 hours  pantoprazole   Suspension 40 milliGRAM(s) Oral daily  polyethylene glycol 3350 17 Gram(s) Oral daily  senna 2 Tablet(s) Oral at bedtime  tacrolimus 1 milliGRAM(s) Oral <User Schedule>    MEDICATIONS  (PRN):  dextrose Oral Gel 15 Gram(s) Oral once PRN Blood Glucose LESS THAN 70 milliGRAM(s)/deciliter      VITALS: T(C): 36.9 (05-14-25 @ 17:40)  T(F): 98.5 (05-14-25 @ 17:40), Max: 99 (05-14-25 @ 13:16)  HR: 76 (05-14-25 @ 16:00) (76 - 101)  BP: 172/77 (05-14-25 @ 16:00) (129/58 - 172/77)  RR:  (13 - 28)  SpO2:  (96% - 100%)  Wt(kg): --  General: AAOx3    Ophthalmology Exam:  Visual acuity (sc): 20/ OD, 20/ OS ***  Pupils: PERRL OU, no APD.  Intraocular Pressure: ***  Extraocular movements (EOMs): Full OU, no pain, no diplopia  Confrontational Visual Field (CVF): Full OU    Pen Light Exam (PLE)  External: Flat OU.  Lids/Lashes/Lacrimal Ducts: Flat OU.   Sclera/Conjunctiva: White and quiet OU.  Cornea: Clear OU.  Anterior Chamber: Deep and formed OU.    Iris: Flat OU.  Lens: Clear OU.    Dilated fundus exam  Vitreous: clear OU  Nerve: wnl OU  Macula: drusen OU  Periphery: flat OU    [facility] DEPARTMENT OF OPHTHALMOLOGY  ------------------------------------------------------------------------------  Viktoriya Trujillo MD  ------------------------------------------------------------------------------  History:  Pt admitted after choking event, requiring intubation; now extubated.   Pt noted by primary team to have upward deviation of eye -- ophthalmology consulted    Per pt, no acute changes in vision compared to baseline. Notes poor VA OS long standing (years); was told that it could not be improved (by several ophthalmologists.)  Denies diplopia  Follows w/ Dr Greenwood for AMD    POHx:   - Per pt, irreversible VA loss OS ~3yrs ago  - AMD, followed by Dr. Greenwood  - CEIOL OU    MEDICATIONS  (STANDING):  aspirin  chewable 81 milliGRAM(s) Oral daily  atorvastatin 40 milliGRAM(s) Oral at bedtime  calcium carbonate   1250 mG (OsCal) 1 Tablet(s) Oral daily  cefTRIAXone   IVPB 2000 milliGRAM(s) IV Intermittent every 24 hours  chlorhexidine 2% Cloths 1 Application(s) Topical <User Schedule>  clopidogrel Tablet 75 milliGRAM(s) Oral every 24 hours  dextrose 5%. 1000 milliLiter(s) (100 mL/Hr) IV Continuous <Continuous>  dextrose 5%. 1000 milliLiter(s) (50 mL/Hr) IV Continuous <Continuous>  dextrose 50% Injectable 25 Gram(s) IV Push once  dextrose 50% Injectable 12.5 Gram(s) IV Push once  dextrose 50% Injectable 25 Gram(s) IV Push once  glucagon  Injectable 1 milliGRAM(s) IntraMuscular once  heparin   Injectable 5000 Unit(s) SubCutaneous every 8 hours  insulin glargine Injectable (LANTUS) 14 Unit(s) SubCutaneous at bedtime  insulin lispro (ADMELOG) corrective regimen sliding scale   SubCutaneous every 6 hours  pantoprazole   Suspension 40 milliGRAM(s) Oral daily  polyethylene glycol 3350 17 Gram(s) Oral daily  senna 2 Tablet(s) Oral at bedtime  tacrolimus 1 milliGRAM(s) Oral <User Schedule>    MEDICATIONS  (PRN):  dextrose Oral Gel 15 Gram(s) Oral once PRN Blood Glucose LESS THAN 70 milliGRAM(s)/deciliter      Ophthalmology Exam:  Visual acuity (sc): 20/70 OD, HM OS  Pupils: surgical, small, non-reactive OU  Intraocular Pressure: 16/20  Extraocular movements (EOMs): Full OU, no pain, no diplopia    Pen Light Exam (PLE)  External: Left exotropia, sl hypertropia  Lids/Lashes/Lacrimal Ducts: Flat OU.   Sclera/Conjunctiva: White and quiet OU.  Cornea: Clear OU.  Anterior Chamber: Deep and formed OU.    Iris: surgical OU. miotic  Lens: PCIOL OU.    Dilated fundus exam  Vitreous: clear OU  Nerve: wnl OU  Macula: drusen OU  Periphery: flat OU    [facility] DEPARTMENT OF OPHTHALMOLOGY  ------------------------------------------------------------------------------  Viktoriya Trujillo MD  ------------------------------------------------------------------------------  History:  Pt admitted after choking event, requiring intubation; now extubated.   Pt noted by primary team to have upward deviation of eye -- ophthalmology consulted    Per pt, no acute changes in vision compared to baseline. Notes poor VA OS long standing (years) - ?prior retinal detachment; was told that it could not be improved at this time (by several ophthalmologists.)  Denies diplopia  Follows w/ Dr Greenwood for AMD    POHx:   - Per pt, irreversible VA loss OS ~3yrs ago  - ?AMD OU, ?RD OS -- followed by Dr. Greenwood  - CEIOL OU    MEDICATIONS  (STANDING):  aspirin  chewable 81 milliGRAM(s) Oral daily  atorvastatin 40 milliGRAM(s) Oral at bedtime  calcium carbonate   1250 mG (OsCal) 1 Tablet(s) Oral daily  cefTRIAXone   IVPB 2000 milliGRAM(s) IV Intermittent every 24 hours  chlorhexidine 2% Cloths 1 Application(s) Topical <User Schedule>  clopidogrel Tablet 75 milliGRAM(s) Oral every 24 hours  dextrose 5%. 1000 milliLiter(s) (100 mL/Hr) IV Continuous <Continuous>  dextrose 5%. 1000 milliLiter(s) (50 mL/Hr) IV Continuous <Continuous>  dextrose 50% Injectable 25 Gram(s) IV Push once  dextrose 50% Injectable 12.5 Gram(s) IV Push once  dextrose 50% Injectable 25 Gram(s) IV Push once  glucagon  Injectable 1 milliGRAM(s) IntraMuscular once  heparin   Injectable 5000 Unit(s) SubCutaneous every 8 hours  insulin glargine Injectable (LANTUS) 14 Unit(s) SubCutaneous at bedtime  insulin lispro (ADMELOG) corrective regimen sliding scale   SubCutaneous every 6 hours  pantoprazole   Suspension 40 milliGRAM(s) Oral daily  polyethylene glycol 3350 17 Gram(s) Oral daily  senna 2 Tablet(s) Oral at bedtime  tacrolimus 1 milliGRAM(s) Oral <User Schedule>    MEDICATIONS  (PRN):  dextrose Oral Gel 15 Gram(s) Oral once PRN Blood Glucose LESS THAN 70 milliGRAM(s)/deciliter      Ophthalmology Exam:  Visual acuity (sc): 20/70 OD, HM OS  Pupils: surgical, small, non-reactive OU  Intraocular Pressure: 16/20  Extraocular movements (EOMs): Full OU, no pain, no diplopia    Pen Light Exam (PLE)  External: Left exotropia, sl hypertropia  Lids/Lashes/Lacrimal Ducts: Flat OU.   Sclera/Conjunctiva: White and quiet OU.  Cornea: Clear OU.  Anterior Chamber: Deep and formed OU.    Iris: surgical OU. miotic  Lens: PCIOL OU.    Dilated fundus exam  Vitreous: clear OU  Nerve: wnl OU  Macula: OD atrophy. OS: flat  Periphery: OD: PVR, atrophy. OS: fibrotic adhesions, flat

## 2025-05-14 NOTE — SWALLOW BEDSIDE ASSESSMENT ADULT - SLP GENERAL OBSERVATIONS
Pt appreciated awake and alert on RA, sitting OOBTC. Voice strong and clear. Pt followed commands, made needs known, and participated in simple conversation.

## 2025-05-14 NOTE — PROGRESS NOTE ADULT - ASSESSMENT
72yoM w/ DM, HTN, HLD, CHF, BPH, CKD stage V (s/p  donor renal transplant 3/20/2023 at [place]), CAD (s/p RAMANA to pLAD - 22), HFpEF (EF 67% 2024) BIBEMS for choking with seizure-like activity of b/l arm and face tonic activity. Epilepsy initially consulted--- VEEG was unremarkable, likely that event was either convulsive syncope or a single seizure provoked by hypoxia without underlying sz d/o. 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     RECOMMENDATIONS  - followup labs and obtain MRB as an outpatient in general neuro clinic   - per [facility] law cannot drive for 1 year after last seizure    Discussed with attending   72yoM w/ DM, HTN, HLD, CHF, BPH, CKD stage V (s/p  donor renal transplant 3/20/2023 at [place]), CAD (s/p RAMANA to pLAD - 22), HFpEF (EF 67% 2024) BIBEMS for choking with seizure-like activity of b/l arm and face tonic activity. Epilepsy initially consulted--- VEEG was unremarkable, likely that event was either convulsive syncope or a single seizure provoked by hypoxia without underlying sz d/o. 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     RECOMMENDATIONS  - followup labs and obtain MRB as an outpatient in general neuro clinic       Discussed with attending

## 2025-05-14 NOTE — PROGRESS NOTE ADULT - SUBJECTIVE AND OBJECTIVE BOX
Seen and evaluated at bedside, OOB this morning.     REVIEW OF SYSTEMS  --------------------------------------------------------------------------------  Gen: No fevers/chills, weakness  Skin: No rashes  Head/Eyes/Ears/Mouth: No headache; No hearing changes, mucous discharge, vision changes  Respiratory: No dyspnea, cough, wheezing  CV: No chest pain, palpitations  GI: No abdominal pain, diarrhea, constipation, nausea, vomiting, melena, hematochezia  : No increased frequency, dysuria, hematuria  MSK: No joint pain/swelling; no back pain; no edema  Neuro: No dizziness/lightheadedness, weakness, seizures, numbness  Heme: No easy bruising or bleeding    All other systems were reviewed and are negative, except as noted.    PAST HISTORY  --------------------------------------------------------------------------------  No significant changes to PMH, PSH, FHx, SHx, unless otherwise noted    ALLERGIES & MEDICATIONS  --------------------------------------------------------------------------------  Allergies    chlorthalidone (Other)    Intolerances      Standing Inpatient Medications  aspirin  chewable 81 milliGRAM(s) Oral daily  atorvastatin 40 milliGRAM(s) Oral at bedtime  calcium carbonate   1250 mG (OsCal) 1 Tablet(s) Oral daily  cefTRIAXone   IVPB 2000 milliGRAM(s) IV Intermittent every 24 hours  chlorhexidine 2% Cloths 1 Application(s) Topical <User Schedule>  clopidogrel Tablet 75 milliGRAM(s) Oral every 24 hours  dextrose 5%. 1000 milliLiter(s) IV Continuous <Continuous>  dextrose 5%. 1000 milliLiter(s) IV Continuous <Continuous>  dextrose 50% Injectable 25 Gram(s) IV Push once  dextrose 50% Injectable 12.5 Gram(s) IV Push once  dextrose 50% Injectable 25 Gram(s) IV Push once  glucagon  Injectable 1 milliGRAM(s) IntraMuscular once  heparin   Injectable 5000 Unit(s) SubCutaneous every 8 hours  insulin glargine Injectable (LANTUS) 14 Unit(s) SubCutaneous at bedtime  insulin lispro (ADMELOG) corrective regimen sliding scale   SubCutaneous every 6 hours  pantoprazole   Suspension 40 milliGRAM(s) Oral daily  polyethylene glycol 3350 17 Gram(s) Oral daily  senna 2 Tablet(s) Oral at bedtime  tacrolimus 1 milliGRAM(s) Oral <User Schedule>    PRN Inpatient Medications  dextrose Oral Gel 15 Gram(s) Oral once PRN        VITALS/PHYSICAL EXAM  --------------------------------------------------------------------------------  T(C): 37.2 (05-14-25 @ 13:16), Max: 37.2 (05-14-25 @ 13:16)  HR: 82 (05-14-25 @ 10:00) (80 - 101)  BP: 168/75 (05-14-25 @ 10:00) (109/73 - 168/75)  RR: 15 (05-14-25 @ 10:00) (12 - 29)  SpO2: 99% (05-14-25 @ 10:00) (97% - 100%)  Wt(kg): --        05-13-25 @ 07:01  -  05-14-25 @ 07:00  --------------------------------------------------------  IN: 1734.2 mL / OUT: 1705 mL / NET: 29.2 mL    05-14-25 @ 07:01  -  05-14-25 @ 14:58  --------------------------------------------------------  IN: 356.3 mL / OUT: 345 mL / NET: 11.3 mL        PHYSICAL EXAM:  Constitutional: Alert and awake  Head: NCAT  Respiratory: CTA b/l   Cardiac: Regular rate  Gastrointestinal: abdomen non-distended  Extremities: No LE edema  Dermatologic: no rash on exposed skin  Neurologic: Alert and awake        LABS/STUDIES  --------------------------------------------------------------------------------              12.1   10.43 >-----------<  133      [05-14-25 @ 05:26]              35.7     135  |  103  |  21  ----------------------------<  282      [05-14-25 @ 05:26]  3.7   |  21  |  1.05        Ca     8.4     [05-14-25 @ 05:26]      Mg     2.3     [05-14-25 @ 05:26]      Phos  2.7     [05-14-25 @ 05:26]                [05-13-25 @ 05:30]    Creatinine Trend:  SCr 1.05 [05-14 @ 05:26]  SCr 1.36 [05-13 @ 05:30]  SCr 1.21 [05-13 @ 03:34]  SCr 1.32 [05-12 @ 09:05]  SCr 1.34 [05-11 @ 20:28]    Urinalysis - [05-14-25 @ 05:26]      Color  / Appearance  / SG  / pH       Gluc 282 / Ketone   / Bili  / Urobili        Blood  / Protein  / Leuk Est  / Nitrite       RBC  / WBC  / Hyaline  / Gran  / Sq Epi  / Non Sq Epi  / Bacteria       TSH 0.905      [05-14-25 @ 05:26]

## 2025-05-14 NOTE — PROGRESS NOTE ADULT - ASSESSMENT
75y M w/ PMHx of HTN, HLD, IDDM2, HFpEF, CKD 2-3 (s/p Renal transplant in 2023), CAD s/p PCI w/ RAMANA-pLAD (6/2022), urinary retention, retinopathy and hypothyroidism presents to the ED after witnessed choking episode admitted to MICU for septic shock and acute hypoxic respiratory failure 2/2 aspiration pna vs opportunistic infection due to immunocompromised state. Course complicated by deviated gaze and witnessed seizure-like (bilateral arm/face stiffness) movements sp ativan, imaging negative for stroke but likely 2/2 to hypoxic injury iso aspiration.       NEURO:  #seizure like activity likely 2/2 hypoxic injury iso aspiration  Pt reportedly with deviated gaze. Stroke w/u neg for any acute stroke or path. s/p ativan. no hx of epilepsy or seizures in past  5/12 vEEG with mild excess intermittent slowing and poor organization of the background, indicators of moderate diffuse or multifocal cerebral dysfunction   5/13 vEEG with mild improvement of prior reading  Tacrolimus level 6.4  - neurology following, appreciate recs       - MR brain noncon       - pending neurology f/u to consider discontinuing keppra 500mg twice daily since no recurrence of seizure-like activity.       - ativan 2mg PRN for seizures > 2mins       - per [place] law cannot drive for 1 year after last seizure      CARDIOVASCULAR:  #Septic Shock, RESOLVED  Patient with shock, likely septic in the setting of pneumonia. Cardiac enzymes WNL. EKG sinus tachycardia  weaned off levo gtt 5/12 8 pm  warm, well-perfused, euvolemic.       - MAP goal >65    #Hx of HTN  Patient takes nifedipine 60mg QD, irbesartan 150mg.   - holding home BP medications iso shock, can resume when BPs increase    #CAD s/p PCI w/ RAMANA-pLAD (6/2022)  Patient on aspirin 81mg QD, Plavix 75mg QD, atorvastatin 40mg  Outpatient cardiologist - Dr. Sarah   - although >1 year from stent, per Dr. Sarah, pt still on DAPT since he is deemed high risk  - continue with home medications    #hx of HFpEF likely ischemic cardiomyopathy  5/12 TTE EF 68%, fibrocalcific aortic valve sclerosis      PULMONARY:  #AHRF  #Aspiration episode  Pt at baseline good general state of health. Had episode of choking on piece of tofu. Brought into ED via EMS, intubated for airway protection, tofu was extracted. CT with bilateral ground glass opacities and infiltrates suggestive of PNA.   BAL growing gram + cocci in pairs and chains  - PNA coverage as below  - Plan to extubate today.  - hydrocortisone 50mg Q6H  5/12 - 5/15 then transition to 50mg q12hrs 5/16-5/17, then transition to 25mg q12hr 5/18-5/19 per [facility] COD (200mg for 4 days, then 100mg for 2 days, then 50mg for 2 days to complete taper)      RENAL:  #CKD  #S/p Renal Transplant 2023  Patient with a hx of CKD, s/p transplant in 2023. Creatinine 1.34 on presentation, GFR 55, likely at baseline. Patient takes Lokelma 10mg QD, calcium carbonate 1250mg BID, mycophenolate mofetil 500mg BID, tacrolimus (prograf) 1mg BID  Outpatient nephrologist - Dr. Farnsworth  - resume PO tacrolimus (prograf) 1mg BID (dose confirmed with outpt pharmacy)  - hold mycophenolate mofetil iso active infection   - c/w Lokelma  - c/w calcium carbonate  - f/u allograft US  - strict I&Os  - consider removing rothman if UOP adequate   - nephrology following, appreciate excellent recs      GI/:  #Urinary Retention   Patient with urinary retention and distended bladder on physical exam s/p intubation. Rothman placed in ED on 5/11.   - c/w rothman  - strict Is & Os    Diet: NPO for now  GI PPX: pantoprazole 40mg QD (therapeutic interchange for home omeprazole 40mg)  Bowel regimen: senna and miralax  - 5/12 NG tube placed  - when extubated, can do dysphagia screen and consider S&S since aspiration event prior to ER visit      HEME:  MARLO      ID:  #septic shock 2/2 PNA  #Immunocompromised  Patient with AHRF in the setting of witnessed aspiration event. Given CT findings of ground glass opacities and bilateral consolidations, concern for underlying pneumonia. MRSA, RVP, urine strep and legionella negative.   BAL with gram+ cocci in pairs and chains  sp Zosyn 4.5mg 5/12-5/13, sp vancomycin 1g Q24H 5/12-5/13, sp azithromycin 500mg for 3 days 5/11-5/13  - hold mycophenolate for now   - transition to CTX 2g 5/13-5/18  - f/u galactomannan, aspergillus, fungitell, BAL cell count       ENDOCRINE:  #hypothyroidism  home levothyroxine 50mcg  - c/w IV conversion -> IV 37.5mcg (Levothyroxine IV to PO = 0.75 : 1)  - transition to PO levothyroxine when able to tolerate PO    #diabetes  #hyperglycemia 2/2 steroid use  - sliding scale insulin  - q6hr finger sticks      MSK:  MARLO      PPX:  Fluids: s/p 2.5 L  Electrolytes: replete as needed  Nutrition: Diet, NPO (05-12-25 @ 05:18) [Active]  PPI ppx: pantoprazole 40mg QD (therapeutic interchange for home omeprazole 40mg)  Dvt ppx: heparin 5000 units subQ TID  Activity: bedrest  Dispo: MICU 75y M w/ PMHx of HTN, HLD, IDDM2, HFpEF, CKD 2-3 (s/p Renal transplant in 2023), CAD s/p PCI w/ RAMANA-pLAD (6/2022), urinary retention, retinopathy and hypothyroidism presents to the ED after witnessed choking episode admitted to MICU for septic shock and acute hypoxic respiratory failure 2/2 aspiration pna vs opportunistic infection due to immunocompromised state. Course complicated by deviated gaze and witnessed seizure-like (bilateral arm/face stiffness) movements sp ativan, imaging negative for stroke but likely 2/2 to hypoxic injury iso aspiration.       NEURO:  #seizure like activity likely 2/2 hypoxic injury iso aspiration  Pt reportedly with deviated gaze. Stroke w/u neg for any acute stroke or path. s/p ativan. no hx of epilepsy or seizures in past  5/12 vEEG with mild excess intermittent slowing and poor organization of the background, indicators of moderate diffuse or multifocal cerebral dysfunction   5/13 vEEG: no findings of active epilepsy, mild excess intermittent slow wave activity  Tacrolimus level 6.4  - neurology following, appreciate recs       - MR brain outpatient       - no current indication for AEDs       - ativan 2mg PRN if seizure-like activity > 2mins       - per [facility] law cannot drive for 1 year after last seizure      CARDIOVASCULAR:  #Septic Shock, RESOLVED  Patient with shock, likely septic in the setting of pneumonia. Cardiac enzymes WNL. EKG sinus tachycardia  weaned off levo gtt 5/12 8 pm  warm, well-perfused, euvolemic, cap refill <3 seconds, making good UOP       - MAP goal >65    #Hx of HTN  Patient takes nifedipine 60mg QD, irbesartan 150mg.   - holding home BP medications iso shock  -BP goal 140-180, resume home meds as appopriate    #CAD s/p PCI w/ RMAANA-pLAD (6/2022)  Patient on aspirin 81mg QD, Plavix 75mg QD, atorvastatin 40mg  Outpatient cardiologist - Dr. Sarah   - although >1 year from stent, per Dr. Sarah, pt still on DAPT since he is deemed high risk  - continue with home medications    #hx of HFpEF likely ischemic cardiomyopathy  5/12 TTE EF 68%, fibrocalcific aortic valve sclerosis      PULMONARY:  #AHRF  #Aspiration episode  Pt at baseline good general state of health. Had episode of choking on piece of tofu. Brought into ED via EMS, intubated for airway protection, tofu was extracted. CT with bilateral ground glass opacities and infiltrates suggestive of PNA.   BAL growing gram + cocci in pairs and chains  - PNA coverage as below  - Plan to extubate today.  - s/p hydrocortisone 10 mg IV q6h 5/12-5/14, no current indication to c/w steroid tx    #Rule out Myasthenia Gravis  Given presentation of choking on tofu, B/L ptosis there was concern for Myasthenia Gravis  As per son, this is first episode of dysphagia and patient normally can tolerate solid food w/o difficulty  Plan:  MG/Lambert Eaton eval pending  NIF, vital capacity pending    RENAL:  #CKD  #S/p Renal Transplant 2023  Patient with a hx of CKD, s/p transplant in 2023. Creatinine 1.34 on presentation, GFR 55, likely at baseline. Patient takes Lokelma 10mg QD, calcium carbonate 1250mg BID, mycophenolate mofetil 500mg BID, tacrolimus (prograf) 1mg BID  Outpatient nephrologist - Dr. Javad HOPPER kidney US: 1. No evidence of hemodynamically significant stenosis of the transplant renal artery. No hydronephrosis. No acute findings regarding the RLQ transplant kidney.  Increased echogenicity of the native right kidney suggestive of   medical renal disease.  - resume PO tacrolimus (prograf) 1mg BID (dose confirmed with outpt pharmacy)  - hold mycophenolate mofetil iso active infection   - c/w calcium carbonate  - nephrology following, appreciate excellent recs      GI/:  #Urinary Retention   Patient with urinary retention and distended bladder on physical exam s/p intubation. Rothman placed in ED on 5/11.   -rothman removed 5/14, Trial of Void    Diet: Easy to chew, conistent carb  GI PPX: pantoprazole 40mg QD (therapeutic interchange for home omeprazole 40mg)  Bowel regimen: senna and miralax    HEME:  MARLO      ID:  #septic shock 2/2 PNA  #Immunocompromised  Patient with AHRF in the setting of witnessed aspiration event. Given CT findings of ground glass opacities and bilateral consolidations, concern for underlying pneumonia. MRSA, RVP, urine strep and legionella negative.   BAL with gram+ cocci in pairs and chains  sp Zosyn 4.5mg 5/12-5/13, sp vancomycin 1g Q24H 5/12-5/13, sp azithromycin 500mg for 3 days 5/11-5/13  - hold mycophenolate for now   - transition to CTX 2g 5/13-5/18  - f/u galactomannan, aspergillus, fungitell, BAL cell count       ENDOCRINE:  #hypothyroidism  home levothyroxine 50mcg  - c/w home medication    #diabetes  #hyperglycemia 2/2 steroid use  Hx DM, on Lantus 23 units and Tradjenta at home  Sugars have been uncontrolled, range 202-238  - c/w sliding scale insulin  - q6hr finger sticks  Consistent carb diet  -Start lantus today      MSK:  MARLO      PPX:  Fluids: s/p 2.5 L  Electrolytes: replete as needed  Nutrition: Diet, Easy to chew, consistent carb  PPI ppx: pantoprazole 40mg QD (therapeutic interchange for home omeprazole 40mg)  Dvt ppx: heparin 5000 units subQ TID  Activity: bedrest  Dispo: MICU 75y M w/ PMHx of HTN, HLD, IDDM2, HFpEF, CKD 2-3 (s/p Renal transplant in 2023), CAD s/p PCI w/ RAMANA-pLAD (6/2022), urinary retention, retinopathy and hypothyroidism presents to the ED after witnessed choking episode admitted to MICU for septic shock and acute hypoxic respiratory failure 2/2 aspiration pna vs opportunistic infection due to immunocompromised state. Course complicated by deviated gaze and witnessed seizure-like (bilateral arm/face stiffness) movements sp ativan, imaging negative for stroke but likely 2/2 to hypoxic injury iso aspiration.       NEURO:  #seizure like activity likely 2/2 hypoxic injury iso aspiration  Pt reportedly with deviated gaze. Stroke w/u neg for any acute stroke or path. s/p ativan. no hx of epilepsy or seizures in past  No seizure like activity seen on vEEG  Tacrolimus level 6.4  - neurology following, appreciate recs       - MR brain outpatient       - no current indication for AEDs    CARDIOVASCULAR:  #Septic Shock, RESOLVED  Patient with shock, likely septic in the setting of pneumonia. Cardiac enzymes WNL. EKG sinus tachycardia  weaned off levo gtt 5/12 8 pm  warm, well-perfused, euvolemic, cap refill <3 seconds, making good UOP       - MAP goal >65    #Hx of HTN  Patient takes nifedipine 60mg QD, irbesartan 150mg.   - holding home BP medications iso shock  -BP goal 140-180, resume home meds as appopriate    #CAD s/p PCI w/ RAMANA-pLAD (6/2022)  Patient on aspirin 81mg QD, Plavix 75mg QD, atorvastatin 40mg  Outpatient cardiologist - Dr. Sarah   - although >1 year from stent, per Dr. Sarah, pt still on DAPT since he is deemed high risk  - continue with home medications    #hx of HFpEF likely ischemic cardiomyopathy  5/12 TTE EF 68%, fibrocalcific aortic valve sclerosis      PULMONARY:  #AHRF  #Aspiration episode  Pt at baseline good general state of health. Had episode of choking on piece of tofu. Brought into ED via EMS, intubated for airway protection, tofu was extracted. CT with bilateral ground glass opacities and infiltrates suggestive of PNA.   BAL growing gram + cocci in pairs and chains  - PNA coverage as below  - Plan to extubate today.  - s/p hydrocortisone 10 mg IV q6h 5/12-5/14, no current indication to c/w steroid tx    #Rule out Myasthenia Gravis  Given presentation of choking on tofu, B/L ptosis there was concern for Myasthenia Gravis  As per son, this is first episode of dysphagia and patient normally can tolerate solid food w/o difficulty  Plan:  MG/Lambert Eaton eval pending  NIF, vital capacity pending    RENAL:  #CKD  #S/p Renal Transplant 2023  Patient with a hx of CKD, s/p transplant in 2023. Creatinine 1.34 on presentation, GFR 55, likely at baseline. Patient takes Lokelma 10mg QD, calcium carbonate 1250mg BID, mycophenolate mofetil 500mg BID, tacrolimus (prograf) 1mg BID  Outpatient nephrologist - Dr. Javad HOPPER kidney US: 1. No evidence of hemodynamically significant stenosis of the transplant renal artery. No hydronephrosis. No acute findings regarding the RLQ transplant kidney.  Increased echogenicity of the native right kidney suggestive of   medical renal disease.  - resume PO tacrolimus (prograf) 1mg BID (dose confirmed with outpt pharmacy)  - hold mycophenolate mofetil iso active infection   - c/w calcium carbonate  - nephrology following, appreciate excellent recs      GI/:  #Urinary Retention   Patient with urinary retention and distended bladder on physical exam s/p intubation. Rothman placed in ED on 5/11.   -rothman removed 5/14, Trial of Void    Diet: Easy to chew, conistent carb  GI PPX: pantoprazole 40mg QD (therapeutic interchange for home omeprazole 40mg)  Bowel regimen: senna and miralax    HEME:  MARLO      ID:  #septic shock 2/2 PNA  #Immunocompromised  Patient with AHRF in the setting of witnessed aspiration event. Given CT findings of ground glass opacities and bilateral consolidations, concern for underlying pneumonia. MRSA, RVP, urine strep and legionella negative.   BAL with gram+ cocci in pairs and chains  sp Zosyn 4.5mg 5/12-5/13, sp vancomycin 1g Q24H 5/12-5/13, sp azithromycin 500mg for 3 days 5/11-5/13  - hold mycophenolate for now   - transition to CTX 2g 5/13-5/18  - f/u galactomannan, aspergillus, fungitell, BAL cell count       ENDOCRINE:  #hypothyroidism  home levothyroxine 50mcg  - c/w home medication    #diabetes  #hyperglycemia 2/2 steroid use  Hx DM, on Lantus 23 units and Tradjenta at home  Sugars have been uncontrolled, range 202-238  - c/w sliding scale insulin  - q6hr finger sticks  Consistent carb diet  -Start lantus today      MSK:  MARLO      PPX:  Fluids: s/p 2.5 L  Electrolytes: replete as needed  Nutrition: Diet, Easy to chew, consistent carb  PPI ppx: pantoprazole 40mg QD (therapeutic interchange for home omeprazole 40mg)  Dvt ppx: heparin 5000 units subQ TID  Activity: bedrest  Dispo: MICU

## 2025-05-14 NOTE — SWALLOW BEDSIDE ASSESSMENT ADULT - COMMENTS
Per chart, "Patient feels well. Collateral from son: no hoarseness, weakness, dysphagia or dysconjugate gaze at baseline and last night when he visited father was at his baseline. Notes he choked because he was speaking while chewing rather than dysphagia"

## 2025-05-14 NOTE — SWALLOW BEDSIDE ASSESSMENT ADULT - SLP PERTINENT HISTORY OF CURRENT PROBLEM
72yoM w/ DM, HTN, HLD, CHF, BPH, CKD stage V (s/p  donor renal transplant 3/20/2023 at [facility]), CAD (s/p RAMANA to pLAD - 22), HFpEF (EF 67% 2024) BIBEMS for choking with seizure-like activity of b/l arm and face tonic activity. Epilepsy initially consulted--- VEEG was unremarkable, likely that event was either convulsive syncope or a single seizure provoked by hypoxia without underlying sz d/o. 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

## 2025-05-14 NOTE — PROGRESS NOTE ADULT - SUBJECTIVE AND OBJECTIVE BOX
Neurology Progress Note    Interval History:  Patient feels well. Collateral from son: no hoarseness, weakness, dysphagia or dysconjugate gaze at baseline and last night when he visited father was at his baseline. Notes he choked because he was speaking while chewing rather than dysphagia      PAST MEDICAL & SURGICAL HISTORY:  Diabetes    Hypertension    Hyperlipidemia    CKD (chronic kidney disease), stage IV    BPH (benign prostatic hyperplasia)    Congestive heart failure (CHF)    CAD (coronary artery disease)    No significant past surgical history            Medications:  albuterol/ipratropium for Nebulization 3 milliLiter(s) Nebulizer every 4 hours  aspirin  chewable 81 milliGRAM(s) Oral daily  atorvastatin 40 milliGRAM(s) Oral at bedtime  calcium carbonate   1250 mG (OsCal) 1 Tablet(s) Oral daily  cefTRIAXone   IVPB 2000 milliGRAM(s) IV Intermittent every 24 hours  chlorhexidine 2% Cloths 1 Application(s) Topical <User Schedule>  clopidogrel Tablet 75 milliGRAM(s) Oral every 24 hours  dextrose 5%. 1000 milliLiter(s) IV Continuous <Continuous>  dextrose 5%. 1000 milliLiter(s) IV Continuous <Continuous>  dextrose 50% Injectable 25 Gram(s) IV Push once  dextrose 50% Injectable 12.5 Gram(s) IV Push once  dextrose 50% Injectable 25 Gram(s) IV Push once  dextrose Oral Gel 15 Gram(s) Oral once PRN  glucagon  Injectable 1 milliGRAM(s) IntraMuscular once  heparin   Injectable 5000 Unit(s) SubCutaneous every 8 hours  hydrocortisone sodium succinate Injectable 50 milliGRAM(s) IV Push every 6 hours  insulin lispro (ADMELOG) corrective regimen sliding scale   SubCutaneous every 6 hours  levothyroxine Injectable 37.5 MICROGram(s) IV Push <User Schedule>  pantoprazole   Suspension 40 milliGRAM(s) Oral daily  polyethylene glycol 3350 17 Gram(s) Oral daily  senna 2 Tablet(s) Oral at bedtime  sodium chloride 0.9%. 1000 milliLiter(s) IV Continuous <Continuous>  sodium chloride 3%  Inhalation 4 milliLiter(s) Inhalation every 4 hours  sodium zirconium cyclosilicate 10 Gram(s) Oral daily  tacrolimus  IVPB 0.6 milliGRAM(s) IV Intermittent every 24 hours      Vital Signs Last 24 Hrs  T(C): 37.1 (14 May 2025 09:00), Max: 37.1 (14 May 2025 09:00)  T(F): 98.8 (14 May 2025 09:00), Max: 98.8 (14 May 2025 09:00)  HR: 80 (14 May 2025 09:00) (55 - 101)  BP: 164/73 (14 May 2025 09:00) (109/58 - 168/73)  BP(mean): 105 (14 May 2025 09:00) (80 - 105)  RR: 17 (14 May 2025 09:00) (12 - 29)  SpO2: 98% (14 May 2025 09:00) (97% - 100%)    Parameters below as of 14 May 2025 09:00  Patient On (Oxygen Delivery Method): room air    Neurological Exam:   General: Pleasant male. Able to speak in full sentences on room air but unable to count above 9 in single breath test.  Mental status: Awake, alert. Perseveration of answers and motor commands. Able to follow simple commands. No dysarthria.  Cranial nerves: Pupils are pinpoint both s/p cataract surgery. Baseline L eye blindness, some R peripheral vision intact able to read writer's ID. Today milder Dysconjugate gaze more often conjugate, still with difficulty looking down possible bilateral ptosis of upper and lower lid (upper lid covers top of iris, sclera is seen above lower lid). Cheek puff intact today, smile is slightly less horizontal. Tongue is midline strength OK.   Motor:  MRC grading 5/5 b/l UE/LE.   strength 5/5.  Normal tone and bulk.  No abnormal movements. No fatigability   Sensation: Intact to light touch  Coordination: No tremor with extension. limited exam due to vision  Reflexes: 1+ biceps 1+ patella  Gait: deferred      Labs:  CBC Full  -  ( 14 May 2025 05:26 )  WBC Count : 10.43 K/uL  RBC Count : 3.95 M/uL  Hemoglobin : 12.1 g/dL  Hematocrit : 35.7 %  Platelet Count - Automated : 133 K/uL  Mean Cell Volume : 90.4 fl  Mean Cell Hemoglobin : 30.6 pg  Mean Cell Hemoglobin Concentration : 33.9 g/dL  Auto Neutrophil # : x  Auto Lymphocyte # : x  Auto Monocyte # : x  Auto Eosinophil # : x  Auto Basophil # : x  Auto Neutrophil % : x  Auto Lymphocyte % : x  Auto Monocyte % : x  Auto Eosinophil % : x  Auto Basophil % : x    05-14    135  |  103  |  21  ----------------------------<  282[H]  3.7   |  21[L]  |  1.05    Ca    8.4      14 May 2025 05:26  Phos  2.7     05-14  Mg     2.3     05-14          Urinalysis Basic - ( 14 May 2025 05:26 )    Color: x / Appearance: x / SG: x / pH: x  Gluc: 282 mg/dL / Ketone: x  / Bili: x / Urobili: x   Blood: x / Protein: x / Nitrite: x   Leuk Esterase: x / RBC: x / WBC x   Sq Epi: x / Non Sq Epi: x / Bacteria: x        < from: CT Head No Cont (05.13.25 @ 17:39) >  FINDINGS:  There is no CT evidence of acute transcortical infarct. Age-related   involutional changes and chronic microvascular ischemic changes.    There is no hydrocephalus, mass effect, or acute intracranial hemorrhage.   No extra-axial collection. Basal cisterns are patent.    The visualized paranasal sinuses and mastoid air cells are clear.    The calvarium is intact.    IMPRESSION:  No evidence of acute transcortical infarct, acute intracranial   hemorrhage, or mass effect.      < end of copied text >

## 2025-05-14 NOTE — PROGRESS NOTE ADULT - PROBLEM SELECTOR PLAN 1
#septic shock 2/2 PNA  #Immunocompromised  Patient with shock, likely septic in the setting of pneumonia. Cardiac enzymes WNL. EKG sinus tachycardia  weaned off levo gtt 5/12 8 pm  warm, well-perfused, euvolemic, cap refill <3 seconds, making good UOP       - MAP goal >65  Given CT findings of ground glass opacities and bilateral consolidations, concern for underlying pneumonia. MRSA, RVP, urine strep and legionella negative.   BAL with gram+ cocci in pairs and chains  sp Zosyn 4.5mg 5/12-5/13, sp vancomycin 1g Q24H 5/12-5/13, sp azithromycin 500mg for 3 days 5/11-5/13  - hold mycophenolate for now   - transition to CTX 2g 5/13-5/18  - f/u galactomannan, aspergillus, fungitell, BAL cell count no #septic shock 2/2 PNA  #Immunocompromised  Patient with shock, now resolved, likely septic in the setting of pneumonia. Cardiac enzymes WNL. EKG sinus tachycardia  weaned off levo gtt 5/12 8 pm  warm, well-perfused, euvolemic, cap refill <3 seconds, making good UOP       - MAP goal >65  Given CT findings of ground glass opacities and bilateral consolidations, concern for underlying pneumonia. MRSA, RVP, urine strep and legionella negative.   BAL with gram+ cocci in pairs and chains  sp Zosyn 4.5mg 5/12-5/13, sp vancomycin 1g Q24H 5/12-5/13, sp azithromycin 500mg for 3 days 5/11-5/13  - hold mycophenolate for now   - transition to CTX 2g 5/13-5/18  - f/u galactomannan, aspergillus, fungitell, BAL cell count

## 2025-05-15 ENCOUNTER — TRANSCRIPTION ENCOUNTER (OUTPATIENT)
Age: 75
End: 2025-05-15

## 2025-05-15 VITALS — OXYGEN SATURATION: 98 % | RESPIRATION RATE: 16 BRPM | HEART RATE: 80 BPM

## 2025-05-15 DIAGNOSIS — J18.9 PNEUMONIA, UNSPECIFIED ORGANISM: ICD-10-CM

## 2025-05-15 LAB
ALBUMIN SERPL ELPH-MCNC: 3.3 G/DL — SIGNIFICANT CHANGE UP (ref 3.3–5)
ALP SERPL-CCNC: 46 U/L — SIGNIFICANT CHANGE UP (ref 40–120)
ALT FLD-CCNC: 14 U/L — SIGNIFICANT CHANGE UP (ref 10–45)
ANION GAP SERPL CALC-SCNC: 12 MMOL/L — SIGNIFICANT CHANGE UP (ref 5–17)
AST SERPL-CCNC: 18 U/L — SIGNIFICANT CHANGE UP (ref 10–40)
BASOPHILS # BLD AUTO: 0.02 K/UL — SIGNIFICANT CHANGE UP (ref 0–0.2)
BASOPHILS NFR BLD AUTO: 0.2 % — SIGNIFICANT CHANGE UP (ref 0–2)
BILIRUB SERPL-MCNC: 0.8 MG/DL — SIGNIFICANT CHANGE UP (ref 0.2–1.2)
BUN SERPL-MCNC: 15 MG/DL — SIGNIFICANT CHANGE UP (ref 7–23)
CALCIUM SERPL-MCNC: 9.1 MG/DL — SIGNIFICANT CHANGE UP (ref 8.4–10.5)
CHLORIDE SERPL-SCNC: 106 MMOL/L — SIGNIFICANT CHANGE UP (ref 96–108)
CO2 SERPL-SCNC: 24 MMOL/L — SIGNIFICANT CHANGE UP (ref 22–31)
CREAT SERPL-MCNC: 0.94 MG/DL — SIGNIFICANT CHANGE UP (ref 0.5–1.3)
EGFR: 83 ML/MIN/1.73M2 — SIGNIFICANT CHANGE UP
EGFR: 83 ML/MIN/1.73M2 — SIGNIFICANT CHANGE UP
EOSINOPHIL # BLD AUTO: 0.04 K/UL — SIGNIFICANT CHANGE UP (ref 0–0.5)
EOSINOPHIL NFR BLD AUTO: 0.4 % — SIGNIFICANT CHANGE UP (ref 0–6)
GLUCOSE SERPL-MCNC: 112 MG/DL — HIGH (ref 70–99)
HCT VFR BLD CALC: 39 % — SIGNIFICANT CHANGE UP (ref 39–50)
HGB BLD-MCNC: 12.9 G/DL — LOW (ref 13–17)
IMM GRANULOCYTES NFR BLD AUTO: 1.2 % — HIGH (ref 0–0.9)
LYMPHOCYTES # BLD AUTO: 0.78 K/UL — LOW (ref 1–3.3)
LYMPHOCYTES # BLD AUTO: 7.6 % — LOW (ref 13–44)
MAGNESIUM SERPL-MCNC: 2 MG/DL — SIGNIFICANT CHANGE UP (ref 1.6–2.6)
MCHC RBC-ENTMCNC: 30.6 PG — SIGNIFICANT CHANGE UP (ref 27–34)
MCHC RBC-ENTMCNC: 33.1 G/DL — SIGNIFICANT CHANGE UP (ref 32–36)
MCV RBC AUTO: 92.6 FL — SIGNIFICANT CHANGE UP (ref 80–100)
MONOCYTES # BLD AUTO: 0.89 K/UL — SIGNIFICANT CHANGE UP (ref 0–0.9)
MONOCYTES NFR BLD AUTO: 8.7 % — SIGNIFICANT CHANGE UP (ref 2–14)
NEUTROPHILS # BLD AUTO: 8.42 K/UL — HIGH (ref 1.8–7.4)
NEUTROPHILS NFR BLD AUTO: 81.9 % — HIGH (ref 43–77)
NRBC BLD AUTO-RTO: 0 /100 WBCS — SIGNIFICANT CHANGE UP (ref 0–0)
PHOSPHATE SERPL-MCNC: 1.5 MG/DL — LOW (ref 2.5–4.5)
PLATELET # BLD AUTO: 166 K/UL — SIGNIFICANT CHANGE UP (ref 150–400)
POTASSIUM SERPL-MCNC: 3.2 MMOL/L — LOW (ref 3.5–5.3)
POTASSIUM SERPL-SCNC: 3.2 MMOL/L — LOW (ref 3.5–5.3)
PROT SERPL-MCNC: 6.1 G/DL — SIGNIFICANT CHANGE UP (ref 6–8.3)
RBC # BLD: 4.21 M/UL — SIGNIFICANT CHANGE UP (ref 4.2–5.8)
RBC # FLD: 12.3 % — SIGNIFICANT CHANGE UP (ref 10.3–14.5)
SODIUM SERPL-SCNC: 142 MMOL/L — SIGNIFICANT CHANGE UP (ref 135–145)
TACROLIMUS SERPL-MCNC: 12.5 NG/ML — SIGNIFICANT CHANGE UP
WBC # BLD: 10.27 K/UL — SIGNIFICANT CHANGE UP (ref 3.8–10.5)
WBC # FLD AUTO: 10.27 K/UL — SIGNIFICANT CHANGE UP (ref 3.8–10.5)

## 2025-05-15 PROCEDURE — 76776 US EXAM K TRANSPL W/DOPPLER: CPT

## 2025-05-15 PROCEDURE — 87899 AGENT NOS ASSAY W/OPTIC: CPT

## 2025-05-15 PROCEDURE — 83605 ASSAY OF LACTIC ACID: CPT

## 2025-05-15 PROCEDURE — 84145 PROCALCITONIN (PCT): CPT

## 2025-05-15 PROCEDURE — 87206 SMEAR FLUORESCENT/ACID STAI: CPT

## 2025-05-15 PROCEDURE — 95700 EEG CONT REC W/VID EEG TECH: CPT

## 2025-05-15 PROCEDURE — 87116 MYCOBACTERIA CULTURE: CPT

## 2025-05-15 PROCEDURE — 96375 TX/PRO/DX INJ NEW DRUG ADDON: CPT

## 2025-05-15 PROCEDURE — 92610 EVALUATE SWALLOWING FUNCTION: CPT

## 2025-05-15 PROCEDURE — 99239 HOSP IP/OBS DSCHRG MGMT >30: CPT | Mod: GC

## 2025-05-15 PROCEDURE — 87633 RESP VIRUS 12-25 TARGETS: CPT

## 2025-05-15 PROCEDURE — 82553 CREATINE MB FRACTION: CPT

## 2025-05-15 PROCEDURE — 87305 ASPERGILLUS AG IA: CPT

## 2025-05-15 PROCEDURE — 93005 ELECTROCARDIOGRAM TRACING: CPT

## 2025-05-15 PROCEDURE — 70498 CT ANGIOGRAPHY NECK: CPT

## 2025-05-15 PROCEDURE — 87040 BLOOD CULTURE FOR BACTERIA: CPT

## 2025-05-15 PROCEDURE — 85610 PROTHROMBIN TIME: CPT

## 2025-05-15 PROCEDURE — 82330 ASSAY OF CALCIUM: CPT

## 2025-05-15 PROCEDURE — 86850 RBC ANTIBODY SCREEN: CPT

## 2025-05-15 PROCEDURE — 82550 ASSAY OF CK (CPK): CPT

## 2025-05-15 PROCEDURE — 87449 NOS EACH ORGANISM AG IA: CPT

## 2025-05-15 PROCEDURE — 86606 ASPERGILLUS ANTIBODY: CPT

## 2025-05-15 PROCEDURE — 86900 BLOOD TYPING SEROLOGIC ABO: CPT

## 2025-05-15 PROCEDURE — 84443 ASSAY THYROID STIM HORMONE: CPT

## 2025-05-15 PROCEDURE — 87635 SARS-COV-2 COVID-19 AMP PRB: CPT

## 2025-05-15 PROCEDURE — 87594 PNEUMCYSTS JIROVECII AMP PRB: CPT

## 2025-05-15 PROCEDURE — 96374 THER/PROPH/DIAG INJ IV PUSH: CPT | Mod: XU

## 2025-05-15 PROCEDURE — 86596 VOLTAGE-GTD CA CHNL ANTB EA: CPT

## 2025-05-15 PROCEDURE — 86042 ACETYLCHOLN RCPTR BLCKG ANTB: CPT

## 2025-05-15 PROCEDURE — 80197 ASSAY OF TACROLIMUS: CPT

## 2025-05-15 PROCEDURE — 86041 ACETYLCHOLN RCPTR BNDNG ANTB: CPT

## 2025-05-15 PROCEDURE — 87015 SPECIMEN INFECT AGNT CONCNTJ: CPT

## 2025-05-15 PROCEDURE — 71250 CT THORAX DX C-: CPT

## 2025-05-15 PROCEDURE — 71045 X-RAY EXAM CHEST 1 VIEW: CPT

## 2025-05-15 PROCEDURE — 84484 ASSAY OF TROPONIN QUANT: CPT

## 2025-05-15 PROCEDURE — 81001 URINALYSIS AUTO W/SCOPE: CPT

## 2025-05-15 PROCEDURE — 97161 PT EVAL LOW COMPLEX 20 MIN: CPT

## 2025-05-15 PROCEDURE — 84295 ASSAY OF SERUM SODIUM: CPT

## 2025-05-15 PROCEDURE — 36415 COLL VENOUS BLD VENIPUNCTURE: CPT

## 2025-05-15 PROCEDURE — 89051 BODY FLUID CELL COUNT: CPT

## 2025-05-15 PROCEDURE — 99291 CRITICAL CARE FIRST HOUR: CPT | Mod: 25

## 2025-05-15 PROCEDURE — 87640 STAPH A DNA AMP PROBE: CPT

## 2025-05-15 PROCEDURE — 97165 OT EVAL LOW COMPLEX 30 MIN: CPT

## 2025-05-15 PROCEDURE — 83519 RIA NONANTIBODY: CPT

## 2025-05-15 PROCEDURE — 70496 CT ANGIOGRAPHY HEAD: CPT

## 2025-05-15 PROCEDURE — 87070 CULTURE OTHR SPECIMN AEROBIC: CPT

## 2025-05-15 PROCEDURE — 80053 COMPREHEN METABOLIC PANEL: CPT

## 2025-05-15 PROCEDURE — 83880 ASSAY OF NATRIURETIC PEPTIDE: CPT

## 2025-05-15 PROCEDURE — 95705 EEG W/O VID 2-12 HR UNMNTR: CPT

## 2025-05-15 PROCEDURE — 84132 ASSAY OF SERUM POTASSIUM: CPT

## 2025-05-15 PROCEDURE — 0042T: CPT

## 2025-05-15 PROCEDURE — 83615 LACTATE (LD) (LDH) ENZYME: CPT

## 2025-05-15 PROCEDURE — 83735 ASSAY OF MAGNESIUM: CPT

## 2025-05-15 PROCEDURE — 83036 HEMOGLOBIN GLYCOSYLATED A1C: CPT

## 2025-05-15 PROCEDURE — 87102 FUNGUS ISOLATION CULTURE: CPT

## 2025-05-15 PROCEDURE — 93306 TTE W/DOPPLER COMPLETE: CPT

## 2025-05-15 PROCEDURE — 85027 COMPLETE CBC AUTOMATED: CPT

## 2025-05-15 PROCEDURE — 84425 ASSAY OF VITAMIN B-1: CPT

## 2025-05-15 PROCEDURE — 94002 VENT MGMT INPAT INIT DAY: CPT

## 2025-05-15 PROCEDURE — 80048 BASIC METABOLIC PNL TOTAL CA: CPT

## 2025-05-15 PROCEDURE — 86043 ACETYLCHOLN RCPTR MODLG ANTB: CPT

## 2025-05-15 PROCEDURE — 86901 BLOOD TYPING SEROLOGIC RH(D): CPT

## 2025-05-15 PROCEDURE — 82962 GLUCOSE BLOOD TEST: CPT

## 2025-05-15 PROCEDURE — 85730 THROMBOPLASTIN TIME PARTIAL: CPT

## 2025-05-15 PROCEDURE — 31500 INSERT EMERGENCY AIRWAY: CPT

## 2025-05-15 PROCEDURE — 87205 SMEAR GRAM STAIN: CPT

## 2025-05-15 PROCEDURE — 0225U NFCT DS DNA&RNA 21 SARSCOV2: CPT

## 2025-05-15 PROCEDURE — 85025 COMPLETE CBC W/AUTO DIFF WBC: CPT

## 2025-05-15 PROCEDURE — 84100 ASSAY OF PHOSPHORUS: CPT

## 2025-05-15 PROCEDURE — 94640 AIRWAY INHALATION TREATMENT: CPT

## 2025-05-15 PROCEDURE — 87641 MR-STAPH DNA AMP PROBE: CPT

## 2025-05-15 PROCEDURE — 83520 IMMUNOASSAY QUANT NOS NONAB: CPT

## 2025-05-15 PROCEDURE — 70450 CT HEAD/BRAIN W/O DYE: CPT

## 2025-05-15 PROCEDURE — 95708 EEG WO VID EA 12-26HR UNMNTR: CPT

## 2025-05-15 PROCEDURE — 82803 BLOOD GASES ANY COMBINATION: CPT

## 2025-05-15 RX ORDER — CALCIUM CARBONATE 750 MG/1
1 TABLET ORAL
Qty: 0 | Refills: 0 | DISCHARGE
Start: 2025-05-15

## 2025-05-15 RX ORDER — POLYETHYLENE GLYCOL 3350 17 G/17G
17 POWDER, FOR SOLUTION ORAL
Qty: 0 | Refills: 0 | DISCHARGE
Start: 2025-05-15

## 2025-05-15 RX ORDER — SENNA 187 MG
2 TABLET ORAL
Qty: 0 | Refills: 0 | DISCHARGE
Start: 2025-05-15

## 2025-05-15 RX ORDER — CALCIUM CARBONATE 750 MG/1
1250 TABLET ORAL
Refills: 0 | DISCHARGE

## 2025-05-15 RX ORDER — TAMSULOSIN HYDROCHLORIDE 0.4 MG/1
0.4 CAPSULE ORAL AT BEDTIME
Refills: 0 | Status: DISCONTINUED | OUTPATIENT
Start: 2025-05-15 | End: 2025-05-15

## 2025-05-15 RX ORDER — NIFEDIPINE 30 MG
60 TABLET, EXTENDED RELEASE 24 HR ORAL EVERY 24 HOURS
Refills: 0 | Status: DISCONTINUED | OUTPATIENT
Start: 2025-05-15 | End: 2025-05-15

## 2025-05-15 RX ORDER — SOD PHOS DI, MONO/K PHOS MONO 250 MG
1 TABLET ORAL ONCE
Refills: 0 | Status: COMPLETED | OUTPATIENT
Start: 2025-05-15 | End: 2025-05-15

## 2025-05-15 RX ORDER — TAMSULOSIN HYDROCHLORIDE 0.4 MG/1
1 CAPSULE ORAL
Qty: 0 | Refills: 0 | DISCHARGE
Start: 2025-05-15

## 2025-05-15 RX ORDER — MYCOPHENOLATE MOFETIL 500 MG/1
1 TABLET, FILM COATED ORAL
Refills: 0 | DISCHARGE

## 2025-05-15 RX ORDER — TAMSULOSIN HYDROCHLORIDE 0.4 MG/1
1 CAPSULE ORAL
Qty: 30 | Refills: 0
Start: 2025-05-15 | End: 2025-06-13

## 2025-05-15 RX ORDER — POTASSIUM PHOSPHATE, MONOBASIC POTASSIUM PHOSPHATE, DIBASIC INJECTION, 236; 224 MG/ML; MG/ML
30 SOLUTION, CONCENTRATE INTRAVENOUS ONCE
Refills: 0 | Status: COMPLETED | OUTPATIENT
Start: 2025-05-15 | End: 2025-05-15

## 2025-05-15 RX ORDER — CLOPIDOGREL BISULFATE 75 MG/1
1 TABLET, FILM COATED ORAL
Qty: 0 | Refills: 0 | DISCHARGE

## 2025-05-15 RX ORDER — NIFEDIPINE 30 MG
1 TABLET, EXTENDED RELEASE 24 HR ORAL
Qty: 0 | Refills: 0 | DISCHARGE
Start: 2025-05-15

## 2025-05-15 RX ADMIN — CALCIUM CARBONATE 1 TABLET(S): 750 TABLET ORAL at 12:28

## 2025-05-15 RX ADMIN — Medication 1 PACKET(S): at 12:28

## 2025-05-15 RX ADMIN — HEPARIN SODIUM 5000 UNIT(S): 1000 INJECTION INTRAVENOUS; SUBCUTANEOUS at 12:49

## 2025-05-15 RX ADMIN — TACROLIMUS 1 MILLIGRAM(S): 0.5 CAPSULE ORAL at 06:29

## 2025-05-15 RX ADMIN — POTASSIUM PHOSPHATE, MONOBASIC POTASSIUM PHOSPHATE, DIBASIC INJECTION, 83.33 MILLIMOLE(S): 236; 224 SOLUTION, CONCENTRATE INTRAVENOUS at 12:26

## 2025-05-15 RX ADMIN — CEFTRIAXONE 100 MILLIGRAM(S): 500 INJECTION, POWDER, FOR SOLUTION INTRAMUSCULAR; INTRAVENOUS at 13:41

## 2025-05-15 RX ADMIN — Medication 81 MILLIGRAM(S): at 12:29

## 2025-05-15 RX ADMIN — Medication 40 MILLIGRAM(S): at 12:29

## 2025-05-15 RX ADMIN — Medication 50 MICROGRAM(S): at 06:29

## 2025-05-15 RX ADMIN — HEPARIN SODIUM 5000 UNIT(S): 1000 INJECTION INTRAVENOUS; SUBCUTANEOUS at 06:29

## 2025-05-15 NOTE — PROGRESS NOTE ADULT - PROBLEM SELECTOR PLAN 1
#septic shock 2/2 PNA  #Immunocompromised  Patient with shock, now resolved, likely septic in the setting of pneumonia. Cardiac enzymes WNL. EKG sinus tachycardia  weaned off levo gtt 5/12 8 pm  warm, well-perfused, euvolemic, cap refill <3 seconds, making good UOP       - MAP goal >65  Given CT findings of ground glass opacities and bilateral consolidations, concern for underlying pneumonia. MRSA, RVP, urine strep and legionella negative.   BAL with gram+ cocci in pairs and chains  sp Zosyn 4.5mg 5/12-5/13, sp vancomycin 1g Q24H 5/12-5/13, sp azithromycin 500mg for 3 days 5/11-5/13  - hold mycophenolate for now   - transition to CTX 2g 5/13-5/18  - f/u galactomannan, aspergillus, fungitell, BAL cell count #septic shock 2/2 PNA  #Immunocompromised  Patient with shock, now resolved, likely septic in the setting of pneumonia. Cardiac enzymes WNL. EKG sinus tachycardia  weaned off levo gtt 5/12 8 pm  warm, well-perfused, euvolemic, cap refill <3 seconds, making good UOP       - MAP goal >65  Given CT findings of ground glass opacities and bilateral consolidations, concern for underlying pneumonia. MRSA, RVP, urine strep and legionella negative.   BAL with gram+ cocci in pairs and chains  sp Zosyn 4.5mg 5/12-5/13, sp vancomycin 1g Q24H 5/12-5/13, sp azithromycin 500mg for 3 days 5/11-5/13  - hold mycophenolate for now   - cw CTX 2g 5/13-5/18  - f/u galactomannan, aspergillus, fungitell, BAL cell count

## 2025-05-15 NOTE — PHYSICAL THERAPY INITIAL EVALUATION ADULT - GENERAL OBSERVATIONS, REHAB EVAL
Pt received semi supine in bed in NAD, +HEP lock, +OT Fabián present, RN Kim cleared Pt for PT/OT eval. Pt is AO x 4 and agreeable to participate in PT session.

## 2025-05-15 NOTE — DISCHARGE NOTE NURSING/CASE MANAGEMENT/SOCIAL WORK - PATIENT PORTAL LINK FT
You can access the FollowMyHealth Patient Portal offered by [facility] by registering at the following website: [email or web address]. By joining [email or web address]’s FollowMyHealth portal, you will also be able to view your health information using other applications (apps) compatible with our system.

## 2025-05-15 NOTE — PROGRESS NOTE ADULT - PROBLEM SELECTOR PLAN 9
#hyperglycemia 2/2 steroid use  Hx DM, on Lantus 23 units and Tradjenta at home  Sugars have been uncontrolled, range 202-238  - c/w sliding scale insulin  - q6hr finger sticks  Consistent carb diet  -Start Lantus today
#hyperglycemia 2/2 steroid use  Hx DM, on Lantus 23 units and Tradjenta at home  Sugars have been uncontrolled, range 202-238  - c/w sliding scale insulin  - q6hr finger sticks  Consistent carb diet  -Start Lantus today

## 2025-05-15 NOTE — DISCHARGE NOTE PROVIDER - PROVIDER TOKENS
PROVIDER:[TOKEN:[[id number]],FOLLOWUP:[2 weeks]],PROVIDER:[TOKEN:[[id number]],FOLLOWUP:[2 weeks]] PROVIDER:[TOKEN:[[id number]],FOLLOWUP:[2 weeks]],PROVIDER:[TOKEN:[[id number]],FOLLOWUP:[2 weeks]]

## 2025-05-15 NOTE — OCCUPATIONAL THERAPY INITIAL EVALUATION ADULT - FINE MOTOR COORDINATION, LEFT HAND, FINGER TO NOSE, OT EVAL
Patient would like communication of their results via:          Cell Phone:   Telephone Information:   Mobile [phone]     Okay to leave a message containing results? Yes      EPWORTH SLEEPINESS SCALE - PATIENT RESPONSES    Situation             Response  Sitting and reading 1   Watching TV 2   Sitting inactive in a public place (e.g. A theatre or a meeting) 1   As a passenger in a car for an hour without a break 0   Lying down to rest in the afternoon when circumstances permit 3   Sitting and talking to someone 0   Sitting quietly after lunch without alcohol 3   In a car while stopped for a few minutes in traffic 0   Total score 10        normal performance

## 2025-05-15 NOTE — PROGRESS NOTE ADULT - PROBLEM SELECTOR PLAN 11
Patient with urinary retention and distended bladder on physical exam s/p intubation. Rothman placed in ED on 5/11.   -rothman removed 5/14, Trial of Void with high   Pt urinated 400cc and repeat  -> pt refusing SC at this time Patient with urinary retention and distended bladder on physical exam s/p intubation. Rothman placed in ED on 5/11.   -rothman removed 5/14, Trial of Void with high   Pt urinated 400cc and repeat  -> pt refusing SC at this time    - start tamsulosin

## 2025-05-15 NOTE — PHYSICAL THERAPY INITIAL EVALUATION ADULT - ADDITIONAL COMMENTS
Pt is right handed and wears glasses for reading. He lives with his family in an elevator access apartment with no steps to enter. Pt reports being independent at baseline for all functional mobility and ADLs at baseline. He works from home and takes nightly walks in the park. Pt does not own any DME. Of note, Pt states that his daughter in law is home with him during the day every day.

## 2025-05-15 NOTE — PROGRESS NOTE ADULT - PROBLEM SELECTOR PLAN 6
#c/f MG  Given presentation of choking on tofu, B/L ptosis there was concern for Myasthenia Gravis  As per son, this is first episode of dysphagia and patient normally can tolerate solid food w/o difficulty  Plan:  MG/Lambert Eaton eval pending  NIF, vital capacity pending
#c/f MG  Given presentation of choking on tofu, B/L ptosis there was concern for Myasthenia Gravis  As per son, this is first episode of dysphagia and patient normally can tolerate solid food w/o difficulty  Plan:  MG/Lambert Eaton eval pending  NIF, vital capacity pending

## 2025-05-15 NOTE — CHART NOTE - NSCHARTNOTEFT_GEN_A_CORE
Chart reviewed. Allograft function appears stable. Passed swallow evaluation now on oral tacrolimus. Continue home dose. Please check tacrolimus level tomorrow morning 30 min to 1 hr prior to AM dose. Discussed with attending Dr. Henderson.

## 2025-05-15 NOTE — OCCUPATIONAL THERAPY INITIAL EVALUATION ADULT - MODIFIED CLINICAL TEST OF SENSORY INTEGRATION IN BALANCE TEST
Pt. performed functional mob in hallway with Min A and Hand Held Assist, noted very unsteady ~2 small LOB, noted with narrow CELI

## 2025-05-15 NOTE — PROGRESS NOTE ADULT - SUBJECTIVE AND OBJECTIVE BOX
INTERVAL EVENTS:   No acute events overnight.    SUBJECTIVE:   Patient seen and examined at bedside. No acute complaints at this time.    PHYSICAL EXAM:  Constitutional: resting comfortably in bed; NAD  Eyes: anicteric sclera  Respiratory: CTA B/L; no W/R/R, no retractions  Cardiac: +S1/S2; RRR  Gastrointestinal: soft, NT/ND; no rebound or guarding;  Extremities: no edema    VITAL SIGNS:  Vital Signs Last 24 Hrs  T(C): 37.7 (15 May 2025 06:08), Max: 37.7 (15 May 2025 06:08)  T(F): 99.8 (15 May 2025 06:08), Max: 99.8 (15 May 2025 06:08)  HR: 82 (15 May 2025 06:08) (76 - 94)  BP: 170/77 (15 May 2025 06:08) (147/67 - 174/72)  BP(mean): 104 (14 May 2025 21:00) (97 - 119)  RR: 18 (15 May 2025 06:08) (15 - 30)  SpO2: 96% (15 May 2025 06:08) (94% - 99%)    Parameters below as of 14 May 2025 22:35  Patient On (Oxygen Delivery Method): room air      INPATIENT MEDICATIONS:   MEDICATIONS  (STANDING):  aspirin  chewable 81 milliGRAM(s) Oral daily  atorvastatin 40 milliGRAM(s) Oral at bedtime  calcium carbonate   1250 mG (OsCal) 1 Tablet(s) Oral daily  cefTRIAXone   IVPB 2000 milliGRAM(s) IV Intermittent every 24 hours  clopidogrel Tablet 75 milliGRAM(s) Oral every 24 hours  dextrose 5%. 1000 milliLiter(s) (100 mL/Hr) IV Continuous <Continuous>  dextrose 5%. 1000 milliLiter(s) (50 mL/Hr) IV Continuous <Continuous>  dextrose 50% Injectable 25 Gram(s) IV Push once  dextrose 50% Injectable 12.5 Gram(s) IV Push once  dextrose 50% Injectable 25 Gram(s) IV Push once  glucagon  Injectable 1 milliGRAM(s) IntraMuscular once  heparin   Injectable 5000 Unit(s) SubCutaneous every 8 hours  insulin glargine Injectable (LANTUS) 14 Unit(s) SubCutaneous at bedtime  insulin lispro (ADMELOG) corrective regimen sliding scale   SubCutaneous every 6 hours  levothyroxine 50 MICROGram(s) Oral daily  losartan 50 milliGRAM(s) Oral daily  pantoprazole   Suspension 40 milliGRAM(s) Oral daily  polyethylene glycol 3350 17 Gram(s) Oral daily  potassium chloride   Powder 40 milliEquivalent(s) Oral once  potassium phosphate / sodium phosphate Powder (PHOS-NaK) 1 Packet(s) Oral once  senna 2 Tablet(s) Oral at bedtime  tacrolimus 1 milliGRAM(s) Oral <User Schedule>    MEDICATIONS  (PRN):  dextrose Oral Gel 15 Gram(s) Oral once PRN Blood Glucose LESS THAN 70 milliGRAM(s)/deciliter    ALLERGIES:  Allergies    chlorthalidone (Other)    Intolerances    LABS:                       12.9   10.27 )-----------( 166      ( 15 May 2025 05:30 )             39.0     05-15    142  |  106  |  15  ----------------------------<  112[H]  3.2[L]   |  24  |  0.94    Ca    9.1      15 May 2025 05:30  Phos  1.5     05-15  Mg     2.0     05-15    TPro  6.1  /  Alb  3.3  /  TBili  0.8  /  DBili  x   /  AST  18  /  ALT  14  /  AlkPhos  46  05-15      Urinalysis Basic - ( 15 May 2025 05:30 )    Color: x / Appearance: x / SG: x / pH: x  Gluc: 112 mg/dL / Ketone: x  / Bili: x / Urobili: x   Blood: x / Protein: x / Nitrite: x   Leuk Esterase: x / RBC: x / WBC x   Sq Epi: x / Non Sq Epi: x / Bacteria: x      CAPILLARY BLOOD GLUCOSE      POCT Blood Glucose.: 112 mg/dL (15 May 2025 08:26)    RADIOLOGY & ADDITIONAL TESTS: Reviewed.

## 2025-05-15 NOTE — DISCHARGE NOTE NURSING/CASE MANAGEMENT/SOCIAL WORK - FINANCIAL ASSISTANCE
[facility] provides services at a reduced cost to those who are determined to be eligible through [facility]’s financial assistance program. Information regarding [facility]’s financial assistance program can be found by going to [email or web address] or by calling [phone].

## 2025-05-15 NOTE — PROGRESS NOTE ADULT - PROBLEM SELECTOR PROBLEM 5
(HFpEF) heart failure with preserved ejection fraction
(HFpEF) heart failure with preserved ejection fraction
Never smoker

## 2025-05-15 NOTE — PROGRESS NOTE ADULT - PROBLEM SELECTOR PLAN 7
#S/p Renal Transplant 2023  Patient with a hx of CKD, s/p transplant in 2023. Creatinine 1.34 on presentation, GFR 55, likely at baseline. Patient takes Lokelma 10mg QD, calcium carbonate 1250mg BID, mycophenolate mofetil 500mg BID, tacrolimus (prograf) 1mg BID  Outpatient nephrologist - Dr. Javad HOPPER kidney US: 1. No evidence of hemodynamically significant stenosis of the transplant renal artery. No hydronephrosis. No acute findings regarding the RLQ transplant kidney.  Increased echogenicity of the native right kidney suggestive of   medical renal disease.  - resume PO tacrolimus (prograf) 1mg BID (dose confirmed with outpt pharmacy)  - hold mycophenolate mofetil iso active infection   - c/w calcium carbonate  - nephrology following, appreciate excellent
#S/p Renal Transplant 2023  Patient with a hx of CKD, s/p transplant in 2023. Creatinine 1.34 on presentation, GFR 55, likely at baseline. Patient takes Lokelma 10mg QD, calcium carbonate 1250mg BID, mycophenolate mofetil 500mg BID, tacrolimus (prograf) 1mg BID  Outpatient nephrologist - Dr. Javad HOPPER kidney US: 1. No evidence of hemodynamically significant stenosis of the transplant renal artery. No hydronephrosis. No acute findings regarding the RLQ transplant kidney.  Increased echogenicity of the native right kidney suggestive of   medical renal disease.  - resume PO tacrolimus (prograf) 1mg BID (dose confirmed with outpt pharmacy)  - hold mycophenolate mofetil iso active infection   - c/w calcium carbonate  - nephrology following, appreciate excellent

## 2025-05-15 NOTE — PHYSICAL THERAPY INITIAL EVALUATION ADULT - ADL SKILLS, REHAB EVAL
no bowel dysfunction/no bladder dysfunction/no numbness/no fever, no chills, no weakness independent no difficulty bearing weight/no motor function loss/no numbness/no bowel dysfunction/no fever, no chills, no weakness/no bladder dysfunction

## 2025-05-15 NOTE — PROGRESS NOTE ADULT - PROBLEM SELECTOR PLAN 3
likely 2/2 hypoxic injury iso aspiration  Pt reportedly with deviated gaze. Stroke w/u neg for any acute stroke or path. s/p ativan. no hx of epilepsy or seizures in past  No seizure like activity seen on vEEG  Tacrolimus level 6.4  - neurology following, appreciate recs       - MR brain outpatient       - no current indication for AEDs
likely 2/2 hypoxic injury iso aspiration  Pt reportedly with deviated gaze. Stroke w/u neg for any acute stroke or path. s/p ativan. no hx of epilepsy or seizures in past  No seizure like activity seen on vEEG  Tacrolimus level 6.4  - neurology following, appreciate recs       - MR brain outpatient       - no current indication for AEDs

## 2025-05-15 NOTE — OCCUPATIONAL THERAPY INITIAL EVALUATION ADULT - PERTINENT HX OF CURRENT PROBLEM, REHAB EVAL
75y year old Male with a PMHx significant for HTN, HLD, IDDM2, HFpEF, CKD 2-3 (s/p Renal transplant in 2023), CAD s/p PCI w/ RAMANA-pLAD (6/2022), urinary retention, retinopathy and hypothyroidism presents to the ED after a witnessed choking episode requiring resuscitation by EMS. Per son (Gus), pt was in usual state of health and was having dinner at a restaurant when he acutely began choking on a piece of tofu. Pt was unable to clear his airway and EMS was called and patient was brought to the ED. Per son no associated prodromal events or symptoms and denies any changes in mentation, chest pain, shortness of breath, seizure like activity preceding the event. In the ED, patient was intubated for airway protection and a large piece of tofu was extracted from the oropharynx. Per ED attending c/f deviated gaze and stroke code called. Possible seizure like activity and ativan was given.

## 2025-05-15 NOTE — DISCHARGE NOTE PROVIDER - NSDCCPCAREPLAN_GEN_ALL_CORE_FT
PRINCIPAL DISCHARGE DIAGNOSIS  Diagnosis: PNA (pneumonia)  Assessment and Plan of Treatment: You have pneumonia, which is an infection in your lungs. Your pneumonia is related to the choking incident. Please follow up with your primary care provider.   Additionally, you had the twitching -serizure like epsiode, video EEG did not show a seizure. Please follow up with neurology for a brain MRI. Additionally follow up with your opthalmologist to evaluate your vision. You may need tests to evaluate for possible myasthenia gravis.         SECONDARY DISCHARGE DIAGNOSES  Diagnosis: Seizure-like activity  Assessment and Plan of Treatment:      PRINCIPAL DISCHARGE DIAGNOSIS  Diagnosis: PNA (pneumonia)  Assessment and Plan of Treatment: You have pneumonia, which is an infection in your lungs. Your pneumonia is related to the choking incident. Please follow up with your primary care provider. please continue oral antibiotic Augmentin till 5/18 -take medication with food  Additionally, you had the twitching -serizure like epsiode, video EEG did not show a seizure. Please follow up with neurology for a brain MRI. Additionally follow up with your opthalmologist to evaluate your vision. You may need tests to evaluate for possible myasthenia gravis. PLease follow up with neurologist.         SECONDARY DISCHARGE DIAGNOSES  Diagnosis: Chronic kidney disease (CKD)  Assessment and Plan of Treatment: You can continue tacrolimus at home. We are holding mycophenylate for now, Please restart your mycophenylate on monday 5/19 after completing antibiotics .   we started you on tamsulosin because you are retaining urine. take one pill at bedtime daily.       Diagnosis: Seizure-like activity  Assessment and Plan of Treatment:      PRINCIPAL DISCHARGE DIAGNOSIS  Diagnosis: PNA (pneumonia)  Assessment and Plan of Treatment: You have pneumonia, which is an infection in your lungs. Your pneumonia is related to the choking incident. Please follow up with your primary care provider. please continue oral antibiotic Augmentin till 5/18 -take medication with food  Additionally, you had the twitching -serizure like epsiode, video EEG did not show a seizure. Please follow up with neurology for a brain MRI. Additionally follow up with your opthalmologist to evaluate your vision. You may need tests to evaluate for possible myasthenia gravis. PLease follow up with neurologist.         SECONDARY DISCHARGE DIAGNOSES  Diagnosis: Seizure-like activity  Assessment and Plan of Treatment:     Diagnosis: Chronic kidney disease (CKD)  Assessment and Plan of Treatment: You can continue tacrolimus at home. We are holding mycophenylate for now, Please restart your mycophenylate on monday 5/19 after completing antibiotics .   we started you on tamsulosin because you are retaining urine. take one pill at bedtime daily.

## 2025-05-15 NOTE — PHYSICAL THERAPY INITIAL EVALUATION ADULT - IMPAIRMENTS FOUND, PT EVAL
aerobic capacity/endurance/gait, locomotion, and balance/poor safety awareness/posture/sensory integrity/visual motor

## 2025-05-15 NOTE — PROGRESS NOTE ADULT - PROBLEM SELECTOR PLAN 8
Patient takes nifedipine 60mg QD, irbesartan 150mg.   - holding home BP medications iso shock  -BP goal 140-180, resume home meds as appropriate
Patient takes nifedipine 60mg QD, irbesartan 150mg.   - holding home BP medications iso shock  -BP goal 140-180, resume home meds as appropriate

## 2025-05-15 NOTE — OCCUPATIONAL THERAPY INITIAL EVALUATION ADULT - ADDITIONAL COMMENTS
per pt. and medical chart-  pt. resides with his son and his family in an elevator-accessible apartment where he was I prior in ADLs and mobility, denies AD/AE. Reports walking in the park daily and is R handed. Vision deficits noted at baseline

## 2025-05-15 NOTE — PROGRESS NOTE ADULT - TIME BILLING
med rec performed  labs reviewed  2 sets of ICU rounds  direct patient care
med rec performed  labs reviewed  direct patient care  d/w neurology  2 sets of Rounds
Chart Review. Independent interpretation of diagnostic tests/labs/imaging. Patient encounter. Resident teaching/review.
Chart Review. Independent interpretation of diagnostic tests/labs/imaging. Patient encounter. Resident teaching/review.

## 2025-05-15 NOTE — PROGRESS NOTE ADULT - ATTENDING COMMENTS
75-year-old man intubated with acute hypoxic respiratory failure in the setting of choking successfully extubated.  Per collateral history, no preceding neurological symptoms underlying the choking event.  Neurological examination is reassuring on May 14, 2025 conversant without cranial neuropathies moving extremities symmetrically.  Can be discharged when medically cleared, MRI brain can be done on an outpatient basis as there is significant delays in obtaining MRI brain's at this time at the facility.
Following for DDRT and IS  Patient seen and examined  VS, labs and EMR reviewed  Agree w/ fellow's note - unable to take PO meds, switch to IV tacro (conversion is total daily oral dose x 0.25).  To be given as a continous infusion over 24 hours  Avoid macrolides as they can increase tacro levels due to drug interactions    Further recs as above  Discussed with primary team
DM2, HFpEF, CKD s/p renal transplant with septic shock after choking on food with resultant aspiration pneumonia  physical as above  we have been able to extubate him and he is tolerating this well  stopping vancomycin and zosyn and switching to ceftriaxone with gpc in pairs/chains in sputum  CAPECOD steroid short protocol  continue ASA/plavix/statin  continue tacrolimus and holding mycophenolate  follow sugars
HTN, DM2, HFpEF, macular degeneration, CAD, CKD with renal transplant admitted with acute hypoxemic respiratory failure after aspiration  physical as above  vision now appears normal to him and he was able to read a sign placed in front of him  no evidence of seizure  MRI now not emergent and could even be outpatient per neurology  continue ceftriaxone  can restart his antihypertensives and aim for SBP below 140  adjust insulin  can likely restart mycophenolate on discharge; continue tacrolimus for now  can DC steroids as again more likely aspiration than CAP
74 yo M with PMHx HTN, HLD, DM, HFpEF (LVEF 68%), CKD (s/p renal transplant), CAD (s/p PCI), hypothyroidism initially BIBEMS from restaurant after witnessed choking episode while eating, intubated for airway protection in ED and admitted to MICU with course c/b septic shock 2/2 aspiration PNA and seizure s/p Keppra load, now extubated 5/13 and stable for stepdown to [facility] for continued evaluation and management.      septic shock 2/2 aspiration PNA  requiring intubation and pressors in critical care unit   [ ] Continue CTX (Aspiration PNA) - transition to po augmentin on dc to complete 7 days   [ ] Aspiration precautions   [ ] will resume home anti-HTN was initially on hold for  septic shock   - Resume PRN with hold parameters as BPs continue to improve      [ ] Renal following (CKD s/p renal transplant)   - Hold MMF i/s/o active infection- fu w renal outpt to resume    - Resumed home Tacrolimus 5/15    - can resume mycophenylate outpt   after  completing abx for pneumonia   [ ] rothman removed 5/14 - Bladder scan Q6 , started on tamsulosin after he was retaining urine -- if passing using can dc home     [ ] Neurology signed off (Seizure/Dysphagia)   - Outpatient MR Brain + Neurology F/U on discharge   [ ] Myasthenia gravis/LE work-up (Specimen received – Low suspicion)    [ ] Monitor off AED (s/p Keppra load)   - vEEG without evidence of seizure activity     [ ] Ophthalmology signed off (Gaze deviation)    - Chronic retinopathy/atrophy > Outpatient F/U on discharge
76 yo M with PMHx HTN, HLD, DM, HFpEF (LVEF 68%), CKD (s/p renal transplant), CAD (s/p PCI), hypothyroidism initially BIBEMS from restaurant after witnessed choking episode while eating, intubated for airway protection in ED and admitted to MICU with course c/b septic shock 2/2 aspiration PNA and seizure s/p Keppra load, now extubated 5/13 and stable for stepdown to [facility] for continued evaluation and management.      [ ] Continue CTX (Aspiration PNA)   [ ] Aspiration precautions   [ ] Hold home anti-HTN i/s/o initial septic shock   - Resume PRN with hold parameters as BPs continue to improve      [ ] Renal following (CKD s/p renal transplant)   - Hold MMF i/s/o active infection   - Resume home Tacrolimus 5/15 following AM Tacrolimus level (Goal 3-5)   [ ] Bladder scan Q6 (Failed TOV x1)     [ ] Neurology signed off (Seizure/Dysphagia)   - Outpatient MR Brain + Neurology F/U on discharge   [ ] Myasthenia gravis/LE work-up (Specimen received – Low suspicion)    [ ] Monitor off AED (s/p Keppra load)   - vEEG without evidence of seizure activity     [ ] Ophthalmology signed off (Gaze deviation)    - Chronic retinopathy/atrophy > Outpatient F/U on discharge

## 2025-05-15 NOTE — PROGRESS NOTE ADULT - PROBLEM SELECTOR PLAN 12
Fluids: s/p 2.5 L  Electrolytes: replete as needed  Nutrition: Diet, Easy to chew, consistent carb  PPI ppx: pantoprazole 40mg QD (therapeutic interchange for home omeprazole 40mg)  Dvt ppx: heparin 5000 units subQ TID  Activity: bedrest  Dispo: RMF
Fluids: s/p 2.5 L  Electrolytes: replete as needed  Nutrition: Diet, Easy to chew, consistent carb  PPI ppx: pantoprazole 40mg QD (therapeutic interchange for home omeprazole 40mg)  Dvt ppx: heparin 5000 units subQ TID  Activity: bedrest  Dispo: RMF

## 2025-05-15 NOTE — PROGRESS NOTE ADULT - PROBLEM SELECTOR PLAN 2
#Aspiration episode  Pt at baseline good general state of health. Had episode of choking on piece of tofu. Brought into ED via EMS, intubated for airway protection, tofu was extracted. CT with bilateral ground glass opacities and infiltrates suggestive of PNA.   BAL growing gram + cocci in pairs and chains  - PNA coverage as below  - Plan to extubate today.  - s/p hydrocortisone 10 mg IV q6h 5/12-5/14, no current indication to c/w steroid tx
#Aspiration episode  Pt at baseline good general state of health. Had episode of choking on piece of tofu. Brought into ED via EMS, intubated for airway protection, tofu was extracted. CT with bilateral ground glass opacities and infiltrates suggestive of PNA.   BAL growing gram + cocci in pairs and chains  - PNA coverage as above  - s/p hydrocortisone 10 mg IV q6h 5/12-5/14, no current indication to c/w steroid tx

## 2025-05-15 NOTE — OCCUPATIONAL THERAPY INITIAL EVALUATION ADULT - GENERAL OBSERVATIONS, REHAB EVAL
OT IE complete. RN Kim clearing pt. for session, PT Cynthia present. Pt. received semi-supine in bed, +heplock in NAD, agreeable to therapy session.

## 2025-05-15 NOTE — OCCUPATIONAL THERAPY INITIAL EVALUATION ADULT - DIAGNOSIS, OT EVAL
Pt. admitted to [facility] s/p witnessed choking event, course c/b intubation, seizure like activity and acute hypoxic respiratory failure. Upon assessment, pt. demo lack of insight into deficits, impaired vision and reduced balance impacting engagement in ADLs and functional mob/transfers.

## 2025-05-15 NOTE — DISCHARGE NOTE PROVIDER - NSDCMRMEDTOKEN_GEN_ALL_CORE_FT
aspirin 81 mg oral tablet, chewable: 1 tab(s) chewed once a day  atorvastatin 40 mg oral tablet: 1 tab(s) orally once a day (at bedtime)  calcium carbonate: 1,250 milligram(s) 2 times a day  insulin aspart 100 units/mL injectable solution: 2 unit(s) injectable 3 times a day (before meals)  Insulin Glargine: 22 unit(s) injectable once a day (at bedtime)  insulin glargine: 20- 24 unit(s) subcutaneous once a day (at bedtime)  irbesartan 150 mg oral tablet: 1 tab(s) orally once a day  levothyroxine 50 mcg (0.05 mg) oral tablet: 1 tab(s) orally once a day  Lokelma 10 g oral powder for reconstitution: 10 gram(s) orally once a day   metoprolol succinate 25 mg oral capsule, extended release: 1 cap(s) orally once a day  metoprolol succinate 25 mg oral tablet, extended release: 1 tab(s) orally once a day  mycophenolate mofetil 500 mg oral tablet: 1 tab(s) orally once a day  omeprazole 40 mg oral delayed release capsule: 1 cap(s) orally once a day  Plavix 75 mg oral tablet: 1 tab(s) orally  sodium bicarbonate 650 mg oral tablet: 1 tab(s) orally 2 times a day  Synthroid 50 mcg (0.05 mg) oral tablet: 1 tab(s) orally once a day before breakfast  tacrolimus 1 mg oral capsule: 1 cap(s) orally once a day  Tradjenta 5 mg oral tablet: 1 tab(s) orally once a day   aspirin 81 mg oral tablet, chewable: 1 tab(s) chewed once a day  atorvastatin 40 mg oral tablet: 1 tab(s) orally once a day (at bedtime)  calcium carbonate: 1,250 milligram(s) 2 times a day  insulin aspart 100 units/mL injectable solution: 2 unit(s) injectable 3 times a day (before meals)  Insulin Glargine: 22 unit(s) injectable once a day (at bedtime)  insulin glargine: 20- 24 unit(s) subcutaneous once a day (at bedtime)  irbesartan 150 mg oral tablet: 1 tab(s) orally once a day  levothyroxine 50 mcg (0.05 mg) oral tablet: 1 tab(s) orally once a day  Lokelma 10 g oral powder for reconstitution: 10 gram(s) orally once a day   metoprolol succinate 25 mg oral capsule, extended release: 1 cap(s) orally once a day  metoprolol succinate 25 mg oral tablet, extended release: 1 tab(s) orally once a day  mycophenolate mofetil 500 mg oral tablet: 1 tab(s) orally once a day  omeprazole 40 mg oral delayed release capsule: 1 cap(s) orally once a day  Plavix 75 mg oral tablet: 1 tab(s) orally  sodium bicarbonate 650 mg oral tablet: 1 tab(s) orally 2 times a day  Synthroid 50 mcg (0.05 mg) oral tablet: 1 tab(s) orally once a day before breakfast  tacrolimus 1 mg oral capsule: 1 cap(s) orally once a day  tamsulosin 0.4 mg oral capsule: 1 cap(s) orally once a day (at bedtime)  Tradjenta 5 mg oral tablet: 1 tab(s) orally once a day   aspirin 81 mg oral tablet, chewable: 1 tab(s) chewed once a day  atorvastatin 40 mg oral tablet: 1 tab(s) orally once a day (at bedtime)  Augmentin 500 mg-125 mg oral tablet: 1 tab(s) orally every 12 hours  calcium carbonate: 1,250 milligram(s) 2 times a day  insulin aspart 100 units/mL injectable solution: 2 unit(s) injectable 3 times a day (before meals)  Insulin Glargine: 22 unit(s) injectable once a day (at bedtime)  insulin glargine: 20- 24 unit(s) subcutaneous once a day (at bedtime)  irbesartan 150 mg oral tablet: 1 tab(s) orally once a day  levothyroxine 50 mcg (0.05 mg) oral tablet: 1 tab(s) orally once a day  Lokelma 10 g oral powder for reconstitution: 10 gram(s) orally once a day   metoprolol succinate 25 mg oral capsule, extended release: 1 cap(s) orally once a day  metoprolol succinate 25 mg oral tablet, extended release: 1 tab(s) orally once a day  mycophenolate mofetil 500 mg oral tablet: 1 tab(s) orally once a day  omeprazole 40 mg oral delayed release capsule: 1 cap(s) orally once a day  Plavix 75 mg oral tablet: 1 tab(s) orally  sodium bicarbonate 650 mg oral tablet: 1 tab(s) orally 2 times a day  Synthroid 50 mcg (0.05 mg) oral tablet: 1 tab(s) orally once a day before breakfast  tacrolimus 1 mg oral capsule: 1 cap(s) orally once a day  tamsulosin 0.4 mg oral capsule: 1 cap(s) orally once a day (at bedtime)  tamsulosin 0.4 mg oral capsule: 1 cap(s) orally once a day  Tradjenta 5 mg oral tablet: 1 tab(s) orally once a day   aspirin 81 mg oral tablet, chewable: 1 tab(s) chewed once a day  atorvastatin 40 mg oral tablet: 1 tab(s) orally once a day (at bedtime)  Augmentin 500 mg-125 mg oral tablet: 1 tab(s) orally every 12 hours  calcium carbonate 1250 mg (500 mg elemental calcium) oral tablet: 1 tab(s) orally once a day  insulin aspart 100 units/mL injectable solution: 2 unit(s) injectable 3 times a day (before meals)  Insulin Glargine: 22 unit(s) injectable once a day (at bedtime)  insulin glargine: 20- 24 unit(s) subcutaneous once a day (at bedtime)  irbesartan 150 mg oral tablet: 1 tab(s) orally once a day  Lokelma 10 g oral powder for reconstitution: 10 gram(s) orally once a day   metoprolol succinate 25 mg oral capsule, extended release: 1 cap(s) orally once a day  metoprolol succinate 25 mg oral tablet, extended release: 1 tab(s) orally once a day  NIFEdipine 60 mg oral tablet, extended release: 1 tab(s) orally every 24 hours  omeprazole 40 mg oral delayed release capsule: 1 cap(s) orally once a day  Plavix 75 mg oral tablet: 1 tab(s) orally once a day  polyethylene glycol 3350 oral powder for reconstitution: 17 gram(s) orally once a day  senna leaf extract oral tablet: 2 tab(s) orally once a day (at bedtime)  sodium bicarbonate 650 mg oral tablet: 1 tab(s) orally 2 times a day  Synthroid 50 mcg (0.05 mg) oral tablet: 1 tab(s) orally once a day before breakfast  tacrolimus 1 mg oral capsule: 1 cap(s) orally once a day  tamsulosin 0.4 mg oral capsule: 1 cap(s) orally once a day  Tradjenta 5 mg oral tablet: 1 tab(s) orally once a day   aspirin 81 mg oral tablet, chewable: 1 tab(s) chewed once a day  atorvastatin 40 mg oral tablet: 1 tab(s) orally once a day (at bedtime)  Augmentin 500 mg-125 mg oral tablet: 1 tab(s) orally every 12 hours start 5/16  calcium carbonate 1250 mg (500 mg elemental calcium) oral tablet: 1 tab(s) orally once a day  insulin aspart 100 units/mL injectable solution: 2 unit(s) injectable 3 times a day (before meals)  Insulin Glargine: 22 unit(s) injectable once a day (at bedtime)  insulin glargine: 20- 24 unit(s) subcutaneous once a day (at bedtime)  irbesartan 150 mg oral tablet: 1 tab(s) orally once a day  Lokelma 10 g oral powder for reconstitution: 10 gram(s) orally once a day   metoprolol succinate 25 mg oral capsule, extended release: 1 cap(s) orally once a day  metoprolol succinate 25 mg oral tablet, extended release: 1 tab(s) orally once a day  NIFEdipine 60 mg oral tablet, extended release: 1 tab(s) orally every 24 hours  omeprazole 40 mg oral delayed release capsule: 1 cap(s) orally once a day  Plavix 75 mg oral tablet: 1 tab(s) orally once a day  polyethylene glycol 3350 oral powder for reconstitution: 17 gram(s) orally once a day  senna leaf extract oral tablet: 2 tab(s) orally once a day (at bedtime)  sodium bicarbonate 650 mg oral tablet: 1 tab(s) orally 2 times a day  Synthroid 50 mcg (0.05 mg) oral tablet: 1 tab(s) orally once a day before breakfast  tacrolimus 1 mg oral capsule: 1 cap(s) orally once a day  tamsulosin 0.4 mg oral capsule: 1 cap(s) orally once a day  Tradjenta 5 mg oral tablet: 1 tab(s) orally once a day

## 2025-05-15 NOTE — PHYSICAL THERAPY INITIAL EVALUATION ADULT - MODALITIES TREATMENT COMMENTS
CN Assessment: CN II: L eye complete blindness (baseline impairment), R eye decreased peripheral vision (nasal and temporal)--Pt demos difficulty with quadrant testing difficult to assess; CN III/IV/VI: Extraocular movements jerky but intact to all planes, disconjugate gaze with lack of convergence/divergence; CN V: Facial sensation intact to light touch VI-VIII; CN VII: Facial movements intact slightly decreased R smile, no facial droop observed; CN VIII: Hearing intact to finger rub b/l; CN XI: Cervical rotation WFL b/l; Shoulder shrug intact b/l; CN XII: Tongue protrudes to midline, lateralization intact b/l.

## 2025-05-15 NOTE — DISCHARGE NOTE NURSING/CASE MANAGEMENT/SOCIAL WORK - NSDCVIVACCINE_GEN_ALL_CORE_FT
influenza, injectable, quadrivalent, preservative free; 09-Nov-2019 15:32; Doretha Otoole (CATHLEEN); [employer]; [id number] (Exp. Date: 30-Jun-2020); IntraMuscular; Deltoid Right.; 0.5 milliLiter(s); VIS (VIS Published: 15-Aug-2019, VIS Presented: 09-Nov-2019);

## 2025-05-15 NOTE — PHYSICAL THERAPY INITIAL EVALUATION ADULT - RANGE OF MOTION EXAMINATION, REHAB EVAL
Suture Removal, Infected Wound  Your sutures are being removed. Your wound has become infected. The wound will not heal properly unless the infection is cleared. Infection in a wound may also spread if it is not treated. In most cases, antibiotic medicines are prescribed to treat a wound infection.  Symptoms of a wound infection include:  · Redness or swelling around the wound  · Warmth coming from the wound  · New or worsening pain  · Red streaks around the wound  · Draining pus  · Fever  Home care  Medicines  · If you were given antibiotics, take them until they are gone or your healthcare provider tells you to stop. It is vital to finish the antibiotics even if you feel better. If you do not finish them, the infection may come back and be harder to treat.  · Take medicine for pain as directed by your healthcare provider.  Wound care  Care for your wound as directed by the healthcare provider. This may include the following:  · Apply a warm compress (clean cloth soaked in hot water) to the infected area for about 5 to 10 minutes at a time. Be very careful not to burn yourself. Test the cloth on a non-infected area to make sure it is not too hot.  · Change the dressing daily. If it becomes wet, stained with wound fluid, or dirty, change it sooner. To change it:  ¨ Wash your hands with soap and water before changing the dressing.  ¨ Carefully remove the dressing and tape. If it sticks to the wound, you may need to wet it a little to remove it. (Do not do this if your healthcare provider has told you not to.)  ¨ Gently clean the wound with clean water (or saline) using gauze, a clean washcloth, or cotton swab.  ¨ Do not use soap, alcohol, peroxide or other cleansers.  ¨ If you were told to dry the wound before putting on a new dressing, gently pat. Do not rub.  ¨ Throw out the old dressing.  ¨ Wash your hands again before opening the new, clean dressing.  ¨ Wash your hands again when you are done.  Follow-up  "care  Follow up with your healthcare provider as advised. It is important to keep your follow-up appointment to be certain that the infection is being treated. If a culture was done, you will be notified if the treatment needs to change. Call as directed for the results.  When to seek medical advice  Call your health care provider right away if any of these occur:  · Symptoms of infection don't start to improve within 2 days of starting antibiotics.  · Symptoms of infection get worse.  · New symptoms, such as red streaks around the wound.  · Fever of 100.4°F (38.0°C) or higher for more than 2 days after starting the antibiotics  Date Last Reviewed: 8/10/2015  © 6221-5626 [employer]. [address] [place] [address], [place] [address]. All rights reserved. This information is not intended as a substitute for professional medical care. Always follow your healthcare professional's instructions.        Suture or Staple Removal  You were seen today for a suture or staple removal. Your wound is healing as expected. The wound has healed well enough that the sutures or staples can be removed. The wound will continue to heal for the next few months.  At this time there is no sign of infection.   Home care  · If you have pain, take pain medicine as advised by your healthcare provider.   · Keep your wound clean and protected by covering it with a bandage for the next week or so.   · Wash your hands with soap and warm water before and after caring for your wound. This helps prevent infection.  · Clean the wound gently with soap and warm water daily or as directed by your childs health care provider. Do not use iodine, alcohol, or other cleansers on the wound.  Gently pat it dry. Put on a new bandage, if needed. Do not reuse bandages.  · If the area gets wet, gently pat it dry with a clean cloth. Replace the wet bandage with a dry one.  · Check the wound daily for signs of infection. (These are listed under "When to seek " "medical advice" below.)  · You may shower and bathe as usual. Swimming is now permitted.  Follow-up care  Follow up with your healthcare provider as advised.  When to seek medical advice   Call your healthcare provider if any of the following occur:  · Wound reopens or bleeds  · Signs of an infection, such as:  ¨ Increasing redness or swelling around the wound  ¨ Increased warmth from the wound  ¨ Worsening pain  ¨ Red streaking lines away from the wound  ¨ Fluid draining from the wound  · Fever of 100.4°F (38°C) or higher, or as directed by your child's healthcare provider  Date Last Reviewed: 9/27/2015  © 5909-0882 [employer]. [address] [place] [address], [place] [address]. All rights reserved. This information is not intended as a substitute for professional medical care. Always follow your healthcare professional's instructions.        " bilateral upper extremity ROM was WFL (within functional limits)/bilateral lower extremity ROM was WFL (within functional limits)

## 2025-05-15 NOTE — DISCHARGE NOTE PROVIDER - NSDCFUSCHEDAPPT_GEN_ALL_CORE_FT
Anna Russell  [facility]  HEART[facility] [address]  Scheduled Appointment: 07/11/2025    Jordon Farnsworth  [facility]  NEPHRO [address]  Scheduled Appointment: 07/18/2025

## 2025-05-15 NOTE — DISCHARGE NOTE PROVIDER - CARE PROVIDER_API CALL
Rey Greenwood  Ophthalmology  [address] [place] [address], [address]  [place] [address]  Phone: [phone]  Fax: [phone]  Follow Up Time: 2 weeks    Poonam Bhatt  Phone: ()-  Fax: ()-  Follow Up Time: 2 weeks   Rey Greenwood  Ophthalmology  [address], [address]  [place] [address]  Phone: [phone]  Fax: [phone]  Follow Up Time: 2 weeks    Anjum Tran  Neurology  [address] [address]  Phone: [phone]  Fax: [phone]  Follow Up Time: 2 weeks

## 2025-05-15 NOTE — DISCHARGE NOTE PROVIDER - CARE PROVIDERS DIRECT ADDRESSES
,[email or web address]

## 2025-05-15 NOTE — PHYSICAL THERAPY INITIAL EVALUATION ADULT - SENSORY TESTS
Coordination Assessment: Finger to Nose: Intact b/l**Pt benefits from [place] to guide finger initially due to visual deficit; Finger Opposition: Intact b/l

## 2025-05-15 NOTE — PROGRESS NOTE ADULT - PROBLEM SELECTOR PLAN 5
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

## 2025-05-15 NOTE — PROGRESS NOTE ADULT - TIME-BASED BILLING (NON-CRITICAL CARE)
Time-based billing (NON-critical care)
Alert and oriented, no focal deficits, no motor or sensory deficits. 5/5 strength in UE/LE b/l. CN II-XII intact.
Time-based billing (NON-critical care)

## 2025-05-15 NOTE — PROGRESS NOTE ADULT - PROVIDER SPECIALTY LIST ADULT
Internal Medicine
MICU
Nephrology
Neurology
Ophthalmology
Neurology
Nephrology
Nephrology
Internal Medicine

## 2025-05-15 NOTE — DISCHARGE NOTE PROVIDER - HOSPITAL COURSE
#Discharge: do not delete    75y M w/ PMHx of HTN, HLD, IDDM2, HFpEF, CKD 2-3 (s/p Renal transplant in 2023), CAD s/p PCI w/ RAMANA-pLAD (6/2022), urinary retention, retinopathy and hypothyroidism presents to the ED after witnessed choking episode admitted to MICU for septic shock and acute hypoxic respiratory failure 2/2 aspiration pna vs opportunistic infection due to immunocompromised state. Course complicated by deviated gaze and witnessed seizure-like (bilateral arm/face stiffness) movements sp ativan, imaging negative for stroke but likely 2/2 to hypoxic injury iso aspiration, patient stable and stepped down, now ready for discharge.     Problem List/Main Diagnoses (system-based):   #PNA (pneumonia) #septic shock 2/2 PNA #Immunocompromised #Acute hypoxic respirator failure   Patient with shock, now resolved, likely septic in the setting of pneumonia. Cardiac enzymes WNL. EKG sinus tachycardia  weaned off levo gtt 5/12 8 pm  warm, well-perfused, euvolemic, cap refill <3 seconds, making good UOP  Given CT findings of ground glass opacities and bilateral consolidations, concern for underlying pneumonia. MRSA, RVP, urine strep and legionella negative.   BAL with gram+ cocci in pairs and chains  sp Zosyn 4.5mg 5/12-5/13, sp vancomycin 1g Q24H 5/12-5/13, sp azithromycin 500mg for 3 days 5/11-5/13  - hold mycophenolate for unitl antiiotics complete/follow up with nephrology    - cw CTX 2g 5/13-5/18  - f/u galactomannan, aspergillus, fungitell, BAL cell count.    #     #    Patient was discharged to: (home/[facility]/acute rehab/hospice, etc. and w/ home health/home PT/RN/home O2)    New medications:   Changes to old medications:  Medications that were stopped:    Items to follow up as outpatient:    Physical exam at the time of discharge:       LABS & STUDIES:  SARS-CoV-2: NotDetec (12 May 2025 17:00)  SARS-CoV-2: NotDetec (12 May 2025 00:35)   #Discharge: do not delete    75y M w/ PMHx of HTN, HLD, IDDM2, HFpEF, CKD 2-3 (s/p Renal transplant in 2023), CAD s/p PCI w/ RAMANA-pLAD (6/2022), urinary retention, retinopathy and hypothyroidism presents to the ED after witnessed choking episode admitted to MICU for septic shock and acute hypoxic respiratory failure 2/2 aspiration pna vs opportunistic infection due to immunocompromised state. Course complicated by deviated gaze and witnessed seizure-like (bilateral arm/face stiffness) movements sp ativan, imaging negative for stroke but likely 2/2 to hypoxic injury iso aspiration, patient stable and stepped down, now ready for discharge.     Problem List/Main Diagnoses (system-based):   #PNA (pneumonia) #septic shock 2/2 PNA #Immunocompromised #Acute hypoxic respirator failure   Patient with shock, now resolved, likely septic in the setting of pneumonia. Cardiac enzymes WNL. EKG sinus tachycardia  weaned off levo gtt 5/12 8 pm  warm, well-perfused, euvolemic, cap refill <3 seconds, making good UOP  Given CT findings of ground glass opacities and bilateral consolidations, concern for underlying pneumonia. MRSA, RVP, urine strep and legionella negative.   BAL with gram+ cocci in pairs and chains  sp Zosyn 4.5mg 5/12-5/13, sp vancomycin 1g Q24H 5/12-5/13, sp azithromycin 500mg for 3 days 5/11-5/13  - hold mycophenolate for until antibiotics complete/follow up with nephrology    - cw CTX 2g 5/13-5/18  - f/u galactomannan, aspergillus, fungitell, BAL cell count.    #Seizure-like activity   likely 2/2 hypoxic injury iso aspiration  Pt reportedly with deviated gaze. Stroke w/u neg for any acute stroke or path. s/p ativan. no hx of epilepsy or seizures in past  No seizure like activity seen on vEEG  Tacrolimus level 6.4  - neurology following, appreciate recs       - MR brain outpatient       - no current indication for AEDs.    # CAD (coronary artery disease).   s/p PCI w/ RAMANA-pLAD (6/2022)  Patient on aspirin 81mg QD, Plavix 75mg QD, atorvastatin 40mg  Outpatient cardiologist - Dr. Russell   - although >1 year from stent, per Dr. Russell, pt still on DAPT since he is deemed high risk  - continue with home medications.    #(HFpEF) heart failure with preserved ejection fraction.   5/12 TTE EF 68%, fibrocalcific aortic valve sclerosis.    #Myasthenia gravis.   #c/f MG  Given presentation of choking on tofu, B/L ptosis there was concern for Myasthenia Gravis  As per son, this is first episode of dysphagia and patient normally can tolerate solid food w/o difficulty  Plan:  MG/Lambert Eaton eval pending  NIF, vital capacity pending.    Chronic kidney disease (CKD).   #S/p Renal Transplant 2023  Patient with a hx of CKD, s/p transplant in 2023. Creatinine 1.34 on presentation, GFR 55, likely at baseline. Patient takes Lokelma 10mg QD, calcium carbonate 1250mg BID, mycophenolate mofetil 500mg BID, tacrolimus (prograf) 1mg BID  Outpatient nephrologist - Dr. Javad HOPPER kidney US: 1. No evidence of hemodynamically significant stenosis of the transplant renal artery. No hydronephrosis. No acute findings regarding the RLQ transplant kidney.  Increased echogenicity of the native right kidney suggestive of   medical renal disease.  - c/w PO tacrolimus (prograf) 1mg BID (dose confirmed with outpt pharmacy)  - hold mycophenolate mofetil iso active infection   - c/w calcium carbonate  - nephrology following, appreciate excellent.    #HTN (hypertension).   Patient takes nifedipine 60mg QD, irbesartan 150mg.   - c/w home meds   -BP goal 140-180, resume home meds as appropriate.  Diabetes mellitus.     #hyperglycemia 2/2 steroid use  Hx DM, on Lantus 23 units and Tradjenta at home  Sugars have been uncontrolled, range 202-238  - c/w sliding scale insulin  - q6hr finger sticks  Consistent carb diet  -Start Lantus today.    #Hypothyroidism.   home levothyroxine 50mcg  - c/w home medication.    #Urinary retention.   Patient with urinary retention and distended bladder on physical exam s/p intubation. Rothman placed in ED on 5/11.   -rothman removed 5/14, Trial of Void with high   Pt urinated 400cc and repeat  -> pt refusing SC at this time  - c/w tamsulosin     Patient was discharged to: home     Items to follow up as outpatient:    Physical exam at the time of discharge:   Constitutional: resting comfortably in bed; NAD  Eyes: anicteric sclera  Respiratory: CTA B/L; no W/R/R, no retractions  Cardiac: +S1/S2; RRR  Gastrointestinal: soft, NT/ND; no rebound or guarding;  Extremities: no edema #Discharge: do not delete    75y M w/ PMHx of HTN, HLD, IDDM2, HFpEF, CKD 2-3 (s/p Renal transplant in 2023), CAD s/p PCI w/ RAMANA-pLAD (6/2022), urinary retention, retinopathy and hypothyroidism presents to the ED after witnessed choking episode admitted to MICU for septic shock and acute hypoxic respiratory failure 2/2 aspiration pna vs opportunistic infection due to immunocompromised state. Course complicated by deviated gaze and witnessed seizure-like (bilateral arm/face stiffness) movements sp ativan, imaging negative for stroke but likely 2/2 to hypoxic injury iso aspiration, patient stable and stepped down, now ready for discharge.     Problem List/Main Diagnoses (system-based):   #PNA (pneumonia) #septic shock 2/2 PNA #Immunocompromised #Acute hypoxic respirator failure   Patient with shock, now resolved, likely septic in the setting of pneumonia. Cardiac enzymes WNL. EKG sinus tachycardia  weaned off levo gtt 5/12 8 pm  warm, well-perfused, euvolemic, cap refill <3 seconds, making good UOP  Given CT findings of ground glass opacities and bilateral consolidations, concern for underlying pneumonia. MRSA, RVP, urine strep and legionella negative.   BAL with gram+ cocci in pairs and chains  sp Zosyn 4.5mg 5/12-5/13, sp vancomycin 1g Q24H 5/12-5/13, sp azithromycin 500mg for 3 days 5/11-5/13  - hold mycophenolate for until antibiotics complete/follow up with nephrology    - Augmentin 500mg bid  5/16-5/18 to complete 7 day course.   - f/u galactomannan, aspergillus, fungitell, BAL cell count.    #Seizure-like activity   likely 2/2 hypoxic injury iso aspiration  Pt reportedly with deviated gaze. Stroke w/u neg for any acute stroke or path. s/p ativan. no hx of epilepsy or seizures in past  No seizure like activity seen on vEEG  Tacrolimus level 6.4  - neurology following, appreciate recs       - Follow up with nueorlogy for MR brain outpatient       - no current indication for AEDs.    # CAD (coronary artery disease).   s/p PCI w/ RAMANA-pLAD (6/2022)  Patient on aspirin 81mg QD, Plavix 75mg QD, atorvastatin 40mg  Outpatient cardiologist - Dr. Russell   - although >1 year from stent, per Dr. Russell, pt still on DAPT since he is deemed high risk  - continue with home medications.    #(HFpEF) heart failure with preserved ejection fraction.   5/12 TTE EF 68%, fibrocalcific aortic valve sclerosis.    #Myasthenia gravis.   #c/f MG  Given presentation of choking on tofu, B/L ptosis there was concern for Myasthenia Gravis  As per son, this is first episode of dysphagia and patient normally can tolerate solid food w/o difficulty  Plan:  MG/Lambert Eaton eval pending  NIF, vital capacity pending.  - follow up outpatient neurology     Chronic kidney disease (CKD).   #S/p Renal Transplant 2023  Patient with a hx of CKD, s/p transplant in 2023. Creatinine 1.34 on presentation, GFR 55, likely at baseline. Patient takes Lokelma 10mg QD, calcium carbonate 1250mg BID, mycophenolate mofetil 500mg BID, tacrolimus (prograf) 1mg BID  Outpatient nephrologist - Dr. Farnsworth  R kidney US: 1. No evidence of hemodynamically significant stenosis of the transplant renal artery. No hydronephrosis. No acute findings regarding the RLQ transplant kidney.  Increased echogenicity of the native right kidney suggestive of   medical renal disease.  - c/w PO tacrolimus (prograf) 1mg BID   - hold mycophenolate mofetil iso active infection   - c/w calcium carbonate  - follow up outpatient nephrology     #HTN (hypertension).   Patient takes nifedipine 60mg QD, irbesartan 150mg.   - c/w home meds     Diabetes mellitus.   #hyperglycemia 2/2 steroid use  Hx DM, on Lantus 23 units and Tradjenta at home  Sugars have been uncontrolled, range 202-238  -c/w home meds    #Hypothyroidism.   home levothyroxine 50mcg  - c/w home medication.    #Urinary retention.   Patient with urinary retention and distended bladder on physical exam s/p intubation. Rothman placed in ED on 5/11.   -rothman removed 5/14, Trial of Void with high   Pt urinated 400cc and repeat  -> pt refusing SC at this time  - c/w tamsulosin     Patient was discharged to: home, refused PT      Items to follow up as outpatient:    Physical exam at the time of discharge:   Constitutional: resting comfortably in bed; NAD  Eyes: anicteric sclera  Respiratory: CTA B/L; no W/R/R, no retractions  Cardiac: +S1/S2; RRR  Gastrointestinal: soft, NT/ND; no rebound or guarding;  Extremities: no edema   #Discharge: do not delete    75y M w/ PMHx of HTN, HLD, IDDM2, HFpEF, CKD 2-3 (s/p Renal transplant in 2023), CAD s/p PCI w/ RAMANA-pLAD (6/2022), urinary retention, retinopathy and hypothyroidism presents to the ED after witnessed choking episode admitted to MICU for septic shock and acute hypoxic respiratory failure 2/2 aspiration pna vs opportunistic infection due to immunocompromised state. Course complicated by deviated gaze and witnessed seizure-like (bilateral arm/face stiffness) movements sp ativan, imaging negative for stroke but likely 2/2 to hypoxic injury iso aspiration, patient stable and stepped down, now ready for discharge.     Problem List/Main Diagnoses (system-based):   #PNA (pneumonia) #septic shock 2/2 PNA #Immunocompromised #Acute hypoxic respirator failure   Patient with shock, now resolved, likely septic in the setting of pneumonia. Cardiac enzymes WNL. EKG sinus tachycardia  weaned off levo gtt 5/12 8 pm  warm, well-perfused, euvolemic, cap refill <3 seconds, making good UOP  Given CT findings of ground glass opacities and bilateral consolidations, concern for underlying pneumonia. MRSA, RVP, urine strep and legionella negative.   BAL with gram+ cocci in pairs and chains  sp Zosyn 4.5mg 5/12-5/13, sp vancomycin 1g Q24H 5/12-5/13, sp azithromycin 500mg for 3 days 5/11-5/13  - continue mycophenolate the day after antibiotics are done (5/19)    - Augmentin 500mg bid  5/16-5/18 to complete 7 day course.   - f/u galactomannan, aspergillus, fungitell, BAL cell count.    #Seizure-like activity   likely 2/2 hypoxic injury iso aspiration  Pt reportedly with deviated gaze. Stroke w/u neg for any acute stroke or path. s/p ativan. no hx of epilepsy or seizures in past  No seizure like activity seen on vEEG  Tacrolimus level 6.4  - neurology following, appreciate recs       - Follow up with neurology for MR brain outpatient       - no current indication for AEDs     # CAD (coronary artery disease).   s/p PCI w/ RAMANA-pLAD (6/2022)  Patient on aspirin 81mg QD, Plavix 75mg QD, atorvastatin 40mg  Outpatient cardiologist - Dr. Russell   - although >1 year from stent, per Dr. Russell, pt still on DAPT since he is deemed high risk  - continue with home medications.    #(HFpEF) heart failure with preserved ejection fraction.   5/12 TTE EF 68%, fibrocalcific aortic valve sclerosis.    #Myasthenia gravis.   #c/f MG  Given presentation of choking on tofu, B/L ptosis there was concern for Myasthenia Gravis  As per son, this is first episode of dysphagia and patient normally can tolerate solid food w/o difficulty  Plan:  MG/Lambert Eaton eval pending  NIF, vital capacity pending.  - follow up outpatient neurology     Chronic kidney disease (CKD).   #S/p Renal Transplant 2023  Patient with a hx of CKD, s/p transplant in 2023. Creatinine 1.34 on presentation, GFR 55, likely at baseline. Patient takes Lokelma 10mg QD, calcium carbonate 1250mg BID, mycophenolate mofetil 500mg BID, tacrolimus (prograf) 1mg BID  Outpatient nephrologist - Dr. Javad HOPPER kidney US: 1. No evidence of hemodynamically significant stenosis of the transplant renal artery. No hydronephrosis. No acute findings regarding the RLQ transplant kidney.  Increased echogenicity of the native right kidney suggestive of   medical renal disease.  - c/w PO tacrolimus (prograf) 1mg BID   - hold mycophenolate mofetil iso active infection   - c/w calcium carbonate  - follow up outpatient nephrology     #HTN (hypertension).   Patient takes nifedipine 60mg QD, irbesartan 150mg.   - c/w home meds     Diabetes mellitus.   #hyperglycemia 2/2 steroid use  Hx DM, on Lantus 23 units and Tradjenta at home  Sugars have been uncontrolled, range 202-238  -c/w home meds    #Hypothyroidism   home levothyroxine 50mcg  - c/w home medication.    #Urinary retention.   Patient with urinary retention and distended bladder on physical exam s/p intubation. Rothman placed in ED on 5/11.   -rothman removed 5/14, Trial of Void with high   Pt urinated 400cc and repeat  -> pt refusing SC at this time  - c/w tamsulosin     Patient was discharged to: home, refused PT      Items to follow up as outpatient:  Physical exam at the time of discharge:   Constitutional: resting comfortably in bed; NAD  Eyes: anicteric sclera  Respiratory: CTA B/L; no W/R/R, no retractions  Cardiac: +S1/S2; RRR  Gastrointestinal: soft, NT/ND; no rebound or guarding;  Extremities: no edema

## 2025-05-15 NOTE — PROGRESS NOTE ADULT - PROBLEM SELECTOR PLAN 4
s/p PCI w/ RAMANA-pLAD (6/2022)  Patient on aspirin 81mg QD, Plavix 75mg QD, atorvastatin 40mg  Outpatient cardiologist - Dr. Russell   - although >1 year from stent, per Dr. Russell, pt still on DAPT since he is deemed high risk  - continue with home medications
s/p PCI w/ RAMANA-pLAD (6/2022)  Patient on aspirin 81mg QD, Plavix 75mg QD, atorvastatin 40mg  Outpatient cardiologist - Dr. Russell   - although >1 year from stent, per Dr. Russell, pt still on DAPT since he is deemed high risk  - continue with home medications

## 2025-05-15 NOTE — PHYSICAL THERAPY INITIAL EVALUATION ADULT - GAIT DEVIATIONS NOTED, PT EVAL
veering to the R side, narrow CELI, unsteady, several instances of multidirectional LOB, small steps, mild ataxia, decreased control with step intensity (stomping gait)/decreased paige/decreased step length/decreased weight-shifting ability

## 2025-05-15 NOTE — DISCHARGE NOTE PROVIDER - ATTENDING DISCHARGE PHYSICAL EXAMINATION:
76 yo M with PMHx HTN, HLD, DM, HFpEF (LVEF 68%), CKD (s/p renal transplant), CAD (s/p PCI), hypothyroidism initially BIBEMS from restaurant after witnessed choking episode while eating, intubated for airway protection in ED and admitted to MICU with course c/b septic shock 2/2 aspiration PNA and seizure s/p Keppra load, now extubated 5/13 and stable for stepdown to F for continued evaluation and management.      septic shock 2/2 aspiration PNA  requiring intubation and pressors in critical care unit   [ ] Continue CTX (Aspiration PNA) - transition to po augmentin on dc to complete 7 days   [ ] Aspiration precautions   [ ] will resume home anti-HTN was initially on hold for  septic shock   - Resume PRN with hold parameters as BPs continue to improve      [ ] Renal following (CKD s/p renal transplant)   - Hold MMF i/s/o active infection- fu w renal outpt to resume    - Resumed home Tacrolimus 5/15    - can resume mycophenylate outpt   after  completing abx for pneumonia   [ ] rothman removed 5/14 - Bladder scan Q6 , started on tamsulosin after he was retaining urine -- if passing using can dc home     [ ] Neurology signed off (Seizure/Dysphagia)   - Outpatient MR Brain + Neurology F/U on discharge   [ ] Myasthenia gravis/LE work-up (Specimen received – Low suspicion)    [ ] Monitor off AED (s/p Keppra load)   - vEEG without evidence of seizure activity     [ ] Ophthalmology signed off (Gaze deviation)    - Chronic retinopathy/atrophy > Outpatient F/U on discharge    Physical exam:  GENERAL: NAD, lying in bed comfortably  HEAD:  Atraumatic, Normocephalic  EYES: EOMI, PERRLA, conjunctiva and sclera clear  ENT: Moist mucous membranes  NECK: Supple, No JVD  CHEST/LUNG: Clear to auscultation bilaterally; No rales, rhonchi, wheezing, or rubs.  HEART: Regular rate and rhythm; S1+ S2+   ABDOMEN: Bowel sounds present; Soft, Nontender, Nondistended   EXTREMITIES:  2+ Peripheral Pulses, brisk capillary refill. No clubbing, cyanosis, or edema  NERVOUS SYSTEM:  Alert & Oriented , speech clear   MSK: FROM all 4 extremities, full and equal strength  SKIN: No rashes or lesions

## 2025-05-16 LAB
A FLAVUS AB FLD QL: NEGATIVE — SIGNIFICANT CHANGE UP
A NIGER AB FLD QL: NEGATIVE — SIGNIFICANT CHANGE UP
A NIGER AB FLD QL: NEGATIVE — SIGNIFICANT CHANGE UP
ACRM BINDING ANTIBODY: <0.07 NMOL/L — SIGNIFICANT CHANGE UP (ref 0–0.24)
GALACTOMANNAN AG SERPL-ACNC: 0.03 INDEX — SIGNIFICANT CHANGE UP (ref 0–0.49)

## 2025-05-16 RX ORDER — AMOXICILLIN AND CLAVULANATE POTASSIUM 500; 125 MG/1; MG/1
1 TABLET, FILM COATED ORAL
Qty: 6 | Refills: 0
Start: 2025-05-16 | End: 2025-05-18

## 2025-05-17 LAB
CULTURE RESULTS: SIGNIFICANT CHANGE UP
CULTURE RESULTS: SIGNIFICANT CHANGE UP
SPECIMEN SOURCE: SIGNIFICANT CHANGE UP
SPECIMEN SOURCE: SIGNIFICANT CHANGE UP
VGCC-P/Q BIND AB SER-SCNC: 1 PMOL/L — SIGNIFICANT CHANGE UP (ref 0–24.5)

## 2025-05-18 LAB — VOLTAGE-GATED K CHANNEL AB RESULT: 0 PMOL/L — SIGNIFICANT CHANGE UP (ref 0–31)

## 2025-05-20 ENCOUNTER — APPOINTMENT (OUTPATIENT)
Dept: INTERNAL MEDICINE | Facility: CLINIC | Age: 75
End: 2025-05-20
Payer: MEDICARE

## 2025-05-20 VITALS
TEMPERATURE: 97.3 F | BODY MASS INDEX: 22.13 KG/M2 | HEIGHT: 67 IN | SYSTOLIC BLOOD PRESSURE: 132 MMHG | HEART RATE: 70 BPM | DIASTOLIC BLOOD PRESSURE: 74 MMHG | OXYGEN SATURATION: 98 % | WEIGHT: 141 LBS

## 2025-05-20 DIAGNOSIS — E87.6 HYPOKALEMIA: ICD-10-CM

## 2025-05-20 DIAGNOSIS — R93.89 ABNORMAL FINDINGS ON DIAGNOSTIC IMAGING OF OTHER SPECIFIED BODY STRUCTURES: ICD-10-CM

## 2025-05-20 DIAGNOSIS — Z94.0 KIDNEY TRANSPLANT STATUS: ICD-10-CM

## 2025-05-20 LAB
ACHR BLOCK AB SER-ACNC: 14 % — SIGNIFICANT CHANGE UP (ref 0–25)
ACHR MOD AB SER-ACNC: 1 % — SIGNIFICANT CHANGE UP (ref 0–45)
ACRM BINDING ANTIBODY: 0 NMOL/L — SIGNIFICANT CHANGE UP
INTERPRETATION MG: SIGNIFICANT CHANGE UP
P/Q TYPE ANTIBODY: 0 NMOL/L — SIGNIFICANT CHANGE UP
VIT B1 SERPL-MCNC: 111.3 NMOL/L — SIGNIFICANT CHANGE UP (ref 66.5–200)

## 2025-05-20 PROCEDURE — 36415 COLL VENOUS BLD VENIPUNCTURE: CPT

## 2025-05-20 PROCEDURE — 99496 TRANSJ CARE MGMT HIGH F2F 7D: CPT

## 2025-05-21 LAB
ANION GAP SERPL CALC-SCNC: 14 MMOL/L
BUN SERPL-MCNC: 12 MG/DL
CALCIUM SERPL-MCNC: 9 MG/DL
CHLORIDE SERPL-SCNC: 103 MMOL/L
CO2 SERPL-SCNC: 24 MMOL/L
CREAT SERPL-MCNC: 1.03 MG/DL
EGFRCR SERPLBLD CKD-EPI 2021: 75 ML/MIN/1.73M2
GLUCOSE SERPL-MCNC: 130 MG/DL
NSE FLD-MCNC: 12 NG/ML — SIGNIFICANT CHANGE UP (ref 0–17.6)
POTASSIUM SERPL-SCNC: 4.7 MMOL/L
SODIUM SERPL-SCNC: 140 MMOL/L

## 2025-05-23 NOTE — ED PROVIDER NOTE - WOUND TYPE
Size Of Lesion In Cm (Optional): 0 Detail Level: Detailed Introduction Text (Please End With A Colon): The following procedure was deferred: Needs an apt with [facility]

## 2025-05-27 DIAGNOSIS — J18.9 PNEUMONIA, UNSPECIFIED ORGANISM: ICD-10-CM

## 2025-05-27 DIAGNOSIS — Z88.8 ALLERGY STATUS TO OTHER DRUGS, MEDICAMENTS AND BIOLOGICAL SUBSTANCES: ICD-10-CM

## 2025-05-27 DIAGNOSIS — Z79.82 LONG TERM (CURRENT) USE OF ASPIRIN: ICD-10-CM

## 2025-05-27 DIAGNOSIS — Z79.890 HORMONE REPLACEMENT THERAPY: ICD-10-CM

## 2025-05-27 DIAGNOSIS — N40.1 BENIGN PROSTATIC HYPERPLASIA WITH LOWER URINARY TRACT SYMPTOMS: ICD-10-CM

## 2025-05-27 DIAGNOSIS — Z79.4 LONG TERM (CURRENT) USE OF INSULIN: ICD-10-CM

## 2025-05-27 DIAGNOSIS — G70.00 MYASTHENIA GRAVIS WITHOUT (ACUTE) EXACERBATION: ICD-10-CM

## 2025-05-27 DIAGNOSIS — A41.9 SEPSIS, UNSPECIFIED ORGANISM: ICD-10-CM

## 2025-05-27 DIAGNOSIS — D84.89 OTHER IMMUNODEFICIENCIES: ICD-10-CM

## 2025-05-27 DIAGNOSIS — E03.9 HYPOTHYROIDISM, UNSPECIFIED: ICD-10-CM

## 2025-05-27 DIAGNOSIS — J96.01 ACUTE RESPIRATORY FAILURE WITH HYPOXIA: ICD-10-CM

## 2025-05-27 DIAGNOSIS — Z79.84 LONG TERM (CURRENT) USE OF ORAL HYPOGLYCEMIC DRUGS: ICD-10-CM

## 2025-05-27 DIAGNOSIS — Z94.0 KIDNEY TRANSPLANT STATUS: ICD-10-CM

## 2025-05-27 DIAGNOSIS — E78.5 HYPERLIPIDEMIA, UNSPECIFIED: ICD-10-CM

## 2025-05-27 DIAGNOSIS — I50.32 CHRONIC DIASTOLIC (CONGESTIVE) HEART FAILURE: ICD-10-CM

## 2025-05-27 DIAGNOSIS — I13.0 HYPERTENSIVE HEART AND CHRONIC KIDNEY DISEASE WITH HEART FAILURE AND STAGE 1 THROUGH STAGE 4 CHRONIC KIDNEY DISEASE, OR UNSPECIFIED CHRONIC KIDNEY DISEASE: ICD-10-CM

## 2025-05-27 DIAGNOSIS — R33.8 OTHER RETENTION OF URINE: ICD-10-CM

## 2025-05-27 DIAGNOSIS — J69.0 PNEUMONITIS DUE TO INHALATION OF FOOD AND VOMIT: ICD-10-CM

## 2025-05-27 DIAGNOSIS — Z95.5 PRESENCE OF CORONARY ANGIOPLASTY IMPLANT AND GRAFT: ICD-10-CM

## 2025-05-27 DIAGNOSIS — R65.21 SEVERE SEPSIS WITH SEPTIC SHOCK: ICD-10-CM

## 2025-05-27 DIAGNOSIS — E11.22 TYPE 2 DIABETES MELLITUS WITH DIABETIC CHRONIC KIDNEY DISEASE: ICD-10-CM

## 2025-05-27 DIAGNOSIS — R56.9 UNSPECIFIED CONVULSIONS: ICD-10-CM

## 2025-05-27 DIAGNOSIS — E11.65 TYPE 2 DIABETES MELLITUS WITH HYPERGLYCEMIA: ICD-10-CM

## 2025-05-27 DIAGNOSIS — I25.10 ATHEROSCLEROTIC HEART DISEASE OF NATIVE CORONARY ARTERY WITHOUT ANGINA PECTORIS: ICD-10-CM

## 2025-05-27 DIAGNOSIS — N18.30 CHRONIC KIDNEY DISEASE, STAGE 3 UNSPECIFIED: ICD-10-CM

## 2025-06-05 ENCOUNTER — APPOINTMENT (OUTPATIENT)
Dept: INTERNAL MEDICINE | Facility: CLINIC | Age: 75
End: 2025-06-05
Payer: MEDICARE

## 2025-06-05 VITALS
OXYGEN SATURATION: 97 % | BODY MASS INDEX: 22.13 KG/M2 | DIASTOLIC BLOOD PRESSURE: 63 MMHG | SYSTOLIC BLOOD PRESSURE: 113 MMHG | TEMPERATURE: 97.8 F | HEART RATE: 68 BPM | WEIGHT: 141 LBS | HEIGHT: 67 IN

## 2025-06-05 DIAGNOSIS — R19.7 DIARRHEA, UNSPECIFIED: ICD-10-CM

## 2025-06-05 PROCEDURE — 99213 OFFICE O/P EST LOW 20 MIN: CPT

## 2025-06-05 PROCEDURE — G2211 COMPLEX E/M VISIT ADD ON: CPT

## 2025-06-10 ENCOUNTER — LABORATORY RESULT (OUTPATIENT)
Age: 75
End: 2025-06-10

## 2025-06-11 ENCOUNTER — LABORATORY RESULT (OUTPATIENT)
Age: 75
End: 2025-06-11

## 2025-06-19 ENCOUNTER — APPOINTMENT (OUTPATIENT)
Dept: INTERNAL MEDICINE | Facility: CLINIC | Age: 75
End: 2025-06-19
Payer: MEDICARE

## 2025-06-19 PROBLEM — A04.72 C. DIFFICILE DIARRHEA: Status: ACTIVE | Noted: 2025-06-19

## 2025-06-19 PROCEDURE — G2211 COMPLEX E/M VISIT ADD ON: CPT | Mod: 2W

## 2025-06-19 PROCEDURE — 99214 OFFICE O/P EST MOD 30 MIN: CPT | Mod: 2W

## 2025-06-19 RX ORDER — VANCOMYCIN HYDROCHLORIDE 125 MG/1
125 CAPSULE ORAL
Qty: 40 | Refills: 0 | Status: ACTIVE | COMMUNITY
Start: 2025-06-19 | End: 1900-01-01

## 2025-06-26 ENCOUNTER — APPOINTMENT (OUTPATIENT)
Dept: DERMATOLOGY | Facility: CLINIC | Age: 75
End: 2025-06-26
Payer: MEDICARE

## 2025-06-26 PROBLEM — B35.1 ONYCHOMYCOSIS: Status: ACTIVE | Noted: 2025-06-26

## 2025-06-26 PROBLEM — D22.60 MULTIPLE BENIGN NEVI OF UPPER EXTREMITY, LOWER EXTREMITY, AND TRUNK: Status: ACTIVE | Noted: 2025-06-26

## 2025-06-26 PROBLEM — L57.8 DERMATOHELIOSIS: Status: ACTIVE | Noted: 2025-06-26

## 2025-06-26 PROBLEM — D48.5 NEOPLASM OF UNCERTAIN BEHAVIOR OF SKIN: Status: ACTIVE | Noted: 2025-06-26

## 2025-06-26 PROCEDURE — 11102 TANGNTL BX SKIN SINGLE LES: CPT

## 2025-06-26 PROCEDURE — 99203 OFFICE O/P NEW LOW 30 MIN: CPT | Mod: 25

## 2025-06-26 RX ORDER — CICLOPIROX 71.3 MG/ML
8 SOLUTION TOPICAL
Qty: 1 | Refills: 6 | Status: ACTIVE | COMMUNITY
Start: 2025-06-26 | End: 1900-01-01

## 2025-07-02 LAB — DERMATOLOGY BIOPSY: NORMAL

## 2025-07-11 ENCOUNTER — APPOINTMENT (OUTPATIENT)
Dept: HEART AND VASCULAR | Facility: CLINIC | Age: 75
End: 2025-07-11

## 2025-07-11 VITALS
WEIGHT: 135 LBS | DIASTOLIC BLOOD PRESSURE: 68 MMHG | OXYGEN SATURATION: 99 % | SYSTOLIC BLOOD PRESSURE: 124 MMHG | HEIGHT: 67 IN | HEART RATE: 71 BPM | TEMPERATURE: 98 F | BODY MASS INDEX: 21.19 KG/M2

## 2025-07-11 PROCEDURE — G2211 COMPLEX E/M VISIT ADD ON: CPT

## 2025-07-11 PROCEDURE — 99214 OFFICE O/P EST MOD 30 MIN: CPT

## 2025-07-11 PROCEDURE — 93000 ELECTROCARDIOGRAM COMPLETE: CPT

## 2025-07-14 LAB
ALBUMIN SERPL ELPH-MCNC: 3.9 G/DL
ALP BLD-CCNC: 79 U/L
ALT SERPL-CCNC: 16 U/L
ANION GAP SERPL CALC-SCNC: 14 MMOL/L
AST SERPL-CCNC: 21 U/L
BASOPHILS # BLD AUTO: 0.02 K/UL
BASOPHILS NFR BLD AUTO: 0.4 %
BILIRUB SERPL-MCNC: 0.5 MG/DL
BUN SERPL-MCNC: 12 MG/DL
CALCIUM SERPL-MCNC: 9.4 MG/DL
CHLORIDE SERPL-SCNC: 101 MMOL/L
CHOLEST SERPL-MCNC: 199 MG/DL
CO2 SERPL-SCNC: 23 MMOL/L
CREAT SERPL-MCNC: 1.07 MG/DL
EGFRCR SERPLBLD CKD-EPI 2021: 71 ML/MIN/1.73M2
EOSINOPHIL # BLD AUTO: 0.33 K/UL
EOSINOPHIL NFR BLD AUTO: 7.2 %
ESTIMATED AVERAGE GLUCOSE: 140 MG/DL
GLUCOSE SERPL-MCNC: 146 MG/DL
HBA1C MFR BLD HPLC: 6.5 %
HCT VFR BLD CALC: 44.6 %
HDLC SERPL-MCNC: 38 MG/DL
HGB BLD-MCNC: 13.9 G/DL
IMM GRANULOCYTES NFR BLD AUTO: 0.2 %
LDLC SERPL-MCNC: 123 MG/DL
LYMPHOCYTES # BLD AUTO: 1.48 K/UL
LYMPHOCYTES NFR BLD AUTO: 32.4 %
MAN DIFF?: NORMAL
MCHC RBC-ENTMCNC: 29.6 PG
MCHC RBC-ENTMCNC: 31.2 G/DL
MCV RBC AUTO: 95.1 FL
MONOCYTES # BLD AUTO: 0.61 K/UL
MONOCYTES NFR BLD AUTO: 13.3 %
NEUTROPHILS # BLD AUTO: 2.12 K/UL
NEUTROPHILS NFR BLD AUTO: 46.5 %
NONHDLC SERPL-MCNC: 162 MG/DL
PLATELET # BLD AUTO: 245 K/UL
POTASSIUM SERPL-SCNC: 4.9 MMOL/L
PROT SERPL-MCNC: 6.8 G/DL
RBC # BLD: 4.69 M/UL
RBC # FLD: 13.2 %
SODIUM SERPL-SCNC: 138 MMOL/L
T3FREE SERPL-MCNC: 2.19 PG/ML
T4 FREE SERPL-MCNC: 1.5 NG/DL
TRIGL SERPL-MCNC: 220 MG/DL
TSH SERPL-ACNC: 1.34 UIU/ML
WBC # FLD AUTO: 4.57 K/UL

## 2025-07-18 ENCOUNTER — APPOINTMENT (OUTPATIENT)
Dept: NEPHROLOGY | Facility: CLINIC | Age: 75
End: 2025-07-18

## 2025-07-18 PROCEDURE — G2211 COMPLEX E/M VISIT ADD ON: CPT

## 2025-07-18 PROCEDURE — 99215 OFFICE O/P EST HI 40 MIN: CPT

## 2025-07-25 ENCOUNTER — APPOINTMENT (OUTPATIENT)
Dept: HEART AND VASCULAR | Facility: CLINIC | Age: 75
End: 2025-07-25
Payer: MEDICARE

## 2025-07-25 PROCEDURE — 93306 TTE W/DOPPLER COMPLETE: CPT

## 2025-07-25 PROCEDURE — 93880 EXTRACRANIAL BILAT STUDY: CPT

## 2025-07-30 ENCOUNTER — RX RENEWAL (OUTPATIENT)
Age: 75
End: 2025-07-30

## 2025-08-07 ENCOUNTER — RX RENEWAL (OUTPATIENT)
Age: 75
End: 2025-08-07

## 2025-08-08 ENCOUNTER — RX RENEWAL (OUTPATIENT)
Age: 75
End: 2025-08-08

## 2025-08-18 ENCOUNTER — APPOINTMENT (OUTPATIENT)
Dept: HEART AND VASCULAR | Facility: CLINIC | Age: 75
End: 2025-08-18
Payer: MEDICARE

## 2025-08-18 DIAGNOSIS — R09.89 OTHER SPECIFIED SYMPTOMS AND SIGNS INVOLVING THE CIRCULATORY AND RESPIRATORY SYSTEMS: ICD-10-CM

## 2025-08-18 DIAGNOSIS — I25.10 ATHEROSCLEROTIC HEART DISEASE OF NATIVE CORONARY ARTERY W/OUT ANGINA PECTORIS: ICD-10-CM

## 2025-08-18 DIAGNOSIS — I50.30 UNSPECIFIED DIASTOLIC (CONGESTIVE) HEART FAILURE: ICD-10-CM

## 2025-08-18 DIAGNOSIS — E78.5 HYPERLIPIDEMIA, UNSPECIFIED: ICD-10-CM

## 2025-08-18 PROCEDURE — 99214 OFFICE O/P EST MOD 30 MIN: CPT | Mod: 93

## 2025-08-18 PROCEDURE — G2211 COMPLEX E/M VISIT ADD ON: CPT | Mod: 93

## 2025-08-18 RX ORDER — ROSUVASTATIN CALCIUM 20 MG/1
20 TABLET, FILM COATED ORAL
Qty: 90 | Refills: 3 | Status: ACTIVE | COMMUNITY
Start: 2025-08-18 | End: 1900-01-01

## 2025-08-22 ENCOUNTER — APPOINTMENT (OUTPATIENT)
Dept: INTERNAL MEDICINE | Facility: CLINIC | Age: 75
End: 2025-08-22
Payer: MEDICARE

## 2025-08-22 VITALS
TEMPERATURE: 97.6 F | HEIGHT: 67 IN | OXYGEN SATURATION: 98 % | SYSTOLIC BLOOD PRESSURE: 134 MMHG | DIASTOLIC BLOOD PRESSURE: 59 MMHG | HEART RATE: 60 BPM

## 2025-08-22 DIAGNOSIS — Z79.4 TYPE 2 DIABETES MELLITUS W/OUT COMPLICATIONS: ICD-10-CM

## 2025-08-22 DIAGNOSIS — E11.9 TYPE 2 DIABETES MELLITUS W/OUT COMPLICATIONS: ICD-10-CM

## 2025-08-22 DIAGNOSIS — I10 ESSENTIAL (PRIMARY) HYPERTENSION: ICD-10-CM

## 2025-08-22 DIAGNOSIS — E03.9 HYPOTHYROIDISM, UNSPECIFIED: ICD-10-CM

## 2025-08-22 PROCEDURE — 99214 OFFICE O/P EST MOD 30 MIN: CPT

## 2025-08-22 PROCEDURE — G2211 COMPLEX E/M VISIT ADD ON: CPT

## 2025-08-22 RX ORDER — BLOOD-GLUCOSE METER
W/DEVICE EACH MISCELLANEOUS
Qty: 1 | Refills: 0 | Status: ACTIVE | COMMUNITY
Start: 2025-08-22 | End: 1900-01-01

## 2025-08-22 RX ORDER — BLOOD SUGAR DIAGNOSTIC
STRIP MISCELLANEOUS
Qty: 3 | Refills: 0 | Status: ACTIVE | COMMUNITY
Start: 2025-08-22 | End: 1900-01-01

## 2025-09-12 ENCOUNTER — APPOINTMENT (OUTPATIENT)
Dept: INTERNAL MEDICINE | Facility: CLINIC | Age: 75
End: 2025-09-12